# Patient Record
Sex: FEMALE | Race: WHITE | Employment: OTHER | URBAN - METROPOLITAN AREA
[De-identification: names, ages, dates, MRNs, and addresses within clinical notes are randomized per-mention and may not be internally consistent; named-entity substitution may affect disease eponyms.]

---

## 2018-08-10 ENCOUNTER — OFFICE VISIT (OUTPATIENT)
Dept: INTERNAL MEDICINE CLINIC | Age: 73
End: 2018-08-10

## 2018-08-10 VITALS
DIASTOLIC BLOOD PRESSURE: 62 MMHG | TEMPERATURE: 98.7 F | SYSTOLIC BLOOD PRESSURE: 144 MMHG | HEIGHT: 68 IN | BODY MASS INDEX: 23.49 KG/M2 | WEIGHT: 155 LBS

## 2018-08-10 DIAGNOSIS — N30.90 CYSTITIS: Primary | ICD-10-CM

## 2018-08-10 DIAGNOSIS — R39.15 URGENCY OF URINATION: ICD-10-CM

## 2018-08-10 DIAGNOSIS — R30.0 DYSURIA: ICD-10-CM

## 2018-08-10 LAB
BILIRUBIN, POC: ABNORMAL
BLOOD URINE, POC: + 3
CLARITY, POC: CLEAR
COLOR, POC: ABNORMAL
GLUCOSE URINE, POC: ABNORMAL
KETONES, POC: ABNORMAL
LEUKOCYTE EST, POC: + 2
NITRITE, POC: ABNORMAL
PH, POC: 6
PROTEIN, POC: ABNORMAL
SPECIFIC GRAVITY, POC: 1010
UROBILINOGEN, POC: ABNORMAL

## 2018-08-10 PROCEDURE — 99202 OFFICE O/P NEW SF 15 MIN: CPT | Performed by: NURSE PRACTITIONER

## 2018-08-10 PROCEDURE — 1123F ACP DISCUSS/DSCN MKR DOCD: CPT | Performed by: NURSE PRACTITIONER

## 2018-08-10 PROCEDURE — G8400 PT W/DXA NO RESULTS DOC: HCPCS | Performed by: NURSE PRACTITIONER

## 2018-08-10 PROCEDURE — 4040F PNEUMOC VAC/ADMIN/RCVD: CPT | Performed by: NURSE PRACTITIONER

## 2018-08-10 PROCEDURE — 1101F PT FALLS ASSESS-DOCD LE1/YR: CPT | Performed by: NURSE PRACTITIONER

## 2018-08-10 PROCEDURE — 1090F PRES/ABSN URINE INCON ASSESS: CPT | Performed by: NURSE PRACTITIONER

## 2018-08-10 PROCEDURE — 81002 URINALYSIS NONAUTO W/O SCOPE: CPT | Performed by: NURSE PRACTITIONER

## 2018-08-10 PROCEDURE — G0444 DEPRESSION SCREEN ANNUAL: HCPCS | Performed by: NURSE PRACTITIONER

## 2018-08-10 PROCEDURE — G8420 CALC BMI NORM PARAMETERS: HCPCS | Performed by: NURSE PRACTITIONER

## 2018-08-10 PROCEDURE — 3017F COLORECTAL CA SCREEN DOC REV: CPT | Performed by: NURSE PRACTITIONER

## 2018-08-10 PROCEDURE — G8427 DOCREV CUR MEDS BY ELIG CLIN: HCPCS | Performed by: NURSE PRACTITIONER

## 2018-08-10 PROCEDURE — 1036F TOBACCO NON-USER: CPT | Performed by: NURSE PRACTITIONER

## 2018-08-10 RX ORDER — CIPROFLOXACIN 250 MG/1
250 TABLET, FILM COATED ORAL 2 TIMES DAILY
Qty: 6 TABLET | Refills: 0 | Status: SHIPPED | OUTPATIENT
Start: 2018-08-10 | End: 2018-08-13

## 2018-08-10 RX ORDER — PHENAZOPYRIDINE HYDROCHLORIDE 200 MG/1
200 TABLET, FILM COATED ORAL 3 TIMES DAILY PRN
Qty: 9 TABLET | Refills: 0 | Status: SHIPPED | OUTPATIENT
Start: 2018-08-10 | End: 2018-08-13

## 2018-08-10 ASSESSMENT — PATIENT HEALTH QUESTIONNAIRE - PHQ9
8. MOVING OR SPEAKING SO SLOWLY THAT OTHER PEOPLE COULD HAVE NOTICED. OR THE OPPOSITE, BEING SO FIGETY OR RESTLESS THAT YOU HAVE BEEN MOVING AROUND A LOT MORE THAN USUAL: 0
SUM OF ALL RESPONSES TO PHQ9 QUESTIONS 1 & 2: 6
7. TROUBLE CONCENTRATING ON THINGS, SUCH AS READING THE NEWSPAPER OR WATCHING TELEVISION: 0
6. FEELING BAD ABOUT YOURSELF - OR THAT YOU ARE A FAILURE OR HAVE LET YOURSELF OR YOUR FAMILY DOWN: 0
2. FEELING DOWN, DEPRESSED OR HOPELESS: 3
SUM OF ALL RESPONSES TO PHQ QUESTIONS 1-9: 15
5. POOR APPETITE OR OVEREATING: 3
9. THOUGHTS THAT YOU WOULD BE BETTER OFF DEAD, OR OF HURTING YOURSELF: 0
SUM OF ALL RESPONSES TO PHQ QUESTIONS 1-9: 15
3. TROUBLE FALLING OR STAYING ASLEEP: 3
4. FEELING TIRED OR HAVING LITTLE ENERGY: 3
1. LITTLE INTEREST OR PLEASURE IN DOING THINGS: 3
10. IF YOU CHECKED OFF ANY PROBLEMS, HOW DIFFICULT HAVE THESE PROBLEMS MADE IT FOR YOU TO DO YOUR WORK, TAKE CARE OF THINGS AT HOME, OR GET ALONG WITH OTHER PEOPLE: 0

## 2018-08-10 NOTE — PROGRESS NOTES
SUBJECTIVE:  Georgette Brower a 72 y. o.female    Chief Complaint   Patient presents with    Urinary Tract Infection       Has Been Treated With Bactrim,Made Her Sick       Patient here for UTI. She had PCP that retired and she is scheduled to establish care with PCP in a few weeks. She started to have UTI symptoms and could not wait until appointment but [facility]. She is having burning when she urinates, urgency, and frequency. She tried to increase fluids and drink cranberry juice but she has not gotten any better. She has had some chills. No fever. No blood in urine that she has realized. No past medical history on file. No past surgical history on file. Family History   Problem Relation Age of Onset    High Cholesterol Mother     Diabetes Father        Social History     Social History    Marital status:      Spouse name: N/A    Number of children: N/A    Years of education: N/A     Occupational History    Not on file. Social History Main Topics    Smoking status: Never Smoker    Smokeless tobacco: Never Used    Alcohol use No    Drug use: No    Sexual activity: Not on file     Other Topics Concern    Not on file     Social History Narrative    No narrative on file       Review of Systems   Constitutional: Negative for chills and fever. Respiratory: Negative for cough, chest tightness and shortness of breath. Cardiovascular: Negative for chest pain. Gastrointestinal: Negative for abdominal pain, constipation, diarrhea, nausea and vomiting. Endocrine: Negative. Genitourinary: Positive for dysuria, frequency and urgency. Negative for difficulty urinating. Musculoskeletal: Negative. Skin: Negative. Allergic/Immunologic: Negative. Neurological: Negative for dizziness, syncope and light-headedness. Hematological: Negative. Psychiatric/Behavioral: Negative.         OBJECTIVE:  BP (!) 144/62   Temp 98.7 °F (37.1 °C) (Oral)   Ht 5' 8\" (1.727 m)   Wt 155 lb (70.3 kg)   BMI 23.57 kg/m²     Physical Exam   Constitutional: She is oriented to person, place, and time. She appears well-developed and well-nourished. HENT:   Head: Normocephalic and atraumatic. Mouth/Throat: Oropharynx is clear and moist.   Eyes: Pupils are equal, round, and reactive to light. Conjunctivae are normal. No scleral icterus. Neck: Normal range of motion. Neck supple. Cardiovascular: Normal rate, regular rhythm and normal heart sounds. Pulmonary/Chest: Effort normal and breath sounds normal. No respiratory distress. Abdominal: Soft. Normal appearance and bowel sounds are normal. There is no hepatosplenomegaly. There is no CVA tenderness. Musculoskeletal: Normal range of motion. Neurological: She is alert and oriented to person, place, and time. Skin: Skin is warm, dry and intact. Psychiatric: She has a normal mood and affect. Her speech is normal and behavior is normal. Judgment and thought content normal.   Vitals reviewed. ASSESSMENT/PLAN:    1. Cystitis  If no improvement/or worsening of condition, notify office for further follow up. Drink plenty of fluids. (Limit caffeine, spicy foods, and alcohol). Take medication as prescribed. You make drink cranberry juice. Take probiotics as directed. May take Tylenol for fever. Make sure to wipe from front to back. Practice good hand hygiene. Empty bladder as soon as you have the urge to do so. - POCT Urinalysis no Micro  - ciprofloxacin (CIPRO) 250 MG tablet; Take 1 tablet by mouth 2 times daily for 3 days  Dispense: 6 tablet; Refill: 0  - URINE CULTURE    2. Urgency of urination  See above plan. 3. Dysuria  See above plan. Take medication as prescribed. - phenazopyridine (PYRIDIUM) 200 MG tablet; Take 1 tablet by mouth 3 times daily as needed for Pain  Dispense: 9 tablet; Refill: 0      Return if symptoms worsen or fail to improve.

## 2018-08-12 LAB — URINE CULTURE, ROUTINE: NORMAL

## 2018-08-12 ASSESSMENT — ENCOUNTER SYMPTOMS
VOMITING: 0
COUGH: 0
DIARRHEA: 0
CHEST TIGHTNESS: 0
SHORTNESS OF BREATH: 0
CONSTIPATION: 0
NAUSEA: 0
ALLERGIC/IMMUNOLOGIC NEGATIVE: 1
ABDOMINAL PAIN: 0

## 2018-08-16 ENCOUNTER — APPOINTMENT (OUTPATIENT)
Dept: CT IMAGING | Age: 73
DRG: 699 | End: 2018-08-16
Payer: MEDICARE

## 2018-08-16 ENCOUNTER — HOSPITAL ENCOUNTER (INPATIENT)
Age: 73
LOS: 2 days | Discharge: HOME | DRG: 699 | End: 2018-08-18
Attending: EMERGENCY MEDICINE | Admitting: INTERNAL MEDICINE
Payer: MEDICARE

## 2018-08-16 DIAGNOSIS — K57.20 PERFORATION OF SIGMOID COLON DUE TO DIVERTICULITIS: Primary | ICD-10-CM

## 2018-08-16 DIAGNOSIS — N32.1: ICD-10-CM

## 2018-08-16 DIAGNOSIS — N34.2 INFECTIVE URETHRITIS: ICD-10-CM

## 2018-08-16 PROBLEM — K57.92 ACUTE DIVERTICULITIS: Status: ACTIVE | Noted: 2018-08-16

## 2018-08-16 PROBLEM — N30.01 ACUTE CYSTITIS WITH HEMATURIA: Status: ACTIVE | Noted: 2018-08-16

## 2018-08-16 LAB
A/G RATIO: 1.2 (ref 1.1–2.2)
ALBUMIN SERPL-MCNC: 3.9 G/DL (ref 3.4–5)
ALP BLD-CCNC: 123 U/L (ref 40–129)
ALT SERPL-CCNC: 21 U/L (ref 10–40)
ANION GAP SERPL CALCULATED.3IONS-SCNC: 14 MMOL/L (ref 3–16)
AST SERPL-CCNC: 22 U/L (ref 15–37)
BACTERIA: ABNORMAL /HPF
BILIRUB SERPL-MCNC: 0.3 MG/DL (ref 0–1)
BILIRUBIN URINE: NEGATIVE
BLOOD, URINE: ABNORMAL
BUN BLDV-MCNC: 9 MG/DL (ref 7–20)
CALCIUM SERPL-MCNC: 9.2 MG/DL (ref 8.3–10.6)
CHLORIDE BLD-SCNC: 104 MMOL/L (ref 99–110)
CLARITY: CLEAR
CO2: 23 MMOL/L (ref 21–32)
COLOR: YELLOW
CREAT SERPL-MCNC: <0.5 MG/DL (ref 0.6–1.2)
EPITHELIAL CELLS, UA: ABNORMAL /HPF
GFR AFRICAN AMERICAN: >60
GFR NON-AFRICAN AMERICAN: >60
GLOBULIN: 3.3 G/DL
GLUCOSE BLD-MCNC: 106 MG/DL (ref 70–99)
GLUCOSE URINE: NEGATIVE MG/DL
HCT VFR BLD CALC: 34.8 % (ref 36–48)
HEMOGLOBIN: 11.6 G/DL (ref 12–16)
KETONES, URINE: NEGATIVE MG/DL
LEUKOCYTE ESTERASE, URINE: ABNORMAL
LIPASE: 23 U/L (ref 13–60)
MCH RBC QN AUTO: 27.6 PG (ref 26–34)
MCHC RBC AUTO-ENTMCNC: 33.2 G/DL (ref 31–36)
MCV RBC AUTO: 83 FL (ref 80–100)
MICROSCOPIC EXAMINATION: YES
NITRITE, URINE: NEGATIVE
PDW BLD-RTO: 14 % (ref 12.4–15.4)
PH UA: 6.5
PLATELET # BLD: 326 K/UL (ref 135–450)
PMV BLD AUTO: 6.7 FL (ref 5–10.5)
POTASSIUM SERPL-SCNC: 4 MMOL/L (ref 3.5–5.1)
PROTEIN UA: NEGATIVE MG/DL
RBC # BLD: 4.19 M/UL (ref 4–5.2)
RBC UA: ABNORMAL /HPF (ref 0–2)
SODIUM BLD-SCNC: 141 MMOL/L (ref 136–145)
SPECIFIC GRAVITY UA: <=1.005
TOTAL PROTEIN: 7.2 G/DL (ref 6.4–8.2)
URINE TYPE: ABNORMAL
UROBILINOGEN, URINE: 0.2 E.U./DL
WBC # BLD: 6.6 K/UL (ref 4–11)
WBC UA: ABNORMAL /HPF (ref 0–5)

## 2018-08-16 PROCEDURE — 99284 EMERGENCY DEPT VISIT MOD MDM: CPT

## 2018-08-16 PROCEDURE — 83690 ASSAY OF LIPASE: CPT

## 2018-08-16 PROCEDURE — 96360 HYDRATION IV INFUSION INIT: CPT

## 2018-08-16 PROCEDURE — 87086 URINE CULTURE/COLONY COUNT: CPT

## 2018-08-16 PROCEDURE — 2580000003 HC RX 258: Performed by: INTERNAL MEDICINE

## 2018-08-16 PROCEDURE — 74176 CT ABD & PELVIS W/O CONTRAST: CPT

## 2018-08-16 PROCEDURE — 6360000002 HC RX W HCPCS: Performed by: INTERNAL MEDICINE

## 2018-08-16 PROCEDURE — 80053 COMPREHEN METABOLIC PANEL: CPT

## 2018-08-16 PROCEDURE — 2580000003 HC RX 258: Performed by: NURSE PRACTITIONER

## 2018-08-16 PROCEDURE — 81001 URINALYSIS AUTO W/SCOPE: CPT

## 2018-08-16 PROCEDURE — 1200000000 HC SEMI PRIVATE

## 2018-08-16 PROCEDURE — 85027 COMPLETE CBC AUTOMATED: CPT

## 2018-08-16 RX ORDER — POTASSIUM CHLORIDE 20 MEQ/1
40 TABLET, EXTENDED RELEASE ORAL PRN
Status: DISCONTINUED | OUTPATIENT
Start: 2018-08-16 | End: 2018-08-18 | Stop reason: HOSPADM

## 2018-08-16 RX ORDER — SODIUM CHLORIDE 9 MG/ML
INJECTION, SOLUTION INTRAVENOUS CONTINUOUS
Status: DISCONTINUED | OUTPATIENT
Start: 2018-08-16 | End: 2018-08-18 | Stop reason: HOSPADM

## 2018-08-16 RX ORDER — MORPHINE SULFATE 2 MG/ML
2 INJECTION, SOLUTION INTRAMUSCULAR; INTRAVENOUS
Status: DISCONTINUED | OUTPATIENT
Start: 2018-08-16 | End: 2018-08-18 | Stop reason: HOSPADM

## 2018-08-16 RX ORDER — POTASSIUM CHLORIDE 7.45 MG/ML
10 INJECTION INTRAVENOUS PRN
Status: DISCONTINUED | OUTPATIENT
Start: 2018-08-16 | End: 2018-08-18 | Stop reason: HOSPADM

## 2018-08-16 RX ORDER — SODIUM CHLORIDE 0.9 % (FLUSH) 0.9 %
10 SYRINGE (ML) INJECTION PRN
Status: DISCONTINUED | OUTPATIENT
Start: 2018-08-16 | End: 2018-08-18 | Stop reason: HOSPADM

## 2018-08-16 RX ORDER — SODIUM CHLORIDE 0.9 % (FLUSH) 0.9 %
10 SYRINGE (ML) INJECTION EVERY 12 HOURS SCHEDULED
Status: DISCONTINUED | OUTPATIENT
Start: 2018-08-16 | End: 2018-08-18 | Stop reason: HOSPADM

## 2018-08-16 RX ORDER — POTASSIUM CHLORIDE 20MEQ/15ML
40 LIQUID (ML) ORAL PRN
Status: DISCONTINUED | OUTPATIENT
Start: 2018-08-16 | End: 2018-08-18 | Stop reason: HOSPADM

## 2018-08-16 RX ORDER — MORPHINE SULFATE 4 MG/ML
4 INJECTION, SOLUTION INTRAMUSCULAR; INTRAVENOUS
Status: DISCONTINUED | OUTPATIENT
Start: 2018-08-16 | End: 2018-08-18 | Stop reason: HOSPADM

## 2018-08-16 RX ORDER — 0.9 % SODIUM CHLORIDE 0.9 %
1000 INTRAVENOUS SOLUTION INTRAVENOUS ONCE
Status: COMPLETED | OUTPATIENT
Start: 2018-08-16 | End: 2018-08-16

## 2018-08-16 RX ORDER — ACETAMINOPHEN 325 MG/1
650 TABLET ORAL EVERY 4 HOURS PRN
Status: DISCONTINUED | OUTPATIENT
Start: 2018-08-16 | End: 2018-08-18 | Stop reason: HOSPADM

## 2018-08-16 RX ORDER — CYANOCOBALAMIN (VITAMIN B-12) 1000 MCG
1000 TABLET, EXTENDED RELEASE ORAL DAILY
COMMUNITY
End: 2021-11-15 | Stop reason: ALTCHOICE

## 2018-08-16 RX ORDER — ONDANSETRON 2 MG/ML
4 INJECTION INTRAMUSCULAR; INTRAVENOUS EVERY 6 HOURS PRN
Status: DISCONTINUED | OUTPATIENT
Start: 2018-08-16 | End: 2018-08-18 | Stop reason: HOSPADM

## 2018-08-16 RX ADMIN — PIPERACILLIN SODIUM AND TAZOBACTAM SODIUM 3.38 G: 3; .375 INJECTION, POWDER, LYOPHILIZED, FOR SOLUTION INTRAVENOUS at 21:21

## 2018-08-16 RX ADMIN — MORPHINE SULFATE 2 MG: 2 INJECTION, SOLUTION INTRAMUSCULAR; INTRAVENOUS at 21:25

## 2018-08-16 RX ADMIN — SODIUM CHLORIDE: 9 INJECTION, SOLUTION INTRAVENOUS at 21:21

## 2018-08-16 RX ADMIN — SODIUM CHLORIDE 1000 ML: 9 INJECTION, SOLUTION INTRAVENOUS at 18:38

## 2018-08-16 RX ADMIN — SODIUM CHLORIDE, PRESERVATIVE FREE 10 ML: 5 INJECTION INTRAVENOUS at 21:21

## 2018-08-16 ASSESSMENT — PAIN SCALES - GENERAL
PAINLEVEL_OUTOF10: 8
PAINLEVEL_OUTOF10: 3
PAINLEVEL_OUTOF10: 6

## 2018-08-16 ASSESSMENT — PAIN DESCRIPTION - ORIENTATION: ORIENTATION: LOWER

## 2018-08-16 ASSESSMENT — PAIN DESCRIPTION - PAIN TYPE: TYPE: ACUTE PAIN

## 2018-08-16 ASSESSMENT — PAIN DESCRIPTION - PROGRESSION: CLINICAL_PROGRESSION: NOT CHANGED

## 2018-08-16 ASSESSMENT — PAIN DESCRIPTION - FREQUENCY: FREQUENCY: INTERMITTENT

## 2018-08-16 ASSESSMENT — PAIN DESCRIPTION - DESCRIPTORS: DESCRIPTORS: CRAMPING

## 2018-08-16 ASSESSMENT — PAIN DESCRIPTION - LOCATION: LOCATION: ABDOMEN

## 2018-08-16 ASSESSMENT — PAIN DESCRIPTION - ONSET: ONSET: ON-GOING

## 2018-08-16 NOTE — H&P
Hospital Medicine History & Physical      PCP: No primary care provider on file. Date of Admission: 8/16/2018    Date of Service: Pt seen/examined on 8/16/2018 and Admitted to Inpatient with expected LOS greater than two midnights due to medical therapy. Chief Complaint:  Burning sensation during urination, lower abdominal pain      History Of Present Illness:   72 y.o. female who presented to [facility] with above complaints  This is a otherwise healthy 80-year-old  woman who presented to the ED with complaints of dysuria, pain in the lower abdomen that has been going on on and off for the past few weeks. She reports she was diagnosed with a UTI by her primary care provider a few weeks ago and was prescribed Bactrim. When her symptoms did not resolve she was then given ciprofloxacin and Pyridium. After completion of this, her PCP obtained a urinalysis which was apparently negative but her symptoms persisted so she decided to come to the ED because today she began to have blood in the urine and continuing lower abdominal pain. She denies any subjective fevers chills or rigors, denies any nausea vomiting or diarrhea. Past Medical History:        Diagnosis Date    Kidney stone        Past Surgical History:    History reviewed. No pertinent surgical history. Medications Prior to Admission:      Prior to Admission medications    Medication Sig Start Date End Date Taking? Authorizing Provider   Cholecalciferol (VITAMIN D3) 5000 units TABS Take by mouth   Yes Historical Provider, MD   diclofenac (VOLTAREN) 75 MG EC tablet Take 1 tablet by mouth 2 times daily. 11/20/14   Isabel Randhawa MD       Allergies:  Bactrim [sulfamethoxazole-trimethoprim]    Social History:    The patient currently lives at home  TOBACCO:   reports that she has never smoked. She has never used smokeless tobacco.  ETOH:   reports that she does not drink alcohol.       Family History:   Positive as BLOODU MODERATE 08/16/2018    SPECGRAV <=1.005 08/16/2018    GLUCOSEU Negative 08/16/2018       Radiology:   I have reviewed the CT abdomen pelvis personally    CT ABDOMEN PELVIS WO CONTRAST Additional Contrast? None   Preliminary Result   Findings compatible with acute sigmoid diverticulitis with a tiny amount of   extraluminal gas adjacent to the sigmoid colon. Significant wall thickening involving the bladder with gas in the urinary   bladder, which raises concern for colovesical fistula. Asymmetric atrophy of the left kidney. Findings were discussed with Dr. Davey [facility] at 7:05 p.m. on 8/16/2018. ASSESSMENT:PLAN:  Acute diverticulitis with microperforation and possible colo-vesical fistula  - IV zosyn  - Gen surgery consulted  - NPO for possible further testing/surgical intervention  - IV fluids, analgesics  - May require CT abdomen pelvis with oral/rectal contrast tomorrow to look for colovesical fistula, defer to surgery  - consult urology      Acute cystitis with hematuria  - on IV zosyn, follow up urine c/s and tailor antiBx accordingly      DVT Prophylaxis: SCD  Diet: Diet NPO Effective Now  Code Status: Full code  PT/OT Eval Status: not consulted    Dispo - IP stay       Medhat Cummins MD    Thank you No primary care provider on file. for the opportunity to be involved in this patient's care. If you have any questions or concerns please feel free to contact me at [phone].

## 2018-08-16 NOTE — ED PROVIDER NOTES
[facility] Emergency Department    CHIEF COMPLAINT  Dysuria (Hematuria and frequency today. Pain in lower abd. Sts dx with UTI a few wks ago, given bactrim x1wk, sts she was no better, was given cipro and pyridium. Sts she feels neg but repeat urine was negative.)      HISTORY OF PRESENT ILLNESS  Dean Valente is a 72 y.o. female who presents to the ED complaining of lower abdominal pressure with urinary urgency, frequency, dysuria and hematuria. Patient reports approximately a few weeks ago she was diagnosed with a urinary tract infection and placed on Bactrim however she did not get any better and was placed on Cipro and Pyridium afterwards. Patient reports seeing her primary care physician upon completion of antibiotic therapy and had negative urinalysis continued to have symptoms. Patient reports 2 days ago she began with hematuria and worsening of \"UTI like symptoms. \" Patient denies any dizziness or lightheadedness. No chest pain or shortness of breath. No nausea, vomiting, or diarrhea. Patient reports decreased appetite. No numbness or tingling in extremities. No unilateral weakness. No other complaints, modifying factors or associated symptoms. Nursing notes reviewed. Past Medical History:   Diagnosis Date    Kidney stone      History reviewed. No pertinent surgical history. Family History   Problem Relation Age of Onset    High Cholesterol Mother     Diabetes Father      Social History     Social History    Marital status:      Spouse name: N/A    Number of children: N/A    Years of education: N/A     Occupational History    Not on file.      Social History Main Topics    Smoking status: Never Smoker    Smokeless tobacco: Never Used    Alcohol use No    Drug use: No    Sexual activity: Not on file     Other Topics Concern    Not on file     Social History Narrative    No narrative on file     Current Facility-Administered Medications   Medication Dose Result Value Ref Range    Color, UA Yellow Straw/Yellow    Clarity, UA Clear Clear    Glucose, Ur Negative Negative mg/dL    Bilirubin Urine Negative Negative    Ketones, Urine Negative Negative mg/dL    Specific Gravity, UA <=1.005 1.005 - 1.030    Blood, Urine MODERATE (A) Negative    pH, UA 6.5 5.0 - 8.0    Protein, UA Negative Negative mg/dL    Urobilinogen, Urine 0.2 <2.0 E.U./dL    Nitrite, Urine Negative Negative    Leukocyte Esterase, Urine LARGE (A) Negative    Microscopic Examination YES     Urine Type Not Specified    CBC   Result Value Ref Range    WBC 6.6 4.0 - 11.0 K/uL    RBC 4.19 4.00 - 5.20 M/uL    Hemoglobin 11.6 (L) 12.0 - 16.0 g/dL    Hematocrit 34.8 (L) 36.0 - 48.0 %    MCV 83.0 80.0 - 100.0 fL    MCH 27.6 26.0 - 34.0 pg    MCHC 33.2 31.0 - 36.0 g/dL    RDW 14.0 12.4 - 15.4 %    Platelets 542 270 - 321 K/uL    MPV 6.7 5.0 - 10.5 fL   Comprehensive Metabolic Panel   Result Value Ref Range    Sodium 141 136 - 145 mmol/L    Potassium 4.0 3.5 - 5.1 mmol/L    Chloride 104 99 - 110 mmol/L    CO2 23 21 - 32 mmol/L    Anion Gap 14 3 - 16    Glucose 106 (H) 70 - 99 mg/dL    BUN 9 7 - 20 mg/dL    CREATININE <0.5 (L) 0.6 - 1.2 mg/dL    GFR Non-African American >60 >60    GFR African American >60 >60    Calcium 9.2 8.3 - 10.6 mg/dL    Total Protein 7.2 6.4 - 8.2 g/dL    Alb 3.9 3.4 - 5.0 g/dL    Albumin/Globulin Ratio 1.2 1.1 - 2.2    Total Bilirubin 0.3 0.0 - 1.0 mg/dL    Alkaline Phosphatase 123 40 - 129 U/L    ALT 21 10 - 40 U/L    AST 22 15 - 37 U/L    Globulin 3.3 g/dL   Lipase   Result Value Ref Range    Lipase 23.0 13.0 - 60.0 U/L   Microscopic Urinalysis   Result Value Ref Range    WBC, UA 20-50 (A) 0 - 5 /HPF    RBC, UA 0-2 0 - 2 /HPF    Epi Cells 0-2 /HPF    Bacteria, UA Rare (A) /HPF       I spoke with Dr. Hernando Dhaliwal and Dr. Srinivas Oreilly. We thoroughly discussed the history, physical exam, laboratory and imaging studies, as well as, emergency department course.  Based upon that discussion, we've decided to admit Mirza Caruso for further observation and evaluation of Julia He's abdominal pain. As I have deemed necessary from their history, physical and studies, I have considered and evaluated Mirza Caruso for the following diagnoses:     FINAL IMPRESSION  1. Perforation of sigmoid colon due to diverticulitis    2. Infective urethritis    3. Fistula, intestinovesical        Vitals:  Blood pressure (!) 143/89, pulse 87, temperature 98.1 °F (36.7 °C), temperature source Oral, resp. rate 16, height 5' 8\" (1.727 m), weight 155 lb (70.3 kg), SpO2 97 %. DISPOSITION  Patient was admitted in stable condition.           Sonia Harden, SOUTH - JANIYA  08/16/18 [id number]

## 2018-08-17 VITALS
SYSTOLIC BLOOD PRESSURE: 122 MMHG | WEIGHT: 155 LBS | HEART RATE: 77 BPM | DIASTOLIC BLOOD PRESSURE: 73 MMHG | TEMPERATURE: 97.9 F | OXYGEN SATURATION: 94 % | BODY MASS INDEX: 23.49 KG/M2 | RESPIRATION RATE: 16 BRPM | HEIGHT: 68 IN

## 2018-08-17 LAB
ANION GAP SERPL CALCULATED.3IONS-SCNC: 11 MMOL/L (ref 3–16)
BASOPHILS ABSOLUTE: 0 K/UL (ref 0–0.2)
BASOPHILS RELATIVE PERCENT: 0.5 %
BUN BLDV-MCNC: 8 MG/DL (ref 7–20)
CALCIUM SERPL-MCNC: 8.5 MG/DL (ref 8.3–10.6)
CHLORIDE BLD-SCNC: 108 MMOL/L (ref 99–110)
CO2: 24 MMOL/L (ref 21–32)
CREAT SERPL-MCNC: <0.5 MG/DL (ref 0.6–1.2)
EOSINOPHILS ABSOLUTE: 0.1 K/UL (ref 0–0.6)
EOSINOPHILS RELATIVE PERCENT: 2.2 %
GFR AFRICAN AMERICAN: >60
GFR NON-AFRICAN AMERICAN: >60
GLUCOSE BLD-MCNC: 91 MG/DL (ref 70–99)
HCT VFR BLD CALC: 32.4 % (ref 36–48)
HEMOGLOBIN: 10.9 G/DL (ref 12–16)
LYMPHOCYTES ABSOLUTE: 2.3 K/UL (ref 1–5.1)
LYMPHOCYTES RELATIVE PERCENT: 39 %
MCH RBC QN AUTO: 28.2 PG (ref 26–34)
MCHC RBC AUTO-ENTMCNC: 33.5 G/DL (ref 31–36)
MCV RBC AUTO: 84 FL (ref 80–100)
MONOCYTES ABSOLUTE: 0.5 K/UL (ref 0–1.3)
MONOCYTES RELATIVE PERCENT: 9.1 %
NEUTROPHILS ABSOLUTE: 2.8 K/UL (ref 1.7–7.7)
NEUTROPHILS RELATIVE PERCENT: 49.2 %
PDW BLD-RTO: 14.1 % (ref 12.4–15.4)
PLATELET # BLD: 305 K/UL (ref 135–450)
PMV BLD AUTO: 6.6 FL (ref 5–10.5)
POTASSIUM REFLEX MAGNESIUM: 3.6 MMOL/L (ref 3.5–5.1)
RBC # BLD: 3.86 M/UL (ref 4–5.2)
SODIUM BLD-SCNC: 143 MMOL/L (ref 136–145)
WBC # BLD: 5.8 K/UL (ref 4–11)

## 2018-08-17 PROCEDURE — 36415 COLL VENOUS BLD VENIPUNCTURE: CPT

## 2018-08-17 PROCEDURE — 6360000002 HC RX W HCPCS: Performed by: NURSE PRACTITIONER

## 2018-08-17 PROCEDURE — 1200000000 HC SEMI PRIVATE

## 2018-08-17 PROCEDURE — 80048 BASIC METABOLIC PNL TOTAL CA: CPT

## 2018-08-17 PROCEDURE — 6360000002 HC RX W HCPCS: Performed by: INTERNAL MEDICINE

## 2018-08-17 PROCEDURE — 2580000003 HC RX 258: Performed by: INTERNAL MEDICINE

## 2018-08-17 PROCEDURE — 85025 COMPLETE CBC W/AUTO DIFF WBC: CPT

## 2018-08-17 PROCEDURE — 99222 1ST HOSP IP/OBS MODERATE 55: CPT | Performed by: SURGERY

## 2018-08-17 RX ADMIN — MORPHINE SULFATE 4 MG: 4 INJECTION INTRAVENOUS at 20:56

## 2018-08-17 RX ADMIN — SODIUM CHLORIDE: 9 INJECTION, SOLUTION INTRAVENOUS at 04:56

## 2018-08-17 RX ADMIN — SODIUM CHLORIDE: 9 INJECTION, SOLUTION INTRAVENOUS at 12:55

## 2018-08-17 RX ADMIN — PIPERACILLIN SODIUM AND TAZOBACTAM SODIUM 3.38 G: 3; .375 INJECTION, POWDER, LYOPHILIZED, FOR SOLUTION INTRAVENOUS at 14:42

## 2018-08-17 RX ADMIN — MORPHINE SULFATE 2 MG: 2 INJECTION, SOLUTION INTRAMUSCULAR; INTRAVENOUS at 04:56

## 2018-08-17 RX ADMIN — PIPERACILLIN SODIUM AND TAZOBACTAM SODIUM 3.38 G: 3; .375 INJECTION, POWDER, LYOPHILIZED, FOR SOLUTION INTRAVENOUS at 05:00

## 2018-08-17 RX ADMIN — SODIUM CHLORIDE: 9 INJECTION, SOLUTION INTRAVENOUS at 20:56

## 2018-08-17 RX ADMIN — SODIUM CHLORIDE, PRESERVATIVE FREE 10 ML: 5 INJECTION INTRAVENOUS at 08:17

## 2018-08-17 RX ADMIN — SODIUM CHLORIDE, PRESERVATIVE FREE 10 ML: 5 INJECTION INTRAVENOUS at 22:06

## 2018-08-17 RX ADMIN — MORPHINE SULFATE 4 MG: 4 INJECTION INTRAVENOUS at 11:10

## 2018-08-17 RX ADMIN — MORPHINE SULFATE 2 MG: 2 INJECTION, SOLUTION INTRAMUSCULAR; INTRAVENOUS at 08:16

## 2018-08-17 RX ADMIN — MORPHINE SULFATE 2 MG: 2 INJECTION, SOLUTION INTRAMUSCULAR; INTRAVENOUS at 00:19

## 2018-08-17 RX ADMIN — PIPERACILLIN SODIUM AND TAZOBACTAM SODIUM 3.38 G: 3; .375 INJECTION, POWDER, LYOPHILIZED, FOR SOLUTION INTRAVENOUS at 22:04

## 2018-08-17 RX ADMIN — ENOXAPARIN SODIUM 40 MG: 40 INJECTION SUBCUTANEOUS at 22:04

## 2018-08-17 ASSESSMENT — PAIN SCALES - GENERAL
PAINLEVEL_OUTOF10: 4
PAINLEVEL_OUTOF10: 3
PAINLEVEL_OUTOF10: 6
PAINLEVEL_OUTOF10: 5
PAINLEVEL_OUTOF10: 4
PAINLEVEL_OUTOF10: 8
PAINLEVEL_OUTOF10: 6
PAINLEVEL_OUTOF10: 10

## 2018-08-17 ASSESSMENT — PAIN DESCRIPTION - PAIN TYPE: TYPE: ACUTE PAIN

## 2018-08-17 ASSESSMENT — PAIN DESCRIPTION - LOCATION: LOCATION: ABDOMEN

## 2018-08-17 NOTE — CONSULTS
Department of General Surgery Consult    PATIENT NAME: Julio Ornelas   YOB: 1945    ADMISSION DATE: 8/16/2018  5:42 PM      TODAY'S DATE: 8/17/2018    Reason for Consult:  Diverticulitis, colovesical fistula    Chief Complaint: abd pain    Requesting Physician:  Marian Allen    HISTORY OF PRESENT ILLNESS:              The patient is a 72 y.o. female who presents with lower abdominal pain. Has been treated recently for UTI but symptoms worsened. Some chills. Mild nausea. Some dysuria and hematuria. No pneumaturia. No prior colonoscopy. .    Past Medical History:        Diagnosis Date    Kidney stone        Past Surgical History:    History reviewed. No pertinent surgical history. Current Medications:   Current Facility-Administered Medications: enoxaparin (LOVENOX) injection 40 mg, 40 mg, Subcutaneous, Daily  piperacillin-tazobactam (ZOSYN) 3.375 g in dextrose 5 % 50 mL IVPB extended infusion (mini-bag), 3.375 g, Intravenous, Q8H  sodium chloride flush 0.9 % injection 10 mL, 10 mL, Intravenous, 2 times per day  sodium chloride flush 0.9 % injection 10 mL, 10 mL, Intravenous, PRN  magnesium hydroxide (MILK OF MAGNESIA) 400 MG/5ML suspension 30 mL, 30 mL, Oral, Daily PRN  ondansetron (ZOFRAN) injection 4 mg, 4 mg, Intravenous, Q6H PRN  0.9 % sodium chloride infusion, , Intravenous, Continuous  potassium chloride (KLOR-CON M) extended release tablet 40 mEq, 40 mEq, Oral, PRN **OR** potassium chloride 20 MEQ/15ML (10%) oral solution 40 mEq, 40 mEq, Oral, PRN **OR** potassium chloride 10 mEq/100 mL IVPB (Peripheral Line), 10 mEq, Intravenous, PRN  acetaminophen (TYLENOL) tablet 650 mg, 650 mg, Oral, Q4H PRN  morphine injection 2 mg, 2 mg, Intravenous, Q2H PRN **OR** morphine (PF) injection 4 mg, 4 mg, Intravenous, Q2H PRN  Prior to Admission medications    Medication Sig Start Date End Date Taking?  Authorizing Provider   Cholecalciferol (VITAMIN D3) 5000 units TABS Take by mouth   Yes Historical Provider, MD   Cyanocobalamin (VITAMIN B-12) 1000 MCG extended release tablet Take 1,000 mcg by mouth daily   Yes Historical Provider, MD   diclofenac (VOLTAREN) 75 MG EC tablet Take 1 tablet by mouth 2 times daily. 11/20/14   Josselin Howard MD        Allergies:  Bactrim [sulfamethoxazole-trimethoprim]    Social History:   TOBACCO:  no  ETOH:  no    Family History:    Family History   Problem Relation Age of Onset    High Cholesterol Mother     Diabetes Father        REVIEW OF SYSTEMS:  CONSTITUTIONAL:  positive for  chills  HEENT:  negative  RESPIRATORY:  negative  CARDIOVASCULAR:  negative  GASTROINTESTINAL:  negative except for nausea and abdominal pain  GENITOURINARY:  negative  HEMATOLOGIC/LYMPHATIC:  negative  NEUROLOGICAL:  Negative  * All other ROS reviewed and negative. PHYSICAL EXAM:  VITALS:  /66   Pulse 81   Temp 98.3 °F (36.8 °C) (Oral)   Resp 16   Ht 5' 8\" (1.727 m)   Wt 155 lb (70.3 kg)   SpO2 95%   BMI 23.57 kg/m²   24HR INTAKE/OUTPUT:    I/O last 3 completed shifts: In: 3444 [I.V.:2259]  Out: 700 [Urine:700]  No intake/output data recorded.     CONSTITUTIONAL:  alert, no apparent distress and normal weight  EYES:  PERRL, sclera clear  ENT:  Normocepalic,atraumatic, without obvious abnormality  NECK:  supple, symmetrical, trachea midline  LUNGS: Resp effort easy and unlabored, clear to auscultation  CARDIOVASCULAR:  NO JVD, regular rate and rhythm and no murmur noted  ABDOMEN:  no scars, normal bowel sounds, soft, non-distended, tenderness noted in the left lower quadrant, voluntary guarding absent, no masses palpated   MUSCULOSKELETAL: No clubbing or cyanosis, 0+ pitting edema lower extremities  NEUROLOGIC:  Mental Status Exam:  Level of Alertness:   awake  PSYCHIATRIC:   person, place, time  SKIN:  no bruising or bleeding    DATA:    CBC:   Recent Labs      08/16/18   1820  08/17/18   0526   WBC  6.6  5.8   HGB  11.6*  10.9*   HCT  34.8*  32.4*   PLT  326  305     BMP:    Recent Labs      08/16/18   1820  08/17/18   0526   NA  141  143   K  4.0  3.6   CL  104  108   CO2  23  24   BUN  9  8   CREATININE  <0.5*  <0.5*   GLUCOSE  106*  91     Hepatic:   Recent Labs      08/16/18 1820   AST  22   ALT  21   BILITOT  0.3   ALKPHOS  123     Mag:    No results for input(s): MG in the last 72 hours. Phos:   No results for input(s): PHOS in the last 72 hours. INR: No results for input(s): INR in the last 72 hours. Radiology Review: Images personally reviewed by me. CT -   Findings compatible with acute sigmoid diverticulitis with a tiny amount of   extraluminal gas adjacent to the sigmoid colon.       Significant wall thickening involving the bladder with gas in the urinary   bladder, which raises concern for colovesical fistula.       Asymmetric atrophy of the left kidney.       Findings were discussed with Dr. Rolan Wang at 7:05 p.m. on 8/16/2018. IMPRESSION/RECOMMENDATIONS:    71 yo with diverticulitis, colovesical fistula  1. Symptoms mild and no leukocytosis. 2. Advance diet as tolerated  3. Continue IV abx  4. Outpatient cystoscopy and colonoscopy once infection resolved. Following this would plan for sigmoid colectomy.   5.  Possible discharge tomorrow on abx    [address] [facility]  [id number]

## 2018-08-17 NOTE — ED NOTES
Report given to floor Rn. All questions answered. No concerns at this time. The patient was transported via wheelchair to room. Family aware of transfer. All belongings sent with patient. Patient stable during transfer.       Mary Ellen Hernandez RN  08/16/18 5602

## 2018-08-17 NOTE — PLAN OF CARE
Problem: Pain:  Goal: Control of acute pain  Control of acute pain   Outcome: Ongoing  Pt complaining of abd pain rating 6/10; PRN Morphine given per order. Denies further needs at this time. Will continue to reassess and monitor.

## 2018-08-17 NOTE — CONSULTS
Urology Consult Note      Reason for Consult:  CV fistula     History:   Pt is a 71 yo WF c/o dysuria and lower abdominal pain. CT scan showed acute diverticultis with a tiny amount of extraluminal gas. There was bladder wall thickening and gas within the urinary bladder. Asymmetric atrophy of the left kidney also noted. UA suggestive of infection. WBC WNL, pt afebrile. She denies pneumaturia/fecaluria. She does not have a manning catheter. Meds: see med rec  Family History, Social History, Review of Systems:  Reviewed and agreed to as per chart    Exam:    Vitals:  /75   Pulse 77   Temp 98.2 °F (36.8 °C) (Oral)   Resp 15   Ht 5' 8\" (1.727 m)   Wt 155 lb (70.3 kg)   SpO2 97%   BMI 23.57 kg/m²   Temp  Av.1 °F (36.7 °C)  Min: 97.6 °F (36.4 °C)  Max: 98.5 °F (36.9 °C)    Intake/Output Summary (Last 24 hours) at 18 1009  Last data filed at 18 0457   Gross per 24 hour   Intake              999 ml   Output              500 ml   Net              499 ml        Physical:   Well developed, well nourished in no acute distress   Mood indicates no abnormalities. Pt doesnt appear depressed   Orientated to time and place   Neck is supple, trachea is midline   Respiratory effort is normal   Cardiovascular show no extremity swelling   Abdomen no masses or hernias are palpated, there is no tenderness. Liver and Spleen appear normal.   Skin show no abnormal lesions   Lymph nodes are not palpated in the inguinal, neck, or axillary area.       Labs:  WBC:    Lab Results   Component Value Date    WBC 5.8 2018     Hemoglobin/Hematocrit:  Lab Results   Component Value Date    HGB 10.9 2018    HCT 32.4 2018     BMP:  Lab Results   Component Value Date     2018    K 3.6 2018     2018    CO2 24 2018    BUN 8 2018    LABALBU 3.9 2018    CREATININE <0.5 2018    CALCIUM 8.5 2018    GFRAA >60 2018    GFRAA >60

## 2018-08-18 LAB
ANION GAP SERPL CALCULATED.3IONS-SCNC: 11 MMOL/L (ref 3–16)
BASOPHILS ABSOLUTE: 0 K/UL (ref 0–0.2)
BASOPHILS RELATIVE PERCENT: 0.4 %
BUN BLDV-MCNC: 8 MG/DL (ref 7–20)
CALCIUM SERPL-MCNC: 8.5 MG/DL (ref 8.3–10.6)
CHLORIDE BLD-SCNC: 108 MMOL/L (ref 99–110)
CO2: 24 MMOL/L (ref 21–32)
CREAT SERPL-MCNC: <0.5 MG/DL (ref 0.6–1.2)
EOSINOPHILS ABSOLUTE: 0.2 K/UL (ref 0–0.6)
EOSINOPHILS RELATIVE PERCENT: 4.7 %
GFR AFRICAN AMERICAN: >60
GFR NON-AFRICAN AMERICAN: >60
GLUCOSE BLD-MCNC: 86 MG/DL (ref 70–99)
HCT VFR BLD CALC: 30.9 % (ref 36–48)
HEMOGLOBIN: 10.2 G/DL (ref 12–16)
LYMPHOCYTES ABSOLUTE: 2.3 K/UL (ref 1–5.1)
LYMPHOCYTES RELATIVE PERCENT: 44.1 %
MCH RBC QN AUTO: 28.1 PG (ref 26–34)
MCHC RBC AUTO-ENTMCNC: 33.1 G/DL (ref 31–36)
MCV RBC AUTO: 85 FL (ref 80–100)
MONOCYTES ABSOLUTE: 0.5 K/UL (ref 0–1.3)
MONOCYTES RELATIVE PERCENT: 9.8 %
NEUTROPHILS ABSOLUTE: 2.2 K/UL (ref 1.7–7.7)
NEUTROPHILS RELATIVE PERCENT: 41 %
PDW BLD-RTO: 14 % (ref 12.4–15.4)
PLATELET # BLD: 267 K/UL (ref 135–450)
PMV BLD AUTO: 7 FL (ref 5–10.5)
POTASSIUM SERPL-SCNC: 3.7 MMOL/L (ref 3.5–5.1)
RBC # BLD: 3.63 M/UL (ref 4–5.2)
SODIUM BLD-SCNC: 143 MMOL/L (ref 136–145)
URINE CULTURE, ROUTINE: NORMAL
WBC # BLD: 5.3 K/UL (ref 4–11)

## 2018-08-18 PROCEDURE — 94761 N-INVAS EAR/PLS OXIMETRY MLT: CPT

## 2018-08-18 PROCEDURE — 6360000002 HC RX W HCPCS: Performed by: INTERNAL MEDICINE

## 2018-08-18 PROCEDURE — 36415 COLL VENOUS BLD VENIPUNCTURE: CPT

## 2018-08-18 PROCEDURE — 85025 COMPLETE CBC W/AUTO DIFF WBC: CPT

## 2018-08-18 PROCEDURE — 2580000003 HC RX 258: Performed by: INTERNAL MEDICINE

## 2018-08-18 PROCEDURE — 80048 BASIC METABOLIC PNL TOTAL CA: CPT

## 2018-08-18 RX ORDER — METRONIDAZOLE 500 MG/1
500 TABLET ORAL 3 TIMES DAILY
Qty: 30 TABLET | Refills: 0 | Status: SHIPPED | OUTPATIENT
Start: 2018-08-18 | End: 2018-08-28

## 2018-08-18 RX ORDER — CIPROFLOXACIN 500 MG/1
500 TABLET, FILM COATED ORAL 2 TIMES DAILY
Qty: 20 TABLET | Refills: 0 | Status: SHIPPED | OUTPATIENT
Start: 2018-08-18 | End: 2018-08-28

## 2018-08-18 RX ADMIN — PIPERACILLIN SODIUM AND TAZOBACTAM SODIUM 3.38 G: 3; .375 INJECTION, POWDER, LYOPHILIZED, FOR SOLUTION INTRAVENOUS at 06:14

## 2018-08-18 NOTE — PLAN OF CARE
Problem: Pain:  Goal: Pain level will decrease  Pain level will decrease   Outcome: Ongoing  Problem: Pain:  Goal: Pain level will decrease  Pt instructed on pain scale.  Will notify RN of any changed in pain level and will provide appropriate interventions

## 2018-08-18 NOTE — PROGRESS NOTES
Assessment completed and charted. Pt resting in bed. VSS. Pain controlled with medication. Call light within reach. Bed locked and in lowest position. Bedside table within reach. Pt denies needs at this time. No s/s of acute distress. Will continue to monitor.
Pt admitted to room C332 from ED via wheelchair at 2100. Pt alert and oriented, VSS, room air upon arrival. Pt ambulates independently. Oriented pt to room, call light and surroundings. Admission assessment completed and charted. Four eyed skin assessment completed with López Gunter RN; no skin issues noted. Pt complaining of abd pain rating 6/10; PRN Morphine given per order. Pt denies further needs. Bed locked and in lowest position, call light within reach. Will continue to monitor.
Pt discharged via private vehicle in stable condition. All discharge and follow up instructions given. Pt states understanding.
reactive to light. Conjunctivae/corneas clear. Neck: Supple, with full range of motion. No jugular venous distention. Trachea midline. Respiratory:  Normal respiratory effort. Clear to auscultation, bilaterally without Rales/Wheezes/Rhonchi. Cardiovascular: Regular rate and rhythm with normal S1/S2 without murmurs, rubs or gallops. Abdomen: Soft, non-tender, non-distended with normal bowel sounds. Musculoskeletal: No clubbing, cyanosis or edema bilaterally. Full range of motion without deformity. Skin: Skin color, texture, turgor normal.  No rashes or lesions. Neurologic:  Neurovascularly intact without any focal sensory/motor deficits. Cranial nerves: II-XII intact, grossly non-focal.  Psychiatric: Alert and oriented, thought content appropriate, normal insight  Capillary Refill: Brisk,< 3 seconds   Peripheral Pulses: +2 palpable, equal bilaterally       Labs:   Recent Labs      08/16/18 1820 08/17/18   0526   WBC  6.6  5.8   HGB  11.6*  10.9*   HCT  34.8*  32.4*   PLT  326  305     Recent Labs      08/16/18 1820 08/17/18   0526   NA  141  143   K  4.0  3.6   CL  104  108   CO2  23  24   BUN  9  8   CREATININE  <0.5*  <0.5*   CALCIUM  9.2  8.5     Recent Labs      08/16/18 1820   AST  22   ALT  21   BILITOT  0.3   ALKPHOS  123     No results for input(s): INR in the last 72 hours. No results for input(s): Alfreda Sneddon in the last 72 hours. Urinalysis:    Lab Results   Component Value Date    NITRU Negative 08/16/2018    WBCUA 20-50 08/16/2018    BACTERIA Rare 08/16/2018    RBCUA 0-2 08/16/2018    BLOODU MODERATE 08/16/2018    SPECGRAV <=1.005 08/16/2018    GLUCOSEU Negative 08/16/2018       Radiology:  CT ABDOMEN PELVIS WO CONTRAST Additional Contrast? None   Final Result   Findings compatible with acute sigmoid diverticulitis with a tiny amount of   extraluminal gas adjacent to the sigmoid colon.       Significant wall thickening involving the bladder with gas in the urinary   bladder,

## 2018-08-19 NOTE — DISCHARGE SUMMARY
Ht 5' 8\" (1.727 m)   Wt 155 lb (70.3 kg)   SpO2 94%   BMI 23.57 kg/m²       General appearance:  No apparent distress, appears stated age and cooperative. HEENT:  Normal cephalic, atraumatic without obvious deformity. Pupils equal, round, and reactive to light. Extra ocular muscles intact. Conjunctivae/corneas clear. Neck: Supple, with full range of motion. No jugular venous distention. Trachea midline. Respiratory:  Normal respiratory effort. Clear to auscultation, bilaterally without Rales/Wheezes/Rhonchi. Cardiovascular:  Regular rate and rhythm with normal S1/S2 without murmurs, rubs or gallops. Abdomen: Soft, non-tender, non-distended with normal bowel sounds. Musculoskeletal:  No clubbing, cyanosis or edema bilaterally. Full range of motion without deformity. Skin: Skin color, texture, turgor normal.  No rashes or lesions. Neurologic:  Neurovascularly intact without any focal sensory/motor deficits. Cranial nerves: II-XII intact, grossly non-focal.  Psychiatric:  Alert and oriented, thought content appropriate, normal insight  Capillary Refill: Brisk,< 3 seconds   Peripheral Pulses: +2 palpable, equal bilaterally       Labs: For convenience and continuity at follow-up the following most recent labs are provided:      CBC:    Lab Results   Component Value Date    WBC 5.3 08/18/2018    HGB 10.2 08/18/2018    HCT 30.9 08/18/2018     08/18/2018       Renal:    Lab Results   Component Value Date     08/18/2018    K 3.7 08/18/2018    K 3.6 08/17/2018     08/18/2018    CO2 24 08/18/2018    BUN 8 08/18/2018    CREATININE <0.5 08/18/2018    CALCIUM 8.5 08/18/2018         Significant Diagnostic Studies    Radiology:   CT ABDOMEN PELVIS WO CONTRAST Additional Contrast? None   Final Result   Findings compatible with acute sigmoid diverticulitis with a tiny amount of   extraluminal gas adjacent to the sigmoid colon.       Significant wall thickening involving the bladder with gas in the

## 2018-09-13 ENCOUNTER — HOSPITAL ENCOUNTER (OUTPATIENT)
Age: 73
Discharge: HOME | End: 2018-09-13
Payer: MEDICARE

## 2018-09-13 ENCOUNTER — OFFICE VISIT (OUTPATIENT)
Dept: FAMILY MEDICINE CLINIC | Age: 73
End: 2018-09-13

## 2018-09-13 VITALS
DIASTOLIC BLOOD PRESSURE: 80 MMHG | HEART RATE: 77 BPM | BODY MASS INDEX: 22.5 KG/M2 | SYSTOLIC BLOOD PRESSURE: 130 MMHG | WEIGHT: 148 LBS | OXYGEN SATURATION: 97 %

## 2018-09-13 DIAGNOSIS — D64.9 NORMOCYTIC ANEMIA: ICD-10-CM

## 2018-09-13 DIAGNOSIS — Z00.00 HEALTH CARE MAINTENANCE: ICD-10-CM

## 2018-09-13 DIAGNOSIS — D64.9 NORMOCYTIC ANEMIA: Primary | ICD-10-CM

## 2018-09-13 PROBLEM — K57.92 ACUTE DIVERTICULITIS: Status: RESOLVED | Noted: 2018-08-16 | Resolved: 2018-09-13

## 2018-09-13 PROBLEM — N30.01 ACUTE CYSTITIS WITH HEMATURIA: Status: RESOLVED | Noted: 2018-08-16 | Resolved: 2018-09-13

## 2018-09-13 LAB
A/G RATIO: 1.2 (ref 1.1–2.2)
ALBUMIN SERPL-MCNC: 4.4 G/DL (ref 3.4–5)
ALP BLD-CCNC: 103 U/L (ref 40–129)
ALT SERPL-CCNC: 20 U/L (ref 10–40)
ANION GAP SERPL CALCULATED.3IONS-SCNC: 15 MMOL/L (ref 3–16)
AST SERPL-CCNC: 19 U/L (ref 15–37)
BILIRUB SERPL-MCNC: <0.2 MG/DL (ref 0–1)
BUN BLDV-MCNC: 17 MG/DL (ref 7–20)
CALCIUM SERPL-MCNC: 10 MG/DL (ref 8.3–10.6)
CHLORIDE BLD-SCNC: 101 MMOL/L (ref 99–110)
CHOLESTEROL, TOTAL: 262 MG/DL (ref 0–199)
CO2: 25 MMOL/L (ref 21–32)
CREAT SERPL-MCNC: 0.6 MG/DL (ref 0.6–1.2)
GFR AFRICAN AMERICAN: >60
GFR NON-AFRICAN AMERICAN: >60
GLOBULIN: 3.8 G/DL
GLUCOSE BLD-MCNC: 105 MG/DL (ref 70–99)
HCT VFR BLD CALC: 40.1 % (ref 36–48)
HDLC SERPL-MCNC: 57 MG/DL (ref 40–60)
HEMOGLOBIN: 13 G/DL (ref 12–16)
IMMATURE RETIC FRACT: 0.36 (ref 0.21–0.37)
LACTATE DEHYDROGENASE: 193 U/L (ref 100–190)
LDL CHOLESTEROL CALCULATED: 168 MG/DL
MCH RBC QN AUTO: 27.8 PG (ref 26–34)
MCHC RBC AUTO-ENTMCNC: 32.4 G/DL (ref 31–36)
MCV RBC AUTO: 85.6 FL (ref 80–100)
PDW BLD-RTO: 14.9 % (ref 12.4–15.4)
PLATELET # BLD: 245 K/UL (ref 135–450)
PMV BLD AUTO: 8.2 FL (ref 5–10.5)
POTASSIUM SERPL-SCNC: 3.8 MMOL/L (ref 3.5–5.1)
RBC # BLD: 4.68 M/UL (ref 4–5.2)
RETICULOCYTE ABSOLUTE COUNT: 0.03 M/UL (ref 0.02–0.1)
RETICULOCYTE COUNT PCT: 0.62 % (ref 0.5–2.18)
SODIUM BLD-SCNC: 141 MMOL/L (ref 136–145)
TOTAL PROTEIN: 8.2 G/DL (ref 6.4–8.2)
TRIGL SERPL-MCNC: 183 MG/DL (ref 0–150)
VLDLC SERPL CALC-MCNC: 37 MG/DL
WBC # BLD: 6.6 K/UL (ref 4–11)

## 2018-09-13 PROCEDURE — 80061 LIPID PANEL: CPT

## 2018-09-13 PROCEDURE — 85027 COMPLETE CBC AUTOMATED: CPT

## 2018-09-13 PROCEDURE — 83036 HEMOGLOBIN GLYCOSYLATED A1C: CPT

## 2018-09-13 PROCEDURE — 36415 COLL VENOUS BLD VENIPUNCTURE: CPT

## 2018-09-13 PROCEDURE — 83615 LACTATE (LD) (LDH) ENZYME: CPT

## 2018-09-13 PROCEDURE — 99202 OFFICE O/P NEW SF 15 MIN: CPT | Performed by: FAMILY MEDICINE

## 2018-09-13 PROCEDURE — 80053 COMPREHEN METABOLIC PANEL: CPT

## 2018-09-13 PROCEDURE — 85045 AUTOMATED RETICULOCYTE COUNT: CPT

## 2018-09-13 NOTE — PROGRESS NOTES
2018    Enedelia Manriquez (:  1945) is a 72 y.o. female, here for a preventive medicine evaluation. Patient Active Problem List   Diagnosis    Perforation of sigmoid colon due to diverticulitis    Normocytic anemia     Recently in hosp for microperforation of diverticulitis, doing better now, had outpatient cysto which she states was normal. Patient still needs outpatient colonoscopy    Pt hasn't been eating much since discharge, slowly starting to eat more    Prior to this she has been very healthy, not on any meds    Review of Systems    Prior to Visit Medications    Medication Sig Taking? Authorizing Provider   Cholecalciferol (VITAMIN D3) 5000 units TABS Take by mouth Yes Historical Provider, MD   Cyanocobalamin (VITAMIN B-12) 1000 MCG extended release tablet Take 1,000 mcg by mouth daily Yes Historical Provider, MD        Allergies   Allergen Reactions    Bactrim [Sulfamethoxazole-Trimethoprim]        Past Medical History:   Diagnosis Date    Kidney stone        No past surgical history on file. Social History     Social History    Marital status:      Spouse name: N/A    Number of children: N/A    Years of education: N/A     Occupational History    Not on file. Social History Main Topics    Smoking status: Never Smoker    Smokeless tobacco: Never Used    Alcohol use No    Drug use: No    Sexual activity: Not on file     Other Topics Concern    Not on file     Social History Narrative    No narrative on file        Family History   Problem Relation Age of Onset    High Cholesterol Mother     Diabetes Father        ADVANCE DIRECTIVE: N, Not Received    Vitals:    18 1252   BP: 130/80   Site: Left Upper Arm   Position: Sitting   Cuff Size: Medium Adult   Pulse: 77   SpO2: 97%   Weight: 148 lb (67.1 kg)     Estimated body mass index is 22.5 kg/m² as calculated from the following:    Height as of 18: 5' 8\" (1.727 m).     Weight as of this encounter: 148 lb maintenance  Screening blood work ordered as below  3rd of Oct schedule for colonoscopy, hasn't done colon cancer screening, not interested in mammograms or paps, never done them, declines DEXA  Will get flu shot after colonscopy  -     CBC; Future  -     HEMOGLOBIN A1C; Future  -     Lipid Panel    Normocytic anemia  Newly diagnosed, unclear if related to recent hospitalization, will order prelim labs and based on results refer to heme/onc  -     Blood Smear Review; Future  -     COMPREHENSIVE METABOLIC PANEL; Future  -     LACTATE DEHYDROGENASE; Future  -     RETICULOCYTES; Future      Recent hospitalization: Recently had cycstoscope which was normal          Return in about 3 months (around 12/13/2018). An electronic signature was used to authenticate this note.     --Christina Valiente MD on 9/13/2018 at 2:30 PM

## 2018-09-14 LAB
BLOOD SMEAR REVIEW: NORMAL
ESTIMATED AVERAGE GLUCOSE: 114 MG/DL
HBA1C MFR BLD: 5.6 %
HEMATOLOGY PATH CONSULT: NORMAL

## 2018-09-15 DIAGNOSIS — E78.00 PURE HYPERCHOLESTEROLEMIA: ICD-10-CM

## 2018-09-15 RX ORDER — ATORVASTATIN CALCIUM 40 MG/1
40 TABLET, FILM COATED ORAL DAILY
Qty: 30 TABLET | Refills: 3 | Status: SHIPPED | OUTPATIENT
Start: 2018-09-15 | End: 2021-04-27

## 2018-10-02 ENCOUNTER — TELEPHONE (OUTPATIENT)
Dept: FAMILY MEDICINE CLINIC | Age: 73
End: 2018-10-02

## 2019-03-07 ENCOUNTER — NURSE TRIAGE (OUTPATIENT)
Dept: OTHER | Facility: CLINIC | Age: 74
End: 2019-03-07

## 2021-04-26 ENCOUNTER — TELEPHONE (OUTPATIENT)
Dept: INTERNAL MEDICINE CLINIC | Age: 76
End: 2021-04-26

## 2021-04-26 NOTE — TELEPHONE ENCOUNTER
----- Message from Jonny Dimas MA sent at 4/26/2021  9:16 AM EDT -----  Subject: Appointment Request    Reason for Call: Urgent (Patient Request) No Script    QUESTIONS  Type of Appointment? Established Patient  Reason for appointment request? No appointments available during search  Additional Information for Provider? 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 Former patient of Gertrude Carey. Needs to   be seen tomorrow. Please call home phone number which is [phone].   ---------------------------------------------------------------------------  --------------  [address]  What is the best way for the office to contact you? OK to leave message on   voicemail  Preferred Call Back Phone Number? [phone]  ---------------------------------------------------------------------------  --------------  SCRIPT ANSWERS  Relationship to Patient? Self  Appointment reason? Symptomatic  Select script based on patient symptoms? Adult No Script   (Is the patient requesting to see the provider for a procedure?)? No  (Is the patient requesting to see the provider urgently  today or   tomorrow. )? Yes  Have you been diagnosed with, tested for, or told that you are suspected   of having COVID-19 (Coronavirus)? No  Have you had a fever or taken medication to treat a fever within the past   3 days? No  Have you had a cough, shortness of breath or flu-like symptoms within the   past 3 days? No  Do you currently have flu-like symptoms including fever or chills, cough,   shortness of breath, or difficulty breathing, or new loss of taste or   smell? No  (Service Expert  click yes below to proceed with [employer] As Usual   Scheduling)?  Yes

## 2021-04-27 ENCOUNTER — OFFICE VISIT (OUTPATIENT)
Dept: FAMILY MEDICINE CLINIC | Age: 76
End: 2021-04-27
Payer: MEDICARE

## 2021-04-27 VITALS
WEIGHT: 157 LBS | SYSTOLIC BLOOD PRESSURE: 164 MMHG | DIASTOLIC BLOOD PRESSURE: 96 MMHG | OXYGEN SATURATION: 96 % | TEMPERATURE: 97 F | HEIGHT: 68 IN | BODY MASS INDEX: 23.79 KG/M2 | HEART RATE: 107 BPM

## 2021-04-27 DIAGNOSIS — E78.00 PURE HYPERCHOLESTEROLEMIA: ICD-10-CM

## 2021-04-27 DIAGNOSIS — Z76.89 ENCOUNTER TO ESTABLISH CARE: Primary | ICD-10-CM

## 2021-04-27 DIAGNOSIS — D64.9 NORMOCYTIC ANEMIA: ICD-10-CM

## 2021-04-27 DIAGNOSIS — N63.25 UNSPECIFIED LUMP IN THE LEFT BREAST, OVERLAPPING QUADRANTS: ICD-10-CM

## 2021-04-27 DIAGNOSIS — M79.645 FINGER PAIN, LEFT: ICD-10-CM

## 2021-04-27 DIAGNOSIS — R73.9 HYPERGLYCEMIA: ICD-10-CM

## 2021-04-27 DIAGNOSIS — E78.00 HYPERCHOLESTEROLEMIA: ICD-10-CM

## 2021-04-27 DIAGNOSIS — N63.20 LUMP OF LEFT BREAST: ICD-10-CM

## 2021-04-27 PROCEDURE — G8420 CALC BMI NORM PARAMETERS: HCPCS | Performed by: REGISTERED NURSE

## 2021-04-27 PROCEDURE — 99214 OFFICE O/P EST MOD 30 MIN: CPT | Performed by: REGISTERED NURSE

## 2021-04-27 PROCEDURE — 1090F PRES/ABSN URINE INCON ASSESS: CPT | Performed by: REGISTERED NURSE

## 2021-04-27 PROCEDURE — 3017F COLORECTAL CA SCREEN DOC REV: CPT | Performed by: REGISTERED NURSE

## 2021-04-27 PROCEDURE — 1036F TOBACCO NON-USER: CPT | Performed by: REGISTERED NURSE

## 2021-04-27 PROCEDURE — 1123F ACP DISCUSS/DSCN MKR DOCD: CPT | Performed by: REGISTERED NURSE

## 2021-04-27 PROCEDURE — G8400 PT W/DXA NO RESULTS DOC: HCPCS | Performed by: REGISTERED NURSE

## 2021-04-27 PROCEDURE — 4040F PNEUMOC VAC/ADMIN/RCVD: CPT | Performed by: REGISTERED NURSE

## 2021-04-27 PROCEDURE — G8427 DOCREV CUR MEDS BY ELIG CLIN: HCPCS | Performed by: REGISTERED NURSE

## 2021-04-27 RX ORDER — ASPIRIN 81 MG/1
81 TABLET ORAL DAILY
COMMUNITY
End: 2021-11-09 | Stop reason: ALTCHOICE

## 2021-04-27 ASSESSMENT — ENCOUNTER SYMPTOMS
COLOR CHANGE: 0
ABDOMINAL PAIN: 0
SORE THROAT: 0
NAUSEA: 0
COUGH: 0
ALLERGIC/IMMUNOLOGIC NEGATIVE: 1
ROS SKIN COMMENTS: LUMP IN LEFT BREAST
SINUS PRESSURE: 0
BACK PAIN: 0
SHORTNESS OF BREATH: 0
CONSTIPATION: 0
DIARRHEA: 0
TROUBLE SWALLOWING: 0

## 2021-04-27 ASSESSMENT — PATIENT HEALTH QUESTIONNAIRE - PHQ9
SUM OF ALL RESPONSES TO PHQ QUESTIONS 1-9: 1
1. LITTLE INTEREST OR PLEASURE IN DOING THINGS: 0
SUM OF ALL RESPONSES TO PHQ QUESTIONS 1-9: 1

## 2021-04-27 NOTE — PROGRESS NOTES
Patient: Mat Marquez is a 78 y.o. female who presents today with the following Chief Complaint(s):  Chief Complaint   Patient presents with    Breast Mass     left    Hand Pain     neuropathy, left        Assessment/Plan:  1. Encounter to establish care  Return for BP recheck once mammogram and US of breast are completed. She will have blood work drawn once she is fasting. 2. Normocytic anemia    - CBC Auto Differential; Future  - Comprehensive Metabolic Panel; Future    3. Hyperglycemia    - Comprehensive Metabolic Panel; Future  - Hemoglobin A1C; Future    4. Hypercholesterolemia    - Lipid Panel; Future    5. Pure hypercholesterolemia    - Lipid Panel; Future    6. Lump of left breast    - [facility] DIGITAL DIAGNOSTIC W OR WO CAD BILATERAL; Future  - US BREAST LIMITED LEFT; Future    7. Finger pain, left  Symptoms are consistent with a tendonitis or carpal tunnel. Recommend wrist brace and diclofenac gel. Follow up with [facility] if this does not improve. - [facility]  - diclofenac sodium (VOLTAREN) 1 % GEL; Apply 2 g topically 2 times daily  Dispense: 100 g; Refill: 1    8. Unspecified lump in the left breast, overlapping quadrants   Make appointment for mammogram.  - [facility] DIGITAL DIAGNOSTIC W OR WO CAD BILATERAL; Future  - US BREAST LIMITED LEFT; Future      Return in about 3 months (around 7/27/2021), or if symptoms worsen or fail to improve. HPI:     She is here to establish care. She has a lump in her left breast and numbness/tingling in her left index finger that she would like to discuss. She does not have a history of hypertension. She says she feels her blood pressure is higher today because she is nervous about the lump she discovered in her breast.     She has a history of perforated bowel. Last colonoscopy was in 2018 and she was told to return for colonoscopy in 8-10 years.        Current Outpatient Medications   Medication Sig Dispense Refill    aspirin 81 MG EC tablet Take 81 mg by mouth daily      Multiple Vitamin (MULTI-VITAMIN PO) Take by mouth      diclofenac sodium (VOLTAREN) 1 % GEL Apply 2 g topically 2 times daily 100 g 1    Cholecalciferol (VITAMIN D3) 5000 units TABS Take by mouth      Cyanocobalamin (VITAMIN B-12) 1000 MCG extended release tablet Take 1,000 mcg by mouth daily       No current facility-administered medications for this visit. Review of Systems   Constitutional: Negative for fatigue and fever. HENT: Negative for congestion, ear pain, postnasal drip, sinus pressure, sore throat and trouble swallowing. Eyes: Negative for visual disturbance. Respiratory: Negative for cough and shortness of breath. Cardiovascular: Negative for chest pain and palpitations. Gastrointestinal: Negative for abdominal pain, constipation, diarrhea and nausea. Endocrine: Negative. Genitourinary: Negative for dysuria and flank pain. Musculoskeletal: Positive for arthralgias ( left index finger pain). Negative for back pain, joint swelling, myalgias and neck pain. Skin: Negative for color change, pallor, rash and wound. Lump in left breast   Allergic/Immunologic: Negative. Neurological: Negative for light-headedness and headaches. Hematological: Negative. Psychiatric/Behavioral: Negative for dysphoric mood and sleep disturbance. Vitals:    04/27/21 1502   BP: (!) 164/96   Pulse: 107   Temp: 97 °F (36.1 °C)   SpO2: 96%   Weight: 157 lb (71.2 kg)   Height: 5' 8\" (1.727 m)     Physical Exam  Vitals signs and nursing note reviewed. Constitutional:       General: She is not in acute distress. Appearance: Normal appearance. She is normal weight. HENT:      Head: Normocephalic and atraumatic. Right Ear: Tympanic membrane, ear canal and external ear normal. There is no impacted cerumen. Left Ear: Tympanic membrane, ear canal and external ear normal. There is no impacted cerumen.       Nose: Nose normal. No congestion or rhinorrhea. Mouth/Throat:      Mouth: Mucous membranes are moist.      Pharynx: Oropharynx is clear. No oropharyngeal exudate or posterior oropharyngeal erythema. Eyes:      General:         Right eye: No discharge. Left eye: No discharge. Extraocular Movements: Extraocular movements intact. Conjunctiva/sclera: Conjunctivae normal.      Pupils: Pupils are equal, round, and reactive to light. Neck:      Musculoskeletal: Normal range of motion and neck supple. No muscular tenderness. Cardiovascular:      Rate and Rhythm: Normal rate and regular rhythm. Pulses: Normal pulses. Heart sounds: Normal heart sounds. No murmur. No friction rub. No gallop. Pulmonary:      Effort: Pulmonary effort is normal.      Breath sounds: Normal breath sounds. No stridor. No wheezing, rhonchi or rales. Chest:      Breasts:         Right: Normal. No swelling, bleeding, mass, nipple discharge, skin change or tenderness. Left: Mass and tenderness present. No swelling, bleeding, nipple discharge or skin change. Comments: 3cm x 2cm tender nodule Left breast  Abdominal:      General: Abdomen is flat. There is no distension. Palpations: Abdomen is soft. There is no mass. Tenderness: There is no abdominal tenderness. There is no guarding. Hernia: No hernia is present. Musculoskeletal: Normal range of motion. General: Tenderness ( left index finger tender to palpation) present. No swelling, deformity or signs of injury. Right lower leg: No edema. Left lower leg: No edema. Lymphadenopathy:      Cervical: No cervical adenopathy. Skin:     General: Skin is warm and dry. Capillary Refill: Capillary refill takes less than 2 seconds. Coloration: Skin is not jaundiced or pale. Findings: No lesion or rash. Neurological:      General: No focal deficit present.       Mental Status: She is alert and oriented to person, place, and time. Mental status is at baseline. Psychiatric:         Mood and Affect: Mood normal.         Behavior: Behavior normal.         Thought Content: Thought content normal.         Judgment: Judgment normal.            Patient's past medical history, surgical history, family history, medications,  and allergies  were all reviewed and updated as appropriate today. Patient Active Problem List   Diagnosis    Perforation of sigmoid colon due to diverticulitis    Normocytic anemia    Pure hypercholesterolemia     Past Medical History:   Diagnosis Date    Kidney stone       History reviewed. No pertinent surgical history. Family History   Problem Relation Age of Onset    High Cholesterol Mother     Diabetes Father       Allergies   Allergen Reactions    Bactrim [Sulfamethoxazole-Trimethoprim]        This chart was generated using the [employer] dictation system. I created this record but it may contain dictation errors due to the limitation of the software.

## 2021-05-05 ENCOUNTER — TELEPHONE (OUTPATIENT)
Dept: WOMENS IMAGING | Age: 76
End: 2021-05-05

## 2021-05-05 ENCOUNTER — HOSPITAL ENCOUNTER (OUTPATIENT)
Dept: WOMENS IMAGING | Age: 76
Discharge: HOME | End: 2021-05-05
Payer: MEDICARE

## 2021-05-05 VITALS — WEIGHT: 155 LBS | HEIGHT: 68 IN | BODY MASS INDEX: 23.49 KG/M2

## 2021-05-05 DIAGNOSIS — N63.20 LUMP OF LEFT BREAST: ICD-10-CM

## 2021-05-05 DIAGNOSIS — N63.25 UNSPECIFIED LUMP IN THE LEFT BREAST, OVERLAPPING QUADRANTS: ICD-10-CM

## 2021-05-05 DIAGNOSIS — R92.8 ABNORMAL MAMMOGRAM: ICD-10-CM

## 2021-05-05 PROCEDURE — 76642 ULTRASOUND BREAST LIMITED: CPT

## 2021-05-05 PROCEDURE — G0279 TOMOSYNTHESIS, MAMMO: HCPCS

## 2021-05-05 NOTE — TELEPHONE ENCOUNTER
Called to review and schedule recommended breast biopsy. No answer and voice mail inbox full. Will call back tomorrow.   Gabbi Mondragon RN

## 2021-05-06 ENCOUNTER — TELEPHONE (OUTPATIENT)
Dept: WOMENS IMAGING | Age: 76
End: 2021-05-06

## 2021-05-06 NOTE — TELEPHONE ENCOUNTER
[address]   [address] [place] [address], [address] [place] [address]  [address]   Phone: [phone]         ULTRASOUND BIOPSY EDUCATION    NAME:  Manny Galan   YOB: 1945   MEDICAL RECORD NUMBER:  [id number]   TODAY'S DATE:  5/6/2021    Referring Physician: Dr. Colleen Mckeon    Procedure: U/S Core Bx    Bilateral and Lymph node, Bilateral breast, RLN, 3 sites. Date of biopsy: 5/7/21    Patient taking blood thinners: yes - ASA 81mg, holding day of. Medicine allergies: yes - Bactrim    Special Instructions: Directions to [facility] given to patient. Biopsy order form faxed to referring MD.          What is an Ultrasound Guided Breast Biopsy? Ultrasound guided breast biopsy is a test that uses ultrasound to find an area of your breast where a tissue sample will be taken. The sample is then looked at under a microscope to check for signs of breast cancer. Why is it done? An Ultrasound biopsy is usually done to check for cancer in a lump or cyst found during a mammogram or ultrasound. Preparing for the test?     * Take your medications as prescribed    You may eat and drink fluids before the test    Take a shower the evening or morning before the biopsy. What happens before the test?  Images are taken to find the exact site to be biopsied.   Your skin is washed with an alcohol prep.   You will be given an injection of medication to numb your breast.   What happens during the test?     Once your breast is numb, a small cut (incision) is made.   Using the imaging, the doctor will guide the needle into the biopsy area.   A sample of breast tissue is taken through the needle.   A small \"Clip\" or Marker is inserted into your breast to erlinda the biopsy site.   The needle is removed and pressure put on the needle site to stop any bleeding.   A bandage will be placed over the site.      A post mammogram picture will be taken to document the clip placement. How long does the test take? Approximately 60 minutes. Most of the time is spent finding the area for the biopsy. What are the risks?   Bleeding: You may have some bleeding which can cause bruising, swelling,    or a bleeding under your skin.   Infection:  Signs of infection are redness, swelling, heat, or increasing pain    at the biopsy Site.   Sample size not adequate: This would require repeating the biopsy. What happens after the test?    The nurse will review your post biopsy instructions which include:         Placing an ice pack in your bra.    Wearing a firm fitting bra.    You may use Tylenol (Acetaminiphen) for discomfort. Lab results take about 2-3 business days. The Nurse Navigator or your doctor will call you with the results. Ultrasound Breast Biopsy:     (Please arrive 15 minutes early)                                                 [x] Blood thinner history reviewed with patient    [x] Take all your other medications on your normal routine schedule. [x] You may eat and drink as normal before your biopsy. [x] You may drive yourself. [x] No heavy lifting or exercise for 48 hours after the biopsy. [x] Printed Pre Ultrasound Biopsy Instructions were provided, reviewed with the patient and all questions were answered. The [facility]'s Information:   Should you experience any significant changes in your health or have questions about your care, please contact:  Pascual Esposito or Priya Mitchell, Breast Navigator's at [facility]:  [phone]  Monday-Friday. If you need help with your care outside these hours and cannot wait until we are again available, contact your Physician or go to the hospital emergency room. [x] Patient/POA/Caregiver verbalized understanding of instructions.        Electronically signed by Pascual Esposito RN on 5/6/2021 at 9:21 AM

## 2021-05-07 ENCOUNTER — HOSPITAL ENCOUNTER (OUTPATIENT)
Dept: WOMENS IMAGING | Age: 76
Discharge: HOME | End: 2021-05-07
Payer: MEDICARE

## 2021-05-07 DIAGNOSIS — R92.8 ABNORMAL MAMMOGRAM: ICD-10-CM

## 2021-05-07 PROCEDURE — 88305 TISSUE EXAM BY PATHOLOGIST: CPT

## 2021-05-07 PROCEDURE — 77066 DX MAMMO INCL CAD BI: CPT

## 2021-05-07 PROCEDURE — 88360 TUMOR IMMUNOHISTOCHEM/MANUAL: CPT

## 2021-05-07 PROCEDURE — 88342 IMHCHEM/IMCYTCHM 1ST ANTB: CPT

## 2021-05-07 PROCEDURE — C1894 INTRO/SHEATH, NON-LASER: HCPCS

## 2021-05-07 PROCEDURE — A4648 IMPLANTABLE TISSUE MARKER: HCPCS

## 2021-05-07 PROCEDURE — 88341 IMHCHEM/IMCYTCHM EA ADD ANTB: CPT

## 2021-05-07 NOTE — PROGRESS NOTES
Your referring physician or the Nurse Navigator will call you with results. The [facility] Information:   Should you experience any significant changes in your health or have questions about your care, please contact:  Pascual Esposito or Priya Mitchell, Breast Navigators with [facility]  [phone]  Monday-Friday. If you need help with your care outside these hours and cannot wait until we are again available, contact your Physician or go to the hospital emergency room.         Electronically signed by Pascual Esposito RN on 5/7/2021 at 1:44 PM

## 2021-05-11 ENCOUNTER — TELEPHONE (OUTPATIENT)
Dept: WOMENS IMAGING | Age: 76
End: 2021-05-11

## 2021-05-11 PROBLEM — C50.911 MALIGNANT NEOPLASM OF RIGHT BREAST IN FEMALE, ESTROGEN RECEPTOR POSITIVE (HCC): Status: ACTIVE | Noted: 2021-05-11

## 2021-05-11 PROBLEM — C50.212 MALIGNANT NEOPLASM OF UPPER-INNER QUADRANT OF LEFT BREAST IN FEMALE, ESTROGEN RECEPTOR POSITIVE (HCC): Status: ACTIVE | Noted: 2021-05-11

## 2021-05-11 PROBLEM — Z17.0 MALIGNANT NEOPLASM OF RIGHT BREAST IN FEMALE, ESTROGEN RECEPTOR POSITIVE (HCC): Status: ACTIVE | Noted: 2021-05-11

## 2021-05-11 PROBLEM — Z17.0 MALIGNANT NEOPLASM OF UPPER-INNER QUADRANT OF LEFT BREAST IN FEMALE, ESTROGEN RECEPTOR POSITIVE (HCC): Status: ACTIVE | Noted: 2021-05-11

## 2021-05-11 NOTE — TELEPHONE ENCOUNTER
Pathology for breast biopsy complete, radiologist confirms concordance. Reviewed breast biopsy results with patient and answered all questions. The radiologist is recommending that the patient see a breast surgeon regarding biopsy results. Per patient request, referral made to [facility]. Path report faxed to SOUTH Canseco CNP. Breast Navigator will remain available for any questions. Pt verbalized understanding.       Moraima Cardoso RN

## 2021-05-12 ENCOUNTER — OFFICE VISIT (OUTPATIENT)
Dept: SURGERY | Age: 76
End: 2021-05-12
Payer: MEDICARE

## 2021-05-12 VITALS
SYSTOLIC BLOOD PRESSURE: 152 MMHG | WEIGHT: 156.6 LBS | BODY MASS INDEX: 23.73 KG/M2 | HEIGHT: 68 IN | RESPIRATION RATE: 16 BRPM | TEMPERATURE: 97.6 F | OXYGEN SATURATION: 96 % | HEART RATE: 106 BPM | DIASTOLIC BLOOD PRESSURE: 91 MMHG

## 2021-05-12 DIAGNOSIS — C50.212 MALIGNANT NEOPLASM OF UPPER-INNER QUADRANT OF LEFT BREAST IN FEMALE, ESTROGEN RECEPTOR POSITIVE (HCC): ICD-10-CM

## 2021-05-12 DIAGNOSIS — Z17.0 MALIGNANT NEOPLASM OF RIGHT BREAST IN FEMALE, ESTROGEN RECEPTOR POSITIVE, UNSPECIFIED SITE OF BREAST (HCC): Primary | ICD-10-CM

## 2021-05-12 DIAGNOSIS — Z17.0 MALIGNANT NEOPLASM OF UPPER-INNER QUADRANT OF LEFT BREAST IN FEMALE, ESTROGEN RECEPTOR POSITIVE (HCC): ICD-10-CM

## 2021-05-12 DIAGNOSIS — C50.911 MALIGNANT NEOPLASM OF RIGHT BREAST IN FEMALE, ESTROGEN RECEPTOR POSITIVE, UNSPECIFIED SITE OF BREAST (HCC): Primary | ICD-10-CM

## 2021-05-12 PROCEDURE — 1123F ACP DISCUSS/DSCN MKR DOCD: CPT | Performed by: SURGERY

## 2021-05-12 PROCEDURE — G8400 PT W/DXA NO RESULTS DOC: HCPCS | Performed by: SURGERY

## 2021-05-12 PROCEDURE — 4040F PNEUMOC VAC/ADMIN/RCVD: CPT | Performed by: SURGERY

## 2021-05-12 PROCEDURE — 3017F COLORECTAL CA SCREEN DOC REV: CPT | Performed by: SURGERY

## 2021-05-12 PROCEDURE — 99205 OFFICE O/P NEW HI 60 MIN: CPT | Performed by: SURGERY

## 2021-05-12 PROCEDURE — G8427 DOCREV CUR MEDS BY ELIG CLIN: HCPCS | Performed by: SURGERY

## 2021-05-12 PROCEDURE — 1090F PRES/ABSN URINE INCON ASSESS: CPT | Performed by: SURGERY

## 2021-05-12 PROCEDURE — 1036F TOBACCO NON-USER: CPT | Performed by: SURGERY

## 2021-05-12 PROCEDURE — G8420 CALC BMI NORM PARAMETERS: HCPCS | Performed by: SURGERY

## 2021-05-12 RX ORDER — LORAZEPAM 0.5 MG/1
0.5 TABLET ORAL ONCE
Qty: 1 TABLET | Refills: 0 | Status: SHIPPED | OUTPATIENT
Start: 2021-05-12 | End: 2021-05-12

## 2021-05-12 ASSESSMENT — ENCOUNTER SYMPTOMS
COUGH: 0
ABDOMINAL PAIN: 0
SHORTNESS OF BREATH: 0

## 2021-05-12 NOTE — LETTER
[facility] Breast [facility]  [address]  Phone: [phone]  Fax: [phone]    Endy Taylor DO        May 12, 2021     Anna Guidry, APRN - CNP  [address] [facility] [address]    Patient: Manny Galan  MR Number: <[id number]>  YOB: 1945  Date of Visit: 5/12/2021    Dear Ms. Tex Mercedes:    I saw Manny Galan today for the evaluation of bilateral breast cancer. Below are the relevant portions of my assessment and plan of care. If you have questions, please do not hesitate to call me. I look forward to following Amber Alcala along with you.     Sincerely,        Endy Taylor DO

## 2021-05-12 NOTE — PROGRESS NOTES
Review of Systems   Constitutional: Negative for unexpected weight change. Eyes: Negative for visual disturbance. Respiratory: Negative for cough and shortness of breath. Cardiovascular: Negative for chest pain and palpitations. Gastrointestinal: Negative for abdominal pain. Musculoskeletal: Positive for arthralgias (Chronic arthritis). Negative for myalgias. Neurological: Negative for headaches. Hematological: Negative for adenopathy. Does not bruise/bleed easily. Psychiatric/Behavioral: Negative for dysphoric mood. The patient is not nervous/anxious. Obstetric/Gynecologic History  Number of pregnancies: 7  Births: 5  Age at First Birth:  23  Age at Menstruation:  17  Still Menstruating? No, age of menopause late 45s  Hysterectomy? No    Age at first mammogram: 45s  Frequency of mammograms: Has not had others since her 45s  Bra/Cup size: 45 C    History of breastfeeding? No   History of OCP Use? Yes for about 2 years and is not currently taking  History of hormone use? Never.

## 2021-05-12 NOTE — PROGRESS NOTES
BREAST SURGICAL ONCOLOGY NEW PATIENT EVALUATION    05/12/21     Chief Complaint: Breast cancer    History of Present Illness  Galina Medina \"Creesip\" is a 78 y.o. female  referred by No ref. provider found for bilateral breast cancer. Ms. Oma Ruffin initially noticed a left breast mass a few months ago. Since she first noticed it she thinks that it seemed to get a little bit bigger and notes that she can now see it protruding a bit in that area. It was non-tender. She also notes new left nipple inversion. No nipple discharge. She subsequently saw her PCP on 4/27 and was sent for breast imaging. She underwent b/l diagnostic mammogram and b/l breast US. In the area of palpable mass, mammogram revealed a large ill-defined spiculated mass measuring up to 3.5 cm at 11-12:00 in the left breast. In the lower slightly lateral right breast, there is an ill-defined spiculated mass which may measure up to 2 cm (and calcifications extending anteriorly 2 cm). There is also a large, dense prominent node in the right axilla. US of the palpable area at 11:00 left breast revealed a mass measuring up to 3.5 cm. US of left axilla is unremarkable. US of lower outer right breast revealed an ill-defined mass measuring 1.8 cm. US of the right axilla demonstrates multiple enlarged LNs (pt did have COVID vaccine, 2nd dose right arm 5/4, however due to breast findings these are of greater concern). She subsequently underwent the following US-guided core needle biopsies on 5/7/21:  - Left breast mass at 11:00 - Invasive ductal carcinoma, grade 3, ER/ID+, HER2- - Butterfly biopsy marker placed  - Right breast mass at 8:00 - Invasive ductal carcinoma, grade 3, ER/ID+ and HER2+ and associated DCIS - Hydromark open coil marker placed  - Right axillary LN - Metastatic adenocarcinoma, ER/ID+, HER2+ - Butterfly biopsy marker placed    Post-biopsy mammogram revealed all clips in expected location, with no migration.      She notes left-sided symptoms above. No concerns in the right breast. She is asymptomatic today and denies any new systemic complaints including weight loss, bone pain, headaches and abd pain. She does have some arthritis which she states is chronic. She also has occasional pain in her knees since she had a fall in the past.  Takes Aleve when needed. I reviewed her medical records. She has anemia, hyperglycemia and HLD. She is on ASA 81 mg for prophylaxis. Hx of complicated diverticulitis treated conservatively. Recently had colonsocopy and cystoscopy - no fistula noted. No prior issues with anesthesia. Family history is significant for bladder cancer in her paternal grandmother. There is no family history of breast or ovarian cancer. PCP - Leigh Barcenas, CNP  No other specialists. REVIEW OF SYSTEMS  Constitutional: Negative for unexpected weight change. Eyes: Negative for visual disturbance. Respiratory: Negative for cough and shortness of breath. Cardiovascular: Negative for chest pain and palpitations. Gastrointestinal: Negative for abdominal pain. Musculoskeletal: Positive for arthralgias (Chronic arthritis). Negative for myalgias. Neurological: Negative for headaches. Hematological: Negative for adenopathy. Does not bruise/bleed easily. Psychiatric/Behavioral: Negative for dysphoric mood. The patient is not nervous/anxious. I personally reviewed and agree with the above ROS as documented by my nurse. Obstetric/Gynecologic History  Number of pregnancies: 7  Births: 5  Age at First Birth:  23  Age at Menstruation:  17  Still Menstruating? No, age of menopause late 45s  Hysterectomy? No    Age at first mammogram: 45s  Frequency of mammograms: Has not had others since her 45s  Bra/Cup size: 45 C    History of breastfeeding? No   History of OCP Use? Yes for about 2 years and is not currently taking  History of hormone use? Never.     Past Medical History      Diagnosis Date  Kidney stone         Past Surgical History      Procedure Laterality Date    US BREAST BIOPSY NEEDLE ADDITIONAL RIGHT Right 5/7/2021    US BREAST BIOPSY NEEDLE ADDITIONAL RIGHT 5/7/2021 Janny Nelson MD [address] BREAST NEEDLE BIOPSY LEFT Left 5/7/2021    US BREAST NEEDLE BIOPSY LEFT 5/7/2021 Janny Nelson MD [address] BREAST NEEDLE BIOPSY RIGHT Right 5/7/2021    US BREAST NEEDLE BIOPSY RIGHT 5/7/2021 Janny Nelson MD [facility] History  Patient  reports that she is a non-smoker but has been exposed to tobacco smoke. She has never used smokeless tobacco. She reports that she does not drink alcohol or use drugs. 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 No     Current Medications  Current Outpatient Medications   Medication Sig Dispense Refill    aspirin 81 MG EC tablet Take 81 mg by mouth daily      Multiple Vitamin (MULTI-VITAMIN PO) Take by mouth      diclofenac sodium (VOLTAREN) 1 % GEL Apply 2 g topically 2 times daily 100 g 1    Cholecalciferol (VITAMIN D3) 5000 units TABS Take by mouth      Cyanocobalamin (VITAMIN B-12) 1000 MCG extended release tablet Take 1,000 mcg by mouth daily       No current facility-administered medications for this visit. Allergies  Allergies   Allergen Reactions    Bactrim [Sulfamethoxazole-Trimethoprim]        PHYSICAL EXAMINATION:  VS: BP (!) 152/91 (Site: Left Upper Arm, Position: Sitting, Cuff Size: Medium Adult)   Pulse 106   Temp 97.6 °F (36.4 °C) (Infrared)   Resp 16   Ht 5' 8\" (1.727 m)   Wt 156 lb 9.6 oz (71 kg)   LMP  (LMP Unknown)   SpO2 96%   BMI 23.81 kg/m²     General: Well-developed, well-nourished, in no apparent distress.   Head: The head is normocephalic  Eyes:  Conjunctivae appear normal. Pupils are equal and reactive. Extraocular movements are intact. Neck: Supple with no adenopathy  Respiratory: Normal respiratory effort, chest expands symmetrically  Psychiatric: Alert and oriented x 3, appropriate affect and behavior   Musculoskeletal: Normal gait and range of motion in all 4 extremities. Skin: Warm and dry  Lymphatics: The supraclavicular, submental, cervical and axillary regions are free of significant lymphadenopathy. Steri-strips in place in right axilla. Right Breast: Examined with patient seated and supine. The skin, nipple, and areola appear normal, there is no skin dimpling with movement of the pectoralis, there is no nipple retraction, no obvious nipple discharge, the parenchyma is mildly nodular, Post-biopsy ecchymosis in the lower outer quadrant with steri-strips in place, there is a questionable small mass in the area however this could be related to post-biopsy changes  Left Breast: Examined with patient seated and supine. The skin and areola appear normal, the nipple is retracted, at 12:00 there is visible fullness with a mass beneath this area clinically measuring 5 cm tall by 3.5 cm wide    ----------------------------------------------    IMAGING: Imaging was performed here and read by our radiologists. I personally reviewed the breast imaging performed on 5/5/2021 and 5/7/2021. Mammogram revealed a large ill-defined spiculated mass at the area of palpable abnormality measuring up to 3.5 cm at 11-12:00 in the left breast. In the lower slightly lateral right breast, there is an ill-defined spiculated mass which may measure up to 2 cm (and calcifications extending anteriorly 2 cm). There is also a large, dense prominent node in the right axilla. BI-RADS 5. Breast density is described as heterogeneously dense. US of the palpable area at 11:00 left breast revealed a mass measuring up to 3.5 cm. US of left axilla is unremarkable.  US of lower outer right breast revealed an ill-defined mass measuring 1.8 cm. US of the right axilla demonstrates multiple enlarged LNs (pt did have COVID vaccine, 2nd dose right arm 5/4., however due to breast findings these are of greater concern). I also reviewed her right axillary US with radiology, Dr. Josep Keller, today. PATHOLOGY:  I personally reviewed the pathology report from her left and right breast biopsies from 5/7/21 with her today as well. These demonstrated:    - Left breast mass at 11:00 - Invasive ductal carcinoma, grade 3, ER+ (90%), DE+(50%), HER2-(1+) - Butterfly biopsy marker placed  - Right breast mass at 8:00 - Invasive ductal carcinoma, grade 3, ER+(>90%), DE+(50%) and HER2+(3+) and associated DCIS - Hydromark open coil marker placed  - Right axillary LN - Metastatic adenocarcinoma, ER+(>90%), DE+(60%), and HER2+ (3+) - Butterfly biopsy marker placed    ----------------------------------------------    IMPRESSION:   78 y.o. female with:  1. Invasive ductal carcinoma, left breast, 3.5 cm   2. Invasive ductal carcinoma, right breast, 1.8 cm, with positive LN    Clinical AJCC (8th Edition) Stage: Cancer Staging  Malignant neoplasm of right breast in female, estrogen receptor positive ([facility] [address]  Staging form: Breast, AJCC 8th Edition  - Clinical stage from 5/11/2021: Stage IB (cT1c, cN1(f), cM0, G3, ER+, DE+, HER2+) - Signed by Leatha Melvin DO on 5/11/2021    Malignant neoplasm of upper-inner quadrant of left breast in female, estrogen receptor positive ([address]  Staging form: Breast, AJCC 8th Edition  - Clinical stage from 5/11/2021: Stage IIA (cT2, cN0, cM0, G3, ER+, DE+, HER2-) - Signed by Leatha Melvin DO on 5/11/2021       PLAN:    I had a detailed discussion with Ms. Bibi Miles and her daughter Lex Dahl regarding her diagnosis of bilateral breast cancer. We discussed the treatment of breast cancer, which includes surgery as well as adjuvant therapies such as radiation and systemic therapy.  I discussed that her care will be coordinated between a multidisciplinary treatment care team.     Given that she has HER2+, node + disease on the right, I have referred her to medical oncology for consideration of neoadjuvant chemotherapy. We talked about how chemotherapy is administered via a port. We discussed port placement today, and risks of the procedure including bleeding, infection, damage to blood vessels, pneumothorax, and failed procedure. I can place her port on 5/25 or sooner if needed based on her discussion with medical oncology. We discussed the surgical management of breast cancer including breast conservation therapy, or lumpectomy followed by radiation, versus mastectomy. We talked about how there are certain indications for post-mastectomy radiation, such as positive lymph nodes. Currently, Mrs. Katherene Primrose is a candidate for breast conservation on the right, however she is most likely not a candidate for this on the left at this time given the size of the mass in relation to her breast.  She prefers to pursue breast conservation if possible. We also discussed axillary management; sentinel lymph node biopsy versus axillary lymph node dissection. We discussed that neoadjuvant chemotherapy will not only benefit in shrinking the tumors, but also treating the breast cancer in a systemic fashion. We discussed that patients who have an excellent clinical response with chemotherapy may become candidates for breast conservation as well as sentinel lymphadenectomy, potentially avoiding the risks and morbidity associated with total mastectomy and/or complete axillary lymph node dissection. I will send her for a baseline breast MRI to be done before she starts chemotherapy. I will then see her back when she is near completion of her chemotherapy to assess her clinical response. At that time, we will also repeat her mammogram and MRI to evaluate her response. Given her bilateral breast cancer she would qualify for genetic testing.   We talked about reasons why some women wish to have this done. She is not interested at this time and will let me know if she changes her mind in the future. She understands that should she have any further concerns or questions she may always follow up or return subsequently. IN SUMMARY:  - Referred to medical oncology for NACT  - Breast MRI pre chemo  - Will f/u after she sees med onc to get scheduled for port placement    Ismael Valero. Betty Wren DO  Breast Surgical Oncology    Tumor board addendum 5/14/2021: Her case was presented today at tumor board. All are in agreement with the plan for neoadjuvant chemotherapy. Medical oncology will discuss potentially doing staging scans. She will see Dr. Tanesha Rios on Tuesday. Ismael Valreo.  Betty Wren DO  Breast Surgical Oncology    [facility] Breast Surgery  Phone: [phone] (option 3)

## 2021-05-12 NOTE — PATIENT INSTRUCTIONS
I could place your port on 5/25 if this works for the oncologist. If they need it done sooner, please let me know. Please have MRI done before you start chemotherapy.

## 2021-05-13 ENCOUNTER — TELEPHONE (OUTPATIENT)
Dept: SURGERY | Age: 76
End: 2021-05-13

## 2021-05-18 ENCOUNTER — HOSPITAL ENCOUNTER (OUTPATIENT)
Dept: MRI IMAGING | Age: 76
Discharge: HOME | End: 2021-05-18
Payer: MEDICARE

## 2021-05-18 DIAGNOSIS — C50.212 MALIGNANT NEOPLASM OF UPPER-INNER QUADRANT OF LEFT BREAST IN FEMALE, ESTROGEN RECEPTOR POSITIVE (HCC): ICD-10-CM

## 2021-05-18 DIAGNOSIS — Z17.0 MALIGNANT NEOPLASM OF UPPER-INNER QUADRANT OF LEFT BREAST IN FEMALE, ESTROGEN RECEPTOR POSITIVE (HCC): ICD-10-CM

## 2021-05-18 LAB
GFR AFRICAN AMERICAN: >60
GFR NON-AFRICAN AMERICAN: >60
PERFORMED ON: NORMAL
POC CREATININE: 0.7 MG/DL (ref 0.6–1.2)
POC SAMPLE TYPE: NORMAL

## 2021-05-18 PROCEDURE — A9579 GAD-BASE MR CONTRAST NOS,1ML: HCPCS | Performed by: SURGERY

## 2021-05-18 PROCEDURE — 82565 ASSAY OF CREATININE: CPT

## 2021-05-18 PROCEDURE — 77049 MRI BREAST C-+ W/CAD BI: CPT

## 2021-05-18 PROCEDURE — 6360000004 HC RX CONTRAST MEDICATION: Performed by: SURGERY

## 2021-05-18 RX ADMIN — GADOTERIDOL 14 ML: 279.3 INJECTION, SOLUTION INTRAVENOUS at 14:52

## 2021-05-19 ENCOUNTER — PREP FOR PROCEDURE (OUTPATIENT)
Dept: SURGERY | Age: 76
End: 2021-05-19

## 2021-05-19 ENCOUNTER — TELEPHONE (OUTPATIENT)
Dept: SURGERY | Age: 76
End: 2021-05-19

## 2021-05-19 DIAGNOSIS — R92.8 ABNORMAL MRI, BREAST: Primary | ICD-10-CM

## 2021-05-19 RX ORDER — SODIUM CHLORIDE 0.9 % (FLUSH) 0.9 %
10 SYRINGE (ML) INJECTION EVERY 12 HOURS SCHEDULED
Status: CANCELLED | OUTPATIENT
Start: 2021-05-19

## 2021-05-19 RX ORDER — SODIUM CHLORIDE 0.9 % (FLUSH) 0.9 %
10 SYRINGE (ML) INJECTION PRN
Status: CANCELLED | OUTPATIENT
Start: 2021-05-19

## 2021-05-19 RX ORDER — SODIUM CHLORIDE 9 MG/ML
25 INJECTION, SOLUTION INTRAVENOUS PRN
Status: CANCELLED | OUTPATIENT
Start: 2021-05-19

## 2021-05-19 NOTE — TELEPHONE ENCOUNTER
TELEPHONE NOTE    I called Kallie Graves to review the results of her MRI. In the left breast, the known mass measures about 4 cm, and no suspicious left axillary nodes. In the right breast, in addition to the known mass at 8:00, there are multiple additional suspicious areas of NME. We talked about how this may make BCT more difficult. If she wishes to consider attempting this, then she would require 2 additional biopsies (MRI guided). At this time, she really wishes to avoid a mastectomy if she can, so we will proceed with bx x 2. I let her know that [place] Garrett will reach out to her to schedule these. I also discussed the case with Dr. Hung Dean today and he plans to start chemo on 6/1. We will plan for port placement on 5/25. Julisa Gorman.  Pavithra Acuña, DO  Breast Surgical Oncology    [facility] Breast Surgery  Phone: [phone] (option 3)

## 2021-05-20 ENCOUNTER — TELEPHONE (OUTPATIENT)
Dept: WOMENS IMAGING | Age: 76
End: 2021-05-20

## 2021-05-20 DIAGNOSIS — Z17.0 MALIGNANT NEOPLASM OF RIGHT BREAST IN FEMALE, ESTROGEN RECEPTOR POSITIVE, UNSPECIFIED SITE OF BREAST (HCC): ICD-10-CM

## 2021-05-20 DIAGNOSIS — C50.212 MALIGNANT NEOPLASM OF UPPER-INNER QUADRANT OF LEFT BREAST IN FEMALE, ESTROGEN RECEPTOR POSITIVE (HCC): Primary | ICD-10-CM

## 2021-05-20 DIAGNOSIS — Z17.0 MALIGNANT NEOPLASM OF UPPER-INNER QUADRANT OF LEFT BREAST IN FEMALE, ESTROGEN RECEPTOR POSITIVE (HCC): Primary | ICD-10-CM

## 2021-05-20 DIAGNOSIS — C50.911 MALIGNANT NEOPLASM OF RIGHT BREAST IN FEMALE, ESTROGEN RECEPTOR POSITIVE, UNSPECIFIED SITE OF BREAST (HCC): ICD-10-CM

## 2021-05-20 RX ORDER — LORAZEPAM 0.5 MG/1
0.5 TABLET ORAL ONCE
Qty: 1 TABLET | Refills: 0 | Status: SHIPPED | OUTPATIENT
Start: 2021-05-20 | End: 2021-05-20

## 2021-05-20 NOTE — TELEPHONE ENCOUNTER
[facility]  [address] [place] [address], [address] [place] [address]  [address]   Phone: [phone]          NAME:  Tony Diaz  YOB: 1945  MEDICAL RECORD NUMBER:  [id number]  TODAY'S DATE:  5/20/2021      Referring Physician: Dr. Karin Rea    Procedure: MRI guided breast biopsy     Right breast x 2    Date of biopsy: 5/28/21    Patient taking blood thinners: yes - ASA 81mg, holding 3 days prior    Medicine allergies: yes - Bactrim    Special Instructions: n/a    Reviewed pre and post biopsy instructions/information with patient. Pt verbalized understanding. Biopsy order form faxed to referring MD.      Nurse Navigator reviewed the education with the patient regarding an MRI biopsy:   *The technologist or a nurse may ask if you have allergies of any kind, such as an allergy to iodine or x-ray contrast material, drugs, food, or the environment, or if you have asthma. The contrast material most commonly used for an MRI exam contains a metal called gadolinium.  It is fine to eat and drink prior to the procedure and we prefer that you do so.  Bring a written list of the medications you are taking. Plan on being at the breast center for 2 -3 hours.  Wear a bra with good support (like a sports bra) and a two-piece comfortable outfit for the procedure.  You can bring someone with you but it is not required, since this is done with local anesthetic. You may drive yourself, or bring a family member or friend, whatever is comfortable and supportive.  If you have claustrophobia (fear of enclosed spaces) or anxiety, you may want to ask your physician for a prescription for a mild sedative prior to your scheduled examination. If this is done, please have someone drive you to the procedure. During your biopsy:   You will be lying on your stomach for this procedure just as when you had the MRI.     If a contrast material will be used in the MRI exam, the technologist will insert an intravenous (IV) catheter, into a vein in your hand or arm.  You will be placed into the magnet of the MRI unit and the radiologist and technologist will perform the examination while working at a computer outside of the room.  If a contrast material is used during the examination, it will be injected into the intravenous line (IV) after an initial series of scans. Additional series of images will be taken during or following the injection and at different points during the procedure.  Then the skin is cleaned and a local anesthetic (slight pin prick) is given to numb the specific area which takes effect very quickly. A small skin nick is made so that the biopsy needle can be inserted.  Once the biopsy needle is in the proper position, samples will be taken. A tiny tissue marker (titanium) is then placed inside your breast at the site of the biopsy for future reference.  Pressure is then held over the biopsy site to stop the bleeding. No sutures are necessary as steri-strips and a waterproof dressing are applied.  Most women report little or no pain and no scarring on the breast, though some bruising can occur. After your biopsy:    A mammogram may be done to make sure the tissue marker is in the right place.  You will receive a set of follow-up instructions from the nurse navigator.  The biopsy sample is sent to the Pathology department for analysis.  Results of the biopsy will come back in 2-3 business days. The results will be sent to your referring physician. Patient verbalized understanding.

## 2021-05-20 NOTE — PROGRESS NOTES
Rogers Duffel    Age 78 y.o.    female    1945    MRN [id number]    5/25/2021  Arrival Time_____________  OR Time____________[address]     Procedure(s):  LEFT VERSUS RIGHT PORT A CATH PLACEMENT                      General     Surgeon(s):  Rober Poser, DO      DAY ADMIT ___  SDS/OP ___  OUTPT IN BED ___         Phone [phone] (home)    PCP _____________________ Phone_________________ Epic ( ) Epic CE ( ) Appt ________    ADDITIONAL INFO __________________________________ Cardio/Consult _____________    NOTES _____________________________________________________________________    ____________________________________________________________________________    PAT APPT DATE:________ TIME: ________  FAXED QAD: _______  (__) H&P w/ hospitalist  ____________________________________________________________________________    COVID TEST: Date/Location______________        NURSING HISTORY COMPLETE: _______  (__) CBC       (__) W/ DIFF ___________  (__)  ECHO    __________  (__) Hgb A1C    ___________  (__) CHEST X RAY   __________  (__) LIPID PROFILE  ___________  (__) EKG   __________  (__) PT/PTT   ___________  (__) PFT's   __________  (__) BMP   ___________  (__) CAROTIDS  __________  (__) CMP   ___________  (__) VEIN MAPPING  __________  (__) U/A   ___________  (__) HISTORY & PHYSICAL __________  (__) URINE C & S  ___________  (__) CARDIAC CLEARANCE __________  (__) U/A W/ FLEX  ___________  (__) PULM.  CLEARANCE __________  (__) SERUM PREGNANCY ___________  (__) Check Epic DOS orders __________  (__) TYPE & SCREEN ________ repeat ( ) (__)  __________________ __________  (__) ALBUMIN   ___________  (__)  __________________ __________  (__) TRANSFERRIN  ___________  (__)  __________________ __________  (__) LIVER PROFILE  ___________  (__)  __________________ __________  (__) CARBOXY HGB  ___________  (__) URINE PREG DOS __________  (__) NICOTINE & MET.  ___________  (__) BLOOD SUGAR DOS __________  (__) PREALBUMIN  ___________    (__) MRSA NASAL SWAB ___________  (__) BLOOD THINNERS __________  (__) ACE/ ARBS: _____________________    (__) BETABLOCKERS ___________________

## 2021-05-25 ENCOUNTER — HOSPITAL ENCOUNTER (OUTPATIENT)
Age: 76
Setting detail: OUTPATIENT SURGERY
Discharge: HOME | End: 2021-05-25
Attending: SURGERY | Admitting: SURGERY
Payer: MEDICARE

## 2021-05-25 ENCOUNTER — ANESTHESIA (OUTPATIENT)
Dept: OPERATING ROOM | Age: 76
End: 2021-05-25
Payer: MEDICARE

## 2021-05-25 ENCOUNTER — ANESTHESIA EVENT (OUTPATIENT)
Dept: OPERATING ROOM | Age: 76
End: 2021-05-25
Payer: MEDICARE

## 2021-05-25 ENCOUNTER — APPOINTMENT (OUTPATIENT)
Dept: GENERAL RADIOLOGY | Age: 76
End: 2021-05-25
Attending: SURGERY
Payer: MEDICARE

## 2021-05-25 VITALS
DIASTOLIC BLOOD PRESSURE: 67 MMHG | OXYGEN SATURATION: 94 % | SYSTOLIC BLOOD PRESSURE: 121 MMHG | RESPIRATION RATE: 4 BRPM

## 2021-05-25 VITALS
OXYGEN SATURATION: 96 % | HEIGHT: 67 IN | HEART RATE: 91 BPM | WEIGHT: 152 LBS | TEMPERATURE: 97.1 F | SYSTOLIC BLOOD PRESSURE: 143 MMHG | BODY MASS INDEX: 23.86 KG/M2 | RESPIRATION RATE: 16 BRPM | DIASTOLIC BLOOD PRESSURE: 75 MMHG

## 2021-05-25 DIAGNOSIS — Z17.0 MALIGNANT NEOPLASM OF UPPER-INNER QUADRANT OF LEFT BREAST IN FEMALE, ESTROGEN RECEPTOR POSITIVE (HCC): Primary | ICD-10-CM

## 2021-05-25 DIAGNOSIS — C50.212 MALIGNANT NEOPLASM OF UPPER-INNER QUADRANT OF LEFT BREAST IN FEMALE, ESTROGEN RECEPTOR POSITIVE (HCC): Primary | ICD-10-CM

## 2021-05-25 PROCEDURE — 6360000002 HC RX W HCPCS: Performed by: SURGERY

## 2021-05-25 PROCEDURE — 2580000003 HC RX 258: Performed by: SURGERY

## 2021-05-25 PROCEDURE — 3700000000 HC ANESTHESIA ATTENDED CARE: Performed by: SURGERY

## 2021-05-25 PROCEDURE — 6370000000 HC RX 637 (ALT 250 FOR IP): Performed by: ANESTHESIOLOGY

## 2021-05-25 PROCEDURE — C1788 PORT, INDWELLING, IMP: HCPCS | Performed by: SURGERY

## 2021-05-25 PROCEDURE — 3600000004 HC SURGERY LEVEL 4 BASE: Performed by: SURGERY

## 2021-05-25 PROCEDURE — 7100000001 HC PACU RECOVERY - ADDTL 15 MIN: Performed by: SURGERY

## 2021-05-25 PROCEDURE — 3209999900 FLUORO FOR SURGICAL PROCEDURES

## 2021-05-25 PROCEDURE — 2500000003 HC RX 250 WO HCPCS: Performed by: SURGERY

## 2021-05-25 PROCEDURE — 7100000011 HC PHASE II RECOVERY - ADDTL 15 MIN: Performed by: SURGERY

## 2021-05-25 PROCEDURE — 71045 X-RAY EXAM CHEST 1 VIEW: CPT

## 2021-05-25 PROCEDURE — 6360000002 HC RX W HCPCS

## 2021-05-25 PROCEDURE — 3700000001 HC ADD 15 MINUTES (ANESTHESIA): Performed by: SURGERY

## 2021-05-25 PROCEDURE — 36561 INSERT TUNNELED CV CATH: CPT | Performed by: SURGERY

## 2021-05-25 PROCEDURE — 7100000010 HC PHASE II RECOVERY - FIRST 15 MIN: Performed by: SURGERY

## 2021-05-25 PROCEDURE — 2580000003 HC RX 258: Performed by: ANESTHESIOLOGY

## 2021-05-25 PROCEDURE — 7100000000 HC PACU RECOVERY - FIRST 15 MIN: Performed by: SURGERY

## 2021-05-25 PROCEDURE — 76937 US GUIDE VASCULAR ACCESS: CPT | Performed by: SURGERY

## 2021-05-25 PROCEDURE — 3600000014 HC SURGERY LEVEL 4 ADDTL 15MIN: Performed by: SURGERY

## 2021-05-25 PROCEDURE — 77001 FLUOROGUIDE FOR VEIN DEVICE: CPT

## 2021-05-25 PROCEDURE — 2500000003 HC RX 250 WO HCPCS

## 2021-05-25 PROCEDURE — 2709999900 HC NON-CHARGEABLE SUPPLY: Performed by: SURGERY

## 2021-05-25 PROCEDURE — 77001 FLUOROGUIDE FOR VEIN DEVICE: CPT | Performed by: SURGERY

## 2021-05-25 DEVICE — PORT INFUS SGL LUMN ATTCH POLYUR OPN END CATH 8FR POWERPRT: Type: IMPLANTABLE DEVICE | Site: CHEST | Status: FUNCTIONAL

## 2021-05-25 RX ORDER — FENTANYL CITRATE 50 UG/ML
INJECTION, SOLUTION INTRAMUSCULAR; INTRAVENOUS PRN
Status: DISCONTINUED | OUTPATIENT
Start: 2021-05-25 | End: 2021-05-25 | Stop reason: SDUPTHER

## 2021-05-25 RX ORDER — SODIUM CHLORIDE 9 MG/ML
25 INJECTION, SOLUTION INTRAVENOUS PRN
Status: DISCONTINUED | OUTPATIENT
Start: 2021-05-25 | End: 2021-05-25 | Stop reason: HOSPADM

## 2021-05-25 RX ORDER — ONDANSETRON 2 MG/ML
4 INJECTION INTRAMUSCULAR; INTRAVENOUS EVERY 10 MIN PRN
Status: DISCONTINUED | OUTPATIENT
Start: 2021-05-25 | End: 2021-05-25 | Stop reason: HOSPADM

## 2021-05-25 RX ORDER — SODIUM CHLORIDE 0.9 % (FLUSH) 0.9 %
5-40 SYRINGE (ML) INJECTION PRN
Status: DISCONTINUED | OUTPATIENT
Start: 2021-05-25 | End: 2021-05-25 | Stop reason: SDUPTHER

## 2021-05-25 RX ORDER — DEXAMETHASONE SODIUM PHOSPHATE 4 MG/ML
INJECTION, SOLUTION INTRA-ARTICULAR; INTRALESIONAL; INTRAMUSCULAR; INTRAVENOUS; SOFT TISSUE PRN
Status: DISCONTINUED | OUTPATIENT
Start: 2021-05-25 | End: 2021-05-25 | Stop reason: SDUPTHER

## 2021-05-25 RX ORDER — PHENYLEPHRINE HCL IN 0.9% NACL 1 MG/10 ML
SYRINGE (ML) INTRAVENOUS PRN
Status: DISCONTINUED | OUTPATIENT
Start: 2021-05-25 | End: 2021-05-25 | Stop reason: SDUPTHER

## 2021-05-25 RX ORDER — LABETALOL HYDROCHLORIDE 5 MG/ML
5 INJECTION, SOLUTION INTRAVENOUS EVERY 10 MIN PRN
Status: DISCONTINUED | OUTPATIENT
Start: 2021-05-25 | End: 2021-05-25 | Stop reason: HOSPADM

## 2021-05-25 RX ORDER — BUPIVACAINE HYDROCHLORIDE 5 MG/ML
INJECTION, SOLUTION EPIDURAL; INTRACAUDAL PRN
Status: DISCONTINUED | OUTPATIENT
Start: 2021-05-25 | End: 2021-05-25 | Stop reason: ALTCHOICE

## 2021-05-25 RX ORDER — LIDOCAINE HYDROCHLORIDE 20 MG/ML
INJECTION, SOLUTION EPIDURAL; INFILTRATION; INTRACAUDAL; PERINEURAL PRN
Status: DISCONTINUED | OUTPATIENT
Start: 2021-05-25 | End: 2021-05-25 | Stop reason: SDUPTHER

## 2021-05-25 RX ORDER — OXYCODONE HYDROCHLORIDE AND ACETAMINOPHEN 5; 325 MG/1; MG/1
2 TABLET ORAL PRN
Status: DISCONTINUED | OUTPATIENT
Start: 2021-05-25 | End: 2021-05-25 | Stop reason: HOSPADM

## 2021-05-25 RX ORDER — MAGNESIUM HYDROXIDE 1200 MG/15ML
LIQUID ORAL CONTINUOUS PRN
Status: COMPLETED | OUTPATIENT
Start: 2021-05-25 | End: 2021-05-25

## 2021-05-25 RX ORDER — PROPOFOL 10 MG/ML
INJECTION, EMULSION INTRAVENOUS PRN
Status: DISCONTINUED | OUTPATIENT
Start: 2021-05-25 | End: 2021-05-25 | Stop reason: SDUPTHER

## 2021-05-25 RX ORDER — MEPERIDINE HYDROCHLORIDE 50 MG/ML
12.5 INJECTION INTRAMUSCULAR; INTRAVENOUS; SUBCUTANEOUS EVERY 5 MIN PRN
Status: DISCONTINUED | OUTPATIENT
Start: 2021-05-25 | End: 2021-05-25 | Stop reason: HOSPADM

## 2021-05-25 RX ORDER — SODIUM CHLORIDE 0.9 % (FLUSH) 0.9 %
10 SYRINGE (ML) INJECTION PRN
Status: DISCONTINUED | OUTPATIENT
Start: 2021-05-25 | End: 2021-05-25 | Stop reason: HOSPADM

## 2021-05-25 RX ORDER — TRAMADOL HYDROCHLORIDE 50 MG/1
50 TABLET ORAL ONCE
Status: COMPLETED | OUTPATIENT
Start: 2021-05-25 | End: 2021-05-25

## 2021-05-25 RX ORDER — SODIUM CHLORIDE, SODIUM LACTATE, POTASSIUM CHLORIDE, CALCIUM CHLORIDE 600; 310; 30; 20 MG/100ML; MG/100ML; MG/100ML; MG/100ML
INJECTION, SOLUTION INTRAVENOUS CONTINUOUS
Status: DISCONTINUED | OUTPATIENT
Start: 2021-05-25 | End: 2021-05-25 | Stop reason: HOSPADM

## 2021-05-25 RX ORDER — LIDOCAINE HYDROCHLORIDE 10 MG/ML
0.3 INJECTION, SOLUTION EPIDURAL; INFILTRATION; INTRACAUDAL; PERINEURAL
Status: DISCONTINUED | OUTPATIENT
Start: 2021-05-25 | End: 2021-05-25 | Stop reason: HOSPADM

## 2021-05-25 RX ORDER — MIDAZOLAM HYDROCHLORIDE 1 MG/ML
INJECTION INTRAMUSCULAR; INTRAVENOUS PRN
Status: DISCONTINUED | OUTPATIENT
Start: 2021-05-25 | End: 2021-05-25 | Stop reason: SDUPTHER

## 2021-05-25 RX ORDER — SODIUM CHLORIDE 0.9 % (FLUSH) 0.9 %
10 SYRINGE (ML) INJECTION EVERY 12 HOURS SCHEDULED
Status: DISCONTINUED | OUTPATIENT
Start: 2021-05-25 | End: 2021-05-25 | Stop reason: HOSPADM

## 2021-05-25 RX ORDER — SODIUM CHLORIDE 0.9 % (FLUSH) 0.9 %
5-40 SYRINGE (ML) INJECTION EVERY 12 HOURS SCHEDULED
Status: DISCONTINUED | OUTPATIENT
Start: 2021-05-25 | End: 2021-05-25 | Stop reason: SDUPTHER

## 2021-05-25 RX ORDER — HYDRALAZINE HYDROCHLORIDE 20 MG/ML
5 INJECTION INTRAMUSCULAR; INTRAVENOUS EVERY 10 MIN PRN
Status: DISCONTINUED | OUTPATIENT
Start: 2021-05-25 | End: 2021-05-25 | Stop reason: HOSPADM

## 2021-05-25 RX ORDER — TRAMADOL HYDROCHLORIDE 50 MG/1
50 TABLET ORAL EVERY 6 HOURS PRN
Qty: 12 TABLET | Refills: 0 | Status: SHIPPED | OUTPATIENT
Start: 2021-05-25 | End: 2021-05-28

## 2021-05-25 RX ORDER — ONDANSETRON 2 MG/ML
INJECTION INTRAMUSCULAR; INTRAVENOUS PRN
Status: DISCONTINUED | OUTPATIENT
Start: 2021-05-25 | End: 2021-05-25 | Stop reason: SDUPTHER

## 2021-05-25 RX ORDER — OXYCODONE HYDROCHLORIDE AND ACETAMINOPHEN 5; 325 MG/1; MG/1
1 TABLET ORAL PRN
Status: DISCONTINUED | OUTPATIENT
Start: 2021-05-25 | End: 2021-05-25 | Stop reason: HOSPADM

## 2021-05-25 RX ADMIN — Medication 100 MCG: at 08:28

## 2021-05-25 RX ADMIN — MIDAZOLAM HYDROCHLORIDE 2 MG: 2 INJECTION, SOLUTION INTRAMUSCULAR; INTRAVENOUS at 07:52

## 2021-05-25 RX ADMIN — TRAMADOL HYDROCHLORIDE 50 MG: 50 TABLET, FILM COATED ORAL at 10:19

## 2021-05-25 RX ADMIN — SODIUM CHLORIDE, SODIUM LACTATE, POTASSIUM CHLORIDE, AND CALCIUM CHLORIDE: .6; .31; .03; .02 INJECTION, SOLUTION INTRAVENOUS at 07:29

## 2021-05-25 RX ADMIN — SODIUM CHLORIDE, SODIUM LACTATE, POTASSIUM CHLORIDE, AND CALCIUM CHLORIDE: .6; .31; .03; .02 INJECTION, SOLUTION INTRAVENOUS at 08:50

## 2021-05-25 RX ADMIN — PROPOFOL 150 MG: 10 INJECTION, EMULSION INTRAVENOUS at 07:57

## 2021-05-25 RX ADMIN — FENTANYL CITRATE 25 MCG: 50 INJECTION INTRAMUSCULAR; INTRAVENOUS at 08:03

## 2021-05-25 RX ADMIN — DEXAMETHASONE SODIUM PHOSPHATE 8 MG: 4 INJECTION, SOLUTION INTRAMUSCULAR; INTRAVENOUS at 08:06

## 2021-05-25 RX ADMIN — ONDANSETRON 4 MG: 2 INJECTION INTRAMUSCULAR; INTRAVENOUS at 08:06

## 2021-05-25 RX ADMIN — CEFAZOLIN SODIUM 2000 MG: 10 INJECTION, POWDER, FOR SOLUTION INTRAVENOUS at 08:00

## 2021-05-25 RX ADMIN — FENTANYL CITRATE 50 MCG: 50 INJECTION INTRAMUSCULAR; INTRAVENOUS at 07:57

## 2021-05-25 RX ADMIN — LIDOCAINE HYDROCHLORIDE 60 MG: 20 INJECTION, SOLUTION EPIDURAL; INFILTRATION; INTRACAUDAL; PERINEURAL at 07:57

## 2021-05-25 ASSESSMENT — PULMONARY FUNCTION TESTS
PIF_VALUE: 10
PIF_VALUE: 2
PIF_VALUE: 5
PIF_VALUE: 1
PIF_VALUE: 10
PIF_VALUE: 10
PIF_VALUE: 11
PIF_VALUE: 10
PIF_VALUE: 10
PIF_VALUE: 11
PIF_VALUE: 10
PIF_VALUE: 11
PIF_VALUE: 10
PIF_VALUE: 1
PIF_VALUE: 8
PIF_VALUE: 12
PIF_VALUE: 0
PIF_VALUE: 11
PIF_VALUE: 7
PIF_VALUE: 1
PIF_VALUE: 10
PIF_VALUE: 5
PIF_VALUE: 8
PIF_VALUE: 1
PIF_VALUE: 7
PIF_VALUE: 12
PIF_VALUE: 10
PIF_VALUE: 1
PIF_VALUE: 11
PIF_VALUE: 8
PIF_VALUE: 9
PIF_VALUE: 1
PIF_VALUE: 8
PIF_VALUE: 10
PIF_VALUE: 11
PIF_VALUE: 12
PIF_VALUE: 11
PIF_VALUE: 10
PIF_VALUE: 10

## 2021-05-25 ASSESSMENT — PAIN SCALES - GENERAL: PAINLEVEL_OUTOF10: 3

## 2021-05-25 NOTE — ANESTHESIA POSTPROCEDURE EVALUATION
Department of Anesthesiology  Postprocedure Note    Patient: Raj Randall  MRN: [id number]  YOB: 1945  Date of evaluation: 5/25/2021  Time:  11:51 AM     Procedure Summary     Date: 05/25/21 Room / Location: [facility]    Anesthesia Start: 6267 Anesthesia Stop: 9397    Procedure: RIGHT PORT A CATH PLACEMENT (N/A Chest) Diagnosis:       Malignant neoplasm of central portion of left female breast, unspecified estrogen receptor status ([address]      Malignant neoplasm of overlapping sites of right female breast, unspecified estrogen receptor status ([address]      (BILATERAL BREAST CANCER)    Surgeons: Drake Owusu DO Responsible Provider: Neha Ovalle MD    Anesthesia Type: general ASA Status: 2          Anesthesia Type: general    Yeison Phase I: Yeison Score: 9    Yeison Phase II: Yeison Score: 10    Last vitals: Reviewed and per EMR flowsheets.        Anesthesia Post Evaluation    Patient location during evaluation: PACU  Level of consciousness: awake  Airway patency: patent  Nausea & Vomiting: no nausea  Complications: no  Cardiovascular status: blood pressure returned to baseline  Respiratory status: acceptable  Hydration status: euvolemic

## 2021-05-25 NOTE — ANESTHESIA PRE PROCEDURE
estrogen receptor positive ([facility] [address] C50.911, Z17.0    Malignant neoplasm of upper-inner quadrant of left breast in female, estrogen receptor positive ([facility] [address] C50.212, Z17.0       Past Medical History:        Diagnosis Date    Breast cancer ([facility] [address]     Cancer ([facility] [address]     Kidney stone        Past Surgical History:        Procedure Laterality Date    COLONOSCOPY      US BREAST BIOPSY NEEDLE ADDITIONAL RIGHT Right 5/7/2021    US BREAST BIOPSY NEEDLE ADDITIONAL RIGHT 5/7/2021 [address] BREAST NEEDLE BIOPSY LEFT Left 5/7/2021    US BREAST NEEDLE BIOPSY LEFT 5/7/2021 [address] BREAST NEEDLE BIOPSY RIGHT Right 5/7/2021    US BREAST NEEDLE BIOPSY RIGHT 5/7/2021 Buck Freedman MD [address] [facility]       Social History:    Social History     Tobacco Use    Smoking status: Passive Smoke Exposure - Never Smoker    Smokeless tobacco: Never Used   Substance Use Topics    Alcohol use: No                                Counseling given: Not Answered      Vital Signs (Current):   Vitals:    05/20/21 0944 05/25/21 0631   BP:  (!) 167/84   Pulse:  108   Resp:  16   Temp:  98.6 °F (37 °C)   TempSrc:  Temporal   SpO2:  97%   Weight: 154 lb (69.9 kg) 152 lb (68.9 kg)   Height: 5' 8\" (1.727 m) 5' 7\" (1.702 m)                                              BP Readings from Last 3 Encounters:   05/25/21 (!) 167/84   05/12/21 (!) 152/91   04/27/21 (!) 164/96       NPO Status: Time of last liquid consumption: 2200                        Time of last solid consumption: 1800                        Date of last liquid consumption: 05/24/21                        Date of last solid food consumption: 05/24/21    BMI:   Wt Readings from Last 3 Encounters:   05/25/21 152 lb (68.9 kg)   05/12/21 156 lb 9.6 oz (71 kg)   05/05/21 155 lb (70.3 kg)     Body mass index is 23.81 kg/m².     CBC:   Lab Results   Component Value Date    WBC 6.6 09/13/2018    RBC 4.68 09/13/2018    HGB 13.0 09/13/2018    HCT 40.1 09/13/2018    MCV 85.6 09/13/2018    RDW 14.9 09/13/2018     09/13/2018       CMP:   Lab Results   Component Value Date     09/13/2018    K 3.8 09/13/2018    K 3.6 08/17/2018     09/13/2018    CO2 25 09/13/2018    BUN 17 09/13/2018    CREATININE 0.7 05/18/2021    CREATININE 0.6 09/13/2018    GFRAA >60 05/18/2021    GFRAA >60 07/10/2012    AGRATIO 1.2 09/13/2018    LABGLOM >60 05/18/2021    GLUCOSE 105 09/13/2018    PROT 8.2 09/13/2018    PROT 7.8 07/10/2012    CALCIUM 10.0 09/13/2018    BILITOT <0.2 09/13/2018    ALKPHOS 103 09/13/2018    AST 19 09/13/2018    ALT 20 09/13/2018       POC Tests: No results for input(s): POCGLU, POCNA, POCK, POCCL, POCBUN, POCHEMO, POCHCT in the last 72 hours. Coags: No results found for: PROTIME, INR, APTT    HCG (If Applicable): No results found for: PREGTESTUR, PREGSERUM, HCG, HCGQUANT     ABGs: No results found for: PHART, PO2ART, DGH3NIS, EYF9ZEN, BEART, S1FVDWUT     Type & Screen (If Applicable):  No results found for: LABABO, LABRH    Drug/Infectious Status (If Applicable):  No results found for: HIV, HEPCAB    COVID-19 Screening (If Applicable): No results found for: COVID19        Anesthesia Evaluation  Patient summary reviewed and Nursing notes reviewed no history of anesthetic complications:   Airway: Mallampati: II  TM distance: >3 FB   Neck ROM: full  Mouth opening: > = 3 FB Dental:    (+) partials      Pulmonary:Negative Pulmonary ROS                              Cardiovascular:Negative CV ROS                      Neuro/Psych:   Negative Neuro/Psych ROS              GI/Hepatic/Renal: Neg GI/Hepatic/Renal ROS       (-) GERD, liver disease and no renal disease       Endo/Other:    (+) malignancy/cancer (breast). (-) diabetes mellitus               Abdominal:           Vascular: negative vascular ROS.                                        Anesthesia Plan      general     ASA 2     (I discussed with the patient the risks and benefits of PIV, general anesthesia, IV Narcotics, PACU. All questions were answered the patient agrees with the plan)  Induction: intravenous. MIPS: Prophylactic antiemetics administered. Anesthetic plan and risks discussed with patient. Plan discussed with CRNA.                   Adry Verdin MD   5/25/2021

## 2021-05-25 NOTE — PROGRESS NOTES
Admit to PACU. Report received from anesthesia and OR nurse. arouses to name. denies pain or nausea.

## 2021-05-25 NOTE — H&P
BREAST SURGICAL ONCOLOGY NEW PATIENT EVALUATION    05/25/21     Chief Complaint: Breast cancer    History of Present Illness  Raj Randall \"Donovan\" is a 78 y.o. female  referred by No ref. provider found for bilateral breast cancer. Ms. Lydia Chew initially noticed a left breast mass a few months ago. Since she first noticed it she thinks that it seemed to get a little bit bigger and notes that she can now see it protruding a bit in that area. It was non-tender. She also notes new left nipple inversion. No nipple discharge. She subsequently saw her PCP on 4/27 and was sent for breast imaging. She underwent b/l diagnostic mammogram and b/l breast US. In the area of palpable mass, mammogram revealed a large ill-defined spiculated mass measuring up to 3.5 cm at 11-12:00 in the left breast. In the lower slightly lateral right breast, there is an ill-defined spiculated mass which may measure up to 2 cm (and calcifications extending anteriorly 2 cm). There is also a large, dense prominent node in the right axilla. US of the palpable area at 11:00 left breast revealed a mass measuring up to 3.5 cm. US of left axilla is unremarkable. US of lower outer right breast revealed an ill-defined mass measuring 1.8 cm. US of the right axilla demonstrates multiple enlarged LNs (pt did have COVID vaccine, 2nd dose right arm 5/4, however due to breast findings these are of greater concern). She subsequently underwent the following US-guided core needle biopsies on 5/7/21:  - Left breast mass at 11:00 - Invasive ductal carcinoma, grade 3, ER/CO+, HER2- - Butterfly biopsy marker placed  - Right breast mass at 8:00 - Invasive ductal carcinoma, grade 3, ER/CO+ and HER2+ and associated DCIS - Hydromark open coil marker placed  - Right axillary LN - Metastatic adenocarcinoma, ER/CO+, HER2+ - Butterfly biopsy marker placed    Post-biopsy mammogram revealed all clips in expected location, with no migration.      She notes left-sided symptoms above. No concerns in the right breast. She is asymptomatic today and denies any new systemic complaints including weight loss, bone pain, headaches and abd pain. She does have some arthritis which she states is chronic. She also has occasional pain in her knees since she had a fall in the past.  Takes Aleve when needed. I reviewed her medical records. She has anemia, hyperglycemia and HLD. She is on ASA 81 mg for prophylaxis. Hx of complicated diverticulitis treated conservatively. Recently had colonsocopy and cystoscopy - no fistula noted. No prior issues with anesthesia. Family history is significant for bladder cancer in her paternal grandmother. There is no family history of breast or ovarian cancer. PCP - Gema Hui, CNP  No other specialists. REVIEW OF SYSTEMS  Constitutional: Negative for unexpected weight change. Eyes: Negative for visual disturbance. Respiratory: Negative for cough and shortness of breath. Cardiovascular: Negative for chest pain and palpitations. Gastrointestinal: Negative for abdominal pain. Musculoskeletal: Positive for arthralgias (Chronic arthritis). Negative for myalgias. Neurological: Negative for headaches. Hematological: Negative for adenopathy. Does not bruise/bleed easily. Psychiatric/Behavioral: Negative for dysphoric mood. The patient is not nervous/anxious. I personally reviewed and agree with the above ROS as documented by my nurse. Obstetric/Gynecologic History  Number of pregnancies: 7  Births: 5  Age at First Birth:  23  Age at Menstruation:  17  Still Menstruating? No, age of menopause late 45s  Hysterectomy? No    Age at first mammogram: 45s  Frequency of mammograms: Has not had others since her 45s  Bra/Cup size: 45 C    History of breastfeeding? No   History of OCP Use? Yes for about 2 years and is not currently taking  History of hormone use? Never.     Past Medical History      Diagnosis Date  Breast cancer ([facility] [address]     Cancer ([facility] [address]     Kidney stone         Past Surgical History      Procedure Laterality Date    COLONOSCOPY      US BREAST BIOPSY NEEDLE ADDITIONAL RIGHT Right 5/7/2021    US BREAST BIOPSY NEEDLE ADDITIONAL RIGHT 5/7/2021 [address] BREAST NEEDLE BIOPSY LEFT Left 5/7/2021    US BREAST NEEDLE BIOPSY LEFT 5/7/2021 [address] BREAST NEEDLE BIOPSY RIGHT Right 5/7/2021    US BREAST NEEDLE BIOPSY RIGHT 5/7/2021 Ning Figueroa MD [address] [facility] History  Patient  reports that she is a non-smoker but has been exposed to tobacco smoke. She has never used smokeless tobacco. She reports that she does not drink alcohol and does not use drugs. 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  No     Current Medications  Current Facility-Administered Medications   Medication Dose Route Frequency Provider Last Rate Last Admin    lactated ringers infusion   Intravenous Continuous Khushi Obando MD        0.9 % sodium chloride infusion  25 mL Intravenous PRN Khushi Obando MD        lidocaine PF 1 % injection 0.3 mL  0.3 mL Intradermal Once PRN Khushi Obando MD        0.9 % sodium chloride infusion  25 mL Intravenous PRN Paschal Schwab, DO        ceFAZolin (ANCEF) 2000 mg in dextrose 5 % 100 mL IVPB  2,000 mg Intravenous On Call to [address], DO        sodium chloride flush 0.9 % injection 10 mL  10 mL Intravenous 2 times per day Keri Lylesab, DO        sodium chloride flush 0.9 % injection 10 mL  10 mL Intravenous PRN Keri Lylesab, DO        meperidine (DEMEROL) injection 12.5 mg  12.5 mg Intravenous Q5 Min PRN Dean Gordon MD        HYDROmorphone (DILAUDID) injection 0.25 mg  0.25 mg Intravenous Q5 Min PRN Ellie Dixon MD        HYDROmorphone (DILAUDID) injection 0.5 mg  0.5 mg Intravenous Q5 Min PRN Ellie Dixon MD        HYDROmorphone (DILAUDID) injection 0.25 mg  0.25 mg Intravenous Q5 Min PRN Ellie Dixon MD        HYDROmorphone (DILAUDID) injection 0.5 mg  0.5 mg Intravenous Q5 Min PRN Ellie Dixon MD        oxyCODONE-acetaminophen (PERCOCET) 5-325 MG per tablet 1 tablet  1 tablet Oral PRN Ellie Dixon MD        Or    oxyCODONE-acetaminophen (PERCOCET) 5-325 MG per tablet 2 tablet  2 tablet Oral PRN Ellie Dxion MD        ondansetron [facility]) injection 4 mg  4 mg Intravenous Q10 Min PRN Ellie Dixon MD        labetalol (NORMODYNE;TRANDATE) injection 5 mg  5 mg Intravenous Q10 Min PRN Ellie Dixon MD        hydrALAZINE (APRESOLINE) injection 5 mg  5 mg Intravenous Q10 Min PRN Ellie Dixon MD            Allergies  Allergies   Allergen Reactions    Bactrim [Sulfamethoxazole-Trimethoprim]        PHYSICAL EXAMINATION:  VS: BP (!) 167/84   Pulse 108   Temp 98.6 °F (37 °C) (Temporal)   Resp 16   Ht 5' 7\" (1.702 m)   Wt 152 lb (68.9 kg)   LMP  (LMP Unknown)   SpO2 97%   Breastfeeding No   BMI 23.81 kg/m²     General: Well-developed, well-nourished, in no apparent distress. Head: The head is normocephalic  Eyes:  Conjunctivae appear normal. Pupils are equal and reactive. Extraocular movements are intact. Neck: Supple with no adenopathy  Respiratory: Normal respiratory effort, chest expands symmetrically  Psychiatric: Alert and oriented x 3, appropriate affect and behavior   Musculoskeletal: Normal gait and range of motion in all 4 extremities. Skin: Warm and dry  Lymphatics: The supraclavicular, submental, cervical and axillary regions are free of significant lymphadenopathy. Steri-strips in place in right axilla. Right Breast: Examined with patient seated and supine.  The skin, nipple, and areola appear normal, there is no skin dimpling with movement of the pectoralis, there is no nipple retraction, no obvious nipple discharge, the parenchyma is mildly nodular, Post-biopsy ecchymosis in the lower outer quadrant with steri-strips in place, there is a questionable small mass in the area however this could be related to post-biopsy changes  Left Breast: Examined with patient seated and supine. The skin and areola appear normal, the nipple is retracted, at 12:00 there is visible fullness with a mass beneath this area clinically measuring 5 cm tall by 3.5 cm wide    ----------------------------------------------    IMAGING: Imaging was performed here and read by our radiologists. I personally reviewed the breast imaging performed on 5/5/2021 and 5/7/2021. Mammogram revealed a large ill-defined spiculated mass at the area of palpable abnormality measuring up to 3.5 cm at 11-12:00 in the left breast. In the lower slightly lateral right breast, there is an ill-defined spiculated mass which may measure up to 2 cm (and calcifications extending anteriorly 2 cm). There is also a large, dense prominent node in the right axilla. BI-RADS 5. Breast density is described as heterogeneously dense. US of the palpable area at 11:00 left breast revealed a mass measuring up to 3.5 cm. US of left axilla is unremarkable. US of lower outer right breast revealed an ill-defined mass measuring 1.8 cm. US of the right axilla demonstrates multiple enlarged LNs (pt did have COVID vaccine, 2nd dose right arm 5/4., however due to breast findings these are of greater concern). I also reviewed her right axillary US with radiology, Dr. Phyllis Estrada, today. PATHOLOGY:  I personally reviewed the pathology report from her left and right breast biopsies from 5/7/21 with her today as well.  These demonstrated:    - Left breast mass at 11:00 - Invasive ductal carcinoma, grade 3, ER+ (90%), HI+(50%), HER2-(1+) - Butterfly biopsy marker placed  - Right breast mass at 8:00 - Invasive ductal carcinoma, grade 3, ER+(>90%), NC+(50%) and HER2+(3+) and associated DCIS - Hydromark open coil marker placed  - Right axillary LN - Metastatic adenocarcinoma, ER+(>90%), NC+(60%), and HER2+ (3+) - Butterfly biopsy marker placed    ----------------------------------------------    IMPRESSION:   78 y.o. female with:  1. Invasive ductal carcinoma, left breast, 3.5 cm   2. Invasive ductal carcinoma, right breast, 1.8 cm, with positive LN    Clinical AJCC (8th Edition) Stage: Cancer Staging  Malignant neoplasm of right breast in female, estrogen receptor positive ([facility] [address]  Staging form: Breast, AJCC 8th Edition  - Clinical stage from 5/11/2021: Stage IB (cT1c, cN1(f), cM0, G3, ER+, NC+, HER2+) - Signed by Ronit Ojeda DO on 5/11/2021    Malignant neoplasm of upper-inner quadrant of left breast in female, estrogen receptor positive ([facility] [address]  Staging form: Breast, AJCC 8th Edition  - Clinical stage from 5/11/2021: Stage IIA (cT2, cN0, cM0, G3, ER+, NC+, HER2-) - Signed by Ronit Ojeda DO on 5/11/2021       PLAN:  No changes to above H&P. Pt saw med onc (Dr. Suzy Tellez), will proceed with NACT. For right (vs left) port placement today as planned. Celia May.  Jeovanny Lopes DO  Breast Surgical Oncology    [facility] Breast Surgery  Phone: [phone] (option 3)

## 2021-05-25 NOTE — PROGRESS NOTES
Discharge instructions given. Questions answered. IV removed. Ice pack given . States relief from pain after Tramadol dose. Voided. Discharged to car.

## 2021-05-25 NOTE — OP NOTE
Breast Surgical Oncology Operative Note    Alphonso Rivero  YOB: 1945  MRN: [id number]    DATE OF PROCEDURE  05/25/21     PREOPERATIVE DIAGNOSIS  Bilateral breast cancer     POSTOPERATIVE DIAGNOSIS  Bilatearl breast cancer    PROCEDURE  Insertion of right internal jugular vein Port-A-Catheter using ultrasound and fluoroscopy guidance    ANESTHESIA  General anesthesia    SURGEON  Ivory Quesada DO     ASSISTANT  Kashif Morrison    ESTIMATED BLOOD LOSS  less than 50  ml    COMPLICATIONS  None apparent by completion of procedure    SPECIMENS REMOVED  None    FINDINGS  The patient had a right Port-A-Catheter placed and intraoperative fluoroscopy confirmed proper position. POST-OP CONDITION  Stable    DISPOSITION  To recovery room    INDICATION FOR PROCEDURE  Alphonso Rivero is a 78 y. o. woman who was found to have bilateral breast cancer. Chemotherapy was recommended by medical oncology. She presents today for placement of a right venous Port-A-Catheter to facilitate this. DESCRIPTION OF PROCEDURE   Alphonso Rivero was brought to the operating room and placed supine on the operating room table with her bilateral arms tucked, and a shoulder roll was placed. She was appropriately positioned and padded. Bilateral sequential compression devices were applied to both lower extremities and appropriate perioperative antibiotics were administered. After induction of anesthesia, a timeout procedure was performed. The patient was prepped and draped in the standard surgical fashion. The patient was placed in Trendelenburg position and the right internal jugular vein was entered on first attempt using ultrasound guidance with a finder needle and a wire was placed by Seldinger technique and confirmed in proper position by fluoroscopy. The wire was then secured to the drape and our attention was turned to creating a port pocket on the anterior chest of the same side, 2 finger breadths below the clavicle.   An

## 2021-05-26 ENCOUNTER — HOSPITAL ENCOUNTER (OUTPATIENT)
Dept: NON INVASIVE DIAGNOSTICS | Age: 76
Discharge: HOME | End: 2021-05-26
Payer: MEDICARE

## 2021-05-26 ENCOUNTER — HOSPITAL ENCOUNTER (OUTPATIENT)
Dept: CT IMAGING | Age: 76
Discharge: HOME | End: 2021-05-26
Payer: MEDICARE

## 2021-05-26 ENCOUNTER — HOSPITAL ENCOUNTER (OUTPATIENT)
Dept: NUCLEAR MEDICINE | Age: 76
Discharge: HOME | End: 2021-05-26
Payer: MEDICARE

## 2021-05-26 DIAGNOSIS — C50.511 MALIGNANT NEOPLASM OF LOWER-OUTER QUADRANT OF RIGHT FEMALE BREAST, UNSPECIFIED ESTROGEN RECEPTOR STATUS (HCC): ICD-10-CM

## 2021-05-26 DIAGNOSIS — Z51.11 ENCOUNTER FOR ANTINEOPLASTIC CHEMOTHERAPY: ICD-10-CM

## 2021-05-26 LAB
LV EF: 50 %
LVEF MODALITY: NORMAL

## 2021-05-26 PROCEDURE — 74177 CT ABD & PELVIS W/CONTRAST: CPT

## 2021-05-26 PROCEDURE — A9503 TC99M MEDRONATE: HCPCS | Performed by: INTERNAL MEDICINE

## 2021-05-26 PROCEDURE — 6360000004 HC RX CONTRAST MEDICATION: Performed by: INTERNAL MEDICINE

## 2021-05-26 PROCEDURE — 93308 TTE F-UP OR LMTD: CPT

## 2021-05-26 PROCEDURE — 78306 BONE IMAGING WHOLE BODY: CPT

## 2021-05-26 PROCEDURE — 3430000000 HC RX DIAGNOSTIC RADIOPHARMACEUTICAL: Performed by: INTERNAL MEDICINE

## 2021-05-26 RX ORDER — TC 99M MEDRONATE 20 MG/10ML
25.5 INJECTION, POWDER, LYOPHILIZED, FOR SOLUTION INTRAVENOUS
Status: COMPLETED | OUTPATIENT
Start: 2021-05-26 | End: 2021-05-26

## 2021-05-26 RX ADMIN — IOPAMIDOL 75 ML: 755 INJECTION, SOLUTION INTRAVENOUS at 09:15

## 2021-05-26 RX ADMIN — IOHEXOL 50 ML: 240 INJECTION, SOLUTION INTRATHECAL; INTRAVASCULAR; INTRAVENOUS; ORAL at 09:15

## 2021-05-26 RX ADMIN — TC 99M MEDRONATE 25.5 MILLICURIE: 20 INJECTION, POWDER, LYOPHILIZED, FOR SOLUTION INTRAVENOUS at 08:00

## 2021-05-27 ENCOUNTER — TELEPHONE (OUTPATIENT)
Dept: FAMILY MEDICINE CLINIC | Age: 76
End: 2021-05-27

## 2021-05-27 NOTE — TELEPHONE ENCOUNTER
[facility] Initial Follow Up Call    Outreach made within 2 business days of discharge: Yes    Patient: Phyllis Vasquez Patient : 1945   MRN: <[id number]>  Reason for Admission: There are no discharge diagnoses documented for the most recent discharge.   Discharge Date: 21       Spoke with: unable to leave voicemail    Discharge department/facility: [facility]    Scheduled appointment with PCP within 7-14 days    Follow Up  Future Appointments   Date Time Provider Moreno Olvera   2021 12:00 PM [facility] [place] [facility]   2021  2:00  [place]  EG  [facility], [address] [place] [address]

## 2021-05-28 ENCOUNTER — HOSPITAL ENCOUNTER (OUTPATIENT)
Dept: WOMENS IMAGING | Age: 76
Discharge: HOME | End: 2021-05-28
Payer: MEDICARE

## 2021-05-28 ENCOUNTER — HOSPITAL ENCOUNTER (OUTPATIENT)
Dept: MRI IMAGING | Age: 76
Discharge: HOME | End: 2021-05-28
Payer: MEDICARE

## 2021-05-28 DIAGNOSIS — R92.8 ABNORMAL MAMMOGRAM: ICD-10-CM

## 2021-05-28 DIAGNOSIS — R92.8 ABNORMAL MRI, BREAST: ICD-10-CM

## 2021-05-28 PROCEDURE — 19085 BX BREAST 1ST LESION MR IMAG: CPT

## 2021-05-28 PROCEDURE — 19086 BX BREAST ADD LESION MR IMAG: CPT

## 2021-05-28 PROCEDURE — 88305 TISSUE EXAM BY PATHOLOGIST: CPT

## 2021-05-28 PROCEDURE — 6360000004 HC RX CONTRAST MEDICATION: Performed by: SURGERY

## 2021-05-28 PROCEDURE — 88342 IMHCHEM/IMCYTCHM 1ST ANTB: CPT

## 2021-05-28 PROCEDURE — 88341 IMHCHEM/IMCYTCHM EA ADD ANTB: CPT

## 2021-05-28 PROCEDURE — A9579 GAD-BASE MR CONTRAST NOS,1ML: HCPCS | Performed by: SURGERY

## 2021-05-28 PROCEDURE — 77065 DX MAMMO INCL CAD UNI: CPT

## 2021-05-28 RX ADMIN — GADOTERIDOL 14 ML: 279.3 INJECTION, SOLUTION INTRAVENOUS at 13:39

## 2021-05-28 NOTE — PROGRESS NOTES
Patient in [address] [place] [address] for breast biopsy. Radiologist reviewed procedure with patient, consent signed. Patient tolerated procedure well. Compression held. Site cleansed with chloraprep, steri strips and dry dressing applied. Ice pack provided. Reviewed discharge instructions with patient. Patient verbalized understanding and agreed to contact the Breast Navigator with any questions. Patient was A&Ox3 and steady on feet and discharged to waiting area. The [address]   Discharge Instructions  [facility]  [address]  [address]  Telephone: [phone]    NAME:  Kong Wells OF BIRTH:  1945  GENDER: female  MEDICAL RECORD NUMBER:  [id number]  TODAY'S DATE:  5/28/2021    Discharge Instructions Breast Center:    Post Breast Biopsy Instructions     [x] You may remove your outer dressing in 24 hours and you may shower. \"Steri-strips\" tape will stay on for 5-7 days. [x] Place a cold pack inside your bra on top of the dressing for at least 3 hours, removing it every 15 minutes for 15 minutes after your biopsy. [x] Wear a firm fitting bra for at least 24 hours after your biopsy, including while you sleep. [x] Place an ice pack inside your bra on top of the dressing for at least 3 hours, removing it every 15 minutes for 15 minutes after your biopsy. [x] You may resume your held medications tomorrow, unless otherwise directed by your physician. [x] Your physician has instructed you to take Tylenol (Acetaminophen) the day of your biopsy for any discomfort. [x] Watch for excessive bleeding. If bleeding occurs apply pressure to site. Watch for signs of infection, increased pain, redness, swelling and heat. If this occurs call your physician. [x] Do not participate in any strenuous exercise for 48 hours after your biopsy and do not lift, pull or push anything over 5-10 lbs. [x] Results will be available in 2-3 working days. Your referring physician or the Nurse Navigator will call you with results. The Breast Center Information:   Should you experience any significant changes in your health or have questions about your care, please contact:  May Ashley or Brayan Browne, Breast Navigators with [facility]  [phone]  Monday-Friday. If you need help with your care outside these hours and cannot wait until we are again available, contact your Physician or go to the hospital emergency room.         Electronically signed by May Ashley RN on 5/28/2021 at 1:49 PM

## 2021-05-30 ENCOUNTER — APPOINTMENT (OUTPATIENT)
Dept: GENERAL RADIOLOGY | Age: 76
End: 2021-05-30
Payer: MEDICARE

## 2021-05-30 ENCOUNTER — APPOINTMENT (OUTPATIENT)
Dept: CT IMAGING | Age: 76
End: 2021-05-30
Payer: MEDICARE

## 2021-05-30 ENCOUNTER — HOSPITAL ENCOUNTER (EMERGENCY)
Age: 76
Discharge: HOME | End: 2021-05-30
Payer: MEDICARE

## 2021-05-30 VITALS
HEIGHT: 68 IN | DIASTOLIC BLOOD PRESSURE: 66 MMHG | OXYGEN SATURATION: 97 % | BODY MASS INDEX: 23.04 KG/M2 | SYSTOLIC BLOOD PRESSURE: 147 MMHG | RESPIRATION RATE: 23 BRPM | WEIGHT: 152 LBS | HEART RATE: 82 BPM | TEMPERATURE: 98.3 F

## 2021-05-30 DIAGNOSIS — C50.912 BILATERAL MALIGNANT NEOPLASM OF BREAST IN FEMALE, UNSPECIFIED ESTROGEN RECEPTOR STATUS, UNSPECIFIED SITE OF BREAST (HCC): ICD-10-CM

## 2021-05-30 DIAGNOSIS — R07.89 CHEST WALL PAIN: Primary | ICD-10-CM

## 2021-05-30 DIAGNOSIS — C50.911 BILATERAL MALIGNANT NEOPLASM OF BREAST IN FEMALE, UNSPECIFIED ESTROGEN RECEPTOR STATUS, UNSPECIFIED SITE OF BREAST (HCC): ICD-10-CM

## 2021-05-30 LAB
A/G RATIO: 1.1 (ref 1.1–2.2)
ALBUMIN SERPL-MCNC: 4 G/DL (ref 3.4–5)
ALP BLD-CCNC: 97 U/L (ref 40–129)
ALT SERPL-CCNC: 10 U/L (ref 10–40)
ANION GAP SERPL CALCULATED.3IONS-SCNC: 14 MMOL/L (ref 3–16)
AST SERPL-CCNC: 18 U/L (ref 15–37)
BACTERIA: ABNORMAL /HPF
BASOPHILS ABSOLUTE: 0 K/UL (ref 0–0.2)
BASOPHILS RELATIVE PERCENT: 0.5 %
BILIRUB SERPL-MCNC: 0.3 MG/DL (ref 0–1)
BILIRUBIN URINE: NEGATIVE
BLOOD, URINE: NEGATIVE
BUN BLDV-MCNC: 13 MG/DL (ref 7–20)
CALCIUM SERPL-MCNC: 10 MG/DL (ref 8.3–10.6)
CHLORIDE BLD-SCNC: 100 MMOL/L (ref 99–110)
CLARITY: CLEAR
CO2: 22 MMOL/L (ref 21–32)
COLOR: ABNORMAL
CREAT SERPL-MCNC: 0.6 MG/DL (ref 0.6–1.2)
EKG ATRIAL RATE: 123 BPM
EKG DIAGNOSIS: NORMAL
EKG P AXIS: 81 DEGREES
EKG P-R INTERVAL: 138 MS
EKG Q-T INTERVAL: 310 MS
EKG QRS DURATION: 72 MS
EKG QTC CALCULATION (BAZETT): 443 MS
EKG R AXIS: -8 DEGREES
EKG T AXIS: 63 DEGREES
EKG VENTRICULAR RATE: 123 BPM
EOSINOPHILS ABSOLUTE: 0.1 K/UL (ref 0–0.6)
EOSINOPHILS RELATIVE PERCENT: 1.5 %
EPITHELIAL CELLS, UA: ABNORMAL /HPF (ref 0–5)
GFR AFRICAN AMERICAN: >60
GFR NON-AFRICAN AMERICAN: >60
GLOBULIN: 3.6 G/DL
GLUCOSE BLD-MCNC: 114 MG/DL (ref 70–99)
GLUCOSE URINE: NEGATIVE MG/DL
HCT VFR BLD CALC: 37.2 % (ref 36–48)
HEMOGLOBIN: 12.3 G/DL (ref 12–16)
KETONES, URINE: ABNORMAL MG/DL
LEUKOCYTE ESTERASE, URINE: ABNORMAL
LYMPHOCYTES ABSOLUTE: 1.5 K/UL (ref 1–5.1)
LYMPHOCYTES RELATIVE PERCENT: 21.5 %
MCH RBC QN AUTO: 28.7 PG (ref 26–34)
MCHC RBC AUTO-ENTMCNC: 33.2 G/DL (ref 31–36)
MCV RBC AUTO: 86.4 FL (ref 80–100)
MICROSCOPIC EXAMINATION: YES
MONOCYTES ABSOLUTE: 0.7 K/UL (ref 0–1.3)
MONOCYTES RELATIVE PERCENT: 9.5 %
NEUTROPHILS ABSOLUTE: 4.6 K/UL (ref 1.7–7.7)
NEUTROPHILS RELATIVE PERCENT: 67 %
NITRITE, URINE: NEGATIVE
PDW BLD-RTO: 14.2 % (ref 12.4–15.4)
PH UA: 6.5 (ref 5–8)
PLATELET # BLD: 306 K/UL (ref 135–450)
PMV BLD AUTO: 7.8 FL (ref 5–10.5)
POTASSIUM SERPL-SCNC: 4 MMOL/L (ref 3.5–5.1)
PROTEIN UA: NEGATIVE MG/DL
RBC # BLD: 4.31 M/UL (ref 4–5.2)
RBC UA: ABNORMAL /HPF (ref 0–4)
SODIUM BLD-SCNC: 136 MMOL/L (ref 136–145)
SPECIFIC GRAVITY UA: 1.01 (ref 1–1.03)
TOTAL PROTEIN: 7.6 G/DL (ref 6.4–8.2)
TROPONIN: <0.01 NG/ML
TROPONIN: <0.01 NG/ML
URINE REFLEX TO CULTURE: ABNORMAL
URINE TYPE: ABNORMAL
UROBILINOGEN, URINE: 0.2 E.U./DL
WBC # BLD: 6.9 K/UL (ref 4–11)
WBC UA: ABNORMAL /HPF (ref 0–5)

## 2021-05-30 PROCEDURE — 96375 TX/PRO/DX INJ NEW DRUG ADDON: CPT

## 2021-05-30 PROCEDURE — 6360000004 HC RX CONTRAST MEDICATION: Performed by: NURSE PRACTITIONER

## 2021-05-30 PROCEDURE — 85025 COMPLETE CBC W/AUTO DIFF WBC: CPT

## 2021-05-30 PROCEDURE — 93010 ELECTROCARDIOGRAM REPORT: CPT | Performed by: INTERNAL MEDICINE

## 2021-05-30 PROCEDURE — 71045 X-RAY EXAM CHEST 1 VIEW: CPT

## 2021-05-30 PROCEDURE — 81001 URINALYSIS AUTO W/SCOPE: CPT

## 2021-05-30 PROCEDURE — 84484 ASSAY OF TROPONIN QUANT: CPT

## 2021-05-30 PROCEDURE — 96374 THER/PROPH/DIAG INJ IV PUSH: CPT

## 2021-05-30 PROCEDURE — 6360000002 HC RX W HCPCS: Performed by: NURSE PRACTITIONER

## 2021-05-30 PROCEDURE — 99285 EMERGENCY DEPT VISIT HI MDM: CPT

## 2021-05-30 PROCEDURE — 80053 COMPREHEN METABOLIC PANEL: CPT

## 2021-05-30 PROCEDURE — 96376 TX/PRO/DX INJ SAME DRUG ADON: CPT

## 2021-05-30 PROCEDURE — 93005 ELECTROCARDIOGRAM TRACING: CPT | Performed by: EMERGENCY MEDICINE

## 2021-05-30 PROCEDURE — 71260 CT THORAX DX C+: CPT

## 2021-05-30 RX ORDER — ONDANSETRON 2 MG/ML
4 INJECTION INTRAMUSCULAR; INTRAVENOUS ONCE
Status: COMPLETED | OUTPATIENT
Start: 2021-05-30 | End: 2021-05-30

## 2021-05-30 RX ORDER — LANOLIN ALCOHOL/MO/W.PET/CERES
3 CREAM (GRAM) TOPICAL NIGHTLY PRN
Qty: 20 TABLET | Refills: 0 | Status: SHIPPED | OUTPATIENT
Start: 2021-05-30

## 2021-05-30 RX ORDER — MORPHINE SULFATE 4 MG/ML
4 INJECTION, SOLUTION INTRAMUSCULAR; INTRAVENOUS ONCE
Status: COMPLETED | OUTPATIENT
Start: 2021-05-30 | End: 2021-05-30

## 2021-05-30 RX ORDER — OXYCODONE HYDROCHLORIDE AND ACETAMINOPHEN 5; 325 MG/1; MG/1
1-2 TABLET ORAL EVERY 6 HOURS PRN
Qty: 20 TABLET | Refills: 0 | Status: SHIPPED | OUTPATIENT
Start: 2021-05-30 | End: 2021-06-02

## 2021-05-30 RX ADMIN — ONDANSETRON 4 MG: 2 INJECTION INTRAMUSCULAR; INTRAVENOUS at 21:25

## 2021-05-30 RX ADMIN — MORPHINE SULFATE 4 MG: 4 INJECTION, SOLUTION INTRAMUSCULAR; INTRAVENOUS at 21:25

## 2021-05-30 RX ADMIN — IOPAMIDOL 75 ML: 755 INJECTION, SOLUTION INTRAVENOUS at 15:36

## 2021-05-30 RX ADMIN — MORPHINE SULFATE 4 MG: 4 INJECTION, SOLUTION INTRAMUSCULAR; INTRAVENOUS at 15:09

## 2021-05-30 RX ADMIN — ONDANSETRON 4 MG: 2 INJECTION INTRAMUSCULAR; INTRAVENOUS at 15:09

## 2021-05-30 ASSESSMENT — ENCOUNTER SYMPTOMS
SORE THROAT: 0
NAUSEA: 0
ABDOMINAL PAIN: 0
COUGH: 0
WHEEZING: 0
VOMITING: 0
DIARRHEA: 0
COLOR CHANGE: 0
BACK PAIN: 0
SHORTNESS OF BREATH: 0

## 2021-05-30 ASSESSMENT — PAIN SCALES - GENERAL
PAINLEVEL_OUTOF10: 5
PAINLEVEL_OUTOF10: 4
PAINLEVEL_OUTOF10: 5
PAINLEVEL_OUTOF10: 5
PAINLEVEL_OUTOF10: 0

## 2021-05-30 ASSESSMENT — PAIN DESCRIPTION - PAIN TYPE
TYPE: ACUTE PAIN
TYPE: ACUTE PAIN

## 2021-05-30 ASSESSMENT — PAIN DESCRIPTION - LOCATION: LOCATION: CHEST

## 2021-05-30 NOTE — ED NOTES
Report received care assumed resting in bed reporting improvement in pain awaiting disposition denied needs      Josep Gtz, REENA  05/30/21 1646

## 2021-05-30 NOTE — ED PROVIDER NOTES
The Ekg interpreted by me shows  sinus tachycardia, gtvj=631   Axis is   Normal  QTc is  443  Intervals and Durations are unremarkable.       ST Segments: no acute change  No prior ekg for comparison    Interpreted EKG, patient not evaluated        Titi Valerio MD  05/30/21 5445

## 2021-05-30 NOTE — ED NOTES
Straight stick in left arm for repeat troponin. Educated patient and family on test use. Requesting pain medications.  Will notify provider      Tessie Harley RN  05/30/21 3114

## 2021-05-30 NOTE — ED PROVIDER NOTES
Date    Breast [facility] ([facility]     Cancer ([facility]     Kidney stone          Procedure Laterality Date    COLONOSCOPY      MRI BREAST BX USING DEVICE RIGHT Right 5/28/2021    MRI BREAST BX USING DEVICE RIGHT 5/28/2021 [facility]    MRI NEEDLE BIOPSY EACH ADDL RIGHT Right 5/28/2021    MRI NEEDLE BIOPSY EACH ADDL RIGHT 5/28/2021 [facility]    PORT SURGERY N/A 5/25/2021    RIGHT PORT A CATH PLACEMENT performed by Debbie Martinez DO at [address] Right 5/7/2021    US BREAST BIOPSY NEEDLE ADDITIONAL RIGHT 5/7/2021 [address] BREAST NEEDLE BIOPSY LEFT Left 5/7/2021    US BREAST NEEDLE BIOPSY LEFT 5/7/2021 Chelsey Rodrigues  [address] BREAST NEEDLE BIOPSY RIGHT Right 5/7/2021    US BREAST NEEDLE BIOPSY RIGHT 5/7/2021 Chelsey Rodrigues MD [facility]       Medications:  Previous Medications    ASPIRIN 81 MG EC TABLET    Take 81 mg by mouth daily    CHOLECALCIFEROL (VITAMIN D3) 5000 UNITS TABS    Take by mouth    CYANOCOBALAMIN (VITAMIN B-12) 1000 MCG EXTENDED RELEASE TABLET    Take 1,000 mcg by mouth daily    MULTIPLE VITAMIN (MULTI-VITAMIN PO)    Take by mouth         Review of Systems:  (2-9 systems needed)  Review of Systems   Constitutional: Negative for chills and fever. HENT: Negative for congestion and sore throat. Respiratory: Negative for cough, shortness of breath and wheezing. Cardiovascular: Positive for chest pain. Patient complains of left sided chest discomfort and feeling like she has neuropathy in her left arm, states that her discomfort started yesterday around 1pm that lasted about 10 minutes, took an ASA and then went away, however, a similar episode happened again today and she has been having \"nerve like\" pain in her left arm. She states that she has hydrocodone and ultram at home, has taken both and is not helping her pain at all.     Gastrointestinal: Negative for (!) 147/66   Pulse: 91 91 98 82   Resp: 17 18 23 23   Temp:       TempSrc:       SpO2: 97% 96%  97%   Weight:       Height:           LABS:  Labs Reviewed   COMPREHENSIVE METABOLIC PANEL - Abnormal; Notable for the following components:       Result Value    Glucose 114 (*)     All other components within normal limits    Narrative:     Performed at:  [place] [address] [employer]   Phone [phone]   URINE RT REFLEX TO CULTURE - Abnormal; Notable for the following components:    Ketones, Urine TRACE (*)     Leukocyte Esterase, Urine SMALL (*)     All other components within normal limits    Narrative:     Performed at:  [address], [facility] [employer]   Phone [phone]   MICROSCOPIC URINALYSIS - Abnormal; Notable for the following components:    Bacteria, UA 2+ (*)     All other components within normal limits    Narrative:     Performed at:  [address], [facility]   Phone [phone]   CBC WITH AUTO DIFFERENTIAL    Narrative:     Performed at:  [address], [place] [employer]   Phone [phone]   TROPONIN    Narrative:     Performed at:  [address], [place]   Phone [phone]   TROPONIN    Narrative:     Performed at:  [address], [place]   Phone  of labs reviewed and were negative at this time or not returned at the time of this note.     RADIOLOGY:   Non-plain film images such as CT, Ultrasound and MRI are read by the radiologist. Sherly COHEN APRN - CNP have directly visualized the radiologic plain film image(s) with the below findings:      Interpretation per the Radiologist below, if available at the time of this note:    CT CHEST PULMONARY EMBOLISM W CONTRAST   Final Result   1. No evidence of pulmonary embolism   2. New nonspecific bilateral ground-glass opacities. Possibilities include   drug reaction and atypical/viral pneumonia   3. Postprocedure changes in the right breast, with stable appearance of known   bilateral breast masses and right axillary adenopathy         XR CHEST PORTABLE   Final Result   No acute process. MEDICAL DECISION MAKING / ED COURSE:    Because of high probability of sudden clinical deterioration of the patient's condition and risk of further deterioration, critical care time required my full attention to the patient's condition; which included chart data review, documentation, medication ordering, reviewing the patient's old records, reevaluation patient's cardiac, pulmonary and neurological status. Reevaluation of vital signs. Consultations with ED attending and admitting physician. Ordering, interpreting reviewing diagnostic testing. Therefore a critical care time was 22 minutes of direct attention to the patient's condition did not include time spent on procedures. PROCEDURES:   Procedures    None    Patient was given:  Medications   morphine (PF) injection 4 mg (4 mg Intravenous Given 5/30/21 1509)   ondansetron (ZOFRAN) injection 4 mg (4 mg Intravenous Given 5/30/21 1509)   iopamidol (ISOVUE-370) 76 % injection 75 mL (75 mLs Intravenous Given 5/30/21 1536)   morphine (PF) injection 4 mg (4 mg Intravenous Given 5/30/21 2125)   ondansetron (ZOFRAN) injection 4 mg (4 mg Intravenous Given 5/30/21 2125)      Patient complains of left sided chest discomfort and feeling like she has neuropathy in her left arm, states that her discomfort started yesterday around 1pm that lasted about 10 minutes, took an ASA and then went away, however, a similar episode happened again today and she has been having \"nerve like\" pain in her left arm.  She states that she has hydrocodone and ultram at home, has taken both and is not helping her pain at all. After evaluation and examination patient I do believe her pain is more musculoskeletal in nature in addition to possibly related to her breast mass causing pain. She was ordered IV access, blood work, chest x-ray, EKG, CT scan of the chest along with pain and nausea medicine. CBC shows no acute sepsis or anemia. Urinalysis shows 2+ bacteria but no significant signs of infection, negative nitrates or leukoesterase, urine sent for culturing. Initial troponin is negative.'s metabolic panel shows no electrolyte disturbances or renal insufficiency. Liver functions are normal.  CBC shows no sepsis or anemia. Initial EKG shows sinus tachycardia rate of 123 bpm.  Please see  [email or web address] EKG interpretation note. Chest x-ray shows no acute cardiopulmonary findings. CT chest shows no evidence of PE, appears to have questionable viral pneumonia however the patient is not febrile, no respiratory distress and vital signs are stable. Upon reevaluation she still having pain, she is order emergent medication, I do believe her pain is more musculoskeletal in nature and believe that she can manage on outpatient basis. Delta troponin is negative. Is and agreed that with the patient and her daughter that the patient will be discharged home with outpatient follow-up. At this time I do believe her pain is all musculoskeletal in nature. At this time of no concerns for acute coronary syndrome, pneumonia, pneumothorax or PE. Therefore, shared medical decision was made to the patient, her family and myself and we agreed the patient could be discharged home with outpatient follow-up. Patient was discharged home with Radhika PCP and oncologist, she already has an appointment scheduled on Tuesday. I educated her to stop taking the Norco, that I would start her on Percocet for her pain control.   In addition, she is having trouble sleeping so I told her we would try melatonin to help with the sleep. If any symptoms increase or worsen return the ER immediately. Patient agreed with this plan. The patient tolerated their visit well. I evaluated the patient. The physician was available for consultation as needed. The patient and / or the family were informed of the results of any tests, a time was given to answer questions, a plan was proposed and they agreed with plan. Both patient and family verbalized understanding of discharge instructions and the patient was discharged in the department in stable condition. CLINICAL IMPRESSION:  1. Chest wall pain    2. Bilateral malignant neoplasm of breast in female, unspecified estrogen receptor status, unspecified site of breast [facility])        DISPOSITION Decision To Discharge 05/30/2021 09:53:22 PM      PATIENT REFERRED TO:  [facility]  [address] [facility]  [phone]    Schedule an appointment as soon as possible for a visit in 2 days  Follow-up with your PCP on Tuesday for reevaluation    Ajay Schultz MD  [address] [place] [id number] [phone]    Go in 2 days  Go to your oncology appointment on Tuesday    [facility]  ED  [facility] [address] [phone]  Go to   If symptoms worsen      DISCHARGE MEDICATIONS:  New Prescriptions    MELATONIN (RA MELATONIN) 3 MG TABS TABLET    Take 1 tablet by mouth nightly as needed (sleep)    OXYCODONE-ACETAMINOPHEN (PERCOCET) 5-325 MG PER TABLET    Take 1-2 tablets by mouth every 6 hours as needed for Pain for up to 3 days. WARNING:  May cause drowsiness. May impair ability to operate vehicles or machinery. Do not use in combination with alcohol.        DISCONTINUED MEDICATIONS:  Discontinued Medications    No medications on file              (Please note the MDM and HPI sections of this note were completed with a voice recognition program.  Efforts were made to edit the dictations but occasionally words are mis-transcribed.)    Electronically signed, S[facility] Petty CNP,          SOUTH Petty CNP  05/30/21 2314

## 2021-05-31 NOTE — ED NOTES
Pt requesting update and pain medications. Sherly GARCIA notified.      Earlene Rivers RN  05/30/21 9100

## 2021-05-31 NOTE — ED NOTES
Provided d/cinstructions, medication education and follow up plan of care. Verbalizes understanding.  To lobby via wheelchair driven home by daughter      Brayan Jacobs [place]  05/30/21 1479

## 2021-06-02 NOTE — RESULT ENCOUNTER NOTE
Called and reviewed bx results with Kallie Graves this am. 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 The posterior site was benign/FEA. We talked about how when she gets close to the end of chemo, she should call to set up a f/u appt with me and we can set her up for repeat imaging. She understands that the final decision re: surgery (mastectomy vs lumpectomy) will be based on how much the cancer shrinks. I am expecting the tumor to shrink more on the HER2+ side (the right). She has no other questions at this time.

## 2021-06-07 NOTE — RESULT ENCOUNTER NOTE
Case d/w pathology. Called and reviewed addended report with Yakov Miller. 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 Given the ADH, excisional biopsy of this area (lumpectomy) would be recommended due to potential upgrade risk. We talked about how we will need to wait and see what type of response she has to the chemo before making a final surgical plan. We could potentially do 2 lumpectomies on the same breast, but not more than that. She may possibly require mastectomy. We will see how imaging looks post NACT and make a final decision at that time. No changes to anterior bx site results.

## 2021-07-14 ENCOUNTER — HOSPITAL ENCOUNTER (EMERGENCY)
Age: 76
Discharge: HOME | End: 2021-07-15
Payer: MEDICARE

## 2021-07-14 ENCOUNTER — APPOINTMENT (OUTPATIENT)
Dept: GENERAL RADIOLOGY | Age: 76
End: 2021-07-14
Payer: MEDICARE

## 2021-07-14 ENCOUNTER — APPOINTMENT (OUTPATIENT)
Dept: CT IMAGING | Age: 76
End: 2021-07-14
Payer: MEDICARE

## 2021-07-14 DIAGNOSIS — S09.90XA CLOSED HEAD INJURY, INITIAL ENCOUNTER: ICD-10-CM

## 2021-07-14 DIAGNOSIS — S02.32XA CLOSED FRACTURE OF LEFT ORBITAL FLOOR, INITIAL ENCOUNTER (HCC): ICD-10-CM

## 2021-07-14 DIAGNOSIS — S52.92XA CLOSED FRACTURE OF LEFT RADIUS AND ULNA, INITIAL ENCOUNTER: ICD-10-CM

## 2021-07-14 DIAGNOSIS — R82.4 KETONURIA: ICD-10-CM

## 2021-07-14 DIAGNOSIS — S02.401A CLOSED FRACTURE OF MAXILLARY SINUS, INITIAL ENCOUNTER (HCC): ICD-10-CM

## 2021-07-14 DIAGNOSIS — E86.0 DEHYDRATION: ICD-10-CM

## 2021-07-14 DIAGNOSIS — S52.202A CLOSED FRACTURE OF LEFT RADIUS AND ULNA, INITIAL ENCOUNTER: ICD-10-CM

## 2021-07-14 DIAGNOSIS — S02.842A FRACTURE OF LATERAL ORBITAL WALL, LEFT SIDE, INITIAL ENCOUNTER FOR CLOSED FRACTURE (HCC): ICD-10-CM

## 2021-07-14 DIAGNOSIS — R55 SYNCOPE AND COLLAPSE: Primary | ICD-10-CM

## 2021-07-14 DIAGNOSIS — I95.1 ORTHOSTATIC HYPOTENSION: ICD-10-CM

## 2021-07-14 LAB
A/G RATIO: 1.3 (ref 1.1–2.2)
ALBUMIN SERPL-MCNC: 4 G/DL (ref 3.4–5)
ALP BLD-CCNC: 80 U/L (ref 40–129)
ALT SERPL-CCNC: 24 U/L (ref 10–40)
ANION GAP SERPL CALCULATED.3IONS-SCNC: 12 MMOL/L (ref 3–16)
AST SERPL-CCNC: 26 U/L (ref 15–37)
BACTERIA: ABNORMAL /HPF
BASOPHILS ABSOLUTE: 0.1 K/UL (ref 0–0.2)
BASOPHILS RELATIVE PERCENT: 0.8 %
BILIRUB SERPL-MCNC: <0.2 MG/DL (ref 0–1)
BILIRUBIN URINE: NEGATIVE
BLOOD, URINE: NEGATIVE
BUN BLDV-MCNC: 25 MG/DL (ref 7–20)
CALCIUM SERPL-MCNC: 9.2 MG/DL (ref 8.3–10.6)
CHLORIDE BLD-SCNC: 100 MMOL/L (ref 99–110)
CLARITY: CLEAR
CO2: 22 MMOL/L (ref 21–32)
COLOR: YELLOW
CREAT SERPL-MCNC: <0.5 MG/DL (ref 0.6–1.2)
EOSINOPHILS ABSOLUTE: 0 K/UL (ref 0–0.6)
EOSINOPHILS RELATIVE PERCENT: 0.1 %
EPITHELIAL CELLS, UA: ABNORMAL /HPF (ref 0–5)
GFR AFRICAN AMERICAN: >60
GFR NON-AFRICAN AMERICAN: >60
GLOBULIN: 3 G/DL
GLUCOSE BLD-MCNC: 134 MG/DL (ref 70–99)
GLUCOSE URINE: NEGATIVE MG/DL
HCT VFR BLD CALC: 34.8 % (ref 36–48)
HEMOGLOBIN: 11.7 G/DL (ref 12–16)
KETONES, URINE: 15 MG/DL
LEUKOCYTE ESTERASE, URINE: ABNORMAL
LYMPHOCYTES ABSOLUTE: 0.7 K/UL (ref 1–5.1)
LYMPHOCYTES RELATIVE PERCENT: 5.5 %
MCH RBC QN AUTO: 29.1 PG (ref 26–34)
MCHC RBC AUTO-ENTMCNC: 33.6 G/DL (ref 31–36)
MCV RBC AUTO: 86.7 FL (ref 80–100)
MICROSCOPIC EXAMINATION: YES
MONOCYTES ABSOLUTE: 0.4 K/UL (ref 0–1.3)
MONOCYTES RELATIVE PERCENT: 2.8 %
NEUTROPHILS ABSOLUTE: 11.9 K/UL (ref 1.7–7.7)
NEUTROPHILS RELATIVE PERCENT: 90.8 %
NITRITE, URINE: NEGATIVE
PDW BLD-RTO: 15.5 % (ref 12.4–15.4)
PH UA: 5.5 (ref 5–8)
PLATELET # BLD: 385 K/UL (ref 135–450)
PMV BLD AUTO: 7.3 FL (ref 5–10.5)
POTASSIUM SERPL-SCNC: 4.1 MMOL/L (ref 3.5–5.1)
PROTEIN UA: NEGATIVE MG/DL
RBC # BLD: 4.02 M/UL (ref 4–5.2)
RBC UA: ABNORMAL /HPF (ref 0–4)
SODIUM BLD-SCNC: 134 MMOL/L (ref 136–145)
SPECIFIC GRAVITY UA: 1.02 (ref 1–1.03)
TOTAL PROTEIN: 7 G/DL (ref 6.4–8.2)
TROPONIN: <0.01 NG/ML
URINE TYPE: ABNORMAL
UROBILINOGEN, URINE: 0.2 E.U./DL
WBC # BLD: 13.1 K/UL (ref 4–11)
WBC UA: ABNORMAL /HPF (ref 0–5)

## 2021-07-14 PROCEDURE — 2580000003 HC RX 258: Performed by: NURSE PRACTITIONER

## 2021-07-14 PROCEDURE — 70450 CT HEAD/BRAIN W/O DYE: CPT

## 2021-07-14 PROCEDURE — 96361 HYDRATE IV INFUSION ADD-ON: CPT

## 2021-07-14 PROCEDURE — 80053 COMPREHEN METABOLIC PANEL: CPT

## 2021-07-14 PROCEDURE — 73110 X-RAY EXAM OF WRIST: CPT

## 2021-07-14 PROCEDURE — 84484 ASSAY OF TROPONIN QUANT: CPT

## 2021-07-14 PROCEDURE — 6370000000 HC RX 637 (ALT 250 FOR IP): Performed by: NURSE PRACTITIONER

## 2021-07-14 PROCEDURE — 96360 HYDRATION IV INFUSION INIT: CPT

## 2021-07-14 PROCEDURE — 6370000000 HC RX 637 (ALT 250 FOR IP)

## 2021-07-14 PROCEDURE — 70486 CT MAXILLOFACIAL W/O DYE: CPT

## 2021-07-14 PROCEDURE — 99285 EMERGENCY DEPT VISIT HI MDM: CPT

## 2021-07-14 PROCEDURE — 81001 URINALYSIS AUTO W/SCOPE: CPT

## 2021-07-14 PROCEDURE — 71045 X-RAY EXAM CHEST 1 VIEW: CPT

## 2021-07-14 PROCEDURE — 85025 COMPLETE CBC W/AUTO DIFF WBC: CPT

## 2021-07-14 PROCEDURE — 93005 ELECTROCARDIOGRAM TRACING: CPT | Performed by: NURSE PRACTITIONER

## 2021-07-14 PROCEDURE — 29125 APPL SHORT ARM SPLINT STATIC: CPT

## 2021-07-14 RX ORDER — 0.9 % SODIUM CHLORIDE 0.9 %
1000 INTRAVENOUS SOLUTION INTRAVENOUS ONCE
Status: COMPLETED | OUTPATIENT
Start: 2021-07-14 | End: 2021-07-14

## 2021-07-14 RX ORDER — DEXTROSE AND SODIUM CHLORIDE 5; .9 G/100ML; G/100ML
1000 INJECTION, SOLUTION INTRAVENOUS ONCE
Status: COMPLETED | OUTPATIENT
Start: 2021-07-14 | End: 2021-07-14

## 2021-07-14 RX ORDER — AMOXICILLIN AND CLAVULANATE POTASSIUM 875; 125 MG/1; MG/1
1 TABLET, FILM COATED ORAL 2 TIMES DAILY
Qty: 20 TABLET | Refills: 0 | Status: SHIPPED | OUTPATIENT
Start: 2021-07-14 | End: 2021-07-24

## 2021-07-14 RX ORDER — AMOXICILLIN AND CLAVULANATE POTASSIUM 875; 125 MG/1; MG/1
1 TABLET, FILM COATED ORAL ONCE
Status: COMPLETED | OUTPATIENT
Start: 2021-07-15 | End: 2021-07-15

## 2021-07-14 RX ORDER — OXYCODONE HYDROCHLORIDE AND ACETAMINOPHEN 5; 325 MG/1; MG/1
TABLET ORAL
Status: COMPLETED
Start: 2021-07-14 | End: 2021-07-14

## 2021-07-14 RX ORDER — OXYCODONE HYDROCHLORIDE AND ACETAMINOPHEN 5; 325 MG/1; MG/1
1 TABLET ORAL ONCE
Status: COMPLETED | OUTPATIENT
Start: 2021-07-14 | End: 2021-07-14

## 2021-07-14 RX ORDER — ONDANSETRON 2 MG/ML
4 INJECTION INTRAMUSCULAR; INTRAVENOUS EVERY 30 MIN PRN
Status: DISCONTINUED | OUTPATIENT
Start: 2021-07-14 | End: 2021-07-15 | Stop reason: HOSPADM

## 2021-07-14 RX ADMIN — OXYCODONE HYDROCHLORIDE AND ACETAMINOPHEN 1 TABLET: 5; 325 TABLET ORAL at 23:53

## 2021-07-14 RX ADMIN — SODIUM CHLORIDE 1000 ML: 9 INJECTION, SOLUTION INTRAVENOUS at 19:35

## 2021-07-14 RX ADMIN — DEXTROSE AND SODIUM CHLORIDE 1000 ML: 5; 900 INJECTION, SOLUTION INTRAVENOUS at 21:29

## 2021-07-14 RX ADMIN — OXYCODONE HYDROCHLORIDE AND ACETAMINOPHEN 1 TABLET: 5; 325 TABLET ORAL at 19:37

## 2021-07-14 ASSESSMENT — PAIN SCALES - GENERAL
PAINLEVEL_OUTOF10: 7
PAINLEVEL_OUTOF10: 9
PAINLEVEL_OUTOF10: 7
PAINLEVEL_OUTOF10: 7

## 2021-07-14 NOTE — ED PROVIDER NOTES
Evaluated by Advanced Practice Provider    [facility]  ED    CHIEF COMPLAINT    Loss of Consciousness (pt to ED via EMS from home with c/o syncope at home. last thing pt remembers is sitting on the couch waiting for her son to come pick her up. pt is currently on 3rd round of chemo for breast cancer. pt arrives alert and oriented x4. pt with pain to left wrist and head.)    HISTORY OF PRESENT ILLNESS  Troy Myers is a 78 y.o. female who presents to the ED complaining of LOC, HA and wrist pain. Feeling faint and cramping in her stomach today. She was waiting on her son and next thing she knows the ambulance was there. She currently has breast cancer, she is on chemo and just had it this past week. This was her 3rd treatment. States it has been kicking her butt. No surgery for the breast cancer. Her oncologist is Dr. Maya Chandler. She does report that her head hurts and her left wrist hurts from the fall. She has had no episodes of vomiting since the fall. Denies visual disturbances or changes. Denies pain any where else. 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 She is right hand dominant. She is retired. Wrist hurts with supination and pronation. The patient is currently rating their pain as 9/10 and describes it as an aching type of pain. Treatments tried prior to arrival in the ED: none. The patient denies other complaints, modifying factors or associated symptoms. The patient arrived to the ED via EMS transport.     PAST MEDICAL HISTORY    Past Medical History:   Diagnosis Date    Breast cancer ([address]     Cancer ([address]     Kidney stone        SURGICAL HISTORY    Past Surgical History:   Procedure Laterality Date    COLONOSCOPY      MRI BREAST BX USING DEVICE RIGHT Right 5/28/2021    MRI BREAST BX USING DEVICE RIGHT 5/28/2021 [facility] MRI    MRI NEEDLE BIOPSY EACH ADDL RIGHT Right 5/28/2021    MRI NEEDLE BIOPSY EACH ADDL RIGHT 5/28/2021 [facility] MRI    PORT SURGERY N/A 5/25/2021    RIGHT PORT A CATH PLACEMENT performed by Malcolm Blake DO at [address] Right 5/7/2021    US BREAST BIOPSY NEEDLE ADDITIONAL RIGHT 5/7/2021 Braden Pierson MD [facility] BREAST NEEDLE BIOPSY LEFT Left 5/7/2021    US BREAST NEEDLE BIOPSY LEFT 5/7/2021 Braden Pierson MD [facility] BREAST NEEDLE BIOPSY RIGHT Right 5/7/2021    US BREAST NEEDLE BIOPSY RIGHT 5/7/2021 Braden Pierson MD [facility]       CURRENT MEDICATIONS    Current Outpatient Rx   Medication Sig Dispense Refill    amoxicillin-clavulanate (AUGMENTIN) 875-125 MG per tablet Take 1 tablet by mouth 2 times daily for 10 days 20 tablet 0    melatonin (RA MELATONIN) 3 MG TABS tablet Take 1 tablet by mouth nightly as needed (sleep) 20 tablet 0    aspirin 81 MG EC tablet Take 81 mg by mouth daily      Multiple Vitamin (MULTI-VITAMIN PO) Take by mouth      Cholecalciferol (VITAMIN D3) 5000 units TABS Take by mouth      Cyanocobalamin (VITAMIN B-12) 1000 MCG extended release tablet Take 1,000 mcg by mouth daily         ALLERGIES    Allergies   Allergen Reactions    Bactrim [Sulfamethoxazole-Trimethoprim]        FAMILY HISTORY    Family History   Problem Relation Age of Onset    High Cholesterol Mother     Diabetes Father     Cancer Paternal Grandmother 76        Bladder       SOCIAL HISTORY    Social History     Socioeconomic History    Marital status:       Spouse name: None    Number of children: None    Years of education: None    Highest education level: None   Occupational History    None   Tobacco Use    Smoking status: Passive Smoke Exposure - Never Smoker    Smokeless tobacco: Never Used   Vaping Use    Vaping Use: Never used   Substance and Sexual Activity    Alcohol use: No    Drug use: No    Sexual activity: None   Other Topics Concern    None   Social History Narrative    None     Social Determinants of Health     Financial Resource Strain:     Difficulty of Paying Living Expenses:    Food Insecurity:     Worried About Running Out of Food in the Last Year:     [address] in the Last Year:    Transportation Needs:     Lack of Transportation (Medical):  Lack of Transportation (Non-Medical):    Physical Activity:     Days of Exercise per Week:     Minutes of Exercise per Session:    Stress:     Feeling of Stress :    Social Connections:     Frequency of Communication with Friends and Family:     Frequency of Social Gatherings with Friends and Family:     Attends Anabaptism Services:     Active Member of Clubs or Organizations:     Attends Club or Organization Meetings:     Marital Status:    Intimate Partner Violence:     Fear of Current or Ex-Partner:     Emotionally Abused:     Physically Abused:     Sexually Abused:      REVIEW OF SYSTEMS    10 systems reviewed, pertinent positives per HPI otherwise noted to be negative    PHYSICAL EXAM  Vitals:    07/14/21 2342   BP: 132/65   Pulse: 112   Resp: 16   Temp:    SpO2: 100%     GENERAL: Patient is thin and chronically ill appearing,  no acute distress. No apparent discomfort. Non toxic appearing. HEENT:  Normocephalic. Atraumatic. There are no bony depressions, instability, step-off, or crepitus to palpation of the left side of the face but there is tenderness to palpation. There is no raccoon's eyes or battles sign observed. PERRL. EOMI. Conjunctiva appear normal.  External ears are normal.  Bilateral TM are without erythema and are not bulging. There is no evidence of rupture or hemotympanum. There is no facial asymmetry. Tongue is midline, uvula is midline. Posterior oropharynx is without edema, erythema, or exudate. NECK: Supple with normal ROM. Trachea midline. There is no tenderness to palpation of the cervical midline spine. LUNGS:  Normal work of breathing.   Speaking comfortably in full sentences. Breath sounds clear throughout all lung fields. CADIOVASCULAR:  Regular rate and rhythm. S1/S2 normal, no murmurs, rubs, or gallops on exam. Peripheral pulses are symmetric and strong. GI: Nondistended. Soft, non-tender to palpation, bowel sounds active in all 4 quadrants, no hepatosplenomegaly. EXTREMITIES: EXTREMITIES: Wrist exam: left. Swelling and tenderness to distal left forearm and over the dorsal surface of the wrist. Reduced range of motion of the left wrist due to pain, pain with supination and pronation. Scaphoid (snuffbox) tenderness negative. There is mild bruising observed. Radial and ulnar pulses normal, palpable, 2+ and capillary refill is brisk to distal tips of all fingers to left hand. Sensation intact to light touch to all digits of left hand. Patient can extend all digits of left hand. Patient can abduct 2nd and 3rd digit of right hand against resistance. Thumb palmar abduction is intact. To all other extremities: Normal ROM, no edema, no tenderness, or deformity. Distal pulses palpable. No gross deformities or trauma noted. Moving all extremities equally and appropriately. SKIN: Warm/dry. Skin is intact. No rashes/lesions noted. PSYCHIATRIC: Patient is alert and oriented with normal affect  NEUROLOGIC: GCS is 15. Cranial nerves II-XII grossly intact. Moves all extremities with equal strength. No gross sensory deficits. Answers questions/follows commands appropriately.     Procedure  None    LABS  Results for orders placed or performed during the hospital encounter of 07/14/21   CBC Auto Differential   Result Value Ref Range    WBC 13.1 (H) 4.0 - 11.0 K/uL    RBC 4.02 4.00 - 5.20 M/uL    Hemoglobin 11.7 (L) 12.0 - 16.0 g/dL    Hematocrit 34.8 (L) 36.0 - 48.0 %    MCV 86.7 80.0 - 100.0 fL    MCH 29.1 26.0 - 34.0 pg    MCHC 33.6 31.0 - 36.0 g/dL    RDW 15.5 (H) 12.4 - 15.4 %    Platelets 197 317 - 014 K/uL    MPV 7.3 5.0 - 10.5 fL    Neutrophils % 90.8 % Lymphocytes % 5.5 %    Monocytes % 2.8 %    Eosinophils % 0.1 %    Basophils % 0.8 %    Neutrophils Absolute 11.9 (H) 1.7 - 7.7 K/uL    Lymphocytes Absolute 0.7 (L) 1.0 - 5.1 K/uL    Monocytes Absolute 0.4 0.0 - 1.3 K/uL    Eosinophils Absolute 0.0 0.0 - 0.6 K/uL    Basophils Absolute 0.1 0.0 - 0.2 K/uL   Comprehensive Metabolic Panel   Result Value Ref Range    Sodium 134 (L) 136 - 145 mmol/L    Potassium 4.1 3.5 - 5.1 mmol/L    Chloride 100 99 - 110 mmol/L    CO2 22 21 - 32 mmol/L    Anion Gap 12 3 - 16    Glucose 134 (H) 70 - 99 mg/dL    BUN 25 (H) 7 - 20 mg/dL    CREATININE <0.5 (L) 0.6 - 1.2 mg/dL    GFR Non-African American >60 >60    GFR African American >60 >60    Calcium 9.2 8.3 - 10.6 mg/dL    Total Protein 7.0 6.4 - 8.2 g/dL    Albumin 4.0 3.4 - 5.0 g/dL    Albumin/Globulin Ratio 1.3 1.1 - 2.2    Total Bilirubin <0.2 0.0 - 1.0 mg/dL    Alkaline Phosphatase 80 40 - 129 U/L    ALT 24 10 - 40 U/L    AST 26 15 - 37 U/L    Globulin 3.0 g/dL   Troponin   Result Value Ref Range    Troponin <0.01 <0.01 ng/mL   Urinalysis, reflex to microscopic   Result Value Ref Range    Color, UA Yellow Straw/Yellow    Clarity, UA Clear Clear    Glucose, Ur Negative Negative mg/dL    Bilirubin Urine Negative Negative    Ketones, Urine 15 (A) Negative mg/dL    Specific Gravity, UA 1.025 1.005 - 1.030    Blood, Urine Negative Negative    pH, UA 5.5 5.0 - 8.0    Protein, UA Negative Negative mg/dL    Urobilinogen, Urine 0.2 <2.0 E.U./dL    Nitrite, Urine Negative Negative    Leukocyte Esterase, Urine TRACE (A) Negative    Microscopic Examination YES     Urine Type NotGiven    Microscopic Urinalysis   Result Value Ref Range    WBC, UA 6-9 (A) 0 - 5 /HPF    RBC, UA 0-2 0 - 4 /HPF    Epithelial Cells, UA 0-1 0 - 5 /HPF    Bacteria, UA 1+ (A) None Seen /HPF   EKG 12 Lead   Result Value Ref Range    Ventricular Rate 105 BPM    Atrial Rate 105 BPM    P-R Interval 146 ms    QRS Duration 68 ms    Q-T Interval 360 ms    QTc Calculation (Bazett) 475 ms    P Axis 78 degrees    R Axis 19 degrees    T Axis 40 degrees    Diagnosis       Sinus tachycardia with Premature atrial complexesPossible Left atrial enlargementSeptal infarct , age undeterminedAbnormal ECGWhen compared with ECG of 30-MAY-2021 14:28,Premature atrial complexes are now PresentSeptal infarct is now Present       RADIOLOGY    XR WRIST LEFT (MIN 3 VIEWS)    Result Date: 7/14/2021  EXAMINATION: 3 XRAY VIEWS OF THE LEFT WRIST 7/14/2021 5:25 pm COMPARISON: None. HISTORY: ORDERING SYSTEM PROVIDED HISTORY: syncope TECHNOLOGIST PROVIDED HISTORY: Reason for exam:->syncope Reason for Exam: loc Acuity: Acute Type of Exam: Initial FINDINGS: There is a minimally displaced fracture of the distal radial metaphysis. There is a possible fracture involving the distal radial styloid. Possible nondisplaced fracture of the distal ulnar metaphysisa only visualized on the lateral view. The bones are diffusely osteopenic. Carpal alignment is maintained. Prominent chondrocalcinosis at the level of the TFC. Moderate degenerative change in the triscaphe joint. No focal soft tissue abnormality. Minimally displaced fracture of the distal radial metaphysis. Probable nondisplaced fracture of the distal ulnar metaphysis apparent on the lateral view. CT Head WO Contrast    Result Date: 7/14/2021  EXAMINATION: CT OF THE HEAD WITHOUT CONTRAST  7/14/2021 6:05 pm TECHNIQUE: CT of the head was performed without the administration of intravenous contrast. Dose modulation, iterative reconstruction, and/or weight based adjustment of the mA/kV was utilized to reduce the radiation dose to as low as reasonably achievable. COMPARISON: None. HISTORY: ORDERING SYSTEM PROVIDED HISTORY: fall and hit head, syncope TECHNOLOGIST PROVIDED HISTORY: Reason for exam:->fall and hit head, syncope Has a \"code stroke\" or \"stroke alert\" been called? ->No Decision Support Exception - unselect if not a suspected or confirmed emergency medical condition->Emergency Medical Condition ([place]) Reason for Exam: Passed out and fell; hit head Acuity: Acute Type of Exam: Initial Relevant Medical/Surgical History: hx of breast cancer FINDINGS: BRAIN/VENTRICLES: There is no acute intracranial hemorrhage, mass effect or midline shift. No abnormal extra-axial fluid collection. The gray-white differentiation is maintained without evidence of an acute infarct. There is no evidence of hydrocephalus. Mild prominence of the ventricular and cisternal spaces, appropriate for patient age. Decreased attenuation in the periventricular and subcortical white matter most consistent with small vessel ischemic change scattered intracranial atherosclerotic vascular calcification. ORBITS: The visualized portion of the orbits demonstrate no acute abnormality. SINUSES: Short air-fluid level in the left maxillary sinus. The remainder of the paranasal sinuses and mastoid air cells are clear. SOFT TISSUES/SKULL:  No acute abnormality of the visualized skull or soft tissues. No acute intracranial abnormality. Chronic small-vessel white matter ischemic changes. Short air-fluid level in the left maxillary sinus. Correlate for possible traumatic or inflammatory etiology. CT FACIAL BONES WO CONTRAST    Result Date: 7/14/2021  EXAMINATION: CT OF THE FACE WITHOUT CONTRAST  7/14/2021 8:28 pm TECHNIQUE: CT of the face was performed without the administration of intravenous contrast. Multiplanar reformatted images are provided for review. Dose modulation, iterative reconstruction, and/or weight based adjustment of the mA/kV was utilized to reduce the radiation dose to as low as reasonably achievable.  COMPARISON: None HISTORY: ORDERING SYSTEM PROVIDED HISTORY: air fluid level on CT head, fall TECHNOLOGIST PROVIDED HISTORY: Reason for exam:->air fluid level on CT head, fall Decision Support Exception - unselect if not a suspected or confirmed emergency medical condition->Emergency Medical Condition (MA) Reason for Exam: evaluate air/fluid levels seem on CT scan,fall Acuity: Acute Type of Exam: Initial FINDINGS: The examination is motion degraded. FACIAL BONES:  There are acute nondisplaced fractures of the anterior and posterior walls of the left maxillary sinus and left maxillary sinus roof/orbital floor. There is also a minimally displaced fracture of the left lateral orbital wall at the zygomaticosphenoid suture. No other facial fracture is identified. ORBITS:  There is no proptosis or orbital hematoma. SINUSES/MASTOIDS: There is left maxillary hemosinus. SOFT TISSUES:  Small amount of left facial soft tissue gas related to the maxillary sinus fracture. Acute fractures of the left lateral orbit, inferior orbit, anterior and posterior maxillary sinus. XR CHEST PORTABLE    Result Date: 7/14/2021  EXAMINATION: ONE XRAY VIEW OF THE CHEST 7/14/2021 5:25 pm COMPARISON: May 30, 2021 HISTORY: ORDERING SYSTEM PROVIDED HISTORY: syncope TECHNOLOGIST PROVIDED HISTORY: Reason for exam:->syncope Reason for Exam: loc Acuity: Acute Type of Exam: Initial FINDINGS: No evidence of consolidation, edema or other acute pulmonary process. No evidence of acute process of the cardiac or mediastinal structures. No evidence of pneumothorax or pleural effusion. No evidence of acute cardiopulmonary disease. ED COURSE/MDM  Patient seen and evaluated. Old records reviewed. Diagnostic testing reviewed and results discussed. I have evaluated this patient. My supervising physician was available for consultation. Carolynn Burnette presented to the ED today with above noted complaints. Physical exam reveals tenderness to palpation of the left temporal region as well as some swelling and bruising and tenderness to the left wrist.  Left upper extremity is distally neurovascularly intact. Patient does have difficulty with supination and pronation due to pain.   The patient is neurologically intact: GCS is 15, CN II-XII intact, no focal or lateralizing neurologic deficits on exam.     The patient is without signs of basilar skull fracture. Based on [address] [place] [address] CT criteria a CT scan of the head was performed and showed no acute findings. Chronic small vessel white matter ischemic changes. Short air-fluid level in the left maxillary sinus. Correlate for possible traumatic or inflammatory etiology. Blood work does show leukocytosis is WBC elevated 13.1. Absolute neutrophils elevated at 11.9, absolute lymphocytes low at 0.7. No further differential shift. Stable anemia. No significant electrolyte abnormality. No evidence of acute kidney injury or transaminitis. Troponin is negative. Chest x-ray is without acute findings. Left wrist x-ray shows a minimally displaced fracture of the distal radial metaphysis. Probable nondisplaced fracture of the distal ulnar metaphysis apparent on the lateral view. Urinalysis does show 15 ketones present. No nitrites but upon microscopic there are 6-9 white blood cells and 1+ bacteria. Initial orthostatic blood pressures were obtained and blood pressure was 123/67 with a heart rate of 112 while lying, sitting she was 134/64 with a heart rate of 115 and standing she was 64/51 with a heart rate of 139. Patient was given a total of 2 L of IV fluids and recheck showed her to be 147/76 with a heart rate of 118 while lying and 128/72 with a heart rate of 123. CT of the facial bones was obtained and shows acute fractures of the left lateral orbit, inferior orbit and anterior and posterior maxillary sinus. These are described as nondisplaced fractures of the anterior and posterior walls of the left maxillary sinus and left maxillary sinus roof/orbital floor. And a minimally displaced fracture of the left lateral orbital wall. I will put the patient on Augmentin due to the sinus fractures.     Patient did have a persistent tachycardia however when I reviewed prior vital signs and EKGs patient has had tachycardia for some time. I do not feel that this is new and that she can be discharged despite the tachycardia. While in ED patient received   Medications   ondansetron (ZOFRAN) injection 4 mg (has no administration in time range)   amoxicillin-clavulanate (AUGMENTIN) 875-125 MG per tablet 1 tablet (has no administration in time range)   0.9 % sodium chloride bolus (0 mLs Intravenous Stopped 7/14/21 2052)   oxyCODONE-acetaminophen (PERCOCET) 5-325 MG per tablet 1 tablet (1 tablet Oral Given 7/14/21 1937)   dextrose 5 % and 0.9 % sodium chloride infusion (0 mLs Intravenous Stopped 7/14/21 2252)   oxyCODONE-acetaminophen (PERCOCET) 5-325 MG per tablet 1 tablet (1 tablet Oral Given 7/14/21 2353)     Patient preferred to go home in I also prefer that she goes home if at all possible as she is currently getting chemotherapy. Patient states that she feels comfortable being discharged at this point. She does have family at home to help her. I feel that her syncopal event was related to the orthostasis and dehydration that was evident with her orthostatic blood pressures and ketonuria. Patient was given strict ED return precautions and she verbalized her understanding with this plan. At this point I do not feel the patient requires further work up and it is reasonable to discharge the patient. A discussion was had with the patient regarding diagnosis, diagnostic testing results, treatment/ plan of care, and follow up. All questions were answered. Patient will follow up as directed for further evaluation/treatment. The patient was given strict return precautions as we discussed symptoms that would necessitate return to the ED. Patient will return to ED for new/worsening symptoms. The patient verbalized their understanding and agreement with the above plan. Please refer to AVS for further details of the discharge instructions.     Patient was sent [address] [place] [address]  Go to   If symptoms worsen      DISPOSITION  Patient was discharged to home in good condition. Comment: Please note this report has been produced using speech recognition software and may contain errors related to that system including errors in grammar, punctuation, and spelling, as well as words and phrases that may be inappropriate. If there are any questions or concerns please feel free to contact the dictating provider for clarification.        SOUTH Moore - CNP  07/15/21 1933

## 2021-07-14 NOTE — ED NOTES
Bed: 01  Expected date:   Expected time:   Means of arrival: [place]  Comments:  [facility]  07/14/21 1701

## 2021-07-15 VITALS
RESPIRATION RATE: 22 BRPM | BODY MASS INDEX: 21.98 KG/M2 | SYSTOLIC BLOOD PRESSURE: 130 MMHG | OXYGEN SATURATION: 100 % | WEIGHT: 145 LBS | HEIGHT: 68 IN | DIASTOLIC BLOOD PRESSURE: 74 MMHG | HEART RATE: 108 BPM | TEMPERATURE: 97.5 F

## 2021-07-15 LAB
EKG ATRIAL RATE: 105 BPM
EKG DIAGNOSIS: NORMAL
EKG P AXIS: 78 DEGREES
EKG P-R INTERVAL: 146 MS
EKG Q-T INTERVAL: 360 MS
EKG QRS DURATION: 68 MS
EKG QTC CALCULATION (BAZETT): 475 MS
EKG R AXIS: 19 DEGREES
EKG T AXIS: 40 DEGREES
EKG VENTRICULAR RATE: 105 BPM

## 2021-07-15 PROCEDURE — 6370000000 HC RX 637 (ALT 250 FOR IP): Performed by: NURSE PRACTITIONER

## 2021-07-15 PROCEDURE — 93010 ELECTROCARDIOGRAM REPORT: CPT | Performed by: INTERNAL MEDICINE

## 2021-07-15 RX ADMIN — AMOXICILLIN AND CLAVULANATE POTASSIUM 1 TABLET: 875; 125 TABLET, FILM COATED ORAL at 00:13

## 2021-07-23 ENCOUNTER — OFFICE VISIT (OUTPATIENT)
Dept: ORTHOPEDIC SURGERY | Age: 76
End: 2021-07-23
Payer: MEDICARE

## 2021-07-23 VITALS — WEIGHT: 140 LBS | BODY MASS INDEX: 21.22 KG/M2 | HEIGHT: 68 IN

## 2021-07-23 DIAGNOSIS — S62.102A CLOSED FRACTURE OF LEFT WRIST, INITIAL ENCOUNTER: Primary | ICD-10-CM

## 2021-07-23 PROCEDURE — G8427 DOCREV CUR MEDS BY ELIG CLIN: HCPCS | Performed by: ORTHOPAEDIC SURGERY

## 2021-07-23 PROCEDURE — G8420 CALC BMI NORM PARAMETERS: HCPCS | Performed by: ORTHOPAEDIC SURGERY

## 2021-07-23 PROCEDURE — L3908 WHO COCK-UP NONMOLDE PRE OTS: HCPCS | Performed by: ORTHOPAEDIC SURGERY

## 2021-07-23 PROCEDURE — 1090F PRES/ABSN URINE INCON ASSESS: CPT | Performed by: ORTHOPAEDIC SURGERY

## 2021-07-23 PROCEDURE — 1036F TOBACCO NON-USER: CPT | Performed by: ORTHOPAEDIC SURGERY

## 2021-07-23 PROCEDURE — 4040F PNEUMOC VAC/ADMIN/RCVD: CPT | Performed by: ORTHOPAEDIC SURGERY

## 2021-07-23 PROCEDURE — G8400 PT W/DXA NO RESULTS DOC: HCPCS | Performed by: ORTHOPAEDIC SURGERY

## 2021-07-23 PROCEDURE — 3017F COLORECTAL CA SCREEN DOC REV: CPT | Performed by: ORTHOPAEDIC SURGERY

## 2021-07-23 PROCEDURE — 1123F ACP DISCUSS/DSCN MKR DOCD: CPT | Performed by: ORTHOPAEDIC SURGERY

## 2021-07-23 PROCEDURE — 99202 OFFICE O/P NEW SF 15 MIN: CPT | Performed by: ORTHOPAEDIC SURGERY

## 2021-07-23 ASSESSMENT — ENCOUNTER SYMPTOMS: COLOR CHANGE: 1

## 2021-07-23 NOTE — PROGRESS NOTES
ORTHOPAEDIC SURGERY INITIAL EVALUATION NOTE  Chief Complaint   Patient presents with    New Patient     LEFT WRIST      HISTORY OF PRESENT ILLNESS:  62-year-old right-hand-dominant female presents for evaluation of left wrist injury. She sustained a syncopal event on 7/14/2021. She has finished undergoing chemotherapy. She believes this is the reason for her syncope. She was seen at [facility] emergency department. Work-up demonstrated a minimally displaced fracture of the distal radius. She was placed into a volar resting splint and provided with orthopedic follow-up. She continues to have minor pain mostly about the dorsal and radial wrist.  She has noted some bruising. Her pain overall is improving. She is using over-the-counter medications for pain. Past Medical History:   Diagnosis Date    [facility] [address]     Cancer ([facility] [address]     Kidney stone        Current Outpatient Medications   Medication Sig Dispense Refill    amoxicillin-clavulanate (AUGMENTIN) 875-125 MG per tablet Take 1 tablet by mouth 2 times daily for 10 days 20 tablet 0    melatonin (RA MELATONIN) 3 MG TABS tablet Take 1 tablet by mouth nightly as needed (sleep) 20 tablet 0    aspirin 81 MG EC tablet Take 81 mg by mouth daily      Multiple Vitamin (MULTI-VITAMIN PO) Take by mouth      Cholecalciferol (VITAMIN D3) 5000 units TABS Take by mouth      Cyanocobalamin (VITAMIN B-12) 1000 MCG extended release tablet Take 1,000 mcg by mouth daily       No current facility-administered medications for this visit.         Past Surgical History:   Procedure Laterality Date    COLONOSCOPY      MRI BREAST BX USING DEVICE RIGHT Right 5/28/2021    MRI BREAST BX USING DEVICE RIGHT 5/28/2021 [facility]    MRI NEEDLE BIOPSY EACH ADDL RIGHT Right 5/28/2021    MRI NEEDLE BIOPSY EACH ADDL RIGHT [facility]    PORT SURGERY N/A 5/25/2021    RIGHT PORT A CATH PLACEMENT performed by Sunni Marcum DO at [facility] NEEDLE ADDITIONAL RIGHT Right 5/7/2021    US BREAST BIOPSY NEEDLE ADDITIONAL RIGHT 5/7/2021 [address] BREAST NEEDLE BIOPSY LEFT Left 5/7/2021    US BREAST NEEDLE BIOPSY LEFT 5/7/2021 [address] BREAST NEEDLE BIOPSY RIGHT Right 5/7/2021    US BREAST NEEDLE BIOPSY RIGHT 5/7/2021 [facility]       Allergies   Allergen Reactions    Bactrim [Sulfamethoxazole-Trimethoprim]        Family History   Problem Relation Age of Onset    High Cholesterol Mother     Diabetes Father     Cancer Paternal Grandmother 76        Bladder       Social History     Socioeconomic History    Marital status:      Spouse name: Not on file    Number of children: Not on file    Years of education: Not on file    Highest education level: Not on file   Occupational History    Not on file   Tobacco Use    Smoking status: Passive Smoke Exposure - Never Smoker    Smokeless tobacco: Never Used   Vaping Use    Vaping Use: Never used   Substance and Sexual Activity    Alcohol use: No    Drug use: No    Sexual activity: Not on file   Other Topics Concern    Not on file   Social History Narrative    Not on file     Social Determinants of Health     Financial Resource Strain:     Difficulty of Paying Living Expenses:    Food Insecurity:     Worried About [address] in the Last Year:     [address] in the Last Year:    Transportation Needs:     Lack of Transportation (Medical):      Lack of Transportation (Non-Medical):    Physical Activity:     Days of Exercise per Week:     Minutes of Exercise per Session:    Stress:     Feeling of Stress :    Social Connections:     Frequency of Communication with Friends and Family:     Frequency of Social Gatherings with Friends and Family:     Attends [employer]ars[employer] Services:     Active Member of Clubs or Organizations:     Attends Club or Organization Meetings: alignment is appropriate. No significant loss of radial height. No evidence of interval healing. ASSESSEMENT AND PLAN    78 y.o. female with the following orthopaedic surgery problems:    1. Left distal radius/distal ulna fracture, minimal displacement. Recommend nonoperative treatment. The alignment has proven to be stable in the volar resting splint. I offered her cast immobilization versus a removable wrist brace. She will proceed with brace immobilization removing it only for hygiene purposes. See her back in 3 weeks for repeat evaluation with x-rays at that time. She will use ice and elevation to limit swelling.     Duglas Ashley MD

## 2021-08-02 ENCOUNTER — TELEPHONE (OUTPATIENT)
Dept: SURGERY | Age: 76
End: 2021-08-02

## 2021-08-03 NOTE — TELEPHONE ENCOUNTER
Writer attempted to call this patient and her daughter to relay message as noted by Dr. Sabra London. Unable to reach patient or her daughter, left voicemail with request for return call to this office.

## 2021-08-03 NOTE — TELEPHONE ENCOUNTER
Yes you are correct Kimberley Ortiz. Please ask her to call when she is almost done with chemo (about 2-3 weeks left) so that we can get her scheduled to see me for follow up, and for breast MRI. She will need breast MRI a few days before she sees me (ideally, MRI can be ASAP once chemo is done). The day she sees me she will need b/l diagnostic mammo (and possibly US as well).

## 2021-08-06 NOTE — TELEPHONE ENCOUNTER
Writer attempted to call patient again in regard to previous note. Unable to reach patient. Left voicemail with detailed message regarding when to make next appointment.

## 2021-08-16 ENCOUNTER — HOSPITAL ENCOUNTER (INPATIENT)
Age: 76
LOS: 1 days | Discharge: HOME | DRG: 808 | End: 2021-08-18
Attending: EMERGENCY MEDICINE | Admitting: HOSPITALIST
Payer: MEDICARE

## 2021-08-16 ENCOUNTER — APPOINTMENT (OUTPATIENT)
Dept: CT IMAGING | Age: 76
DRG: 808 | End: 2021-08-16
Payer: MEDICARE

## 2021-08-16 ENCOUNTER — APPOINTMENT (OUTPATIENT)
Dept: GENERAL RADIOLOGY | Age: 76
DRG: 808 | End: 2021-08-16
Payer: MEDICARE

## 2021-08-16 DIAGNOSIS — E86.0 DEHYDRATION: ICD-10-CM

## 2021-08-16 DIAGNOSIS — R00.0 SINUS TACHYCARDIA: ICD-10-CM

## 2021-08-16 DIAGNOSIS — T45.1X5A CHEMOTHERAPY-INDUCED NEUTROPENIA (HCC): ICD-10-CM

## 2021-08-16 DIAGNOSIS — S22.080D COMPRESSION FRACTURE OF T12 VERTEBRA WITH ROUTINE HEALING, SUBSEQUENT ENCOUNTER: ICD-10-CM

## 2021-08-16 DIAGNOSIS — R77.8 ELEVATED TROPONIN: Primary | ICD-10-CM

## 2021-08-16 DIAGNOSIS — D70.1 CHEMOTHERAPY-INDUCED NEUTROPENIA (HCC): ICD-10-CM

## 2021-08-16 DIAGNOSIS — R06.09 DOE (DYSPNEA ON EXERTION): ICD-10-CM

## 2021-08-16 PROBLEM — R53.83 CHEMOTHERAPY-INDUCED FATIGUE: Status: ACTIVE | Noted: 2021-08-16

## 2021-08-16 PROBLEM — D70.2 NEUTROPENIA, DRUG-INDUCED (HCC): Status: ACTIVE | Noted: 2021-08-16

## 2021-08-16 PROBLEM — R53.83 FATIGUE: Status: ACTIVE | Noted: 2021-08-16

## 2021-08-16 LAB
A/G RATIO: 1.3 (ref 1.1–2.2)
ALBUMIN SERPL-MCNC: 4.3 G/DL (ref 3.4–5)
ALP BLD-CCNC: 92 U/L (ref 40–129)
ALT SERPL-CCNC: 28 U/L (ref 10–40)
ANION GAP SERPL CALCULATED.3IONS-SCNC: 16 MMOL/L (ref 3–16)
AST SERPL-CCNC: 24 U/L (ref 15–37)
BASOPHILS ABSOLUTE: 0 K/UL (ref 0–0.2)
BASOPHILS RELATIVE PERCENT: 1.4 %
BILIRUB SERPL-MCNC: <0.2 MG/DL (ref 0–1)
BILIRUBIN URINE: NEGATIVE
BLOOD, URINE: NEGATIVE
BUN BLDV-MCNC: 11 MG/DL (ref 7–20)
CALCIUM SERPL-MCNC: 9.9 MG/DL (ref 8.3–10.6)
CHLORIDE BLD-SCNC: 101 MMOL/L (ref 99–110)
CLARITY: CLEAR
CO2: 21 MMOL/L (ref 21–32)
COLOR: YELLOW
CREAT SERPL-MCNC: 0.6 MG/DL (ref 0.6–1.2)
EKG ATRIAL RATE: 108 BPM
EKG DIAGNOSIS: NORMAL
EKG P AXIS: 74 DEGREES
EKG P-R INTERVAL: 164 MS
EKG Q-T INTERVAL: 356 MS
EKG QRS DURATION: 70 MS
EKG QTC CALCULATION (BAZETT): 477 MS
EKG R AXIS: -14 DEGREES
EKG T AXIS: 59 DEGREES
EKG VENTRICULAR RATE: 108 BPM
EOSINOPHILS ABSOLUTE: 0 K/UL (ref 0–0.6)
EOSINOPHILS RELATIVE PERCENT: 1.4 %
FOLATE: 14.47 NG/ML (ref 4.78–24.2)
GFR AFRICAN AMERICAN: >60
GFR NON-AFRICAN AMERICAN: >60
GLOBULIN: 3.2 G/DL
GLUCOSE BLD-MCNC: 133 MG/DL (ref 70–99)
GLUCOSE URINE: NEGATIVE MG/DL
HCT VFR BLD CALC: 34.4 % (ref 36–48)
HEMATOLOGY PATH CONSULT: YES
HEMOGLOBIN: 11.9 G/DL (ref 12–16)
KETONES, URINE: NEGATIVE MG/DL
LACTIC ACID, SEPSIS: 1.4 MMOL/L (ref 0.4–1.9)
LACTIC ACID: 0.8 MMOL/L (ref 0.4–2)
LEUKOCYTE ESTERASE, URINE: NEGATIVE
LIPASE: 26 U/L (ref 13–60)
LYMPHOCYTES ABSOLUTE: 0.4 K/UL (ref 1–5.1)
LYMPHOCYTES RELATIVE PERCENT: 45.8 %
MAGNESIUM: 2 MG/DL (ref 1.8–2.4)
MCH RBC QN AUTO: 30.5 PG (ref 26–34)
MCHC RBC AUTO-ENTMCNC: 34.6 G/DL (ref 31–36)
MCV RBC AUTO: 88.2 FL (ref 80–100)
MICROSCOPIC EXAMINATION: NORMAL
MONOCYTES ABSOLUTE: 0.4 K/UL (ref 0–1.3)
MONOCYTES RELATIVE PERCENT: 41.2 %
NEUTROPHILS ABSOLUTE: 0.1 K/UL (ref 1.7–7.7)
NEUTROPHILS RELATIVE PERCENT: 10.2 %
NITRITE, URINE: NEGATIVE
PDW BLD-RTO: 17.3 % (ref 12.4–15.4)
PH UA: 7 (ref 5–8)
PHOSPHORUS: 3.3 MG/DL (ref 2.5–4.9)
PLATELET # BLD: 230 K/UL (ref 135–450)
PMV BLD AUTO: 7.1 FL (ref 5–10.5)
POTASSIUM SERPL-SCNC: 3.7 MMOL/L (ref 3.5–5.1)
PROCALCITONIN: 0.05 NG/ML (ref 0–0.15)
PROTEIN UA: NEGATIVE MG/DL
RBC # BLD: 3.9 M/UL (ref 4–5.2)
SLIDE REVIEW: ABNORMAL
SODIUM BLD-SCNC: 138 MMOL/L (ref 136–145)
SPECIFIC GRAVITY UA: <=1.005 (ref 1–1.03)
T4 FREE: 1.7 NG/DL (ref 0.9–1.8)
TOTAL PROTEIN: 7.5 G/DL (ref 6.4–8.2)
TROPONIN: 0.04 NG/ML
TROPONIN: 0.05 NG/ML
TSH REFLEX: 1.35 UIU/ML (ref 0.27–4.2)
URINE TYPE: NORMAL
UROBILINOGEN, URINE: 0.2 E.U./DL
VITAMIN B-12: >2000 PG/ML (ref 211–911)
VITAMIN D 25-HYDROXY: 42.4 NG/ML
WBC # BLD: 0.9 K/UL (ref 4–11)

## 2021-08-16 PROCEDURE — 71260 CT THORAX DX C+: CPT

## 2021-08-16 PROCEDURE — 80053 COMPREHEN METABOLIC PANEL: CPT

## 2021-08-16 PROCEDURE — G0378 HOSPITAL OBSERVATION PER HR: HCPCS

## 2021-08-16 PROCEDURE — 6360000002 HC RX W HCPCS: Performed by: EMERGENCY MEDICINE

## 2021-08-16 PROCEDURE — 87186 SC STD MICRODIL/AGAR DIL: CPT

## 2021-08-16 PROCEDURE — 87086 URINE CULTURE/COLONY COUNT: CPT

## 2021-08-16 PROCEDURE — 93010 ELECTROCARDIOGRAM REPORT: CPT | Performed by: INTERNAL MEDICINE

## 2021-08-16 PROCEDURE — 99285 EMERGENCY DEPT VISIT HI MDM: CPT

## 2021-08-16 PROCEDURE — 84484 ASSAY OF TROPONIN QUANT: CPT

## 2021-08-16 PROCEDURE — 84439 ASSAY OF FREE THYROXINE: CPT

## 2021-08-16 PROCEDURE — 6370000000 HC RX 637 (ALT 250 FOR IP): Performed by: HOSPITALIST

## 2021-08-16 PROCEDURE — 96361 HYDRATE IV INFUSION ADD-ON: CPT

## 2021-08-16 PROCEDURE — 2580000003 HC RX 258: Performed by: EMERGENCY MEDICINE

## 2021-08-16 PROCEDURE — 87077 CULTURE AEROBIC IDENTIFY: CPT

## 2021-08-16 PROCEDURE — 87040 BLOOD CULTURE FOR BACTERIA: CPT

## 2021-08-16 PROCEDURE — 82306 VITAMIN D 25 HYDROXY: CPT

## 2021-08-16 PROCEDURE — 83735 ASSAY OF MAGNESIUM: CPT

## 2021-08-16 PROCEDURE — 6360000004 HC RX CONTRAST MEDICATION: Performed by: EMERGENCY MEDICINE

## 2021-08-16 PROCEDURE — 81003 URINALYSIS AUTO W/O SCOPE: CPT

## 2021-08-16 PROCEDURE — 84443 ASSAY THYROID STIM HORMONE: CPT

## 2021-08-16 PROCEDURE — 84145 PROCALCITONIN (PCT): CPT

## 2021-08-16 PROCEDURE — 71046 X-RAY EXAM CHEST 2 VIEWS: CPT

## 2021-08-16 PROCEDURE — 84100 ASSAY OF PHOSPHORUS: CPT

## 2021-08-16 PROCEDURE — 2580000003 HC RX 258: Performed by: HOSPITALIST

## 2021-08-16 PROCEDURE — 93005 ELECTROCARDIOGRAM TRACING: CPT | Performed by: EMERGENCY MEDICINE

## 2021-08-16 PROCEDURE — 96374 THER/PROPH/DIAG INJ IV PUSH: CPT

## 2021-08-16 PROCEDURE — 36415 COLL VENOUS BLD VENIPUNCTURE: CPT

## 2021-08-16 PROCEDURE — 83605 ASSAY OF LACTIC ACID: CPT

## 2021-08-16 PROCEDURE — 85025 COMPLETE CBC W/AUTO DIFF WBC: CPT

## 2021-08-16 PROCEDURE — 83690 ASSAY OF LIPASE: CPT

## 2021-08-16 PROCEDURE — 82607 VITAMIN B-12: CPT

## 2021-08-16 PROCEDURE — 82746 ASSAY OF FOLIC ACID SERUM: CPT

## 2021-08-16 RX ORDER — ACETAMINOPHEN 650 MG/1
650 SUPPOSITORY RECTAL EVERY 6 HOURS PRN
Status: DISCONTINUED | OUTPATIENT
Start: 2021-08-16 | End: 2021-08-18 | Stop reason: HOSPADM

## 2021-08-16 RX ORDER — SODIUM CHLORIDE, SODIUM LACTATE, POTASSIUM CHLORIDE, AND CALCIUM CHLORIDE .6; .31; .03; .02 G/100ML; G/100ML; G/100ML; G/100ML
1000 INJECTION, SOLUTION INTRAVENOUS ONCE
Status: DISCONTINUED | OUTPATIENT
Start: 2021-08-16 | End: 2021-08-18 | Stop reason: HOSPADM

## 2021-08-16 RX ORDER — SODIUM CHLORIDE, SODIUM LACTATE, POTASSIUM CHLORIDE, AND CALCIUM CHLORIDE .6; .31; .03; .02 G/100ML; G/100ML; G/100ML; G/100ML
1000 INJECTION, SOLUTION INTRAVENOUS ONCE
Status: COMPLETED | OUTPATIENT
Start: 2021-08-16 | End: 2021-08-16

## 2021-08-16 RX ORDER — ASPIRIN 81 MG/1
81 TABLET ORAL DAILY
Status: DISCONTINUED | OUTPATIENT
Start: 2021-08-16 | End: 2021-08-18 | Stop reason: HOSPADM

## 2021-08-16 RX ORDER — SODIUM CHLORIDE 0.9 % (FLUSH) 0.9 %
10 SYRINGE (ML) INJECTION EVERY 12 HOURS SCHEDULED
Status: DISCONTINUED | OUTPATIENT
Start: 2021-08-16 | End: 2021-08-18 | Stop reason: HOSPADM

## 2021-08-16 RX ORDER — SENNA PLUS 8.6 MG/1
1 TABLET ORAL DAILY PRN
Status: DISCONTINUED | OUTPATIENT
Start: 2021-08-16 | End: 2021-08-18 | Stop reason: HOSPADM

## 2021-08-16 RX ORDER — SODIUM CHLORIDE 9 MG/ML
25 INJECTION, SOLUTION INTRAVENOUS PRN
Status: DISCONTINUED | OUTPATIENT
Start: 2021-08-16 | End: 2021-08-18 | Stop reason: HOSPADM

## 2021-08-16 RX ORDER — PROMETHAZINE HYDROCHLORIDE 25 MG/1
12.5 TABLET ORAL EVERY 6 HOURS PRN
Status: DISCONTINUED | OUTPATIENT
Start: 2021-08-16 | End: 2021-08-18 | Stop reason: HOSPADM

## 2021-08-16 RX ORDER — ONDANSETRON 2 MG/ML
4 INJECTION INTRAMUSCULAR; INTRAVENOUS ONCE
Status: COMPLETED | OUTPATIENT
Start: 2021-08-16 | End: 2021-08-16

## 2021-08-16 RX ORDER — ONDANSETRON 2 MG/ML
4 INJECTION INTRAMUSCULAR; INTRAVENOUS EVERY 6 HOURS PRN
Status: DISCONTINUED | OUTPATIENT
Start: 2021-08-16 | End: 2021-08-18 | Stop reason: HOSPADM

## 2021-08-16 RX ORDER — POTASSIUM CHLORIDE 7.45 MG/ML
10 INJECTION INTRAVENOUS PRN
Status: DISCONTINUED | OUTPATIENT
Start: 2021-08-16 | End: 2021-08-18 | Stop reason: HOSPADM

## 2021-08-16 RX ORDER — ACETAMINOPHEN 325 MG/1
650 TABLET ORAL EVERY 6 HOURS PRN
Status: DISCONTINUED | OUTPATIENT
Start: 2021-08-16 | End: 2021-08-18 | Stop reason: HOSPADM

## 2021-08-16 RX ORDER — M-VIT,TX,IRON,MINS/CALC/FOLIC 27MG-0.4MG
1 TABLET ORAL
Status: DISCONTINUED | OUTPATIENT
Start: 2021-08-17 | End: 2021-08-18 | Stop reason: HOSPADM

## 2021-08-16 RX ORDER — SODIUM CHLORIDE 0.9 % (FLUSH) 0.9 %
10 SYRINGE (ML) INJECTION PRN
Status: DISCONTINUED | OUTPATIENT
Start: 2021-08-16 | End: 2021-08-18 | Stop reason: HOSPADM

## 2021-08-16 RX ORDER — MAGNESIUM SULFATE IN WATER 40 MG/ML
2000 INJECTION, SOLUTION INTRAVENOUS PRN
Status: DISCONTINUED | OUTPATIENT
Start: 2021-08-16 | End: 2021-08-18 | Stop reason: HOSPADM

## 2021-08-16 RX ORDER — POTASSIUM CHLORIDE 20 MEQ/1
40 TABLET, EXTENDED RELEASE ORAL PRN
Status: DISCONTINUED | OUTPATIENT
Start: 2021-08-16 | End: 2021-08-18 | Stop reason: HOSPADM

## 2021-08-16 RX ORDER — CHOLECALCIFEROL (VITAMIN D3) 125 MCG
1000 CAPSULE ORAL DAILY
Status: DISCONTINUED | OUTPATIENT
Start: 2021-08-17 | End: 2021-08-18 | Stop reason: HOSPADM

## 2021-08-16 RX ORDER — LANOLIN ALCOHOL/MO/W.PET/CERES
3 CREAM (GRAM) TOPICAL NIGHTLY PRN
Status: DISCONTINUED | OUTPATIENT
Start: 2021-08-16 | End: 2021-08-18 | Stop reason: HOSPADM

## 2021-08-16 RX ORDER — SODIUM CHLORIDE 9 MG/ML
INJECTION, SOLUTION INTRAVENOUS CONTINUOUS
Status: DISCONTINUED | OUTPATIENT
Start: 2021-08-16 | End: 2021-08-17

## 2021-08-16 RX ADMIN — SODIUM CHLORIDE: 9 INJECTION, SOLUTION INTRAVENOUS at 23:35

## 2021-08-16 RX ADMIN — ONDANSETRON 4 MG: 2 INJECTION INTRAMUSCULAR; INTRAVENOUS at 17:04

## 2021-08-16 RX ADMIN — SODIUM CHLORIDE, POTASSIUM CHLORIDE, SODIUM LACTATE AND CALCIUM CHLORIDE 1000 ML: 600; 310; 30; 20 INJECTION, SOLUTION INTRAVENOUS at 16:52

## 2021-08-16 RX ADMIN — SODIUM CHLORIDE, PRESERVATIVE FREE 10 ML: 5 INJECTION INTRAVENOUS at 23:37

## 2021-08-16 RX ADMIN — IOPAMIDOL 75 ML: 755 INJECTION, SOLUTION INTRAVENOUS at 15:44

## 2021-08-16 RX ADMIN — SODIUM CHLORIDE, SODIUM LACTATE, POTASSIUM CHLORIDE, AND CALCIUM CHLORIDE 1000 ML: .6; .31; .03; .02 INJECTION, SOLUTION INTRAVENOUS at 13:51

## 2021-08-16 RX ADMIN — ASPIRIN 81 MG: 81 TABLET, COATED ORAL at 23:35

## 2021-08-16 ASSESSMENT — ENCOUNTER SYMPTOMS
BACK PAIN: 0
ABDOMINAL PAIN: 0
VOMITING: 0
NAUSEA: 1
SHORTNESS OF BREATH: 1
DIARRHEA: 0
EYE REDNESS: 0
COUGH: 0
EYE PAIN: 0
CHEST TIGHTNESS: 0
PHOTOPHOBIA: 0
SORE THROAT: 0

## 2021-08-16 NOTE — ED PROVIDER NOTES
EMERGENCY DEPARTMENT ENCOUNTER        Pt Name: Alia Nebsitt  MRN: [id number]  [place] 1945  Date of evaluation: 8/16/2021  Provider: Kwame Boss MD  [facility]       Chief Complaint   Patient presents with    Fatigue     Pt reports currently being treated for Breast CA with Chemo, pt reports has been feeling fatigued and weak, concerned for dehydration. Pt has Echo scheduled for tachycardia tomorrow. HISTORY OFPRESENT ILLNESS   (Location/Symptom, Timing/Onset, Context/Setting, Quality, Duration, Modifying Factors,Severity)  Note limiting factors. Alia Nesbitt is a 78 y.o. female presenting today due to concern for increased fatigue along with generalized weakness over the last couple of weeks. She does report having a decreased appetite and is trying to drink her Ensure but is having difficulty with loss of appetite. She denies any vomiting or diarrhea. She denies any current pain including no headache, chest pain, abdominal pain. She is not nauseated. She had a syncopal event 1 month ago but denies any since. She is currently receiving chemotherapy for breast cancer. She states that she has been to the office at [facility] a couple times for IV fluids but feels like she is still very dehydrated. No unilateral numbness or weakness. No visual changes. She did get her Covid vaccine. She does feel short of breath with exertion but denies any shortness of breath currently. She denies any leg swelling or history of blood clots. Due to worsening fatigue, she came to the ED for further evaluation. She is supposed to get an echocardiogram tomorrow in regards to her tachycardia. REVIEW OF SYSTEMS    (2-9 systems for level 4, 10 or more for level 5)     Review of Systems   Constitutional: Positive for activity change, appetite change and fatigue. Negative for chills, diaphoresis and fever.    HENT: Negative for congestion and sore throat. Eyes: Negative for photophobia, pain, redness and visual disturbance. Respiratory: Positive for shortness of breath (with exertion only). Negative for cough and chest tightness. Cardiovascular: Negative for chest pain. Gastrointestinal: Positive for nausea (currently controlled with medications at home). Negative for abdominal pain, diarrhea and vomiting. Genitourinary: Negative for difficulty urinating, dysuria and flank pain. Musculoskeletal: Positive for gait problem (due to dyspnea on exertion). Negative for back pain and neck pain. Allergic/Immunologic: Positive for immunocompromised state. Neurological: Positive for weakness (generalized) and light-headedness. Negative for dizziness, syncope and headaches. Psychiatric/Behavioral: The patient is nervous/anxious. Positives and Pertinent negatives as per HPI.       PASTMEDICAL HISTORY     Past Medical History:   Diagnosis Date    [facility] [address] [facility]     Cancer [facility] [address] [facility]     Kidney stone          SURGICAL HISTORY       Past Surgical History:   Procedure Laterality Date    COLONOSCOPY      MRI BREAST BX USING DEVICE RIGHT Right 5/28/2021    MRI BREAST BX USING DEVICE RIGHT 5/28/2021 [address] [facility]    MRI NEEDLE BIOPSY EACH ADDL RIGHT Right 5/28/2021    MRI NEEDLE BIOPSY EACH ADDL RIGHT 5/28/2021 [facility]    PORT SURGERY N/A 5/25/2021    RIGHT PORT A CATH PLACEMENT performed by Alfreda Sage DO at [address] Right 5/7/2021    US BREAST BIOPSY NEEDLE ADDITIONAL RIGHT 5/7/2021 [address] BREAST NEEDLE BIOPSY LEFT Left 5/7/2021    US BREAST NEEDLE BIOPSY LEFT 5/7/2021 Kathy Rodriguez MD [address] [facility]    US BREAST NEEDLE BIOPSY RIGHT Right 5/7/2021    US BREAST NEEDLE BIOPSY RIGHT 5/7/2021 MD [address] [facility]       Current Discharge Medication List      CONTINUE these medications which have NOT CHANGED    Details   melatonin (RA MELATONIN) 3 MG TABS tablet Take 1 tablet by mouth nightly as needed (sleep)  Qty: 20 tablet, Refills: 0      aspirin 81 MG EC tablet Take 81 mg by mouth daily      Multiple Vitamin (MULTI-VITAMIN PO) Take by mouth      Cholecalciferol (VITAMIN D3) 5000 units TABS Take by mouth      Cyanocobalamin (VITAMIN B-12) 1000 MCG extended release tablet Take 1,000 mcg by mouth daily             ALLERGIES     Bactrim [sulfamethoxazole-trimethoprim]    FAMILY HISTORY       Family History   Problem Relation Age of Onset    High Cholesterol Mother     Diabetes Father     Cancer Paternal Grandmother 76        Bladder          SOCIAL HISTORY       Social History     Socioeconomic History    Marital status:      Spouse name: None    Number of children: None    Years of education: None    Highest education level: None   Occupational History    None   Tobacco Use    Smoking status: Passive Smoke Exposure - Never Smoker    Smokeless tobacco: Never Used   Vaping Use    Vaping Use: Never used   Substance and Sexual Activity    Alcohol use: No    Drug use: No    Sexual activity: Not Currently     Partners: Male   Other Topics Concern    None   Social History Narrative    None     Social Determinants of Health     Financial Resource Strain:     Difficulty of Paying Living Expenses:    Food Insecurity:     Worried About Running Out of Food in the Last Year:     Ran Out of Food in the Last Year:    Transportation Needs:     Lack of Transportation (Medical):      Lack of Transportation (Non-Medical):    Physical Activity:     Days of Exercise per Week:     Minutes of Exercise per Session:    Stress:     Feeling of Stress :    Social Connections:     Frequency of Communication with Friends and Family:     Frequency of Social Gatherings with Friends and Family:     Attends Rastafari Services:     Active Member of Clubs or Organizations:     Attends Club or Organization Meetings:     Marital Status:    Intimate Partner Violence:     Fear of Current or Ex-Partner:     Emotionally Abused:     Physically Abused:     Sexually Abused:        SCREENINGS    [place] Coma Scale  Eye Opening: Spontaneous  Best Verbal Response: Oriented  Best Motor Response: Obeys commands  Timothy Coma Scale Score: 15           PHYSICAL EXAM    (up to 7 for level 4, 8 or more for level 5)     ED Triage Vitals [08/16/21 1307]   BP Temp Temp Source Pulse Resp SpO2 Height Weight   137/79 97.4 °F (36.3 °C) Oral 128 18 98 % 5' 7\" (1.702 m) 139 lb (63 kg)       Physical Exam  Vitals and nursing note reviewed. Constitutional:       General: She is awake. She is not in acute distress. Appearance: She is well-developed, well-groomed and normal weight. She is ill-appearing (chronically ill-appearing). She is not toxic-appearing or diaphoretic. Interventions: She is not intubated. HENT:      Head: Normocephalic and atraumatic. Right Ear: External ear normal.      Left Ear: External ear normal.      Nose: Nose normal.      Mouth/Throat:      Mouth: Mucous membranes are dry. Eyes:      General:         Right eye: No discharge. Left eye: No discharge. Neck:      Trachea: Trachea and phonation normal. No tracheal deviation. Cardiovascular:      Rate and Rhythm: Regular rhythm. Tachycardia present. Pulses: Normal pulses. Radial pulses are 2+ on the right side and 2+ on the left side. Pulmonary:      Effort: Pulmonary effort is normal. No tachypnea, bradypnea, accessory muscle usage, prolonged expiration, respiratory distress or retractions. She is not intubated. Breath sounds: Normal breath sounds and air entry. No stridor, decreased air movement or transmitted upper airway sounds. No decreased breath sounds, wheezing, rhonchi or rales. Chest:      Chest wall: No tenderness. Abdominal:      General: Abdomen is flat.  Bowel sounds are normal. There is no distension. Palpations: Abdomen is soft. Abdomen is not rigid. Tenderness: There is no abdominal tenderness. There is no guarding or rebound. Negative signs include Braun's sign and McBurney's sign. Musculoskeletal:         General: No swelling, tenderness, deformity or signs of injury. Normal range of motion. Cervical back: Full passive range of motion without pain, normal range of motion and neck supple. No edema, erythema, signs of trauma, rigidity, torticollis, tenderness, bony tenderness or crepitus. No pain with movement, spinous process tenderness or muscular tenderness. Normal range of motion. Thoracic back: No tenderness or bony tenderness. Lumbar back: No tenderness or bony tenderness. Right lower leg: No edema. Left lower leg: No edema. Skin:     General: Skin is warm and dry. Coloration: Skin is pale. Skin is not jaundiced. Findings: No bruising, erythema, lesion or rash. Neurological:      General: No focal deficit present. Mental Status: She is alert and oriented to person, place, and time. Mental status is at baseline. GCS: GCS eye subscore is 4. GCS verbal subscore is 5. GCS motor subscore is 6. Cranial Nerves: No dysarthria. Sensory: Sensation is intact. No sensory deficit. Motor: Motor function is intact. No weakness, tremor, atrophy, abnormal muscle tone or seizure activity. Psychiatric:         Attention and Perception: Attention normal.         Mood and Affect: Affect normal. Mood is anxious. Speech: Speech normal. Speech is not slurred. Behavior: Behavior normal. Behavior is cooperative.              DIAGNOSTIC RESULTS   :    Labs Reviewed   CBC WITH AUTO DIFFERENTIAL - Abnormal; Notable for the following components:       Result Value    WBC 0.9 (*)     RBC 3.90 (*)     Hemoglobin 11.9 (*)     Hematocrit 34.4 (*)     RDW 17.3 (*)     Neutrophils Absolute 0.1 (*)     Lymphocytes Absolute 0.4 (*) All other components within normal limits    Narrative:     Nataly Wells tel. [phone],  Hematology results called to and read back by REENA Nuñez, 08/16/2021  13:53, by Wei Sanchez  Performed at:  [address],  [place], [address]   Phone [phone]   COMPREHENSIVE METABOLIC PANEL - Abnormal; Notable for the following components:    Glucose 133 (*)     All other components within normal limits    Narrative:     Performed at:  [address],  [place], [address] [employer]   Phone [phone]   TROPONIN - Abnormal; Notable for the following components:    Troponin 0.04 (*)     All other components within normal limits    Narrative:     Performed at:  [address],  [place], [address]   Phone [phone]   VITAMIN B12 & FOLATE - Abnormal; Notable for the following components:    Vitamin B-12 >2000 (*)     All other components within normal limits    Narrative:     Performed at:  [address] [address]   Phone [phone]   TROPONIN - Abnormal; Notable for the following components:    Troponin 0.05 (*)     All other components within normal limits    Narrative:     Performed at:  [address],  [place], [address]   Phone [phone]   CULTURE, BLOOD 1   CULTURE, BLOOD 2   CULTURE, URINE   LACTATE, SEPSIS    Narrative:     Performed at:  [address],  [place], [address] [employer]   Phone [phone]   URINALYSIS    Narrative:     Performed at:  [address],  [place], [address] [employer]   Phone [phone]   MAGNESIUM    Narrative:     Performed at:  [address],  [place], [address] [employer]   Phone [phone]   PHOSPHORUS Narrative:     Performed at:  [address],  [place], [address]   Phone [phone]   [facility] WITH REFLEX    Narrative:     Binta Farfan tel. [phone],  Hematology results called to and read back by Cassidy Wang RN, 08/16/2021  14:51, by Liz Bowles  Performed at:  [facility]  [address] [place], [place] [address]   Phone [phone]   LIPASE    Narrative:     Performed at:  [address],  [place], [address]   Phone [phone]   LACTIC ACID, PLASMA    Narrative:     Performed at:  [address],  [place], [address]   Phone [phone]   VITAMIN D 25 HYDROXY    Narrative:     Performed at:  [facility]  [address] [facility], [place] [address]   Phone [phone]   PROCALCITONIN    Narrative:     Binta Farfan tel. [phone],  Hematology results called to and read back by Cassidy Wang RN, 08/16/2021  14:51, by Liz Bowles  Performed at:  [address],  [place], [address]   Phone [phone]   T4, FREE    Narrative:     Binta Farfan tel. [phone],  Hematology results called to and read back by Cassidy Wang RN, 08/16/2021  14:51, by Liz Bowles  Performed at:  [facility]  [address] [place], [place] [address]   Phone [phone]   BLOOD GAS, VENOUS   TSH WITHOUT REFLEX   BLOOD OCCULT STOOL SCREEN #1   CBC WITH AUTO DIFFERENTIAL   COMPREHENSIVE METABOLIC PANEL W/ REFLEX TO MG FOR LOW K       All other labs were within normal range or not returned asof this dictation. EKG:  All EKG's are interpreted by the Emergency Department Physician who either signs or Co-signs this chart in the absence of a cardiologist.    The Ekg interpreted by me shows  sinus tachycardia, xmdj=[id number]   Axis is   Normal  QTc is borderline prolonged QT  Intervals and Durations are unremarkable. ST Segments: no acute change and nonspecific changes  No significant change from prior EKG dated - 7/14/21  No STEMI           RADIOLOGY:   Non-plain film images such as CT, Ultrasound and MRI are read by the radiologist. Hemanthne Rafi images are visualized and preliminarily interpreted by the  ED Provider with the belowfindings:        Interpretation per the Radiologist below, if available at the time of this note:    CT CHEST PULMONARY EMBOLISM W CONTRAST   Final Result   1. No evidence of pulmonary embolism   2. No acute cardiopulmonary process   3. T12 superior endplate compression fracture new since 05/2021         XR CHEST (2 VW)   Final Result   No acute process. VL DUP CAROTID BILATERAL    (Results Pending)         PROCEDURES   Unless otherwise noted below, none     Procedures    CRITICAL CARE TIME   Time: 35 minutes  Includes repeat examinations, speaking with consultants, lab  interpretation, charting, treating with severe dehydration/sinus tachycardia needing IV fluids  Excludes separate billable procedures. Patient at risk for serious decompensation if not treated for this life-threatening condition. CONSULTS: Spoke with her oncologist Dr. Kaley Bhatti and he did agree with my recommendation for admission, especially with concern for elevated troponin and he will see her in the hospital.  He is aware of neutropenia but had no other recommendations for this at this point. Since paged hospitalist for admission and spoke with Dr. Carlos Pérez briefly about patient.     IP CONSULT TO ONCOLOGY  IP CONSULT TO ONCOLOGY  IP CONSULT TO HOSPITALIST    EMERGENCY DEPARTMENT COURSE and DIFFERENTIAL DIAGNOSIS/MDM:   Vitals:    Vitals:    08/16/21 1931 08/16/21 2111 08/16/21 2232 08/16/21 2345   BP: 129/69 127/64 (!) 143/87    Pulse: 105 103 107    Resp: 18 20 20    Temp:   97.7 °F (36.5 °C)    TempSrc:   Oral    SpO2: 96% 97% 98% Weight:    138 lb 1.6 oz (62.6 kg)   Height:    5' 7\" (1.702 m)       Patient was given the following medications:  Medications   lactated ringers bolus (1,000 mLs Intravenous Bolus from Bag 8/16/21 1351)   ondansetron (ZOFRAN) injection 4 mg (has no administration in time range)   aspirin EC tablet 81 mg (81 mg Oral Given 8/16/21 2335)   vitamin B-12 (CYANOCOBALAMIN) tablet 1,000 mcg (has no administration in time range)   melatonin tablet 3 mg (has no administration in time range)   therapeutic multivitamin-minerals 1 tablet (has no administration in time range)   0.9 % sodium chloride infusion ( Intravenous New Bag 8/16/21 6065)   sodium chloride flush 0.9 % injection 10 mL (10 mLs Intravenous Given 8/16/21 2337)   sodium chloride flush 0.9 % injection 10 mL (has no administration in time range)   0.9 % sodium chloride infusion (has no administration in time range)   potassium chloride (KLOR-CON M) extended release tablet 40 mEq (has no administration in time range)     Or   potassium bicarb-citric acid (EFFER-K) effervescent tablet 40 mEq (has no administration in time range)     Or   potassium chloride 10 mEq/100 mL IVPB (Peripheral Line) (has no administration in time range)   magnesium sulfate 2000 mg in 50 mL IVPB premix (has no administration in time range)   enoxaparin (LOVENOX) injection 40 mg (has no administration in time range)   promethazine (PHENERGAN) tablet 12.5 mg (has no administration in time range)     Or   ondansetron (ZOFRAN) injection 4 mg (has no administration in time range)   senna (SENOKOT) tablet 8.6 mg (has no administration in time range)   acetaminophen (TYLENOL) tablet 650 mg (has no administration in time range)     Or   acetaminophen (TYLENOL) suppository 650 mg (has no administration in time range)   perflutren lipid microspheres (DEFINITY) injection 1.65 mg (has no administration in time range)   iopamidol (ISOVUE-370) 76 % injection 75 mL (75 mLs Intravenous Given 8/16/21 4279)   lactated ringers bolus (0 mLs Intravenous Stopped 8/16/21 1848)   ondansetron (ZOFRAN) injection 4 mg (4 mg Intravenous Given 8/16/21 1704)     Patient was evaluated due to being on chemotherapy and feeling more and more fatigued and weak over the last couple of weeks. She feels very dehydrated at this time and feels like she needs IV fluids. Her initial pulse was 128 and I do feel that IV fluids will be appropriate for her. She denies any pain anywhere but with tachycardia along with history of breast cancer, she will need a CT pulmonary study to rule out any sign of pulmonary embolism. She denies any chest pain and story not suggestive of acute coronary syndrome. Troponin was mildly elevated and this may be due to demand ischemia. No sign of STEMI on EKG. Repeat troponin did slightly go up from 0.04 to 0.05. She will need further evaluation in the hospital with possible cardiology consultation. She is stable for the floor with telemetry and feels comfortable with this plan. She had no abdominal pain or tenderness and at this time I have low suspicion for acute abdominal pathology and therefore CT scan of the abdomen was not obtained at this time. In regards to her neutropenia, she denies any concern with fever and at this point she will need to be on neutropenic precautions but no need for antibiotics at this time per her oncologist.  I did inform the patient of her new T12 compression fracture and she denies any current back pain. The patient tolerated their visit well. The patient and / or the family were informed of the results of any tests, a time was given to answer questions. FINAL IMPRESSION      1. Elevated troponin    2. Chemotherapy-induced neutropenia (HCC)    3. Dehydration    4. Sinus tachycardia    5. VEE (dyspnea on exertion)    6.  Compression fracture of T12 vertebra with routine healing, subsequent encounter          DISPOSITION/PLAN   DISPOSITION  - decision to admit      PATIENT REFERRED TO:  No follow-up provider specified.     DISCHARGEMEDICATIONS:  Current Discharge Medication List          DISCONTINUED MEDICATIONS:  Current Discharge Medication List                 (Please note that portions of this note were completed with a voicerecognition program.  Efforts were made to edit the dictations but occasionally words are mis-transcribed.)    Florencia Johnson MD (electronically signed)            Florencia Johnson MD  08/17/21 [address], [place]  08/17/21 0280

## 2021-08-16 NOTE — ED NOTES
PS oncology @ [phone]   RE: neutropenic, dehydration  Dr. Gael Barreto @ [address] [place] [address]  08/16/21 [id number]

## 2021-08-16 NOTE — H&P
HOSPITALISTS HISTORY AND PHYSICAL    8/16/2021 2:56 PM    Patient Information:  Yomaira Esposito is a 78 y.o. female [id number]  PCP:  SOUTH Felipe CNP (Tel: [phone] )    Chief complaint:    Chief Complaint   Patient presents with    Fatigue     Pt reports currently being treated for Breast CA with Chemo, pt reports has been feeling fatigued and weak, concerned for dehydration. Pt has Echo scheduled for tachycardia tomorrow. History of Present Illness:  Eva Riggs is a 78 y.o. female who presented to the ED to be evaluated for increased fatigue and generalized weakness ongoing for the past 2 weeks PTA. The patient was diagnosed with bilateral invasive ductal breast CA in May 2021, and she is currently undergoing chemotherapy, having recently started Taxol. Patient endorses anorexia, but denies fever, chills, N/V/D, and chest pain. Approximately 1 month earlier, patient experienced a syncopal episode which resulted in traumatic wrist fracture; she is scheduled for cardiac echo later this week. Patient has received both Covid vaccinations, but has not yet received booster injection. Upon arrival to the ED, vital signs were obtained revealing the patient is afebrile but tachycardic at a rate of 128. EKG was obtained revealing sinus tachycardia without evidence of bundle branch block or ischemic changes. Labs were obtained and notable for WBC 0.9, with absolute neutrophils reduced to 0.1. CXR obtained revealing no acute disease. Patient received 1 L bolus of LR in ED, with subsequent improvement of pulse rate to 96. Chest CT with PE protocol was obtained in the setting of malignancy/tachycardia/dyspnea, and noted to be negative for embolic disease.   Patient will be admitted under neutropenic isolation protocol for further treatment of her neutropenia, dehydration, and generalized fatigue. History obtained from patient and review of Epic chart    REVIEW OF SYSTEMS:   Constitutional: Negative for fever,chills, and generalized weakness  ENT: Negative for headache, rhinorrhea, and sore throat. Respiratory: positive for shortness of breath, wheezing, and cough  Cardiovascular: Negative for chest pain, palpitations, peripheral edema, orthopnea or PND  Gastrointestinal: Negative for N/V/D and abdominal pain; no hematemesis, hematochezia, or melena; positive anorexia  Genitourinary: Negative for dysuria, frequency, retention; no incontinence  Hematologic/Lymphatic: Negative for bleeding tendency/excessive bruising  Musculoskeletal: Positive for myalgias and arthalgias; able to ambulate without difficulty  Neurologic: Negative for LOC, seizure activity, paresthesias, dysarthria, vertigo, and gait disturbance  Skin: Negative for itching,rash, decubitus  Psychiatric: Positive for depression,anxiety  Endocrine: Negative for polyuria/polydipsia/polyphagia; no heat/cold intolerance    Past Medical History:   has a past medical history of Breast cancer ([facility] [address] Cancer ([facility] [address] and Kidney stone. Past Surgical History:   has a past surgical history that includes US BREAST BIOPSY W LOC DEVICE 1ST LESION RIGHT (Right, 5/7/2021); US BREAST BIOPSY W LOC DEVICE EACH ADDL LESION RIGHT (Right, 5/7/2021); US BREAST BIOPSY W LOC DEVICE 1ST LESION LEFT (Left, 5/7/2021); Colonoscopy; Port Surgery (N/A, 5/25/2021); MRI BIOPSY BREAST W LOC DEVICE RIGHT 1ST LESION (Right, 5/28/2021); and MRI BIOPSY BREAST W LOC DEVICE RIGHT EACH ADDL (Right, 5/28/2021). Medications:  No current facility-administered medications on file prior to encounter.      Current Outpatient Medications on File Prior to Encounter   Medication Sig Dispense Refill    melatonin (RA MELATONIN) 3 MG TABS tablet Take 1 tablet by mouth nightly as needed (sleep) 20 tablet 0    aspirin 81 MG EC tablet Take 81 mg by mouth daily      Multiple Vitamin (MULTI-VITAMIN PO) Take by mouth      Cholecalciferol (VITAMIN D3) 5000 units TABS Take by mouth      Cyanocobalamin (VITAMIN B-12) 1000 MCG extended release tablet Take 1,000 mcg by mouth daily         Allergies: Allergies   Allergen Reactions    Bactrim [Sulfamethoxazole-Trimethoprim]         Social History:   reports that she is a non-smoker but has been exposed to tobacco smoke. She has never used smokeless tobacco. She reports that she does not drink alcohol and does not use drugs. Family History:  family history includes Cancer (age of onset: 76) in her paternal grandmother; Diabetes in her father; High Cholesterol in her mother.      Physical Exam:  BP (!) 150/89   Pulse 100   Temp 97.4 °F (36.3 °C) (Oral)   Resp 16   Ht 5' 7\" (1.702 m)   Wt 139 lb (63 kg)   LMP  (LMP Unknown)   SpO2 95%   BMI 21.77 kg/m²     General appearance: Chronically ill-appearing female with alopecia areata  Eyes: Sclera clear without conjunctival injection; PERRLA; EOMI  ENT: Mucous membranes dry without thrush; normal dentition  Neck: Supple without meningismus; no goiter; no carotid bruit bilaterally  Cardiovascular: Tachycardia without ectopy; normal S1-S2 with no murmurs; no peripheral edema; no JVD  Respiratory: No tachypnea; CTAB with adequate air exchange, no wheeze, rhonchi or rales; I:E intact  Gastrointestinal: Abdomen soft, non-tender, not distended; bowel sounds normal; no masses/organomegaly appreciated  Musculoskeletal: FROM spine and extremities x4; no gross deformity  Neurology: A&O x3; cranial nerves 2-12 grossly intact; motor 5/5  BUE/BLE; no seizure activity; finger-to-nose/heel-to-shin intact; no pronator drift  Psychiatry: Well-groomed with good eye contact; anxious affect; no visual/auditory hallucination  Skin: Warm, dry, normal turgor, no rash  PV: 2/4 radial and dorsalis pedis bilaterally; brisk capillary refill    Labs:  CBC:   Lab Results   Component Value Date    WBC 0.9 08/16/2021    RBC 3.90 08/16/2021    HGB 11.9 08/16/2021    HCT 34.4 08/16/2021    MCV 88.2 08/16/2021    MCH 30.5 08/16/2021    MCHC 34.6 08/16/2021    RDW 17.3 08/16/2021     08/16/2021    MPV 7.1 08/16/2021     BMP:    Lab Results   Component Value Date     08/16/2021    K 3.7 08/16/2021    K 3.6 08/17/2018     08/16/2021    CO2 21 08/16/2021    BUN 11 08/16/2021    CREATININE 0.6 08/16/2021    CALCIUM 9.9 08/16/2021    GFRAA >60 08/16/2021    GFRAA >60 07/10/2012    LABGLOM >60 08/16/2021    GLUCOSE 133 08/16/2021     XR CHEST (2 VW)   Final Result   No acute process. CT CHEST PULMONARY EMBOLISM W CONTRAST    (Results Pending)         EKG:    Ventricular Rate 108 BPM QTc Calculation (Bazett) 477 ms   Atrial Rate 108 BPM P Severance 74 degrees   P-R Interval 164 ms R Axis -14 degrees   QRS Duration 70 ms T Axis 59 degrees   Q-T Interval 356 ms Diagnosis Sinus tachycardiaOtherwise normal        I visualized CXR images and EKG strips personally and agree with documented interpretation    Discussed case  with ED provider    Problem List  Active Problems:    Chemotherapy-induced neutropenia (HCC)    Fatigue    Malignant neoplasm of right breast in female, estrogen receptor positive (HCC)    Malignant neoplasm of upper-inner quadrant of left breast in female, estrogen receptor positive (HCC)    Tachycardia  Resolved Problems:    * No resolved hospital problems.  *        Assessment/Plan:     B breast CA with chemotherapy-induced neutropenia  Patient admitted for observation under neutropenic isolation precautions  Blood and urine cultures collected; lactate, procalcitonin pending   Serial temperature measurements to ensure patient remains afebrile; no evidence of infection currently therefore antibiotics not initiated  Consultation placed to [facility] for further recommendations/ G-CSF injection    Recent syncope  Admit to telemetry floor for continuous monitoring and serial neuro checks Q4H  Fall and seizure

## 2021-08-17 ENCOUNTER — APPOINTMENT (OUTPATIENT)
Dept: VASCULAR LAB | Age: 76
DRG: 808 | End: 2021-08-17
Payer: MEDICARE

## 2021-08-17 ENCOUNTER — APPOINTMENT (OUTPATIENT)
Dept: CT IMAGING | Age: 76
DRG: 808 | End: 2021-08-17
Payer: MEDICARE

## 2021-08-17 PROBLEM — D70.9 NEUTROPENIA (HCC): Status: ACTIVE | Noted: 2021-08-17

## 2021-08-17 PROBLEM — R82.71 BACTERIURIA: Status: ACTIVE | Noted: 2021-08-17

## 2021-08-17 PROBLEM — R62.7 FAILURE TO THRIVE IN ADULT: Status: ACTIVE | Noted: 2021-08-17

## 2021-08-17 PROBLEM — E43 SEVERE MALNUTRITION (HCC): Chronic | Status: ACTIVE | Noted: 2021-08-17

## 2021-08-17 PROBLEM — R29.6 RECURRENT FALLS: Status: ACTIVE | Noted: 2021-08-17

## 2021-08-17 LAB
A/G RATIO: 1.3 (ref 1.1–2.2)
ALBUMIN SERPL-MCNC: 3.7 G/DL (ref 3.4–5)
ALP BLD-CCNC: 81 U/L (ref 40–129)
ALT SERPL-CCNC: 24 U/L (ref 10–40)
ANION GAP SERPL CALCULATED.3IONS-SCNC: 12 MMOL/L (ref 3–16)
AST SERPL-CCNC: 21 U/L (ref 15–37)
BASOPHILS ABSOLUTE: 0 K/UL (ref 0–0.2)
BASOPHILS RELATIVE PERCENT: 0.8 %
BILIRUB SERPL-MCNC: <0.2 MG/DL (ref 0–1)
BUN BLDV-MCNC: 7 MG/DL (ref 7–20)
CALCIUM SERPL-MCNC: 9.1 MG/DL (ref 8.3–10.6)
CHLORIDE BLD-SCNC: 106 MMOL/L (ref 99–110)
CO2: 23 MMOL/L (ref 21–32)
CREAT SERPL-MCNC: 0.6 MG/DL (ref 0.6–1.2)
EOSINOPHILS ABSOLUTE: 0 K/UL (ref 0–0.6)
EOSINOPHILS RELATIVE PERCENT: 1.7 %
GFR AFRICAN AMERICAN: >60
GFR NON-AFRICAN AMERICAN: >60
GLOBULIN: 2.8 G/DL
GLUCOSE BLD-MCNC: 110 MG/DL (ref 70–99)
HCT VFR BLD CALC: 32.3 % (ref 36–48)
HEMATOLOGY PATH CONSULT: NO
HEMATOLOGY PATH CONSULT: NORMAL
HEMOGLOBIN: 10.9 G/DL (ref 12–16)
LV EF: 55 %
LVEF MODALITY: NORMAL
LYMPHOCYTES ABSOLUTE: 0.4 K/UL (ref 1–5.1)
LYMPHOCYTES RELATIVE PERCENT: 35.1 %
MCH RBC QN AUTO: 30.6 PG (ref 26–34)
MCHC RBC AUTO-ENTMCNC: 33.8 G/DL (ref 31–36)
MCV RBC AUTO: 90.5 FL (ref 80–100)
MONOCYTES ABSOLUTE: 0.5 K/UL (ref 0–1.3)
MONOCYTES RELATIVE PERCENT: 48.6 %
NEUTROPHILS ABSOLUTE: 0.1 K/UL (ref 1.7–7.7)
NEUTROPHILS RELATIVE PERCENT: 13.8 %
PDW BLD-RTO: 17 % (ref 12.4–15.4)
PLATELET # BLD: 258 K/UL (ref 135–450)
PMV BLD AUTO: 6.8 FL (ref 5–10.5)
POTASSIUM REFLEX MAGNESIUM: 4 MMOL/L (ref 3.5–5.1)
RBC # BLD: 3.56 M/UL (ref 4–5.2)
SODIUM BLD-SCNC: 141 MMOL/L (ref 136–145)
TOTAL PROTEIN: 6.5 G/DL (ref 6.4–8.2)
WBC # BLD: 1.1 K/UL (ref 4–11)

## 2021-08-17 PROCEDURE — 6360000004 HC RX CONTRAST MEDICATION: Performed by: INTERNAL MEDICINE

## 2021-08-17 PROCEDURE — 96372 THER/PROPH/DIAG INJ SC/IM: CPT

## 2021-08-17 PROCEDURE — 6370000000 HC RX 637 (ALT 250 FOR IP): Performed by: NURSE PRACTITIONER

## 2021-08-17 PROCEDURE — 80053 COMPREHEN METABOLIC PANEL: CPT

## 2021-08-17 PROCEDURE — 6370000000 HC RX 637 (ALT 250 FOR IP): Performed by: HOSPITALIST

## 2021-08-17 PROCEDURE — 2580000003 HC RX 258: Performed by: HOSPITALIST

## 2021-08-17 PROCEDURE — 6360000002 HC RX W HCPCS: Performed by: HOSPITALIST

## 2021-08-17 PROCEDURE — 93880 EXTRACRANIAL BILAT STUDY: CPT

## 2021-08-17 PROCEDURE — 6370000000 HC RX 637 (ALT 250 FOR IP): Performed by: STUDENT IN AN ORGANIZED HEALTH CARE EDUCATION/TRAINING PROGRAM

## 2021-08-17 PROCEDURE — 36415 COLL VENOUS BLD VENIPUNCTURE: CPT

## 2021-08-17 PROCEDURE — 70470 CT HEAD/BRAIN W/O & W/DYE: CPT

## 2021-08-17 PROCEDURE — 93306 TTE W/DOPPLER COMPLETE: CPT

## 2021-08-17 PROCEDURE — G0378 HOSPITAL OBSERVATION PER HR: HCPCS

## 2021-08-17 PROCEDURE — 1200000000 HC SEMI PRIVATE

## 2021-08-17 PROCEDURE — 85025 COMPLETE CBC W/AUTO DIFF WBC: CPT

## 2021-08-17 PROCEDURE — 6360000002 HC RX W HCPCS: Performed by: NURSE PRACTITIONER

## 2021-08-17 RX ORDER — AMOXICILLIN 250 MG/1
500 CAPSULE ORAL EVERY 8 HOURS SCHEDULED
Status: DISCONTINUED | OUTPATIENT
Start: 2021-08-17 | End: 2021-08-18

## 2021-08-17 RX ORDER — LORAZEPAM 0.5 MG/1
0.5 TABLET ORAL DAILY PRN
Status: DISCONTINUED | OUTPATIENT
Start: 2021-08-17 | End: 2021-08-18 | Stop reason: HOSPADM

## 2021-08-17 RX ORDER — TRAMADOL HYDROCHLORIDE 50 MG/1
50 TABLET ORAL EVERY 6 HOURS PRN
Status: DISCONTINUED | OUTPATIENT
Start: 2021-08-17 | End: 2021-08-18 | Stop reason: HOSPADM

## 2021-08-17 RX ORDER — LORAZEPAM 0.5 MG/1
0.5 TABLET ORAL NIGHTLY
Status: DISCONTINUED | OUTPATIENT
Start: 2021-08-17 | End: 2021-08-18 | Stop reason: HOSPADM

## 2021-08-17 RX ADMIN — SODIUM CHLORIDE, PRESERVATIVE FREE 10 ML: 5 INJECTION INTRAVENOUS at 21:41

## 2021-08-17 RX ADMIN — TRAMADOL HYDROCHLORIDE 50 MG: 50 TABLET, FILM COATED ORAL at 21:39

## 2021-08-17 RX ADMIN — IOPAMIDOL 75 ML: 755 INJECTION, SOLUTION INTRAVENOUS at 11:31

## 2021-08-17 RX ADMIN — TRAMADOL HYDROCHLORIDE 50 MG: 50 TABLET, FILM COATED ORAL at 03:05

## 2021-08-17 RX ADMIN — TRAMADOL HYDROCHLORIDE 50 MG: 50 TABLET, FILM COATED ORAL at 13:16

## 2021-08-17 RX ADMIN — Medication 3 MG: at 02:26

## 2021-08-17 RX ADMIN — ENOXAPARIN SODIUM 40 MG: 40 INJECTION SUBCUTANEOUS at 08:03

## 2021-08-17 RX ADMIN — LORAZEPAM 0.5 MG: 0.5 TABLET ORAL at 14:12

## 2021-08-17 RX ADMIN — CYANOCOBALAMIN TAB 500 MCG 1000 MCG: 500 TAB at 08:03

## 2021-08-17 RX ADMIN — NYSTATIN 5 ML: 100000 SUSPENSION ORAL at 14:13

## 2021-08-17 RX ADMIN — TBO-FILGRASTIM 300 MCG: 300 INJECTION, SOLUTION SUBCUTANEOUS at 13:09

## 2021-08-17 RX ADMIN — Medication 1 TABLET: at 08:03

## 2021-08-17 RX ADMIN — LORAZEPAM 0.5 MG: 0.5 TABLET ORAL at 21:39

## 2021-08-17 RX ADMIN — SENNOSIDES 8.6 MG: 8.6 TABLET, FILM COATED ORAL at 14:13

## 2021-08-17 RX ADMIN — ASPIRIN 81 MG: 81 TABLET, COATED ORAL at 08:03

## 2021-08-17 ASSESSMENT — PAIN SCALES - GENERAL
PAINLEVEL_OUTOF10: 6
PAINLEVEL_OUTOF10: 3
PAINLEVEL_OUTOF10: 0
PAINLEVEL_OUTOF10: 6
PAINLEVEL_OUTOF10: 5
PAINLEVEL_OUTOF10: 5
PAINLEVEL_OUTOF10: 2

## 2021-08-17 ASSESSMENT — PAIN DESCRIPTION - LOCATION
LOCATION: GENERALIZED
LOCATION: GENERALIZED

## 2021-08-17 NOTE — PROGRESS NOTES
clear.  Neck: Supple, with full range of motion. No jugular venous distention. Trachea midline. Respiratory:  Normal respiratory effort. Clear to auscultation, bilaterally without Rales/Wheezes/Rhonchi. Cardiovascular: Regular rate and rhythm with normal S1/S2 without murmurs, rubs or gallops. Abdomen: Soft, non-tender, non-distended with normal bowel sounds. Musculoskeletal: No clubbing, cyanosis or edema bilaterally. Full range of motion without deformity. Skin: Skin color, texture, turgor normal.  No rashes or lesions. Neurologic:  Neurovascularly intact without any focal sensory/motor deficits. Cranial nerves: II-XII intact, grossly non-focal.  Psychiatric: Alert and oriented, thought content appropriate, normal insight  Capillary Refill: Brisk,3 seconds, normal   Peripheral Pulses: +2 palpable, equal bilaterally       Labs:   Recent Labs     08/16/21 1317 08/17/21  0656   WBC 0.9* 1.1*   HGB 11.9* 10.9*   HCT 34.4* 32.3*    258     Recent Labs     08/16/21  1317 08/17/21  0656    141   K 3.7 4.0    106   CO2 21 23   BUN 11 7   CREATININE 0.6 0.6   CALCIUM 9.9 9.1   PHOS 3.3  --      Recent Labs     08/16/21  1317 08/17/21  0656   AST 24 21   ALT 28 24   BILITOT <0.2 <0.2   ALKPHOS 92 81     No results for input(s): INR in the last 72 hours. Recent Labs     08/16/21  1317 08/16/21  1645   TROPONINI 0.04* 0.05*       Urinalysis:      Lab Results   Component Value Date    NITRU Negative 08/16/2021    WBCUA 6-9 07/14/2021    BACTERIA 1+ 07/14/2021    RBCUA 0-2 07/14/2021    BLOODU Negative 08/16/2021    SPECGRAV <=1.005 08/16/2021    GLUCOSEU Negative 08/16/2021       Radiology:  VL DUP CAROTID BILATERAL         CT HEAD W WO CONTRAST   Final Result   No acute intracranial abnormality identified. CT CHEST PULMONARY EMBOLISM W CONTRAST   Final Result   1. No evidence of pulmonary embolism   2. No acute cardiopulmonary process   3.  T12 superior endplate compression fracture new since 05/2021         XR CHEST (2 VW)   Final Result   No acute process. Assessment/Plan:    Active Hospital Problems    Diagnosis     Severe malnutrition ([address] [E43]     Neutropenia (HCC) [D70.9]     Chemotherapy-induced neutropenia (HCC) [D70.1, T45.1X5A]     Fatigue [R53.83]     Tachycardia [R00.0]     Neutropenia, drug-induced ([address] [D70.2]     Malignant neoplasm of upper-inner quadrant of left breast in female, estrogen receptor positive ([address] [C50.212, Z17.0]     Malignant neoplasm of right breast in female, estrogen receptor positive ([address] [C50.911, Z17.0]      Failure to thrive recurrent falls generalized fatigue- encourage PO intake. Maybe more acute in setting of recent chemotherapy and neutropenia however may benefit from meal stimulant. Will d/w pt. - f/u TSH, josseline D, b12/folate, a1c  - f/u CT head, echo    Neutropenia  - neutropenic precautions ordered  - bld cultures collected, f/u urine cx  - d/w [facility] starting G-CSF injection daily until counts recover  - ordered nystatin for minor mucositis noted on exam    DVT Prophylaxis: enoxaparin  Diet: ADULT DIET;  Regular  Adult Oral Nutrition Supplement; Standard High Calorie/High Protein Oral Supplement  Code Status: Full Code    PT/OT Eval Status: needs PT/OT    Dispo: 3 days or so; await until [address] [place] [address] over 1     Adrienne Ivan MD

## 2021-08-17 NOTE — PROGRESS NOTES
For patient safety, an AVASYS camera has been placed in patient's room. AVASYS monitoring needed for increased fall risk. Writer placed call to monitor observer to verify camera number 3 and review patient name, reason for monitoring, and contact information for primary RN. Patient notified of use of remote monitoring upon implementation of camera and verbalized understanding.

## 2021-08-17 NOTE — PROGRESS NOTES
4 Eyes Skin Assessment     NAME:  Josh Deluna  YOB: 1945  MEDICAL RECORD NUMBER:  [id number]    The patient is being assess for  Admission    I agree that 2 RN's have performed a thorough Head to Toe Skin Assessment on the patient. ALL assessment sites listed below have been assessed. Areas assessed by both nurses:    Head, Face, Ears, Shoulders, Back, Chest, Arms, Elbows, Hands, Sacrum. Buttock, Coccyx, Ischium and Legs. Feet and Heels        Does the Patient have a Wound?  No noted wound(s)       Nacho Prevention initiated:  NA   Wound Care Orders initiated:  NA    Pressure Injury (Stage 3,4, Unstageable, DTI, NWPT, and Complex wounds) if present place consult order under [de-identified] NA    New and Established Ostomies if present place consult order under : NA      Nurse 1 eSignature: Electronically signed by Maximus Way RN on 8/16/21 at 11:59 PM EDT    **SHARE this note so that the co-signing nurse is able to place an eSignature**    Nurse 2 eSignature: Electronically signed by Tyra Carias RN on 8/17/21 at 12:00 AM EDT

## 2021-08-17 NOTE — ED NOTES
Bedside report given to REENA Pike at this time. VS as noted. Questions/concerns addressed at this time. Pt transported to floor via wheelchair with RN at this time.       Abdelrahman Rico RN  08/16/21 6995

## 2021-08-17 NOTE — CONSULTS
Medications:     Current Facility-Administered Medications   Medication Dose Route Frequency Provider Last Rate Last Admin    traMADol (ULTRAM) tablet 50 mg  50 mg Oral Q6H PRN SOUTH Rizo CNP   50 mg at 08/17/21 0305    Tbo-Filgrastim (GRANIX) injection 300 mcg  300 mcg Subcutaneous Daily SOUTH Cabral CNP        magic (miracle) mouthwash with nystatin  5 mL Swish & Swallow TID SOUTH Cabral CNP        LORazepam (ATIVAN) tablet 0.5 mg  0.5 mg Oral Nightly SOUTH Cabral CNP        lactated ringers bolus  1,000 mL Intravenous Once Dayami Christian MD        [facility]etron [facility] injection 4 mg  4 mg Intravenous Q6H PRN Dayami Christian MD        aspirin EC tablet 81 mg  81 mg Oral Daily Dayami Christian MD   81 mg at 08/17/21 5625    vitamin B-12 (CYANOCOBALAMIN) tablet 1,000 mcg  1,000 mcg Oral Daily Dayami Christian MD   1,000 mcg at 08/17/21 0803    melatonin tablet 3 mg  3 mg Oral Nightly PRN Dayami Christian MD   3 mg at 08/17/21 5146    therapeutic multivitamin-minerals 1 tablet  1 tablet Oral Daily with breakfast Dayami Christian MD   1 tablet at 08/17/21 0803    sodium chloride flush 0.9 % injection 10 mL  10 mL Intravenous 2 times per day Dayami Christian MD   10 mL at 08/16/21 2337    sodium chloride flush 0.9 % injection 10 mL  10 mL Intravenous PRN Dayami Christian MD        0.9 % sodium chloride infusion  25 mL Intravenous PRN Dayami Christian MD        potassium chloride (KLOR-CON M) extended release tablet 40 mEq  40 mEq Oral PRN Dayami Christian MD        Or    potassium bicarb-citric acid (EFFER-K) effervescent tablet 40 mEq  40 mEq Oral PRN Dayami Christian MD        Or   Floydene Ada potassium chloride 10 mEq/100 mL IVPB (Peripheral Line)  10 mEq Intravenous PRN Dayami Christian MD        magnesium sulfate 2000 mg in 50 mL IVPB premix  2,000 mg Intravenous PRN Dayami Christian MD        enoxaparin (LOVENOX) injection 40 mg  40 mg Subcutaneous Daily Nirali Masters MD   40 mg at 08/17/21 0803    promethazine (PHENERGAN) tablet 12.5 mg  12.5 mg Oral Q6H PRN Nirali Masters MD        Or    ondansetron [facility]) injection 4 mg  4 mg Intravenous Q6H PRN Nirali Masters MD        senna (SENOKOT) tablet 8.6 mg  1 tablet Oral Daily PRN Nirali Masters MD        acetaminophen (TYLENOL) tablet 650 mg  650 mg Oral Q6H PRN Nirali Masters MD        Or   Ardyth Moritz acetaminophen (TYLENOL) suppository 650 mg  650 mg Rectal Q6H PRN Nirali Masters MD        perflutren lipid microspheres (DEFINITY) injection 1.65 mg  1.5 mL Intravenous ONCE PRN Nirali Masters MD           Allergies: Allergies   Allergen Reactions    Bactrim [Sulfamethoxazole-Trimethoprim]        Social History:     Social History     Socioeconomic History    Marital status:      Spouse name: Not on file    Number of children: Not on file    Years of education: Not on file    Highest education level: Not on file   Occupational History    Not on file   Tobacco Use    Smoking status: Passive Smoke Exposure - Never Smoker    Smokeless tobacco: Never Used   Vaping Use    Vaping Use: Never used   Substance and Sexual Activity    Alcohol use: No    Drug use: No    Sexual activity: Not Currently     Partners: Male   Other Topics Concern    Not on file   Social History Narrative    Not on file     Social Determinants of Health     Financial Resource Strain:     Difficulty of Paying Living Expenses:    Food Insecurity:     Worried About Running Out of Food in the Last Year:     [address] in the Last Year:    Transportation Needs:     Lack of Transportation (Medical):      Lack of Transportation (Non-Medical):    Physical Activity:     Days of Exercise per Week:     Minutes of Exercise per Session:    Stress:     Feeling of Stress :    Social Connections:     Frequency of Communication with Friends and Family:     Frequency of Social Gatherings with Friends and Family:     Attends [employer] Services:     Active Member of Clubs or Organizations:     Attends Club or Organization Meetings:     Marital Status:    Intimate Partner Violence:     Fear of Current or Ex-Partner:     Emotionally Abused:     Physically Abused:     Sexually Abused:      Social History     Substance and Sexual Activity   Drug Use No     Social History     Substance and Sexual Activity   Alcohol Use No     Social History     Substance and Sexual Activity   Sexual Activity Not Currently    Partners: Male     Social History     Tobacco Use   Smoking Status Passive Smoke Exposure - Never Smoker   Smokeless Tobacco Never Used       Family History:     Family History   Problem Relation Age of Onset    High Cholesterol Mother     Diabetes Father     Cancer Paternal Grandmother 76        Bladder       Review of Systems:     Constitutional: falls and lack of appetite , syncope   Eyes: No visual changes or diplopia. No scleral icterus. ENT: No Headaches, no hearing loss, no  vertigo. No mouth sores or sore throat. Cardiovascular: No chest pain, no dyspnea on exertion, no palpitations, no  loss of consciousness. Respiratory: No cough, no  wheezing, no dyspnea, no sputum production. No hemoptysis. .    Gastrointestinal: No abdominal pain, no appetite loss, no blood in stools. No change in bowel habits. Genitourinary: No dysuria, trouble voiding, or hematuria. Musculoskeletal:  Generalized weakness. No joint complaints. Integumentary: No rash or pruritis. Neurological: No headache, diplopia. No change in gait, balance, or coordination. No paresthesias. Endocrine: No temperature intolerance. No excessive thirst, fluid intake, or urination. Hematologic/Lymphatic: No abnormal bruising or ecchymoses, no blood clots or swollen lymph nodes. Allergic/Immunologic: No nasal congestion or hives.      Physical Exam:     /73   Pulse 99   Temp 97.6 °F (36.4 °C) (Oral)   Resp 16   Ht 5' 7\" (1.702 m)   Wt 138 lb 1.6 oz (62.6 kg)   LMP  (LMP Unknown)   SpO2 96%   BMI 21.63 kg/m²     CONSTITUTIONAL: awake, alert, cooperative, no apparent distress. EYES:  Pupils equal, round and reactive to light, sclera non-icteric, conjunctiva normal  ENT:  Normocephalic, without obvious abnormality, atraumatic, sinuses nontender on palpation, external ears without lesions, oral pharynx with moist mucus membranes, no mucositis. NECK:  Supple, symmetrical, trachea midline, no adenopathy, thyroid symmetric, not enlarged and no tenderness, skin normal  HEMATOLOGIC/LYMPHATICS:  no cervical lymphadenopathy, no supraclavicular lymphadenopathy, no axillary lymphadenopathy and no inguinal lymphadenopathy  BACK:  Symmetric, no curvature, spinous processes are non-tender on palpation, paraspinous muscles are non-tender on palpation, no costal vertebral tenderness  LUNGS:  Clear to auscultation bilaterally, no crackles or wheezing  CARDIOVASCULAR:  Regular rate and rhythm, normal S1 and S2, no S3 or S4, and no murmur noted  ABDOMEN:  Normal bowel sounds, soft, non-distended, non-tender, no masses palpated, no hepatosplenomegally  MUSCULOSKELETAL:  There is no redness, warmth, or swelling of the joints  NEUROLOGIC:   No focal findings. SKIN:  Scattered bruising and ecchymoses. EXT: without clubbing, cyanosis or edema. Data:     CBC:  Recent Labs     08/17/21  0656 08/16/21  1317   WBC 1.1* 0.9*   HGB 10.9* 11.9*   HCT 32.3* 34.4*   MCV 90.5 88.2    230       BMP:  Recent Labs     08/17/21  0656 08/16/21  1317    138   K 4.0 3.7   CO2 23 21   BUN 7 11   CREATININE 0.6 0.6   MG  --  2.00       HEPATIC:  Recent Labs     08/17/21  0656 08/16/21  1317   AST 21 24   ALT 24 28   ALKPHOS 81 92   PROT 6.5 7.5   BILITOT <0.2 <0.2       TUMOR MARKERS:  No results for input(s): PSA, CEA, , [id number],  in the last 720 hours.       MAGNESIUM:    Lab Results   Component Value Date    MG 2.00 08/16/2021       PT/INR:    No results found for: PTINR    No results found for: INR    PTT:    No results found for: APTT    U/A:    Lab Results   Component Value Date    NITRITE Neg 08/10/2018    COLORU Yellow 2021    PHUR 7.0 2021    WBCUA 6-9 2021    RBCUA 0-2 2021    BACTERIA 1+ 2021    CLARITYU Clear 2021    SPECGRAV <=1.005 2021    LEUKOCYTESUR Negative 2021    UROBILINOGEN 0.2 2021    BILIRUBINUR Negative 2021    BILIRUBINUR Neg 08/10/2018    BLOODU Negative 2021    GLUCOSEU Negative 2021       Imagin. No evidence of pulmonary embolism   2. No acute cardiopulmonary process   3. T12 superior endplate compression fracture new since 2021             Impression:     See below     Plan:     Breast CA  - s/p cycle 4 Omar and Cytoxan on   - due to start weekly Taxol with Herceptin and Perjeta on ; may need to delay for low counts; patient aware     Falls/Syncope   - ECHO pending   - Does not want MRI brain ; agreeable to CT cranial imaging   - Carotid dopplers pending   - likely due to poor PO intake and dehydration     Neutropenia  - Denies s/sx of infection and no fevers   - Granix daily until [address] over 1   - Hold on ATB unless she develops low grade fevers or cx demonstrate active infection     Mouth sores  - MMW with Nystatin started     T12 compression fracture  - new since May 2021  - Ultram from pain   - Bone scan 21 no signs of mets; may need to repeat     Dispo: 3 days or so; await until [address] over 1     Dr. Maame Auguste to see in the AM.             Thank you very much for allowing me to participate in the care of this patient.     Moo Ram, JANIYA   Medical Oncology/Hematology    [facility] - [address],  [place] [address]  Phone: [phone]  Fax: [phone]

## 2021-08-17 NOTE — PLAN OF CARE
Problem: Nutrition  Goal: Optimal nutrition therapy  Outcome: Ongoing  Note: Nutrition Problem #1: Inadequate oral intake  Intervention: Food and/or Nutrient Delivery: Continue Current Diet, Start Oral Nutrition Supplement  Nutritional Goals: Pt will have po intakes 50% or greater this admission

## 2021-08-17 NOTE — PROGRESS NOTES
Comprehensive Nutrition Assessment    Type and Reason for Visit:  Initial, Positive Nutrition Screen    Nutrition Recommendations/Plan:   1. Continue general diet  2. Add Ensure ONS  3. Encourage po intakes  4. Monitor po intakes, nutrition adequacy, weights, pertinent labs, BMs    Nutrition Assessment:  Positive nutrition screen for weight loss and decreased appetite. Pt admitted for increased fatigue and generalized weakness. PMHx of breast cancer on chemo. Pt reports that her appetite has been decreased over the past few months since starting chemo. Pt endorses weight loss of ~14 lb during this time (3 months per EMR). Pt states that she has had some taste changes. Pt reports that she drinks Ensure at home and would like to receive it here. Pt had no questions. Encouraged po intakes. Will monitor. Malnutrition Assessment:  Malnutrition Status:  Severe malnutrition    Context:  Chronic Illness     Findings of the 6 clinical characteristics of malnutrition:  Energy Intake:  7 - 75% or less estimated energy requirements for 1 month or longer  Weight Loss:  7 - Greater than 7.5% over 3 months     Body Fat Loss:  1 - Mild body fat loss Orbital   Muscle Mass Loss:   (moderate loss) Temples (temporalis), Clavicles (pectoralis & deltoids)  Fluid Accumulation:  Unable to assess     Strength:  Not Performed    Estimated Daily Nutrient Needs:  Energy (kcal):  2054-2803; Weight Used for Energy Requirements:  Current     Protein (g):  75-88; Weight Used for Protein Requirements:  Current        Fluid (ml/day): 1 ml/kcal      Nutrition Related Findings:  Taste changes from chemo. BM x1 on 8/17. Wounds:  None       Current Nutrition Therapies:    ADULT DIET;  Regular    Anthropometric Measures:  · Height: 5' 7\" (170.2 cm)  · Current Body Weight: 138 lb 1.6 oz (62.6 kg)   · Ideal Body Weight: 135 lbs  · BMI: 21.6  · BMI Categories: Underweight (BMI less than 22) age over 72       Nutrition Diagnosis: · Inadequate oral intake related to inadequate protein-energy intake as evidenced by poor intake prior to admission      Nutrition Interventions:   Food and/or Nutrient Delivery:  Continue Current Diet, Start Oral Nutrition Supplement  Nutrition Education/Counseling:  No recommendation at this time   Coordination of Nutrition Care:  Continue to monitor while inpatient    Goals:  Pt will have po intakes 50% or greater this admission       Nutrition Monitoring and Evaluation:   Behavioral-Environmental Outcomes:  None Identified   Food/Nutrient Intake Outcomes:  Food and Nutrient Intake, Supplement Intake  Physical Signs/Symptoms Outcomes:  Weight     Discharge Planning:    Continue current diet, Continue Oral Nutrition Supplement     Electronically signed by Reece Driscoll RD, LD on 8/17/21 at 2:50 PM EDT    Contact: [phone]

## 2021-08-17 NOTE — PLAN OF CARE
LAST [facility] OFFICE NOTE FROM 21       Patient Name: Barnard Reason  Patient : 1945  Patient MRN: [id number]    Primary Oncologist: Martha Tamayo  Referring Physician: Kwame Chun DO (Surgical Oncology), Sandip Dorsey CNP (Family Practice)    Date of Service: 2021      Chief Complaint  Breast cancer, female ( Stage Date: 2021, Stage IB (Primary, Right breast lower-outer quadrant, T1c, N1, M0, G3, ER Status: Positive, MA Status: Positive, HER-2/cory Status: Positive)-Clinical  Date of Dx:2021 Menopausal Status: Postmenopausal; )      Problem List  Breast cancer, female ( Stage Date: 2021, Stage IB (Primary, Right breast lower-outer quadrant, T1c, N1, M0, G3, ER Status: Positive, MA Status: Positive, HER-2/cory Status: Positive)-Clinical  Date of Dx:2021 Menopausal Status: Postmenopausal; )  Anxiety  Breast cancer, female ( Stage Date: 2021, Stage IIA (Primary, Left breast upper-inner quadrant, T2, N0, M0, G3, ER Status: Positive, MA Status: Positive, HER-2/cory Status: Negative)-Clinical  Date of Dx:2021 )  Monitoring status  Nausea and vomiting (disorder)  Pain due to neoplastic disease (finding)    HPI  The patient is a 61-year-old female who I am seeing for the first time today at the request of Dr. Destin Sylvester. I am seeing her because of the recent finding of bilateral breast cancer. Relevant history is outlined below:    Bilateral breast cancer:  1. Presented with a left-sided palpable breast mass. 2. Bilateral digital diagnostic mammogram with targeted ultrasound, 2021, showed:  A. Right breast - 2 cm spiculated mass in the lower outer breast.  Multiple enlarged lymph nodes with prominent, thickened cortices were noted. B. Left breast - 3.5 cm spiculated mass in the upper inner breast.  Left axilla was unremarkable. 3. Core needle biopsy, 2021, showed:  A. Right breast, 8 o'clock position - Grade 3 IDC, ER/MA pos, HER2 pos by IHC.   B. Right axillary LN - Metastatic adenocarcinoma, ER/MI pos, HER2 pos by IHC. C. Left breast 11 o'clock position - Grade 3 IDC, ER/MI pos, HER2 neg by IHC. 4. Seen for initial surgical consultation by Dr. Nani Rivas, 2021. Referred for neoadjuvant chemo. Genetic testin. Declined genetic testing at the time of initial consultation with Dr. Esperanza Jeffrey on 2021. BMD:  1. No DEXA available. Currently, she arrives with her daughter. They have many questions. She is having breast pain after her biopsy. Mainly on the left. Previous Therapies  As above. Current Therapy  Doxorubicin + Cyclophosphamide (AC) Q21D fb Paclitaxel D1,8,15 + Pertuzumab + Trastuzumab IV BIOSIMILAR Q21D Cycle Length: 21 Number Cycles: 22 Start: [place] on 2021 Assoc Dx: Breast cancer, female LOT: Neoadjuvant Stage: IB 2021 C6 D15  Doxorubicin IV,  mg, Dose modified  Cyclophosphamide IV,  mg, Dose modified  Prochlorperazine Oral, 10 mg q6h PRN  Ondansetron Oral, 8 mg q8h PRN  Pertuzumab IV, 420 mg  Pertuzumab IV, 840 mg  Kanjinti (Trastuzumab-anns IV), 420 mg (6 mg/kg)  Kanjinti (Trastuzumab-anns IV), 570 mg (8 mg/kg)  Paclitaxel IV, 150 mg (80 mg/m2)  OHC Hydration Cycle Length: 1 Number Cycles: 2 Start: C1D1 on 2021 Assoc Dx: Breast cancer, female LOT: Toxicity Management 2021 C1 D1  Sodium Chloride IV 0.9 %, .9 % 1000 mL, Dose modified      Interval History    The patient is a 62-year-old female who is here today for follow-up and treatment of bilateral breast cancer. She started neoadjuvant AC on 2021. She is here today for neoadjuvant AC #4     Since we have seen the patient last she was seen in the ED on 21 after a syncopal episode at home. L wrist X-ray showed minimally displaced fracture of the distal radius. CT facial bones showed acute fractures of the left lateral orbit, inferior orbit, anterior and posterior maxillary sinus.      She reports feeling well today other than pain in her wrist and fatigue. She has had a diminished appetite and limited fluid intake and is requesting IVF later this week. Review of Systems  Constitutional:  No weight loss, No fever, No chills, No night sweats. Energy level good.   Eyes:  No impairment or change in vision  ENT / Mouth:  No pain, abnormal ulceration, bleeding, nasal drip or change in voice or hearing  Cardiovascular:  No chest pain, palpitations, new edema, or calf discomfort  Respiratory:  No pain, hemoptysis, change to breathing  Breast:  No pain, discharge, change in appearance or texture  Gastrointestinal:  No pain, cramping, jaundice, change to eating and bowel habits  Urinary:  No pain, bleeding or change in continence  Musculoskeletal:  No redness, pain, edema or weakness  Skin:  No pruritus, rash, change to nodules or lesions  Neurologic:  No discomfort, change in mental status, speech, sensory or motor activity  Psychiatric:  No change in concentration or change to affect or mood  Endocrine:  No hot flashes, increased thirst, or change to urine production  Hematologic: No petechiae, ecchymosis or bleeding  Lymphatic:  No lymphadenopathy or lymphedema  Allergy / Immunologic:  No eczema, hives, frequent or recurrent infections      Vital Signs  Blood pressure: 142/84, Pulse: 126, Temperature: 97.8 F, Respirations: 16, O2 sat: , Pain Scale: 0, Height: 67 in, Weight: 138.8 lb, BSA: 1.73, BMI: 21.74 kg/m2    Physical Exam    CONSTITUTIONAL: awake, alert, cooperative, no apparent distress   EYES: pupils equal, round and reactive to light, sclera clear and conjunctiva normal  ENT: Normocephalic, without obvious abnormality, atraumatic  NECK: supple, symmetrical, no jugular venous distension and no carotid bruits   HEMATOLOGIC/LYMPHATIC: no cervical, supraclavicular or axillary lymphadenopathy   LUNGS: no increased work of breathing and clear to auscultation   CARDIOVASCULAR: regular rate and rhythm, normal S1 and S2, no murmur noted  ABDOMEN: normal bowel sounds x 4, soft, non-distended, non-tender, no masses palpated, no hepatosplenomegaly   MUSCULOSKELETAL: full range of motion noted, tone is normal  NEUROLOGIC: awake, alert, oriented to name, place and time. Motor skills grossly intact. SKIN: Normal skin color, texture, turgor and no jaundice.  appears intact   EXTREMITIES: Without cyanosis, clubbing, edema or asymmetry                Labs  CBC  Lab Results 08/02/2021 07/14/2021 07/12/2021 06/21/2021 06/07/2021 06/01/2021    CBC                 WBC x 10^3/uL 7.0   5.0 6.3 5.9 9.2      RBC x 10^6/uL 4.33   4.38 4.22 4.07 4.58      HGB g/dL 12.7   12.8 12.3 11.8 13.1      HCT % 38.6   38.5 45.4 (H) 35.8 40.2      MCV fL 89.1   87.9 107.6 (H) 88.0 87.8      MCH pg 29.3   29.2 29.1 29.0 28.6      MCHC g/dL 32.9   33.2 27.1 (L) 33.0 32.6      RDW-CV, % 17.2 (H)   15.1 (H) 13.3 13.3 14.0      PLT x 10^3/uL 333.0   304.0 515.0 (H) 203.0 210.0      Lucia % 76.0 (H)   65.2 69.2 86.8 (H) 75.1 (H)      LY % 8.1 (L)   16.9 (L) 15.0 (L) 8.5 (L) 13.7 (L)      MO % 14.8 (H)   16.5 (H) 15.0 (H) 0.8 (L) 9.7      EO % 0.4 (L)   0.6 (L) 0.2 (L) 3.7 1.3      Lucia # (ANC) x 10^3/uL 5.34   3.25 4.38 5.12 6.87 (H)      BA % 0.7   0.8 0.6 0.2 0.2      MO # x 10^3/uL 1.04 (H)   0.82 0.95 (H) 0.05 (L) 0.89 (H)      EO # x 10^3/uL 0.03 (L)   0.03 (L) 0.01 (L) 0.22 0.12      BA # x 10^3/uL 0.05   0.04 0.04 0.01 0.02      LY # x 10^3/uL 0.57 (L)   0.84 (L) 0.95 (L) 0.50 (L) 1.25  CMP  Lab Results 08/02/2021 07/14/2021 07/12/2021 06/21/2021 06/07/2021 06/01/2021    Chemistries                 Glucose mg/dL 103   113 (H) 118 (H) 142 (H) 104      BUN mg/dL 16   16 20 20 15      Creatinine mg/dL 0.57   0.57 0.57 0.61 0.57      BUN/Creatinine ratio 28.1 (H)   28.1 (H) 35.1 (H) 32.8 (H) 26.3 (H)      Sodium mmol/L 140   140 139 137 140      Potassium mmol/L 4.0   4.2 4.3 3.9 4.0      Chloride mmol/L 106   108 (H) 105 103 106      CO2 mmol/L 26.2   26.8 24.1 26.9 25.0      Anion gap, mmol/L 7.8   5.2 9.9 7.1 9.0      Calcium mg/dL 9.9   9.7 9.4 9.4 9.5      Albumin g/dL 3.8   3.7 3.6 3.5 3.8      Total protein g/dL 6.7   6.9 6.7 6.7 6.8      A/G ratio 1.3   1.2 1.2 1.1 1.3      Bilirubin, total mg/dL 0.2 (L)   0.2 (L) 0.2 (L) 0.3 0.3      Alkaline phosphatase U/L 96   91 130 (H) 96 98      AST/SGOT U/L 27   24 19 18 20      ALT/SGPT U/L 33   22 18 17 13      GFR non-African American, estimated mL/min/1.73m2 >60.0   >60.0 >60.0 >60.0 >60.0      GFR African American, estimated mL/min/1.73m2 >60.0   >60.0 >60.0 >60.0 >60.0                        Imaging  [facility] ANAHI DIGITAL DIAGNOSTIC BILATERAL (5/05/2021): Impression  Bilateral solid suspicious masses of the 11 o'clock left breast an 8 o'clock  right breast.  Right axillary lymphadenopathy. Recommendation: Ultrasound-guided core biopsies of both breast masses as well  as right axilla. Findings discussed with patient by myself. Our nurse navigator will follow-up with the patient for biopsy planning  purposes. BIRADS:  BIRADS - CATEGORY 5     Highly Suggestive for Malignancy. Biopsy should be considered at this time. OVERALL ASSESSMENT - HIGHLY SUGGESTIVE OF MALIGNANCY. OCM - Patient Care Management    Pain, if applicable:   Date of Service: 08/02/2021  Pain Scale (0-10): 0  Pain Treatment Plan: Non-opioid analgesics or techniques  Comment:      Performance Status: ECOG 1 Symptoms, but ambulatory. Restricted in physically strenuous activity, but ambulatory and able to carry out work of a light or sedentary nature (e.g., light housework, office work). (Date: 08/02/2021)     Depression Status: Was screened; Outcome positive: No; Screening Date: 06/07/2021; Screening Tool: Patient Health Questionnaire (PHQ9)    Psycho-social PHQ-9 Follow-up Plan (if applicable):     Research    Would you like this patient screened for clinical trial eligibility?  If yes, please enter the order for \"Research: Screen for Eligibility\"    Assessment & Plan    1. Right-sided grade 3 IDC, ER/IN pos, HER2 pos - This patient presented with a left-sided palpable breast mass and underwent bilateral digital diagnostic mammogram with targeted ultrasound on 5/05/2021, which showed:  A. Right breast - 2 cm spiculated mass in the lower outer breast.  Multiple enlarged lymph nodes with prominent, thickened cortices were noted. A subsequent core needle biopsy off the right breast at the 8 o'clock position on 5/07/2021 showed a grade 3 IDC, ER/IN pos, HER2 pos by IHC. A biopsy of a right axillary lymph node was positive as well. Imaging with a CT off the chest/abdomen/pelvis and a bone scan each performed on 5/26/2021 showed no evidence of metastatic disease. The overall plan is for neoadjuvant AC x 4 followed by weekly Taxol/Herceptin/Perjeta. Then surgery. Whether she will receive RT depends on her choice of surgery and pathologic findings. Will require an endocrine therapy with AI. She start neoadjuvant AC on 6/01/2021. She is here today for neoadjuvant AC #4. Has acceptable counts. Will proceed. 2. Left-sided grade 3 IDC, ER/IN pos, HER2 neg - This patient presented with a left-sided palpable breast mass and underwent bilateral digital diagnostic mammogram with targeted ultrasound on 5/05/2021, which showed:  A. Left breast - 3.5 cm spiculated mass in the upper inner breast.  Left axilla was unremarkable. A subsequent core needle biopsy of the left breast 11 o'clock position on 5/07/2021 showed a grade 3 IDC, ER/IN pos, HER2 neg by IHC. Imaging with a CT off the chest/abdomen/pelvis and a bone scan each performed on 5/26/2021 showed no evidence of metastatic disease. 3. Last echocardiogram - Echocardiogram, 5/26/2021, showed LVEF 50%. 4. Genetic testing - Declined genetic testing at the time of initial consultation with Dr. Amirah Arana on 5/12/2021.    5. BMD - No DEXA available.     6. Cancer-associated pain - Pain contract initially signed on 5/18/2021. Controlled Substance Plan initially completed on 5/18/2021. Takes Hydrocodone/APAP 5/325 mg tabs PRN. Last prescribed on 6/21/2021.    7. Nausea- Takes Alvarado and Compazine as needed. We reviewed how to take these medications. 8. Syncope- This happened once with cycle #1. She did hit her forehead. She refused imaging. This happened again following cycle #3. She was seen in the ED and found to have left wrist and facial fractures. 9. Plan -Give AC cycle #4. RTO in 3 weeks for MD cycle 5, day 1 Taxol/Herceptin/Perjeta. Will get echo prior to next appointment. Time Based Itemization    A total of  minutes was spent on today's patient encounter. If applicable, non-patient-facing activities:     (   )   Preparing to see the patient and reviewing records     (   )   Individual interpretation of results      (   )   Discussion or coordination of care with other health care professionals     (   )   Ordering of unique tests, medications, or procedures     (   )   Documentation within the EHR   . Recent imaging and labs were reviewed and discussed with the patient. I have recommended that the patient follow CDC guidelines for prevention of COVID-19 infection. Tj Moffett CNP  [facility]  Phone: [phone]  Fax: [phone]  Online: [email or web address]. [employer]     Note Recipients:

## 2021-08-18 VITALS
BODY MASS INDEX: 21.2 KG/M2 | WEIGHT: 135.1 LBS | OXYGEN SATURATION: 96 % | SYSTOLIC BLOOD PRESSURE: 104 MMHG | TEMPERATURE: 98.4 F | HEART RATE: 93 BPM | RESPIRATION RATE: 18 BRPM | DIASTOLIC BLOOD PRESSURE: 65 MMHG | HEIGHT: 67 IN

## 2021-08-18 LAB
ANION GAP SERPL CALCULATED.3IONS-SCNC: 12 MMOL/L (ref 3–16)
ATYPICAL LYMPHOCYTE RELATIVE PERCENT: 1 % (ref 0–6)
BANDED NEUTROPHILS RELATIVE PERCENT: 40 % (ref 0–7)
BASOPHILS ABSOLUTE: 0 K/UL (ref 0–0.2)
BASOPHILS RELATIVE PERCENT: 0 %
BUN BLDV-MCNC: 10 MG/DL (ref 7–20)
CALCIUM SERPL-MCNC: 9.2 MG/DL (ref 8.3–10.6)
CHLORIDE BLD-SCNC: 106 MMOL/L (ref 99–110)
CO2: 24 MMOL/L (ref 21–32)
CREAT SERPL-MCNC: <0.5 MG/DL (ref 0.6–1.2)
EOSINOPHILS ABSOLUTE: 0 K/UL (ref 0–0.6)
EOSINOPHILS RELATIVE PERCENT: 0 %
GFR AFRICAN AMERICAN: >60
GFR NON-AFRICAN AMERICAN: >60
GLUCOSE BLD-MCNC: 87 MG/DL (ref 70–99)
HCT VFR BLD CALC: 31.9 % (ref 36–48)
HEMATOLOGY PATH CONSULT: NO
HEMOGLOBIN: 10.8 G/DL (ref 12–16)
LYMPHOCYTES ABSOLUTE: 0.8 K/UL (ref 1–5.1)
LYMPHOCYTES RELATIVE PERCENT: 12 %
MACROCYTES: ABNORMAL
MCH RBC QN AUTO: 29.9 PG (ref 26–34)
MCHC RBC AUTO-ENTMCNC: 33.9 G/DL (ref 31–36)
MCV RBC AUTO: 88.3 FL (ref 80–100)
METAMYELOCYTES RELATIVE PERCENT: 1 %
MICROCYTES: ABNORMAL
MONOCYTES ABSOLUTE: 1.5 K/UL (ref 0–1.3)
MONOCYTES RELATIVE PERCENT: 23 %
NEUTROPHILS ABSOLUTE: 4.1 K/UL (ref 1.7–7.7)
NEUTROPHILS RELATIVE PERCENT: 23 %
ORGANISM: ABNORMAL
PDW BLD-RTO: 17.7 % (ref 12.4–15.4)
PLATELET # BLD: 301 K/UL (ref 135–450)
PLATELET SLIDE REVIEW: ADEQUATE
PMV BLD AUTO: 6.8 FL (ref 5–10.5)
POTASSIUM REFLEX MAGNESIUM: 4.2 MMOL/L (ref 3.5–5.1)
RBC # BLD: 3.61 M/UL (ref 4–5.2)
SLIDE REVIEW: ABNORMAL
SODIUM BLD-SCNC: 142 MMOL/L (ref 136–145)
TOXIC GRANULATION: PRESENT
URINE CULTURE, ROUTINE: ABNORMAL
URINE CULTURE, ROUTINE: ABNORMAL
WBC # BLD: 6.4 K/UL (ref 4–11)

## 2021-08-18 PROCEDURE — 6360000002 HC RX W HCPCS: Performed by: NURSE PRACTITIONER

## 2021-08-18 PROCEDURE — 80048 BASIC METABOLIC PNL TOTAL CA: CPT

## 2021-08-18 PROCEDURE — 2580000003 HC RX 258: Performed by: HOSPITALIST

## 2021-08-18 PROCEDURE — 6370000000 HC RX 637 (ALT 250 FOR IP): Performed by: HOSPITALIST

## 2021-08-18 PROCEDURE — 85025 COMPLETE CBC W/AUTO DIFF WBC: CPT

## 2021-08-18 PROCEDURE — 36415 COLL VENOUS BLD VENIPUNCTURE: CPT

## 2021-08-18 PROCEDURE — 6360000002 HC RX W HCPCS: Performed by: HOSPITALIST

## 2021-08-18 RX ADMIN — CYANOCOBALAMIN TAB 500 MCG 1000 MCG: 500 TAB at 09:20

## 2021-08-18 RX ADMIN — ASPIRIN 81 MG: 81 TABLET, COATED ORAL at 09:20

## 2021-08-18 RX ADMIN — ENOXAPARIN SODIUM 40 MG: 40 INJECTION SUBCUTANEOUS at 09:20

## 2021-08-18 RX ADMIN — SODIUM CHLORIDE, PRESERVATIVE FREE 10 ML: 5 INJECTION INTRAVENOUS at 09:21

## 2021-08-18 RX ADMIN — TBO-FILGRASTIM 300 MCG: 300 INJECTION, SOLUTION SUBCUTANEOUS at 09:20

## 2021-08-18 ASSESSMENT — PAIN SCALES - GENERAL
PAINLEVEL_OUTOF10: 0

## 2021-08-18 NOTE — ONCOLOGY
ONCOLOGY HEMATOLOGY CARE PROGRESS NOTE      SUBJECTIVE:     Afebrile and on room air. A/O x 3. ROS:   The remaining 10 point review of symptoms is unremarkable. OBJECTIVE        Physical    VITALS:  /76   Pulse 99   Temp 98.5 °F (36.9 °C) (Oral)   Resp 16   Ht 5' 7\" (1.702 m)   Wt 135 lb 1.6 oz (61.3 kg)   LMP  (LMP Unknown)   SpO2 95%   BMI 21.16 kg/m²   TEMPERATURE:  Current - Temp: 98.5 °F (36.9 °C); Max - Temp  Av.1 °F (36.7 °C)  Min: 97.6 °F (36.4 °C)  Max: 98.5 °F (36.9 °C)  PULSE OXIMETRY RANGE: SpO2  Av.6 %  Min: 95 %  Max: 98 %  24HR INTAKE/OUTPUT:    Intake/Output Summary (Last 24 hours) at 2021 0647  Last data filed at 2021 0423  Gross per 24 hour   Intake 315 ml   Output 1650 ml   Net -1335 ml       CONSTITUTIONAL:  awake, alert, cooperative, no apparent distress, HEENT oral pharynx , no scleral icterus  HEMATOLOGIC/LYMPHATICS:  no cervical lymphadenopathy, no supraclavicular lymphadenopathy, no axillary lymphadenopathy and no inguinal lymphadenopathy  LUNGS:  No increased work of breathing, good air exchange, clear to auscultation bilaterally, no crackles or wheezing  CARDIOVASCULAR:  , regular rate and rhythm, normal S1 and S2, no S3 or S4, and no murmur noted  ABDOMEN:  No scars, normal bowel sounds, soft, non-distended, non-tender, no masses palpated, no hepatosplenomegally  MUSCULOSKELETAL:  There is no redness, warmth, or swelling of the joints. EXTREMETIES: No clubbing cynosis or edema  NEUROLOGIC:  Awake, alert, oriented to name, place and time. Cranial nerves II-XII are grossly intact. Motor is 5 out of 5 bilaterally.    SKIN:  no bruising or bleeding      Data      Recent Labs     21  1317 21  0656   WBC 0.9* 1.1*   HGB 11.9* 10.9*   HCT 34.4* 32.3*    258   MCV 88.2 90.5        Recent Labs     21  1317 21  0656    141   K 3.7 4.0    106   CO2 21 23   PHOS 3.3  --    BUN 11 7   CREATININE 0.6 0.6     Recent Labs     08/16/21  1317 08/17/21  0656   AST 24 21   ALT 28 24   BILITOT <0.2 <0.2   ALKPHOS 92 81       Magnesium:    Lab Results   Component Value Date    MG 2.00 08/16/2021         Problem List  Patient Active Problem List   Diagnosis    Perforation of sigmoid colon due to diverticulitis    Normocytic anemia    Pure hypercholesterolemia    Malignant neoplasm of right breast in female, estrogen receptor positive ([facility] [place] [address]    Malignant neoplasm of upper-inner quadrant of left breast in female, estrogen receptor positive ([facility] [address]    Chemotherapy-induced neutropenia (HCC)    Fatigue    Tachycardia    Neutropenia, drug-induced (HCC)    Severe malnutrition (HCC)    Neutropenia (HCC)    Failure to thrive in adult    Recurrent falls    Bacteriuria       ASSESSMENT AND PLAN    Bilateral breast CA undergoing neoadjuvant chemo  - s/p cycle 4 Omar and Cytoxan on 8/02/2021.  - due to start weekly Taxol with Herceptin and Perjeta on 8/23; may need to delay for low counts; patient aware.     Falls/Syncope   - EKG, 8/16/2021, showed sinus tach at 108 BPM.  - Echocardiogram, 8/17/2021, showed LVEF 55%. - Does not want an MRI brain.  - CT head with and without contrast, 8/17/2021, was negative. - Carotid dopplers, 8/17/2021, was neg.  - Likely due to poor PO intake and dehydration.     Neutropenia  - Denies s/sx of infection and no fevers. - Started Granix on 8/17. Continue Granix daily until [address] over 1 K/mcL. - Hold on ATB unless she develops low grade fevers or cx demonstrate active infection.     Mouth sores  - MMW with Nystatin started.      T12 compression fracture  - New since May 2021.  - Ultram for pain . - Bone scan 5/26/21 no signs of mets. - Due to recent fall. Deformity of T12 seems minor on CT. ONCOLOGIC DISPOSITION:  Once ANC recovers.     Arturo Cabezas MD  Please contact through [facility]

## 2021-08-18 NOTE — PROGRESS NOTES
Physician Progress Note      Jayne Strauss  CSN #:                  [id number]  :                       1945  ADMIT DATE:       2021 1:16 PM  [facility] DATE:        2021 1:31 PM  RESPONDING  PROVIDER #:        Rosemarie Reeys          QUERY TEXT:    Pt admitted with weakness and neutropenia. Noted documentation of severe   malnutrition per dietician progress note . If possible, please document   in progress notes and discharge summary:    The medical record reflects the following:  Risk Factors: Breast cancer, chemo, neutropenia  Clinical Indicators: Per dietician progress note  \"Malnutrition Status:    Severe malnutrition\". \"Weight Loss:  7 - Greater than 7.5% over 3 months, . Pt   endorses weight loss of   14 lb during this time (3 months per EMR). Pt states that she has had some   taste changes. Muscle Mass Loss:   (moderate loss) Temples (temporalis),   Clavicles (pectoralis & deltoids)\" BMI 21.16  Treatment: Dietician consult, supplements, daily weights, I&O's, serial labs,   supportive care    Thank you,  [email or web address]  Options provided:  -- Severe malnutrition confirmed present on admission  -- Severe malnutrition ruled out  -- Other - I will add my own diagnosis  -- Disagree - Not applicable / Not valid  -- Disagree - Clinically unable to determine / Unknown  -- Refer to Clinical Documentation Reviewer    PROVIDER RESPONSE TEXT:    The diagnosis of severe malnutrition was confirmed as present on admission.     Query created by: Derwin Fabry on 2021 11:45 AM      Electronically signed by:  Rosemarie Reyes 2021 6:42 PM

## 2021-08-18 NOTE — DISCHARGE SUMMARY
Hospital Medicine Discharge Summary    Patient ID: Demarcus Demarco      Patient's PCP: Ellamae Holter, APRN - CNP    Admit Date: 8/16/2021     Discharge Date:   08/18/2021 at 48    Admitting Physician: Scott Ziegler MD     Discharge Physician: Irwin Cabello MD     Discharge Diagnoses: Active Hospital Problems    Diagnosis     Severe malnutrition (HCC) [E43]     Neutropenia (HCC) [D70.9]     Failure to thrive in adult [R62.7]     Recurrent falls [R29.6]     Bacteriuria [R82.71]     Chemotherapy-induced neutropenia (HCC) [D70.1, T45.1X5A]     Fatigue [R53.83]     Tachycardia [R00.0]     Neutropenia, drug-induced ([address] [D70.2]     Malignant neoplasm of upper-inner quadrant of left breast in female, estrogen receptor positive ([address] [C50.212, Z17.0]     Malignant neoplasm of right breast in female, estrogen receptor positive ([address] [C50.911, Z17.0]        The patient was seen and examined on day of discharge and this discharge summary is in conjunction with any daily progress note from day of discharge. Hospital Course:     78 yoF with bilateral invasicve ductal breast Ca currently on chemotherapy presenting with failure to thrive in setting of fatigue and anorexia for days to weeks found to be dehydrated and neutropenic. Vs have normalized with IVF. Oncology consulted for work up and mgmt of neutropenia while on chemotherapy. Received Tbo-Filgastrim x 2 doses with significant improvement in  [address] (4.1). Remained without signes of infection or neuro deficits this admission. Asymptomatic Bacteruria- E. Faecalis on urine culture. Remained free of sxs before and during admission. Return precautions provided.  (Did not treat as pt is not pregnant, undergoing a urologic procedure or a renal transplant pt)      Physical Exam Performed:     /65   Pulse 93   Temp 98.4 °F (36.9 °C) (Oral)   Resp 18   Ht 5' 7\" (1.702 m)   Wt 135 lb 1.6 oz (61.3 kg)   LMP  (LMP Unknown)   SpO2 96% BMI 21.16 kg/m²       General appearance:  No apparent distress, appears stated age and cooperative. HEENT:  Normal cephalic, atraumatic without obvious deformity. Pupils equal, round, and reactive to light. Extra ocular muscles intact. Conjunctivae/corneas clear. Neck: Supple, with full range of motion. No jugular venous distention. Trachea midline. Respiratory:  Normal respiratory effort. Clear to auscultation, bilaterally without Rales/Wheezes/Rhonchi. Cardiovascular:  Regular rate and rhythm with normal S1/S2 without murmurs, rubs or gallops. Abdomen: Soft, non-tender, non-distended with normal bowel sounds. Musculoskeletal:  No clubbing, cyanosis or edema bilaterally. Full range of motion without deformity. Skin: Skin color, texture, turgor normal.  No rashes or lesions. Neurologic:  Neurovascularly intact without any focal sensory/motor deficits. Cranial nerves: II-XII intact, grossly non-focal.  Psychiatric:  Alert and oriented, thought content appropriate, normal insight  Capillary Refill: Brisk,< 3 seconds   Peripheral Pulses: +2 palpable, equal bilaterally       Labs: For convenience and continuity at follow-up the following most recent labs are provided:      CBC:    Lab Results   Component Value Date    WBC 6.4 08/18/2021    HGB 10.8 08/18/2021    HCT 31.9 08/18/2021     08/18/2021       Renal:    Lab Results   Component Value Date     08/18/2021    K 4.2 08/18/2021     08/18/2021    CO2 24 08/18/2021    BUN 10 08/18/2021    CREATININE <0.5 08/18/2021    CALCIUM 9.2 08/18/2021    PHOS 3.3 08/16/2021         Significant Diagnostic Studies    Radiology:   VL DUP CAROTID BILATERAL         CT HEAD W WO CONTRAST   Final Result   No acute intracranial abnormality identified. CT CHEST PULMONARY EMBOLISM W CONTRAST   Final Result   1. No evidence of pulmonary embolism   2. No acute cardiopulmonary process   3.  T12 superior endplate compression fracture new since 05/2021

## 2021-08-18 NOTE — PROGRESS NOTES
Discharge orders placed by MD, verified by RN prior to discharge.  denies any discharge needs for the patient. Discharge paperwork complete, reviewed, and signed with the patient. A copy of all paperwork provided. All questions and concerns resolved at the time of review. Patient verbalized understanding of paperwork. No new prescriptions. RN removed the tele box, informed [facility] of discharge, and removed IV prior to discharge. Patient discharged to home with spouse, transported out of the hospital via wheelchair.

## 2021-08-20 LAB
BLOOD CULTURE, ROUTINE: NORMAL
CULTURE, BLOOD 2: NORMAL

## 2021-10-22 ENCOUNTER — APPOINTMENT (OUTPATIENT)
Dept: CT IMAGING | Age: 76
DRG: 175 | End: 2021-10-22
Payer: MEDICARE

## 2021-10-22 ENCOUNTER — HOSPITAL ENCOUNTER (INPATIENT)
Age: 76
LOS: 3 days | Discharge: HOME | DRG: 175 | End: 2021-10-25
Attending: EMERGENCY MEDICINE | Admitting: INTERNAL MEDICINE
Payer: MEDICARE

## 2021-10-22 ENCOUNTER — APPOINTMENT (OUTPATIENT)
Dept: GENERAL RADIOLOGY | Age: 76
DRG: 175 | End: 2021-10-22
Payer: MEDICARE

## 2021-10-22 DIAGNOSIS — I26.94 MULTIPLE SUBSEGMENTAL PULMONARY EMBOLI WITHOUT ACUTE COR PULMONALE (HCC): Primary | ICD-10-CM

## 2021-10-22 PROBLEM — I26.99 ACUTE PULMONARY EMBOLISM WITHOUT ACUTE COR PULMONALE (HCC): Status: ACTIVE | Noted: 2021-10-22

## 2021-10-22 LAB
A/G RATIO: 1 (ref 1.1–2.2)
ALBUMIN SERPL-MCNC: 3.5 G/DL (ref 3.4–5)
ALP BLD-CCNC: 107 U/L (ref 40–129)
ALT SERPL-CCNC: 17 U/L (ref 10–40)
ANION GAP SERPL CALCULATED.3IONS-SCNC: 14 MMOL/L (ref 3–16)
ANTI-XA UNFRAC HEPARIN: 0.76 IU/ML (ref 0.3–0.7)
ANTI-XA UNFRAC HEPARIN: <0.04 IU/ML (ref 0.3–0.7)
APTT: 32.3 SEC (ref 26.2–38.6)
APTT: 32.4 SEC (ref 26.2–38.6)
AST SERPL-CCNC: 18 U/L (ref 15–37)
BASE EXCESS VENOUS: 0.8 MMOL/L (ref -3–3)
BASOPHILS ABSOLUTE: 0 K/UL (ref 0–0.2)
BASOPHILS RELATIVE PERCENT: 0.6 %
BILIRUB SERPL-MCNC: 0.4 MG/DL (ref 0–1)
BUN BLDV-MCNC: 12 MG/DL (ref 7–20)
CALCIUM SERPL-MCNC: 9.1 MG/DL (ref 8.3–10.6)
CARBOXYHEMOGLOBIN: 1.2 % (ref 0–1.5)
CHLORIDE BLD-SCNC: 95 MMOL/L (ref 99–110)
CO2: 22 MMOL/L (ref 21–32)
CREAT SERPL-MCNC: <0.5 MG/DL (ref 0.6–1.2)
EOSINOPHILS ABSOLUTE: 0.1 K/UL (ref 0–0.6)
EOSINOPHILS RELATIVE PERCENT: 2 %
GFR AFRICAN AMERICAN: >60
GFR NON-AFRICAN AMERICAN: >60
GLOBULIN: 3.5 G/DL
GLUCOSE BLD-MCNC: 120 MG/DL (ref 70–99)
HCO3 VENOUS: 22.9 MMOL/L (ref 23–29)
HCT VFR BLD CALC: 32.2 % (ref 36–48)
HEMOGLOBIN: 10.9 G/DL (ref 12–16)
INR BLD: 1.21 (ref 0.88–1.12)
LACTIC ACID: 1.2 MMOL/L (ref 0.4–2)
LYMPHOCYTES ABSOLUTE: 0.5 K/UL (ref 1–5.1)
LYMPHOCYTES RELATIVE PERCENT: 9.8 %
MAGNESIUM: 1.9 MG/DL (ref 1.8–2.4)
MCH RBC QN AUTO: 31.5 PG (ref 26–34)
MCHC RBC AUTO-ENTMCNC: 33.7 G/DL (ref 31–36)
MCV RBC AUTO: 93.5 FL (ref 80–100)
METHEMOGLOBIN VENOUS: 0.7 %
MONOCYTES ABSOLUTE: 0.3 K/UL (ref 0–1.3)
MONOCYTES RELATIVE PERCENT: 5 %
NEUTROPHILS ABSOLUTE: 4.4 K/UL (ref 1.7–7.7)
NEUTROPHILS RELATIVE PERCENT: 82.6 %
O2 SAT, VEN: 87 %
O2 THERAPY: ABNORMAL
PCO2, VEN: 29 MMHG (ref 40–50)
PDW BLD-RTO: 17.1 % (ref 12.4–15.4)
PH VENOUS: 7.52 (ref 7.35–7.45)
PLATELET # BLD: 411 K/UL (ref 135–450)
PMV BLD AUTO: 7.7 FL (ref 5–10.5)
PO2, VEN: 48.6 MMHG (ref 25–40)
POTASSIUM REFLEX MAGNESIUM: 3.5 MMOL/L (ref 3.5–5.1)
PRO-BNP: 867 PG/ML (ref 0–449)
PROTHROMBIN TIME: 13.8 SEC (ref 9.9–12.7)
RBC # BLD: 3.45 M/UL (ref 4–5.2)
SARS-COV-2, NAAT: NOT DETECTED
SODIUM BLD-SCNC: 131 MMOL/L (ref 136–145)
TCO2 CALC VENOUS: 24 MMOL/L
TOTAL PROTEIN: 7 G/DL (ref 6.4–8.2)
TROPONIN: <0.01 NG/ML
WBC # BLD: 5.3 K/UL (ref 4–11)

## 2021-10-22 PROCEDURE — 85730 THROMBOPLASTIN TIME PARTIAL: CPT

## 2021-10-22 PROCEDURE — 2580000003 HC RX 258: Performed by: EMERGENCY MEDICINE

## 2021-10-22 PROCEDURE — 84484 ASSAY OF TROPONIN QUANT: CPT

## 2021-10-22 PROCEDURE — 2060000000 HC ICU INTERMEDIATE R&B

## 2021-10-22 PROCEDURE — 2580000003 HC RX 258: Performed by: INTERNAL MEDICINE

## 2021-10-22 PROCEDURE — 80053 COMPREHEN METABOLIC PANEL: CPT

## 2021-10-22 PROCEDURE — 83605 ASSAY OF LACTIC ACID: CPT

## 2021-10-22 PROCEDURE — 83880 ASSAY OF NATRIURETIC PEPTIDE: CPT

## 2021-10-22 PROCEDURE — 6370000000 HC RX 637 (ALT 250 FOR IP): Performed by: INTERNAL MEDICINE

## 2021-10-22 PROCEDURE — 71045 X-RAY EXAM CHEST 1 VIEW: CPT

## 2021-10-22 PROCEDURE — 85520 HEPARIN ASSAY: CPT

## 2021-10-22 PROCEDURE — 6360000004 HC RX CONTRAST MEDICATION: Performed by: EMERGENCY MEDICINE

## 2021-10-22 PROCEDURE — 93005 ELECTROCARDIOGRAM TRACING: CPT | Performed by: EMERGENCY MEDICINE

## 2021-10-22 PROCEDURE — 82803 BLOOD GASES ANY COMBINATION: CPT

## 2021-10-22 PROCEDURE — 87635 SARS-COV-2 COVID-19 AMP PRB: CPT

## 2021-10-22 PROCEDURE — 99283 EMERGENCY DEPT VISIT LOW MDM: CPT

## 2021-10-22 PROCEDURE — 71260 CT THORAX DX C+: CPT

## 2021-10-22 PROCEDURE — 85610 PROTHROMBIN TIME: CPT

## 2021-10-22 PROCEDURE — 83735 ASSAY OF MAGNESIUM: CPT

## 2021-10-22 PROCEDURE — 85025 COMPLETE CBC W/AUTO DIFF WBC: CPT

## 2021-10-22 PROCEDURE — 6360000002 HC RX W HCPCS: Performed by: EMERGENCY MEDICINE

## 2021-10-22 RX ORDER — SODIUM CHLORIDE 9 MG/ML
25 INJECTION, SOLUTION INTRAVENOUS PRN
Status: DISCONTINUED | OUTPATIENT
Start: 2021-10-22 | End: 2021-10-25 | Stop reason: HOSPADM

## 2021-10-22 RX ORDER — HEPARIN SODIUM 1000 [USP'U]/ML
2300 INJECTION, SOLUTION INTRAVENOUS; SUBCUTANEOUS PRN
Status: DISCONTINUED | OUTPATIENT
Start: 2021-10-22 | End: 2021-10-25 | Stop reason: HOSPADM

## 2021-10-22 RX ORDER — CHOLECALCIFEROL (VITAMIN D3) 125 MCG
1000 CAPSULE ORAL DAILY
Status: DISCONTINUED | OUTPATIENT
Start: 2021-10-23 | End: 2021-10-25 | Stop reason: HOSPADM

## 2021-10-22 RX ORDER — ACETAMINOPHEN 325 MG/1
650 TABLET ORAL EVERY 6 HOURS PRN
Status: DISCONTINUED | OUTPATIENT
Start: 2021-10-22 | End: 2021-10-25 | Stop reason: HOSPADM

## 2021-10-22 RX ORDER — ONDANSETRON HYDROCHLORIDE 8 MG/1
1 TABLET, FILM COATED ORAL
COMMUNITY
Start: 2021-06-21

## 2021-10-22 RX ORDER — ASPIRIN 81 MG/1
81 TABLET ORAL DAILY
Status: DISCONTINUED | OUTPATIENT
Start: 2021-10-23 | End: 2021-10-25 | Stop reason: HOSPADM

## 2021-10-22 RX ORDER — DOCUSATE SODIUM 100 MG/1
100 CAPSULE, LIQUID FILLED ORAL EVERY 8 HOURS PRN
COMMUNITY
End: 2021-11-15 | Stop reason: ALTCHOICE

## 2021-10-22 RX ORDER — LANOLIN ALCOHOL/MO/W.PET/CERES
3 CREAM (GRAM) TOPICAL NIGHTLY PRN
Status: DISCONTINUED | OUTPATIENT
Start: 2021-10-22 | End: 2021-10-25 | Stop reason: HOSPADM

## 2021-10-22 RX ORDER — HEPARIN SODIUM 1000 [USP'U]/ML
4600 INJECTION, SOLUTION INTRAVENOUS; SUBCUTANEOUS ONCE
Status: COMPLETED | OUTPATIENT
Start: 2021-10-22 | End: 2021-10-22

## 2021-10-22 RX ORDER — ONDANSETRON 4 MG/1
4 TABLET, ORALLY DISINTEGRATING ORAL EVERY 8 HOURS PRN
Status: DISCONTINUED | OUTPATIENT
Start: 2021-10-22 | End: 2021-10-25 | Stop reason: HOSPADM

## 2021-10-22 RX ORDER — SODIUM CHLORIDE 0.9 % (FLUSH) 0.9 %
5-40 SYRINGE (ML) INJECTION EVERY 12 HOURS SCHEDULED
Status: DISCONTINUED | OUTPATIENT
Start: 2021-10-22 | End: 2021-10-25 | Stop reason: HOSPADM

## 2021-10-22 RX ORDER — HEPARIN SODIUM 1000 [USP'U]/ML
4600 INJECTION, SOLUTION INTRAVENOUS; SUBCUTANEOUS PRN
Status: DISCONTINUED | OUTPATIENT
Start: 2021-10-22 | End: 2021-10-25 | Stop reason: HOSPADM

## 2021-10-22 RX ORDER — SODIUM CHLORIDE 0.9 % (FLUSH) 0.9 %
5-40 SYRINGE (ML) INJECTION PRN
Status: DISCONTINUED | OUTPATIENT
Start: 2021-10-22 | End: 2021-10-25 | Stop reason: HOSPADM

## 2021-10-22 RX ORDER — DEXTROSE AND SODIUM CHLORIDE 5; .9 G/100ML; G/100ML
1000 INJECTION, SOLUTION INTRAVENOUS ONCE
Status: COMPLETED | OUTPATIENT
Start: 2021-10-22 | End: 2021-10-22

## 2021-10-22 RX ORDER — LOPERAMIDE HYDROCHLORIDE 2 MG/1
2 CAPSULE ORAL 4 TIMES DAILY PRN
COMMUNITY
End: 2021-11-09

## 2021-10-22 RX ORDER — ONDANSETRON 4 MG/1
4 TABLET, FILM COATED ORAL EVERY 8 HOURS PRN
Status: ON HOLD | COMMUNITY
End: 2021-10-22 | Stop reason: SDUPTHER

## 2021-10-22 RX ORDER — POLYETHYLENE GLYCOL 3350 17 G/17G
17 POWDER, FOR SOLUTION ORAL DAILY PRN
Status: DISCONTINUED | OUTPATIENT
Start: 2021-10-22 | End: 2021-10-25 | Stop reason: HOSPADM

## 2021-10-22 RX ORDER — ACETAMINOPHEN 650 MG/1
650 SUPPOSITORY RECTAL EVERY 6 HOURS PRN
Status: DISCONTINUED | OUTPATIENT
Start: 2021-10-22 | End: 2021-10-25 | Stop reason: HOSPADM

## 2021-10-22 RX ORDER — ONDANSETRON 2 MG/ML
4 INJECTION INTRAMUSCULAR; INTRAVENOUS EVERY 6 HOURS PRN
Status: DISCONTINUED | OUTPATIENT
Start: 2021-10-22 | End: 2021-10-25 | Stop reason: HOSPADM

## 2021-10-22 RX ORDER — LOPERAMIDE HYDROCHLORIDE 2 MG/1
1 CAPSULE ORAL
COMMUNITY
Start: 2021-09-07 | End: 2021-11-09

## 2021-10-22 RX ORDER — HEPARIN SODIUM 10000 [USP'U]/100ML
970 INJECTION, SOLUTION INTRAVENOUS CONTINUOUS
Status: DISCONTINUED | OUTPATIENT
Start: 2021-10-22 | End: 2021-10-24

## 2021-10-22 RX ADMIN — IOPAMIDOL 75 ML: 755 INJECTION, SOLUTION INTRAVENOUS at 15:05

## 2021-10-22 RX ADMIN — DEXTROSE AND SODIUM CHLORIDE 1000 ML: 5; 900 INJECTION, SOLUTION INTRAVENOUS at 15:25

## 2021-10-22 RX ADMIN — Medication 3 MG: at 20:34

## 2021-10-22 RX ADMIN — HEPARIN SODIUM 4600 UNITS: 1000 INJECTION INTRAVENOUS; SUBCUTANEOUS at 16:42

## 2021-10-22 RX ADMIN — SODIUM CHLORIDE, PRESERVATIVE FREE 10 ML: 5 INJECTION INTRAVENOUS at 23:41

## 2021-10-22 RX ADMIN — HEPARIN SODIUM AND DEXTROSE 1030 UNITS/HR: 10000; 5 INJECTION INTRAVENOUS at 16:41

## 2021-10-22 ASSESSMENT — PAIN SCALES - GENERAL
PAINLEVEL_OUTOF10: 0
PAINLEVEL_OUTOF10: 0
PAINLEVEL_OUTOF10: 7

## 2021-10-22 NOTE — ED NOTES
Patient resting comfortably in bed. Call light in reach. Patient denies further needs at this time.         Eulogio Adamson RN  10/22/21 [id number]

## 2021-10-22 NOTE — H&P
Hospital Medicine History & Physical      PCP: Rafaela Rees, APRN - CNP    Date of Admission: 10/22/2021    Date of Service: Pt seen/examined on 10/22/21 and Admitted to Inpatient with expected LOS greater than two midnights due to medical therapy. Chief Complaint:  Shortness of Breath       History Of Present Illness:   78 y.o. female who presented to [facility] with above complaints  Patient with PMH of breast CA on chemotherapy presents to the ED with complaints of shortness of breath. Patient reports she has been short of breath since Monday, gradually getting worse. It is present at rest and on exertion. Patient had last round of chemo treatment on Monday. Patient reports some chest tightness which is particularly worse on deep inspiration. Also complains of chronic mild back pain. Denies any subjective fevers or chills. Mild nonproductive cough with some hemoptysis. She does endorse to having nosebleeds since she started chemo. denies any asymmetric leg swelling or cramping in the calves.     Past Medical History:          Diagnosis Date    Breast cancer ([address]     [facility]     Kidney stone        Past Surgical History:          Procedure Laterality Date    COLONOSCOPY      MRI BREAST BX USING DEVICE RIGHT Right 5/28/2021    MRI BREAST BX USING DEVICE RIGHT [place]20[place] [facility]    MRI NEEDLE BIOPSY EACH ADDL RIGHT Right 5/28/2021    MRI NEEDLE BIOPSY [facility]ACH ADDL RIGHT [facility]    PORT SURGERY N/A 5/25/2021    RIGHT PORT A CATH PLACEMENT performed by Nick Willis DO at [address] Right 5/7/2021    US BREAST BIOPSY NEEDLE ADDITIONAL RIGHT 5/7/2021 Stephanie Lawrence  [place] BREAST NEEDLE BIOPSY LEFT Left 5/7/2021    US BREAST NEEDLE BIOPSY LEFT 5/7/2021 Stephanie Lawrence MD [address] [facility] EG [facility]    US BREAST NEEDLE BIOPSY RIGHT Right 5/7/2021    US BREAST NEEDLE BIOPSY RIGHT 5/7/2021 Blossom Nolan Doug Brooks MD [facility]       Medications Prior to Admission:      Prior to Admission medications    Medication Sig Start Date End Date Taking? Authorizing Provider   melatonin (RA MELATONIN) 3 MG TABS tablet Take 1 tablet by mouth nightly as needed (sleep) 5/30/21   Sherly Drake, APRN - CNP   aspirin 81 MG EC tablet Take 81 mg by mouth daily    Historical Provider, MD   Multiple Vitamin (MULTI-VITAMIN PO) Take by mouth    Historical Provider, MD   Cholecalciferol (VITAMIN D3) 5000 units TABS Take by mouth    Historical Provider, MD   Cyanocobalamin (VITAMIN B-12) 1000 MCG extended release tablet Take 1,000 mcg by mouth daily    Historical Provider, MD       Allergies:  Bactrim [sulfamethoxazole-trimethoprim]    Social History:      The patient currently lives at home    TOBACCO:   reports that she is a non-smoker but has been exposed to tobacco smoke. She has never used smokeless tobacco.  ETOH:   reports no history of alcohol use. E-Cigarettes/Vaping Use     Questions Responses    E-Cigarette/Vaping Use Never User    Start Date     Passive Exposure     Quit Date     Counseling Given     Comments             Family History:     Positive as follows:        Problem Relation Age of Onset    High Cholesterol Mother     Diabetes Father     Cancer Paternal Grandmother 76        Bladder       REVIEW OF SYSTEMS COMPLETED:   Pertinent positives as noted in the HPI. All other systems reviewed and negative. PHYSICAL EXAM PERFORMED:    /83   Pulse 112   Temp 97.7 °F (36.5 °C) (Oral)   Resp 20   Ht 5' 7.5\" (1.715 m)   Wt 126 lb (57.2 kg)   LMP  (LMP Unknown)   SpO2 97%   BMI 19.44 kg/m²     General appearance:  No apparent distress, appears stated age and cooperative. HEENT:  Normal cephalic, atraumatic without obvious deformity. Pupils equal, round, and reactive to light. Extra ocular muscles intact. Conjunctivae/corneas clear. Neck: Supple, with full range of motion.  No jugular venous distention. Trachea midline. Respiratory: Tachypneic, increased respiratory effort. Clear to auscultation, bilaterally without Rales/Wheezes/Rhonchi. Cardiovascular: Tachycardic, regular rhythm with normal S1/S2 without murmurs, rubs or gallops. Abdomen: Soft, non-tender, non-distended with normal bowel sounds. Musculoskeletal:  No clubbing, cyanosis or edema bilaterally. Full range of motion without deformity. Skin: Skin color, texture, turgor normal.  No rashes or lesions. Neurologic:  Neurovascularly intact without any focal sensory/motor deficits. Cranial nerves: II-XII intact, grossly non-focal.  Psychiatric:  Alert and oriented, thought content appropriate, normal insight  Capillary Refill: Brisk,3 seconds, normal  Peripheral Pulses: +2 palpable, equal bilaterally       Labs:     Recent Labs     10/22/21  1349   WBC 5.3   HGB 10.9*   HCT 32.2*        Recent Labs     10/22/21  1349   *   K 3.5   CL 95*   CO2 22   BUN 12   CREATININE <0.5*   CALCIUM 9.1     Recent Labs     10/22/21  1349   AST 18   ALT 17   BILITOT 0.4   ALKPHOS 107     Recent Labs     10/22/21  1349   INR 1.21*     Recent Labs     10/22/21  1349   TROPONINI <0.01       Urinalysis:      Lab Results   Component Value Date    NITRU Negative 08/16/2021    WBCUA 6-9 07/14/2021    BACTERIA 1+ 07/14/2021    RBCUA 0-2 07/14/2021    BLOODU Negative 08/16/2021    SPECGRAV <=1.005 08/16/2021    GLUCOSEU Negative 08/16/2021       Radiology:     I personally reviewed the CXR and CT chest pulmonary and also reviewed the radiologist reports    EKG:  I have reviewed the EKG with the following interpretation: Sinus tachycardia    CT CHEST PULMONARY EMBOLISM W CONTRAST   Final Result   1. multiple acute bilateral pulmonary emboli. 2. Nonspecific multifocal bilateral ground-glass opacification.  In the   setting of pulmonary emboli, the differential includes pulmonary infarct with   or without superimposed atypical infectious versus inflammatory pneumonitis. 3. Mild CHF. Findings were discussed with LAURA Lauren at 3:28 pm on 10/22/2021. XR CHEST PORTABLE   Final Result   Hazy opacities noted in the bilateral upper lungs likely reflecting airspace   disease, however malignant process cannot be excluded. Further evaluation   with chest CT is recommended. ASSESSMENT:PLAN:    Acute pulmonary embolism without acute cor pulmonale   -provoked due to underlying malignancy  -CT chest report reviewed and documented as above  -No hypoxemia, currently saturating 97% on room air  -IV heparin bolus and gtt. started in the ED, continue  -Consult heme-onc to guide with anticoagulant therapy of choice  -2D echo to assess for RV strain/dilation  -Venous Doppler of bilateral lower extremities to rule out DVT  -No compelling evidence so far for the need of catheter thrombolysis/aspiration thrombectomy, await 2D echo. Troponin normal.  No evidence of hypotension or shock. No hypoxemia. Malignant neoplasm of right breast in female, estrogen receptor positive (  -On chemotherapy  -Heme-onc consulted    DVT Prophylaxis: On heparin drip  Diet: No diet orders on file  Code Status: Full code  PT/OT Eval Status: Not consulted    Dispo -IP stay, admit to PCU       Josue Lind MD    Thank you [facility] for the opportunity to be involved in this patient's care. If you have any questions or concerns please feel free to contact me at [phone].

## 2021-10-22 NOTE — CONSULTS
10/22/21 @ 18:35 sent [employer] msg to Dr. Christoph Laughlin for Oncology consult.  Maria M Encarnacion

## 2021-10-22 NOTE — ED NOTES
RN taking pt to room 442. Bedside to be given upon arrival.  All personal belongings to be transported with pt to floor.      Jeramie Sullivan RN  10/22/21 [id number]

## 2021-10-22 NOTE — ED PROVIDER NOTES
CHIEF COMPLAINT  Shortness of Breath (pt to ED with c/o SOB since Monday. pt with hx of breast cancer x 5-6 months. pt on last rounds of chemo. had chemo treatment on Monday. told to come in by oncologist at [facility]. pt reports mild back pain. pt reports it is hard to take a deep breath)      HISTORY OF PRESENT ILLNESS  Damaris Caballero is a 78 y.o. female with a history of BRCA on chemotherapy who presents to the ED complaining of shortness of breath. Patient states ever since her last chemotherapy dose on Monday she has been increasingly short of breath. Has an intermittent mild nonproductive cough and slight pleurisy but denies rafaela chest pain. States her heart rate has also been elevated although she believes this has been ongoing for multiple weeks, possibly months. Denies pedal edema or history of DVT/PE. Patient states she was sent in by her doctor for evaluation of possible pulmonary embolism. She denies diaphoresis, syncope, fever, chills, emesis, diarrhea, dysuria. No other complaints, modifying factors or associated symptoms. I have reviewed the following from the nursing documentation.     Past Medical History:   Diagnosis Date    [facility]     Cancer [facility]     Kidney stone      Past Surgical History:   Procedure Laterality Date    COLONOSCOPY      MRI BREAST BX USING DEVICE RIGHT Right 5/28/2021    MRI BREAST BX USING DEVICE RIGHT 5/28/2021 [facility]    MRI NEEDLE BIOPSY EACH ADDL RIGHT Right 5/28/2021    MRI NEEDLE BIOPSY EACH ADDL RIGHT 5/28/2021 [facility]    PORT SURGERY N/A 5/25/2021    RIGHT PORT A CATH PLACEMENT performed by Dari Vanessa DO at [address] Right 5/7/2021    US BREAST BIOPSY NEEDLE ADDITIONAL RIGHT 5/7/2021 [address] BREAST NEEDLE BIOPSY LEFT Left 5/7/2021    US BREAST NEEDLE BIOPSY LEFT 5/7/2021 Pernell Blevins MD [facility]    US BREAST NEEDLE BIOPSY RIGHT Right 5/7/2021     BREAST NEEDLE BIOPSY RIGHT 5/7/2021 Felisha Graves MD [facility] [facility]     Family History   Problem Relation Age of Onset    High Cholesterol Mother     Diabetes Father     Cancer Paternal Grandmother 76        Bladder     Social History     Socioeconomic History    Marital status:      Spouse name: Not on file    Number of children: Not on file    Years of education: Not on file    Highest education level: Not on file   Occupational History    Not on file   Tobacco Use    Smoking status: Passive Smoke Exposure - Never Smoker    Smokeless tobacco: Never Used   Vaping Use    Vaping Use: Never used   Substance and Sexual Activity    Alcohol use: No    Drug use: No    Sexual activity: Not Currently     Partners: Male   Other Topics Concern    Not on file   Social History Narrative    Not on file     Social Determinants of Health     Financial Resource Strain:     Difficulty of Paying Living Expenses:    Food Insecurity:     Worried About Running Out of Food in the Last Year:     [address] in the Last Year:    Transportation Needs:     Lack of Transportation (Medical):      Lack of Transportation (Non-Medical):    Physical Activity:     Days of Exercise per Week:     Minutes of Exercise per Session:    Stress:     Feeling of Stress :    Social Connections:     Frequency of Communication with Friends and Family:     Frequency of Social Gatherings with Friends and Family:     Attends [employer] Services:     Active Member of Clubs or Organizations:     Attends Club or Organization Meetings:     Marital Status:    Intimate Partner Violence:     Fear of Current or Ex-Partner:     Emotionally Abused:     Physically Abused:     Sexually Abused:      Current Facility-Administered Medications   Medication Dose Route Frequency Provider Last Rate Last Admin    heparin (porcine) injection 4,600 Units  4,600 Units IntraVENous Once Mayra Gtz MD        heparin (porcine) injection 4,600 Units  4,600 Units IntraVENous PRN Alex Irwin MD        heparin (porcine) injection 2,300 Units  2,300 Units IntraVENous PRN Alex Irwin MD        heparin 25,000 units in dextrose 5% 250 mL (premix) infusion  1,030 Units/hr IntraVENous Continuous Alex Irwin MD         Current Outpatient Medications   Medication Sig Dispense Refill    melatonin (RA MELATONIN) 3 MG TABS tablet Take 1 tablet by mouth nightly as needed (sleep) 20 tablet 0    aspirin 81 MG EC tablet Take 81 mg by mouth daily      Multiple Vitamin (MULTI-VITAMIN PO) Take by mouth      Cholecalciferol (VITAMIN D3) 5000 units TABS Take by mouth      Cyanocobalamin (VITAMIN B-12) 1000 MCG extended release tablet Take 1,000 mcg by mouth daily       Allergies   Allergen Reactions    Bactrim [Sulfamethoxazole-Trimethoprim]        REVIEW OF SYSTEMS  10 systems reviewed, pertinent positives per HPI otherwise noted to be negative. PHYSICAL EXAM  /83   Pulse 112   Temp 97.7 °F (36.5 °C) (Oral)   Resp 20   Ht 5' 7.5\" (1.715 m)   Wt 126 lb (57.2 kg)   LMP  (LMP Unknown)   SpO2 97%   BMI 19.44 kg/m²   GENERAL APPEARANCE: Awake and alert. Cooperative. Appears chronically ill, cachectic, pale but not acutely toxic. HEAD: Normocephalic. Atraumatic. EYES: PERRL. EOM's grossly intact. ENT: Mucous membranes are moist.   NECK: Supple, trachea midline. HEART: RR rate 110. Normal S1, S2. No murmurs, rubs or gallops. LUNGS: Respirations unlabored. CTAB. Moderate air exchange. No wheezes, rales, or rhonchi. Speaking comfortably in full sentences. ABDOMEN: Soft. Non-distended. Non-tender. No guarding or rebound. Normal Bowel sounds. EXTREMITIES: No peripheral edema. MAEE. No acute deformities. SKIN: Warm and dry. No acute rashes. NEUROLOGICAL: Alert and oriented X 3. CN II-XII intact. No gross facial drooping. Strength 5/5, sensation intact. No pronator drift.  Normal coordination. PSYCHIATRIC: Normal mood and affect. LABS  I have reviewed all labs for this visit.    Results for orders placed or performed during the hospital encounter of 10/22/21   COVID-19, Rapid    Specimen: Nasopharyngeal Swab   Result Value Ref Range    SARS-CoV-2, NAAT Not Detected Not Detected   Blood Gas, Venous   Result Value Ref Range    pH, Omar 7.516 (H) 7.350 - 7.450    pCO2, Omar 29.0 (L) 40.0 - 50.0 mmHg    pO2, Omar 48.6 (H) 25 - 40 mmHg    HCO3, Venous 22.9 (L) 23.0 - 29.0 mmol/L    Base Excess, Omar 0.8 -3.0 - 3.0 mmol/L    O2 Sat, Omar 87 Not Established %    Carboxyhemoglobin 1.2 0.0 - 1.5 %    MetHgb, Omar 0.7 <1.5 %    TC02 (Calc), Omar 24 Not Established mmol/L    O2 Therapy Unknown    Lactic Acid, Plasma   Result Value Ref Range    Lactic Acid 1.2 0.4 - 2.0 mmol/L   CBC Auto Differential   Result Value Ref Range    WBC 5.3 4.0 - 11.0 K/uL    RBC 3.45 (L) 4.00 - 5.20 M/uL    Hemoglobin 10.9 (L) 12.0 - 16.0 g/dL    Hematocrit 32.2 (L) 36.0 - 48.0 %    MCV 93.5 80.0 - 100.0 fL    MCH 31.5 26.0 - 34.0 pg    MCHC 33.7 31.0 - 36.0 g/dL    RDW 17.1 (H) 12.4 - 15.4 %    Platelets 766 994 - 833 K/uL    MPV 7.7 5.0 - 10.5 fL    Neutrophils % 82.6 %    Lymphocytes % 9.8 %    Monocytes % 5.0 %    Eosinophils % 2.0 %    Basophils % 0.6 %    Neutrophils Absolute 4.4 1.7 - 7.7 K/uL    Lymphocytes Absolute 0.5 (L) 1.0 - 5.1 K/uL    Monocytes Absolute 0.3 0.0 - 1.3 K/uL    Eosinophils Absolute 0.1 0.0 - 0.6 K/uL    Basophils Absolute 0.0 0.0 - 0.2 K/uL   Comprehensive Metabolic Panel w/ Reflex to MG   Result Value Ref Range    Sodium 131 (L) 136 - 145 mmol/L    Potassium reflex Magnesium 3.5 3.5 - 5.1 mmol/L    Chloride 95 (L) 99 - 110 mmol/L    CO2 22 21 - 32 mmol/L    Anion Gap 14 3 - 16    Glucose 120 (H) 70 - 99 mg/dL    BUN 12 7 - 20 mg/dL    CREATININE <0.5 (L) 0.6 - 1.2 mg/dL    GFR Non-African American >60 >60    GFR African American >60 >60    Calcium 9.1 8.3 - 10.6 mg/dL    Total Protein 7.0 6.4 - 8.2 g/dL    Albumin 3.5 3.4 - 5.0 g/dL    Albumin/Globulin Ratio 1.0 (L) 1.1 - 2.2    Total Bilirubin 0.4 0.0 - 1.0 mg/dL    Alkaline Phosphatase 107 40 - 129 U/L    ALT 17 10 - 40 U/L    AST 18 15 - 37 U/L    Globulin 3.5 Not Established g/dL   Protime-INR   Result Value Ref Range    Protime 13.8 (H) 9.9 - 12.7 sec    INR 1.21 (H) 0.88 - 1.12   APTT   Result Value Ref Range    aPTT 32.4 26.2 - 38.6 sec   Troponin   Result Value Ref Range    Troponin <0.01 <0.01 ng/mL   Brain Natriuretic Peptide   Result Value Ref Range    Pro- (H) 0 - 449 pg/mL   Magnesium   Result Value Ref Range    Magnesium 1.90 1.80 - 2.40 mg/dL   EKG 12 Lead   Result Value Ref Range    Ventricular Rate 122 BPM    Atrial Rate 122 BPM    P-R Interval 142 ms    QRS Duration 76 ms    Q-T Interval 348 ms    QTc Calculation (Bazett) 495 ms    P Axis 83 degrees    R Axis 21 degrees    T Axis 81 degrees    Diagnosis       Sinus tachycardiaSeptal infarct , age undeterminedAbnormal ECGWhen compared with ECG of 16-AUG-2021 13:51,Septal infarct is now PresentNonspecific T wave abnormality, worse in Lateral leads       EKG  Sinus tachycardia, rate 122, normal axis, QTC prolonged at 495, no acute ST or T wave changes from prior on 8/16/2021    RADIOLOGY  X-RAYS:  I have reviewed radiologic plain film image(s). ALL OTHER NON-PLAIN FILM IMAGES SUCH AS CT, ULTRASOUND AND MRI HAVE BEEN READ BY THE RADIOLOGIST. CT CHEST PULMONARY EMBOLISM W CONTRAST   Final Result   1. multiple acute bilateral pulmonary emboli. 2. Nonspecific multifocal bilateral ground-glass opacification. In the   setting of pulmonary emboli, the differential includes pulmonary infarct with   or without superimposed atypical infectious versus inflammatory pneumonitis. 3. Mild CHF. Findings were discussed with LAURA Johnson at 3:28 pm on 10/22/2021.          XR CHEST PORTABLE   Final Result   Hazy opacities noted in the bilateral upper lungs likely reflecting airspace   disease,

## 2021-10-22 NOTE — CONSULTS
Pharmacy to Manage Heparin Infusion per Hospital Policy    Dx: PE  Pt wt = 57.2 kg. Baseline anti-Xa and/or aPTT = < 0.04 / 32.3 sec at 1623. Oral factor Xa-inhibitors may alter and elevate anti-Xa levels used for unfractionated heparin monitoring. As a result, anti-Xa monitoring is not accurate while Xa-inhibitor activity is detectable. Utilize aPTT monitoring when patient received an oral factor Xa-inhibitor (apixaban, betrixaban, edoxaban or rivaroxaban) within 72 hours prior to admission (please document last administration time). The goal is to allow a washout of oral factor Xa-inhibitors by using aPTT for 72 hours, then change to ant-Xa levels for UFH. High Dose Heparin Infusion  Heparin 4600 units IVP bolus followed by Heparin infusion at 1030 units/hr (recommended initial max dose 2100 units/hr). Recheck aPTT in 6 hours. Goal anti-Xa 0.3-0.7 IU/mL  Goal aPTT = 60-90 seconds. anti-Xa < 0.1  Heparin 80 units/kg bolus  Increase infusion by 4 units/kg/hr  anti-Xa 0.1-0.29    Heparin 40 units/kg bolus Increase infusion by 2 units/kg/hr  anti-Xa 0.3-0.7  No bolus No change   anti-Xa 0.71-0.8    No bolus Decrease infusion by 1 units/kg/hr  anti-Xa 0.81-0.99   No bolus Decrease infusion by 2 units/kg/hr  anti-Xa 1 or more   Hold heparin for 1 hour Decrease infusion by 3 units/kg/hr    Obtain anti-Xa 6 hours after bolus and 6 hours after any dose change until two consecutive therapeutic anti-Xa are achieved- then daily. Kori GamezD 10/22/2021  3:45 PM    10/22/2021   Anti-Xa: 0.76 at 2217  - Heparin infusion decreased to _970_ units/hr. - Recheck Anti-Xa in 6 hours at 0530. Marta Cui PharmD    10/22/2021 11:25 PM     10/23/2021   Anti-Xa: 0.70 at 0543  - Heparin infusion continued at _970_ units/hr. - Recheck Anti-Xa in 6 hours at 1200.    Marta Cui PharmD    10/23/2021 6:17 AM    10/23/21  Anti-Xa 0.64 at 13:45  - No changes needed  - Continue infusion at current rate of 970 unis/hr  - Daily Anti-Xa level   Candy Grimes/[facility]. 10/23/21

## 2021-10-23 LAB
ANTI-XA UNFRAC HEPARIN: 0.64 IU/ML (ref 0.3–0.7)
ANTI-XA UNFRAC HEPARIN: 0.7 IU/ML (ref 0.3–0.7)
BASOPHILS ABSOLUTE: 0 K/UL (ref 0–0.2)
BASOPHILS RELATIVE PERCENT: 1.1 %
EKG ATRIAL RATE: 122 BPM
EKG DIAGNOSIS: NORMAL
EKG P AXIS: 83 DEGREES
EKG P-R INTERVAL: 142 MS
EKG Q-T INTERVAL: 348 MS
EKG QRS DURATION: 76 MS
EKG QTC CALCULATION (BAZETT): 495 MS
EKG R AXIS: 21 DEGREES
EKG T AXIS: 81 DEGREES
EKG VENTRICULAR RATE: 122 BPM
EOSINOPHILS ABSOLUTE: 0.1 K/UL (ref 0–0.6)
EOSINOPHILS RELATIVE PERCENT: 2.2 %
HCT VFR BLD CALC: 28.8 % (ref 36–48)
HEMOGLOBIN: 9.9 G/DL (ref 12–16)
LV EF: 28 %
LVEF MODALITY: NORMAL
LYMPHOCYTES ABSOLUTE: 0.5 K/UL (ref 1–5.1)
LYMPHOCYTES RELATIVE PERCENT: 11.3 %
MCH RBC QN AUTO: 32.2 PG (ref 26–34)
MCHC RBC AUTO-ENTMCNC: 34.3 G/DL (ref 31–36)
MCV RBC AUTO: 94 FL (ref 80–100)
MONOCYTES ABSOLUTE: 0.3 K/UL (ref 0–1.3)
MONOCYTES RELATIVE PERCENT: 7.1 %
NEUTROPHILS ABSOLUTE: 3.3 K/UL (ref 1.7–7.7)
NEUTROPHILS RELATIVE PERCENT: 78.3 %
PDW BLD-RTO: 17.2 % (ref 12.4–15.4)
PLATELET # BLD: 363 K/UL (ref 135–450)
PMV BLD AUTO: 7.2 FL (ref 5–10.5)
RBC # BLD: 3.06 M/UL (ref 4–5.2)
WBC # BLD: 4.2 K/UL (ref 4–11)

## 2021-10-23 PROCEDURE — 6370000000 HC RX 637 (ALT 250 FOR IP): Performed by: INTERNAL MEDICINE

## 2021-10-23 PROCEDURE — 93306 TTE W/DOPPLER COMPLETE: CPT

## 2021-10-23 PROCEDURE — 6360000002 HC RX W HCPCS: Performed by: INTERNAL MEDICINE

## 2021-10-23 PROCEDURE — 6370000000 HC RX 637 (ALT 250 FOR IP)

## 2021-10-23 PROCEDURE — 2580000003 HC RX 258: Performed by: INTERNAL MEDICINE

## 2021-10-23 PROCEDURE — 93010 ELECTROCARDIOGRAM REPORT: CPT | Performed by: INTERNAL MEDICINE

## 2021-10-23 PROCEDURE — 85025 COMPLETE CBC W/AUTO DIFF WBC: CPT

## 2021-10-23 PROCEDURE — 6370000000 HC RX 637 (ALT 250 FOR IP): Performed by: HOSPITALIST

## 2021-10-23 PROCEDURE — 85520 HEPARIN ASSAY: CPT

## 2021-10-23 PROCEDURE — 2060000000 HC ICU INTERMEDIATE R&B

## 2021-10-23 RX ORDER — HYDROXYZINE HYDROCHLORIDE 10 MG/1
10 TABLET, FILM COATED ORAL 3 TIMES DAILY PRN
Status: DISCONTINUED | OUTPATIENT
Start: 2021-10-23 | End: 2021-10-25 | Stop reason: HOSPADM

## 2021-10-23 RX ADMIN — ACETAMINOPHEN 650 MG: 325 TABLET ORAL at 16:52

## 2021-10-23 RX ADMIN — HEPARIN SODIUM AND DEXTROSE 970 UNITS/HR: 10000; 5 INJECTION INTRAVENOUS at 16:44

## 2021-10-23 RX ADMIN — Medication: at 04:30

## 2021-10-23 RX ADMIN — ASPIRIN 81 MG: 81 TABLET, COATED ORAL at 10:10

## 2021-10-23 RX ADMIN — SODIUM CHLORIDE, PRESERVATIVE FREE 10 ML: 5 INJECTION INTRAVENOUS at 21:09

## 2021-10-23 RX ADMIN — HYDROXYZINE HYDROCHLORIDE 10 MG: 10 TABLET, FILM COATED ORAL at 13:50

## 2021-10-23 RX ADMIN — HYDROXYZINE HYDROCHLORIDE 10 MG: 10 TABLET, FILM COATED ORAL at 21:09

## 2021-10-23 RX ADMIN — CYANOCOBALAMIN TAB 500 MCG 1000 MCG: 500 TAB at 10:09

## 2021-10-23 RX ADMIN — Medication 3 MG: at 21:09

## 2021-10-23 ASSESSMENT — PAIN SCALES - GENERAL
PAINLEVEL_OUTOF10: 4
PAINLEVEL_OUTOF10: 0

## 2021-10-23 NOTE — PROGRESS NOTES
Hospitalist Progress Note      PCP: Maria Isabel Mercado, APRN - CNP    Date of Admission: 10/22/2021    Chief Complaint: Shortness of breath    Hospital Course: This is a 72-year-old female with a history of breast cancer on chemotherapy admitted with shortness of breath diagnosis of pulmonary embolism started on IV heparin    Subjective: Patient denies any chest pain or shortness of breath on room air complains of shortness of breath due to anxiety, panic attack yet she refusing any anxiolytic agent. Medications:  Reviewed    Infusion Medications    heparin (PORCINE) Infusion 970 Units/hr (10/23/21 0034)    sodium chloride       Scheduled Medications    aspirin  81 mg Oral Daily    vitamin B-12  1,000 mcg Oral Daily    sodium chloride flush  5-40 mL IntraVENous 2 times per day    influenza virus vaccine  0.5 mL IntraMUSCular Prior to discharge     PRN Meds: heparin (porcine), heparin (porcine), melatonin, sodium chloride flush, sodium chloride, ondansetron **OR** ondansetron, polyethylene glycol, acetaminophen **OR** acetaminophen      Intake/Output Summary (Last 24 hours) at 10/23/2021 0904  Last data filed at 10/23/2021 0416  Gross per 24 hour   Intake 50 ml   Output 0 ml   Net 50 ml       Physical Exam Performed:    /69   Pulse 103   Temp 97.6 °F (36.4 °C) (Oral)   Resp 19   Ht 5' 7.5\" (1.715 m)   Wt 125 lb 4.8 oz (56.8 kg)   LMP  (LMP Unknown)   SpO2 94%   BMI 19.34 kg/m²     General appearance: No apparent distress, appears stated age and cooperative. HEENT: Pupils equal, round, and reactive to light. Conjunctivae/corneas clear. Neck: Supple, with full range of motion. No jugular venous distention. Trachea midline. Respiratory:  Normal respiratory effort. Clear to auscultation, bilaterally without Rales/Wheezes/Rhonchi. Cardiovascular: Regular rate and rhythm with normal S1/S2 without murmurs, rubs or gallops.   Abdomen: Soft, non-tender, non-distended with normal bowel sounds. Musculoskeletal: No clubbing, cyanosis or edema bilaterally. Full range of motion without deformity. Skin: Skin color, texture, turgor normal.  No rashes or lesions. Neurologic:  Neurovascularly intact without any focal sensory/motor deficits. Cranial nerves: II-XII intact, grossly non-focal.  Psychiatric: Alert and oriented, thought content appropriate, normal insight  Capillary Refill: Brisk,3 seconds, normal   Peripheral Pulses: +2 palpable, equal bilaterally       Labs:   Recent Labs     10/22/21  1349 10/23/21  0543   WBC 5.3 4.2   HGB 10.9* 9.9*   HCT 32.2* 28.8*    363     Recent Labs     10/22/21  1349   *   K 3.5   CL 95*   CO2 22   BUN 12   CREATININE <0.5*   CALCIUM 9.1     Recent Labs     10/22/21  1349   AST 18   ALT 17   BILITOT 0.4   ALKPHOS 107     Recent Labs     10/22/21  1349   INR 1.21*     Recent Labs     10/22/21  1349   TROPONINI <0.01       Urinalysis:      Lab Results   Component Value Date    NITRU Negative 08/16/2021    WBCUA 6-9 07/14/2021    BACTERIA 1+ 07/14/2021    RBCUA 0-2 07/14/2021    BLOODU Negative 08/16/2021    SPECGRAV <=1.005 08/16/2021    GLUCOSEU Negative 08/16/2021       Radiology:  CT CHEST PULMONARY EMBOLISM W CONTRAST   Final Result   1. multiple acute bilateral pulmonary emboli. 2. Nonspecific multifocal bilateral ground-glass opacification. In the   setting of pulmonary emboli, the differential includes pulmonary infarct with   or without superimposed atypical infectious versus inflammatory pneumonitis. 3. Mild CHF. Findings were discussed with LAURA Del Rio at 3:28 pm on 10/22/2021. XR CHEST PORTABLE   Final Result   Hazy opacities noted in the bilateral upper lungs likely reflecting airspace   disease, however malignant process cannot be excluded. Further evaluation   with chest CT is recommended.          VL Extremity Venous Bilateral    (Results Pending)           Assessment/Plan:    Active Hospital Problems    Diagnosis

## 2021-10-23 NOTE — PLAN OF CARE
Problem: Nutrition  Goal: Optimal nutrition therapy  Outcome: Ongoing  Note: Nutrition Problem #1: Severe malnutrition  Intervention: Food and/or Nutrient Delivery: Continue Current Diet, Start Oral Nutrition Supplement  Nutritional Goals: Pt will consume greater than 50% of meals and ONS this admission w/o further weight loss

## 2021-10-23 NOTE — PROGRESS NOTES
Comprehensive Nutrition Assessment    Type and Reason for Visit:  Initial, Positive Nutrition Screen (MSt=2: weight loss, decreased appetite)    Nutrition Recommendations/Plan:   1. Continue General diet order, free of therapeutic restrictions, to promote adequate nutrient intake  2. Add Ensure with meals - flavor preferences added   3. Encourage PO intakes as tolerated   4. Consider addition of appetite stimulant - MD to advise  5. Monitor nutrition adequacy, pertinent labs, bowel habits, wt changes, and clinical progress    Nutrition Assessment:  79 yo female admitted with pulmonary embolism. Hx of breast cancer, on chemo. Pt nutritionally compromised AEB poor appetite and PO intakes and nutriiton focused physical exam. Pt reports UBW of 150 lb, now 125 lb. Pt reports poor appetite since starting chemo. C/o taste changes. Drinks Ensure at home, will add and prefers chocolate. Encouraged PO intakes. Menu brought to bedside to assist with meal ordering. Malnutrition Assessment:  Malnutrition Status:  Severe malnutrition    Context:  Chronic Illness     Findings of the 6 clinical characteristics of malnutrition:  Energy Intake:  7 - 75% or less estimated energy requirements for 1 month or longer  Weight Loss:   (7.4% weight loss in 2 months)     Body Fat Loss:  7 - Severe body fat loss Buccal region, Orbital   Muscle Mass Loss:  7 - Severe muscle mass loss Temples (temporalis), Clavicles (pectoralis & deltoids)    Estimated Daily Nutrient Needs:  Energy (kcal):  4742-4214 kcal; Weight Used for Energy Requirements:  Ideal (63 kg)     Protein (g):  63-76 g; Weight Used for Protein Requirements:  Ideal (1.0-1.2 g/kg)        Fluid (ml/day):   ; Method Used for Fluid Requirements:  1 ml/kcal      Nutrition Related Findings:  7.4% weight loss in 2 months. BM x1 on 10/23. Na 131. Wounds:  None       Current Nutrition Therapies:    ADULT DIET;  Regular    Anthropometric Measures:  · Height: 5' 7.5\" (171.5 cm)  · Current Body Weight: 125 lb (56.7 kg)   · Usual Body Weight: 135 lb (61.2 kg) (standing scale 8/16/21)     · Ideal Body Weight: 138 lbs; % Ideal Body Weight 90.6 %   · BMI: 19.3  · BMI Categories: Underweight (BMI less than 22) age over 72       Nutrition Diagnosis:   · Severe malnutrition related to inadequate protein-energy intake, catabolic illness as evidenced by poor intake prior to admission, weight loss, severe loss of subcutaneous fat, severe muscle loss      Nutrition Interventions:   Food and/or Nutrient Delivery:  Continue Current Diet, Start Oral Nutrition Supplement  Nutrition Education/Counseling:  No recommendation at this time   Coordination of Nutrition Care:  Continue to monitor while inpatient    Goals:  Pt will consume greater than 50% of meals and ONS this admission w/o further weight loss       Nutrition Monitoring and Evaluation:   Behavioral-Environmental Outcomes:  None Identified   Food/Nutrient Intake Outcomes:  Food and Nutrient Intake, Supplement Intake  Physical Signs/Symptoms Outcomes:  Meal Time Behavior, Nutrition Focused Physical Findings, Weight     Discharge Planning:    Continue current diet, Continue Oral Nutrition Supplement     Electronically signed by Radha Davis, MS, RD, LD on 10/23/21 at 1:17 PM EDT    Contact: [phone]

## 2021-10-24 PROCEDURE — 6370000000 HC RX 637 (ALT 250 FOR IP): Performed by: INTERNAL MEDICINE

## 2021-10-24 PROCEDURE — 2580000003 HC RX 258: Performed by: INTERNAL MEDICINE

## 2021-10-24 PROCEDURE — 6370000000 HC RX 637 (ALT 250 FOR IP): Performed by: HOSPITALIST

## 2021-10-24 PROCEDURE — 2060000000 HC ICU INTERMEDIATE R&B

## 2021-10-24 PROCEDURE — 99223 1ST HOSP IP/OBS HIGH 75: CPT | Performed by: INTERNAL MEDICINE

## 2021-10-24 RX ORDER — LISINOPRIL 2.5 MG/1
2.5 TABLET ORAL DAILY
Status: DISCONTINUED | OUTPATIENT
Start: 2021-10-24 | End: 2021-10-25 | Stop reason: HOSPADM

## 2021-10-24 RX ADMIN — HYDROXYZINE HYDROCHLORIDE 10 MG: 10 TABLET, FILM COATED ORAL at 20:57

## 2021-10-24 RX ADMIN — APIXABAN 10 MG: 5 TABLET, FILM COATED ORAL at 16:38

## 2021-10-24 RX ADMIN — SODIUM CHLORIDE, PRESERVATIVE FREE 10 ML: 5 INJECTION INTRAVENOUS at 08:37

## 2021-10-24 RX ADMIN — HYDROXYZINE HYDROCHLORIDE 10 MG: 10 TABLET, FILM COATED ORAL at 12:46

## 2021-10-24 RX ADMIN — CYANOCOBALAMIN TAB 500 MCG 1000 MCG: 500 TAB at 08:37

## 2021-10-24 RX ADMIN — ASPIRIN 81 MG: 81 TABLET, COATED ORAL at 08:37

## 2021-10-24 RX ADMIN — LISINOPRIL 2.5 MG: 2.5 TABLET ORAL at 12:48

## 2021-10-24 RX ADMIN — Medication 3 MG: at 20:58

## 2021-10-24 RX ADMIN — SODIUM CHLORIDE, PRESERVATIVE FREE 10 ML: 5 INJECTION INTRAVENOUS at 20:58

## 2021-10-24 RX ADMIN — HYDROXYZINE HYDROCHLORIDE 10 MG: 10 TABLET, FILM COATED ORAL at 05:22

## 2021-10-24 ASSESSMENT — PAIN SCALES - GENERAL
PAINLEVEL_OUTOF10: 0

## 2021-10-24 NOTE — CONSULTS
[facility]    Consult Note      Requesting Physician:  Dr. Mullins Patches:  PE        HISTORY OF PRESENT ILLNESS:      Ms. Daniel Villarreal  is a 78 y.o. female we are seeing in consultation for A pulmonary embolism. She is undergoing treatment for breast cancer currently by my partner Dr. Dean Colmenares. She has done relatively well with treatment but developed worsening shortness of breath over the last month. This was felt to be due to deconditioning which is reasonable. Her tumor is a T1c, N1, M0, grade 3. Tumor is ER positive, TN positive and HER-2/cory negative. The patient is status post 4 cycles of Adriamycin/Cytoxan and just received her seventh cycle of Taxol with Pertuzumab and Herceptin bio similar. Her biggest complaint on admission was shortness of breath. She denies chest pain or significant edema.       Past Medical History:    Past Medical History:   Diagnosis Date    [facility]     Cancer ([facility]     Kidney stone      Past Surgical History:    Past Surgical History:   Procedure Laterality Date    COLONOSCOPY      MRI BREAST BX USING DEVICE RIGHT Right 5/28/2021    MRI BREAST BX USING DEVICE RIGHT 5/28/2021 [address] [facility]    MRI NEEDLE BIOPSY EACH ADDL RIGHT Right 5/28/2021    MRI NEEDLE BIOPSY EACH ADDL RIGHT 5/28/2021 [facility]    PORT SURGERY N/A 5/25/2021    RIGHT PORT A CATH PLACEMENT performed by Stephany Lord DO at [address] Right 5/7/2021    US BREAST BIOPSY NEEDLE ADDITIONAL RIGHT 5/7/2021 Ryan García  [facility] BREAST NEEDLE BIOPSY LEFT Left 5/7/2021    US BREAST NEEDLE BIOPSY LEFT 5/7/2021 Ryan García MD [address] [facility]    US BREAST NEEDLE BIOPSY RIGHT Right 5/7/2021    US BREAST NEEDLE BIOPSY RIGHT 5/7/2021 Ryan García MD [address] [place] [address] Current Medications:    Current Facility-Administered Medications   Medication Dose Route Frequency Provider Last Rate Last Admin    hydrOXYzine (ATARAX) tablet 10 mg  10 mg Oral TID PRN Selina Burnett MD   10 mg at 10/24/21 0522    heparin (porcine) injection 4,600 Units  4,600 Units IntraVENous PRN Thania Torres MD        heparin (porcine) injection 2,300 Units  2,300 Units IntraVENous PRN Thania Torres MD        heparin 25,000 units in dextrose 5% 250 mL (premix) infusion  970 Units/hr IntraVENous Continuous Thania Torres MD 9.7 mL/hr at 10/23/21 1644 970 Units/hr at 10/23/21 1644    aspirin EC tablet 81 mg  81 mg Oral Daily Thania Torres MD   81 mg at 10/24/21 7613    vitamin B-12 (CYANOCOBALAMIN) tablet 1,000 mcg  1,000 mcg Oral Daily Thania Torres MD   1,000 mcg at 10/24/21 0837    melatonin tablet 3 mg  3 mg Oral Nightly PRN Thania Torres MD   3 mg at 10/23/21 2109    sodium chloride flush 0.9 % injection 5-40 mL  5-40 mL IntraVENous 2 times per day Thania Torres MD   10 mL at 10/24/21 0837    sodium chloride flush 0.9 % injection 5-40 mL  5-40 mL IntraVENous PRN Thania Torres MD        0.9 % sodium chloride infusion  25 mL IntraVENous PRN Thania Torres MD        ondansetron (ZOFRAN-ODT) disintegrating tablet 4 mg  4 mg Oral Q8H PRN Thania Torres MD        Or    ondansetron (ZOFRAN) injection 4 mg  4 mg IntraVENous Q6H PRN Thania Torres MD        polyethylene glycol (GLYCOLAX) packet 17 g  17 g Oral Daily PRN Thania Torres MD        acetaminophen (TYLENOL) tablet 650 mg  650 mg Oral Q6H PRN Thania Torres MD   650 mg at 10/23/21 1652    Or    acetaminophen (TYLENOL) suppository 650 mg  650 mg Rectal Q6H PRN Thania Torres MD        influenza quadrivalent split vaccine (FLUZONE;FLUARIX;FLULAVAL;AFLURIA) injection 0.5 mL  0.5 mL IntraMUSCular Prior to discharge Thania Torres MD Allergies: Allergies   Allergen Reactions    Bactrim [Sulfamethoxazole-Trimethoprim]        Social History:   Social History     Socioeconomic History    Marital status:      Spouse name: Not on file    Number of children: Not on file    Years of education: Not on file    Highest education level: Not on file   Occupational History    Not on file   Tobacco Use    Smoking status: Passive Smoke Exposure - Never Smoker    Smokeless tobacco: Never Used   Vaping Use    Vaping Use: Never used   Substance and Sexual Activity    Alcohol use: No    Drug use: No    Sexual activity: Not Currently     Partners: Male   Other Topics Concern    Not on file   Social History Narrative    Not on file     Social Determinants of Health     Financial Resource Strain:     Difficulty of Paying Living Expenses:    Food Insecurity:     Worried About Running Out of Food in the Last Year:     [address] in the Last Year:    Transportation Needs:     Lack of Transportation (Medical):  Lack of Transportation (Non-Medical):    Physical Activity:     Days of Exercise per Week:     Minutes of Exercise per Session:    Stress:     Feeling of Stress :    Social Connections:     Frequency of Communication with Friends and Family:     Frequency of Social Gatherings with Friends and Family:     Attends Jewish Services:     Active Member of Clubs or Organizations:     Attends Club or Organization Meetings:     Marital Status:    Intimate Partner Violence:     Fear of Current or Ex-Partner:     Emotionally Abused:     Physically Abused:     Sexually Abused:           Family History:     Family History   Problem Relation Age of Onset    High Cholesterol Mother     Diabetes Father     Cancer Paternal Grandmother 76        Bladder     REVIEW OF SYSTEMS:      Constitutional:  No weight loss, No fever, No chills, No night sweats. Energy level good.   Eyes:  No impairment or change in vision  ENT / Mouth: No pain, abnormal ulceration, bleeding, nasal drip or change in voice or hearing  Cardiovascular:  No chest pain, palpitations, new edema, or calf discomfort  Respiratory:  Shortness of breath as noted above  Breast:  No pain, discharge, change in appearance or texture  Gastrointestinal:  No pain, cramping, jaundice, change to eating and bowel habits  Urinary:  No pain, bleeding or change in continence  Genitalia: No pain, bleeding or discharge  Musculoskeletal:  No redness, pain, edema or weakness  Skin:  No pruritus, rash, change to nodules or lesions  Neurologic:  No discomfort, change in mental status, speech, sensory or motor activity  Psychiatric:  No change in concentration or change to affect or mood  Endocrine:  No hot flashes, increased thirst, or change to urine production  Hematologic: No petechiae, ecchymosis or bleeding  Lymphatic:  No lymphadenopathy or lymphedema  Allergy / Immunologic:  No eczema, hives, frequent or recurrent infections    PHYSICAL EXAM:      Vitals:  /71   Pulse 118   Temp 97.8 °F (36.6 °C) (Oral)   Resp 21   Ht 5' 7.5\" (1.715 m)   Wt 125 lb (56.7 kg)   LMP  (LMP Unknown)   SpO2 90%   BMI 19.29 kg/m²   CONSTITUTIONAL: awake, alert, cooperative, no apparent distress   EYES: pupils equal, round and reactive to light, sclera clear and conjunctiva normal  ENT: Normocephalic, without obvious abnormality, atraumatic  NECK: supple, symmetrical, no jugular venous distension and no carotid bruits   HEMATOLOGIC/LYMPHATIC: no cervical, supraclavicular or axillary lymphadenopathy   LUNGS:Decreased breath sounds bilaterally. CARDIOVASCULAR: regular rate and rhythm, normal S1 and S2, no murmur noted  ABDOMEN: normal bowel sounds x 4, soft, non-distended, non-tender, no masses palpated, no hepatosplenomgaly   MUSCULOSKELETAL: full range of motion noted, tone is normal  NEUROLOGIC: awake, alert, oriented to name, place and time. Motor skills grossly intact.    SKIN: Normal skin color, texture, turgor and no jaundice. appears intact   EXTREMITIES: no LE edema       DATA:    PT/INR:    Recent Labs     10/22/21  1349   PROT 7.0   INR 1.21*     PTT:    Recent Labs     10/22/21  1349 10/22/21  1623   APTT 32.4 32.3     CMP:    Lab Results   Component Value Date     10/22/2021    K 3.5 10/22/2021    CL 95 10/22/2021    CO2 22 10/22/2021    BUN 12 10/22/2021    PROT 7.0 10/22/2021    PROT 7.8 07/10/2012     :    Lab Results   Component Value Date    MG 1.90 10/22/2021     Phosphorus:  No components found for: PO4  Calcium:  No components found for: CA  CBC:    Lab Results   Component Value Date    WBC 4.2 10/23/2021    RBC 3.06 10/23/2021    HGB 9.9 10/23/2021    HCT 28.8 10/23/2021    MCV 94.0 10/23/2021    RDW 17.2 10/23/2021     10/23/2021     DIFF:  Lab Results   Component Value Date    MCV 94.0 10/23/2021    RDW 17.2 10/23/2021      [email or web address]  Uric Acid:  @labcrnt(URIC)@    Radiology Review:  CT scan reviewed personally and shown to the patient's daughter.         Problem List  Patient Active Problem List   Diagnosis    Perforation of sigmoid colon due to diverticulitis    Normocytic anemia    Pure hypercholesterolemia    Malignant neoplasm of right breast in female, estrogen receptor positive ([address]    Malignant neoplasm of upper-inner quadrant of left breast in female, estrogen receptor positive ([address]    Chemotherapy-induced neutropenia (HCC)    Fatigue    Tachycardia    Neutropenia, drug-induced (HCC)    Severe malnutrition (HCC)    Neutropenia (HCC)    Failure to thrive in adult    Recurrent falls    Bacteriuria    Acute pulmonary embolism without acute cor pulmonale (HCC)       IMPRESSION/RECOMMENDATIONS:    Breast carcinoma:  -Diagnosis 5/28/2021  -Right breast  -T1c, N1, M0  -ER positive, [place] positive and HER-2/cory positive  -Status post 4 cycles of dose dense Adriamycin/Cytoxan  -Completed her seventh cycle of a planned 12 of weekly Taxol with Herceptin bio similar and pertuzumab  -No clinical evidence of recurrence or progression    Pulmonary embolism:  -I feel she could be discharged on Eliquis 5 mg p.o. twice daily  -I would consider a loading dose for 7 days of 10 mg p.o. twice daily for seven days    Cardiac dysfunction:  -She had an echocardiogram performed 8/17/2021 which demonstrated a normal ejection fraction of 55%  -10/22/2021 ejection fraction was noted to be 27%. -This could be from her Herceptin bio similar  -This treatment will need to be held and her echocardiogram repeated at a later date  -Cardiology is going to see the patient    No oncology treatment is planned while she is an inpatient        Thank you for asking me to see the patient.        Cristiano Jarrett MD  Please Contact Through [employer]

## 2021-10-24 NOTE — CONSULTS
[address]  Cardiac Consult     Referring Provider:  Jennifer Barrios, APRN - CNP         History of Present Illness:  77 y/o female with breast CA admitted with dyspnea and found to have pulmonary emboli and new LV systolic dysfunction. She has been undergoing chemo and is s/p 4 cycles of Adriamycin/cytoxan. She has had progressive dyspnea for a few weeks. Associated tachycardia. Became very severe so came to the ER. CTPA showed multiple bilateral pulmonary emboli. ECHO shows new LV systolic dysfunction. She has severe dyspnea. Worsens with any activity or movement. Some associated chest tightness with breathing. Past Medical History:   has a past medical history of Breast cancer ([address] Cancer ([facility] [address] and Kidney stone. Surgical History:   has a past surgical history that includes [address] LOC DEVICE 1ST LESION RIGHT (Right, 5/7/2021); US BREAST BIOPSY W LOC DEVICE EACH ADDL LESION RIGHT (Right, 5/7/2021); US BREAST BIOPSY W LOC DEVICE 1ST LESION LEFT (Left, 5/7/2021); Colonoscopy; Port Surgery (N/A, 5/25/2021); MRI BIOPSY BREAST W LOC DEVICE RIGHT 1ST LESION (Right, 5/28/2021); and MRI BIOPSY BREAST W LOC DEVICE RIGHT EACH ADDL (Right, 5/28/2021). Social History:   reports that she is a non-smoker but has been exposed to tobacco smoke. She has never used smokeless tobacco. She reports that she does not drink alcohol and does not use drugs. Family History:  family history includes Cancer (age of onset: 76) in her paternal grandmother; Diabetes in her father; High Cholesterol in her mother. Medications:   aspirin  81 mg Oral Daily    vitamin B-12  1,000 mcg Oral Daily    sodium chloride flush  5-40 mL IntraVENous 2 times per day    influenza virus vaccine  0.5 mL IntraMUSCular Prior to discharge         Allergies:  Bactrim [sulfamethoxazole-trimethoprim]     ? Medications and dosages reviewed.     ROS:  ?Full ROS obtained and negative except as mentioned in HPI      Physical Examination:    Vitals:    10/24/21 0835   BP: 115/71   Pulse: 118   Resp: 21   Temp: 97.8 °F (36.6 °C)   SpO2: 90%        · GENERAL: ill appearing female on O2 appearing dyspneic  · NEUROLOGICAL: Alert and oriented  · PSYCH: Calm affect  · SKIN: Warm and dry, No visible rash,   · EYES: Pupils equal and round, Sclera non-icteric,   · HENT:  External ears and nose unremarkable, mucus membranes moist  · MUSCULOSKELETAL: Normal cephalic, neck supple  · CAROTID: Normal upstroke, no bruits  · CARDIAC: JVP normal, Normal PMI, tachycardic regular rate and rhythm, normal S1S2, no murmur, rub, + gallop  · RESPIRATORY: Normal respiratory effort, clear to auscultation bilaterally  · EXTREMITIES: No LE edema  · GASTROINTESTINAL: normal bowel sounds, soft, non-tender, No hepatomegaly     All testing and labs listed below were personally reviewed. ECHO 10/23-Images personally reviewed   Normal left ventricle size and wall thickness. The left ventricular systolic function is severely reduced with an ejection    fraction of 25-30 %. EF by Fay's method estimated at 27%. Severe global hypokinesis with regional variation. Left ventricular diastolic filling pressure are indeterminate. The right ventricle is normal in size and function. There is protruding echodensity from anterior RA wall seen in multiple    views, possibly artifact, but given patient's clinical history,    mass/thrombus cannot be excluded. Mild to moderate mitral regurgitation. Mild pulmonic and tricuspid regurgitation. Systolic pulmonary artery pressure (SPAP) is normal and estimated at 35 mmHg    (right atrial pressure 3 mmHg). There is a large left pleural effusion.        Compared to last echo on 8/17/2021 (EF 55%), left ventricle systolic    function has significantly declined while RV size and function appears    normal. RIght atrial findings as above - not seen on review of images prior,    unclear significance. ECHO 8/17/2021-Personally reviewed  Summary   LV systolic function is normal with EF estimated at 55%. No regional wall motion abnormalities. Grade I diastolic dysfunction with normal LV filling pressure. Mild mitral regurgitation. Inadequate tricuspid regurgitation jet to estimate systolic artery pressure   (SPAP). EKG 10/22 (tracings reviewed)  Sinus tachycardia      CTPA  1. multiple acute bilateral pulmonary emboli. 2. Nonspecific multifocal bilateral ground-glass opacification. In the   setting of pulmonary emboli, the differential includes pulmonary infarct with   or without superimposed atypical infectious versus inflammatory pneumonitis. 3. Mild CHF. Findings were discussed with LAURA Izaguirre at 3:28 pm on 10/22/2021. TELE:Tracings reviewed  Sinus tachycardia      Assessment:     Breast CA:  Oncology following. Undergoing chemo but now has new severe LV systolic dysfunction  Plan per oncology    Respiratory failure:  Bilateral pulmonary emboli and LV dysfunction  No evidence of fluid overload on exam  Treat PE with anticoagulation  Try to institute CHF therapy    Cardiomyopathy/CHF:  No obvious fluid overload. Tachycardic. Try to institute low dose lisinopril  Later try low dose coreg but hesitant at this time due to need for tachycardia to compensate    Pulmonary Emboli:  Multiple bilateral.   Agree with anticoagulation  Heparin acutely and transition to eliquis    Plan:  As above  Pt is very ill and prognosis seems guarded.   I am not sure that she or her family understand severity of her illness      Thank you for allowing me to participate in the care of this individual.      Alta Jacobsen M.D., [address] [facility]

## 2021-10-24 NOTE — PROGRESS NOTES
Hospitalist Progress Note      PCP: SOUTH Yadav - CNP    Date of Admission: 10/22/2021    Chief Complaint: Shortness of breath    Hospital Course: This is a 65-year-old female with a history of breast cancer on chemotherapy admitted with shortness of breath diagnosis of pulmonary embolism started on IV heparin, 2D echo on 10/23/2021 with reduced EF 25 to 30% cardiology consulted    Subjective: Patient continues complain of shortness of breath denies any chest pain no nausea vomiting. Medications:  Reviewed    Infusion Medications    heparin (PORCINE) Infusion 970 Units/hr (10/23/21 1644)    sodium chloride       Scheduled Medications    aspirin  81 mg Oral Daily    vitamin B-12  1,000 mcg Oral Daily    sodium chloride flush  5-40 mL IntraVENous 2 times per day    influenza virus vaccine  0.5 mL IntraMUSCular Prior to discharge     PRN Meds: hydrOXYzine, heparin (porcine), heparin (porcine), melatonin, sodium chloride flush, sodium chloride, ondansetron **OR** ondansetron, polyethylene glycol, acetaminophen **OR** acetaminophen      Intake/Output Summary (Last 24 hours) at 10/24/2021 0951  Last data filed at 10/24/2021 0837  Gross per 24 hour   Intake 700 ml   Output --   Net 700 ml       Physical Exam Performed:    /71   Pulse 118   Temp 97.8 °F (36.6 °C) (Oral)   Resp 21   Ht 5' 7.5\" (1.715 m)   Wt 125 lb (56.7 kg)   LMP  (LMP Unknown)   SpO2 90%   BMI 19.29 kg/m²     General appearance: No apparent distress, appears stated age and cooperative. HEENT: Pupils equal, round, and reactive to light. Conjunctivae/corneas clear. Neck: Supple, with full range of motion. No jugular venous distention. Trachea midline. Respiratory:  Normal respiratory effort. Clear to auscultation, bilaterally without Rales/Wheezes/Rhonchi. Cardiovascular: Regular rate and rhythm with normal S1/S2 without murmurs, rubs or gallops.   Abdomen: Soft, non-tender, non-distended with normal bowel sounds. Musculoskeletal: No clubbing, cyanosis or edema bilaterally. Full range of motion without deformity. Skin: Skin color, texture, turgor normal.  No rashes or lesions. Neurologic:  Neurovascularly intact without any focal sensory/motor deficits. Cranial nerves: II-XII intact, grossly non-focal.  Psychiatric: Alert and oriented, thought content appropriate, normal insight  Capillary Refill: Brisk,3 seconds, normal   Peripheral Pulses: +2 palpable, equal bilaterally       Labs:   Recent Labs     10/22/21  1349 10/23/21  0543   WBC 5.3 4.2   HGB 10.9* 9.9*   HCT 32.2* 28.8*    363     Recent Labs     10/22/21  1349   *   K 3.5   CL 95*   CO2 22   BUN 12   CREATININE <0.5*   CALCIUM 9.1     Recent Labs     10/22/21  1349   AST 18   ALT 17   BILITOT 0.4   ALKPHOS 107     Recent Labs     10/22/21  1349   INR 1.21*     Recent Labs     10/22/21  1349   TROPONINI <0.01       Urinalysis:      Lab Results   Component Value Date    NITRU Negative 08/16/2021    WBCUA 6-9 07/14/2021    BACTERIA 1+ 07/14/2021    RBCUA 0-2 07/14/2021    BLOODU Negative 08/16/2021    SPECGRAV <=1.005 08/16/2021    GLUCOSEU Negative 08/16/2021       Radiology:  CT CHEST PULMONARY EMBOLISM W CONTRAST   Final Result   1. multiple acute bilateral pulmonary emboli. 2. Nonspecific multifocal bilateral ground-glass opacification. In the   setting of pulmonary emboli, the differential includes pulmonary infarct with   or without superimposed atypical infectious versus inflammatory pneumonitis. 3. Mild CHF. Findings were discussed with LAURA Horn at 3:28 pm on 10/22/2021. XR CHEST PORTABLE   Final Result   Hazy opacities noted in the bilateral upper lungs likely reflecting airspace   disease, however malignant process cannot be excluded. Further evaluation   with chest CT is recommended.          VL Extremity Venous Bilateral    (Results Pending)           Assessment/Plan:    Active Hospital Problems    Diagnosis  Acute pulmonary embolism without acute cor pulmonale (HCC) [I26.99]     Severe malnutrition (HCC) [E43]     Malignant neoplasm of right breast in female, estrogen receptor positive ([facility] [address] [C50.911, Z17.0]      1. Admitted with acute pulmonary embolism without acute cor pulmonale due to underlying malignancy continue with IV heparin no hypoxia. Consulted heme-onc for recommendation of anticoagulation. Recommendation appreciated we will discontinue heparin drip and start her on Eliquis 10 mg p.o. twice daily for 7 days followed by Eliquis 5 mg p.o. twice daily. 2D echo results noted, Doppler of lower extremity pending. 2.  Malignant neoplasm of right breast on chemotherapy heme-onc consulted, currently chemo on hold due to new diagnosis of cardiomyopathy with a reduced EF noted on 10/23/2021  3. Anxiety patient refused to take low-dose of benzodiazepines , started on Atarax as needed. 4.  2D echo 10/23/21 -     Normal left ventricle size and wall thickness.   The left ventricular systolic function is severely reduced with an ejection   fraction of 25-30 %.   EF by Fay's method estimated at 27%.   Severe global hypokinesis with regional variation.   Left ventricular diastolic filling pressure are indeterminate.   The right ventricle is normal in size and function.   There is protruding echodensity from anterior RA wall seen in multiple   views, possibly artifact, but given patient's clinical history,   mass/thrombus cannot be excluded.   Mild to moderate mitral regurgitation.   Mild pulmonic and tricuspid regurgitation.   Systolic pulmonary artery pressure (SPAP) is normal and estimated at 35 mmHg   (right atrial pressure 3 mmHg).   There is a large left pleural effusion. Cardiomyopathy due to chemotherapy, chemotherapy on hold. cardiology consult appreciated. Started on low-dose lisinopril, patient has dentist take Coreg. Patient with a normal 2D echo on 8/17/2021.     DVT Prophylaxis: Eliquis  Diet: ADULT DIET;  Regular  ADULT ORAL NUTRITION SUPPLEMENT; Breakfast, Lunch, Dinner; Standard High Calorie/High Protein Oral Supplement  Code Status: Full Code    PT/OT Eval Status:     Hyun Mata MD

## 2021-10-25 VITALS
HEIGHT: 68 IN | OXYGEN SATURATION: 94 % | RESPIRATION RATE: 22 BRPM | BODY MASS INDEX: 18.94 KG/M2 | TEMPERATURE: 97.9 F | DIASTOLIC BLOOD PRESSURE: 75 MMHG | HEART RATE: 126 BPM | WEIGHT: 125 LBS | SYSTOLIC BLOOD PRESSURE: 115 MMHG

## 2021-10-25 PROCEDURE — 6370000000 HC RX 637 (ALT 250 FOR IP): Performed by: INTERNAL MEDICINE

## 2021-10-25 PROCEDURE — 97162 PT EVAL MOD COMPLEX 30 MIN: CPT

## 2021-10-25 PROCEDURE — 93970 EXTREMITY STUDY: CPT

## 2021-10-25 PROCEDURE — 97166 OT EVAL MOD COMPLEX 45 MIN: CPT

## 2021-10-25 PROCEDURE — 2580000003 HC RX 258: Performed by: INTERNAL MEDICINE

## 2021-10-25 PROCEDURE — 97116 GAIT TRAINING THERAPY: CPT

## 2021-10-25 PROCEDURE — 99233 SBSQ HOSP IP/OBS HIGH 50: CPT | Performed by: INTERNAL MEDICINE

## 2021-10-25 PROCEDURE — 97530 THERAPEUTIC ACTIVITIES: CPT

## 2021-10-25 PROCEDURE — 6370000000 HC RX 637 (ALT 250 FOR IP): Performed by: HOSPITALIST

## 2021-10-25 PROCEDURE — 6360000002 HC RX W HCPCS: Performed by: NURSE PRACTITIONER

## 2021-10-25 RX ORDER — CARVEDILOL 3.12 MG/1
3.12 TABLET ORAL 2 TIMES DAILY WITH MEALS
Qty: 60 TABLET | Refills: 0 | Status: ON HOLD | OUTPATIENT
Start: 2021-10-25 | End: 2021-11-02 | Stop reason: HOSPADM

## 2021-10-25 RX ORDER — CARVEDILOL 3.12 MG/1
3.12 TABLET ORAL 2 TIMES DAILY WITH MEALS
Status: DISCONTINUED | OUTPATIENT
Start: 2021-10-25 | End: 2021-10-25 | Stop reason: HOSPADM

## 2021-10-25 RX ORDER — LORAZEPAM 2 MG/ML
0.5 INJECTION INTRAMUSCULAR ONCE
Status: COMPLETED | OUTPATIENT
Start: 2021-10-25 | End: 2021-10-25

## 2021-10-25 RX ORDER — LISINOPRIL 2.5 MG/1
2.5 TABLET ORAL DAILY
Qty: 30 TABLET | Refills: 0 | Status: ON HOLD | OUTPATIENT
Start: 2021-10-26 | End: 2021-11-21 | Stop reason: HOSPADM

## 2021-10-25 RX ADMIN — APIXABAN 10 MG: 5 TABLET, FILM COATED ORAL at 08:25

## 2021-10-25 RX ADMIN — CYANOCOBALAMIN TAB 500 MCG 1000 MCG: 500 TAB at 08:25

## 2021-10-25 RX ADMIN — SODIUM CHLORIDE, PRESERVATIVE FREE 10 ML: 5 INJECTION INTRAVENOUS at 08:25

## 2021-10-25 RX ADMIN — ASPIRIN 81 MG: 81 TABLET, COATED ORAL at 08:25

## 2021-10-25 RX ADMIN — LISINOPRIL 2.5 MG: 2.5 TABLET ORAL at 08:25

## 2021-10-25 RX ADMIN — LORAZEPAM 0.5 MG: 2 INJECTION INTRAMUSCULAR; INTRAVENOUS at 02:11

## 2021-10-25 ASSESSMENT — PAIN SCALES - GENERAL: PAINLEVEL_OUTOF10: 0

## 2021-10-25 NOTE — DISCHARGE INSTR - COC
Continuity of Care Form    Patient Name: Alfredo Patterson   :  1945  MRN:  [id number]    Admit date:  10/22/2021  Discharge date:  10/25/2021    Code Status Order: Full Code   Advance Directives:      Admitting Physician:  Darinel Zelaya MD  PCP: SOUTH Walters CNP    Discharging Nurse: [facility] Unit/Room#: 4141/4223-70  Discharging Unit Phone Number: [phone]    Emergency Contact:   Extended Emergency Contact Information  Primary Emergency Contact: Jasen Ortega [address] Phone: [phone]  Relation: Parent  Secondary Emergency Contact: Jose Christy, [address] Phone: [phone]  Relation: Child    Past Surgical History:  Past Surgical History:   Procedure Laterality Date    COLONOSCOPY      MRI BREAST BX USING DEVICE RIGHT Right 2021    MRI BREAST BX USING DEVICE RIGHT 2021 [facility]    MRI NEEDLE BIOPSY EACH ADDL RIGHT Right 2021    MRI NEEDLE BIOPSY EACH ADDL RIGHT 2021 [facility]    PORT SURGERY N/A 2021    RIGHT PORT A CATH PLACEMENT performed by Boogie Holly DO at [address] Right 2021    US BREAST BIOPSY NEEDLE ADDITIONAL RIGHT [address] BREAST NEEDLE BIOPSY LEFT Left 2021    US BREAST NEEDLE BIOPSY LEFT 2021 Delmer Villasenor MD [facility] [address] BREAST NEEDLE BIOPSY RIGHT Right 2021    US BREAST NEEDLE BIOPSY RIGHT 2021 Delmer Villasenor MD [facility]       Immunization History:   Immunization History   Administered Date(s) Administered    COVID-19, [email or web address], PF, 30mcg/0.3mL 2021, 2021    Influenza Virus Vaccine 10/31/2010, 10/16/2017, 2019    Influenza, MDCK Quadv, IM, PF (Flucelvax 2 yrs and older) 10/01/2020    Influenza, Quadv, IM, PF (6 mo and older Fluzone, Flulaval, Fluarix, and 3 yrs and older Afluria) 10/15/2018, 2019       Active Problems:  Patient Active Problem List   Diagnosis Code    Perforation of sigmoid colon due to diverticulitis K57.20    Normocytic anemia D64.9    Pure hypercholesterolemia E78.00    Malignant neoplasm of right breast in female, estrogen receptor positive ([facility] [address] C50.911, Z17.0    Malignant neoplasm of upper-inner quadrant of left breast in female, estrogen receptor positive ([facility] [address] C50.212, Z17.0    Chemotherapy-induced neutropenia (HCC) D70.1, T45.1X5A    Fatigue R53.83    Tachycardia R00.0    Neutropenia, drug-induced (HCC) D70.2    Severe malnutrition (HCC) E43    Neutropenia (HCC) D70.9    Failure to thrive in adult R62.7    Recurrent falls R29.6    Bacteriuria R82.71    Acute pulmonary embolism without acute cor pulmonale (HCC) I26.99       Isolation/Infection:   Isolation            No Isolation          Patient Infection Status       Infection Onset Added Last Indicated Last Indicated By Review Planned Expiration Resolved Resolved By    None active    Resolved    COVID-19 Rule Out 10/22/21 10/22/21 10/22/21 COVID-19, Rapid (Ordered)   10/22/21 Rule-Out Test Resulted            Nurse Assessment:  Last Vital Signs: /75   Pulse 126   Temp 97.9 °F (36.6 °C) (Oral)   Resp 22   Ht 5' 7.5\" (1.715 m)   Wt 125 lb (56.7 kg)   LMP  (LMP Unknown)   SpO2 94%   BMI 19.29 kg/m²     Last documented pain score (0-10 scale): Pain Level: 0  Last Weight:   Wt Readings from Last 1 Encounters:   10/24/21 125 lb (56.7 kg)     Mental Status:  alert/oriented     IV Access:  - None (Port- deaccessed)     Nursing Mobility/ADLs:  Walking   Assisted  Transfer  Assisted  Bathing  Assisted  Dressing  Independent  Toileting  Independent  Feeding  Independent  Med Admin  Independent  Med Delivery   whole    Wound Care Documentation and Therapy:        Elimination:  Continence:   · Bowel:  Yes  · Bladder: Yes  Urinary Catheter: None   Colostomy/Ileostomy/Ileal Conduit: No       Date of Last BM: 10/25/2021    Intake/Output Summary (Last 24 hours) at 10/25/2021 1529  Last data filed at 10/25/2021 0957  Gross per 24 hour   Intake 500 ml   Output 550 ml   Net -50 ml     I/O last 3 completed shifts: In: 500 [P.O.:500]  Out: 550 [Urine:550]    Safety Concerns: At Risk for Falls    Impairments/Disabilities:      None    Nutrition Therapy:  Current Nutrition Therapy:   - Oral Diet:  General    Routes of Feeding: Oral  Liquids: No Restrictions  Daily Fluid Restriction: no  Last Modified Barium Swallow with Video (Video Swallowing Test): not done    Treatments at the Time of Hospital Discharge:   Respiratory Treatments: RA  Oxygen Therapy:  is not on home oxygen therapy.   Ventilator:    - No ventilator support    Rehab Therapies: Physical Therapy, Occupational Therapy and Nurse  Weight Bearing Status/Restrictions: No weight bearing restirctions  Other Medical Equipment (for information only, NOT a DME order):  walker  Other Treatments: HOME HEALTH CARE: LEVEL 3 [address] to establish plan of care for patient over 60 day period   Nursing  Initial home SN evaluation visit to occur within 24-48 hours for:  1)  medication management  2)  VS and clinical assessment  3)  S&S chronic disease exacerbation education + when to contact MD/NP  4)  care coordination  Medication Reconciliation during 1st SN visit  PT/OT/Speech   Evaluations in home within 24-48 hours of discharge to include DME and home safety   Frontload therapy 5 days, then 3x a week   OT to evaluate if patient has [address] needs for personal care    evaluation within 24-48 hours to evaluate resources & insurance for potential AL, [place], LTC, and Medicaid options   Palliative Care referral within 5 days of hospital discharge   PCP Visit scheduled within 3 - 7 days of hospital discharge [address] (If patient is agreeable and meets guidelines)      Patient's personal belongings (please select all that are sent with patient): With patient    RN SIGNATURE:  Electronically signed by Hawa Stoner RN on 10/25/21 at 5:04 PM EDT    CASE MANAGEMENT/SOCIAL WORK SECTION    Inpatient Status Date: ***    Readmission Risk Assessment Score:  Readmission Risk              Risk of Unplanned Readmission:  19           Discharging to Facility/ Agency   Name:  [facility]    Address: [address] [place] [address], [address] [place] [address]., [place] [address]  Phone: [phone]  Fax: [phone]      Dialysis Facility (if applicable)   · Name:  · Address:  · Dialysis Schedule:  · Phone:  · Fax:    / signature: {Esignature:[id number]:::0}    PHYSICIAN SECTION    Prognosis: Good    Condition at Discharge: Stable    Rehab Potential (if transferring to Rehab): Good    Recommended Labs or Other Treatments After Discharge: PT/OT, skilled nursing    Physician Certification: I certify the above information and transfer of Rubin Pa  is necessary for the continuing treatment of the diagnosis listed and that she requires Home Care for less 30 days.      Update Admission H&P: No change in H&P    PHYSICIAN SIGNATURE:  Electronically signed by Shaunna Mcdaniels MD on 10/25/21 at 3:30 PM EDT

## 2021-10-25 NOTE — FLOWSHEET NOTE
10/25/21 0813   Vital Signs   Temp 97.9 °F (36.6 °C)   Temp Source Oral   Pulse 117   Heart Rate Source Monitor   Resp 20   /68   BP Location Left upper arm   MAP (mmHg) 81   Patient Position Semi fowlers   Level of Consciousness Alert (0)   MEWS Score 3   Patient Currently in Pain Other (comment)  (pt states discomfort)   Oxygen Therapy   SpO2 90 %   O2 Device None (Room air)

## 2021-10-25 NOTE — PROGRESS NOTES
Bedside report received from [place]. Patient resting comfortably in bed. No signs of discomfort or distress. Bed is in lowest position, wheels locked, 2/2 side rails up. Bedside table and call light within reach. White board updated. Will continue to monitor patient. Sandy Mireles RN    12:45 AM  Message sent to cross cover \"Patient admitted for PE's. Was previously on hep gtt, now on eloquis. Patient is very anxious (this is her normal), she has previously refused benzos, but now is asking for something stronger than atarax. VSS, she runs a little tachy but appears to be normal for her. \" Sandy Mireles RN

## 2021-10-25 NOTE — PROGRESS NOTES
[address] 83   PROGRESS NOTE  [phone]      Attending Physician: Shira Jerome MD  Reason for Consultation/Chief Complaint: Cardiomyopathy, acute PE    Subjective     Patient complains of general malaise and persistent shortness of breath this morning, but has been maintaining O2 saturation in the mid-90s on room air. CURRENT Medications:  lisinopril (PRINIVIL;ZESTRIL) tablet 2.5 mg, Daily  apixaban (ELIQUIS) tablet 10 mg, BID  hydrOXYzine (ATARAX) tablet 10 mg, TID PRN  heparin (porcine) injection 4,600 Units, PRN  heparin (porcine) injection 2,300 Units, PRN  aspirin EC tablet 81 mg, Daily  vitamin B-12 (CYANOCOBALAMIN) tablet 1,000 mcg, Daily  melatonin tablet 3 mg, Nightly PRN  sodium chloride flush 0.9 % injection 5-40 mL, 2 times per day  sodium chloride flush 0.9 % injection 5-40 mL, PRN  0.9 % sodium chloride infusion, PRN  ondansetron (ZOFRAN-ODT) disintegrating tablet 4 mg, Q8H PRN   Or  ondansetron (ZOFRAN) injection 4 mg, Q6H PRN  polyethylene glycol (GLYCOLAX) packet 17 g, Daily PRN  acetaminophen (TYLENOL) tablet 650 mg, Q6H PRN   Or  acetaminophen (TYLENOL) suppository 650 mg, Q6H PRN  influenza quadrivalent split vaccine (FLUZONE;FLUARIX;FLULAVAL;AFLURIA) injection 0.5 mL, Prior to discharge        Allergies:  Bactrim [sulfamethoxazole-trimethoprim]     Review of Systems:   General: No chills or sweats. Cardiac: No chest pain or palpitations. Respiratory: Positive for shortness of breath. No cough. GI: No nausea, vomiting, or diarrhea. Neuro: No lightheadedness or dizziness. Objective   PHYSICAL EXAM:    Vitals:    10/25/21 0813   BP: 105/68   Pulse: 117   Resp: 20   Temp: 97.9 °F (36.6 °C)   SpO2: 90%    Weight: 125 lb (56.7 kg)      General: Cachectic adult female lying in bed. HEENT: Normocephalic, atraumatic, non-icteric, hearing intact, nares normal, mucous membranes moist.  Neck: No JVD. Heart: Tachycardic with regular rhythm. Normal S1 and S2.  No murmurs, gallops or rubs. Chest: Port noted on right. Lungs: Normal respiratory effort. Clear to auscultation bilaterally. No wheezes, rales, or rhonchi. Abdomen: Soft, non-tender. Normoactive bowel sounds. No masses or organomegaly. Skin: No rashes, wounds, or lesions. Pulses: 2+ and symmetric. Extremities: No clubbing, cyanosis, or edema. Psych: Normal mood and affect. Neuro: Alert and oriented to person, place, and time. No focal deficits noted. Labs   CBC:   Lab Results   Component Value Date    WBC 4.2 10/23/2021    RBC 3.06 10/23/2021    HGB 9.9 10/23/2021    HCT 28.8 10/23/2021    MCV 94.0 10/23/2021    RDW 17.2 10/23/2021     10/23/2021     CMP:  Lab Results   Component Value Date     10/22/2021    K 3.5 10/22/2021    CL 95 10/22/2021    CO2 22 10/22/2021    BUN 12 10/22/2021    CREATININE <0.5 10/22/2021    GFRAA >60 10/22/2021    GFRAA >60 07/10/2012    AGRATIO 1.0 10/22/2021    LABGLOM >60 10/22/2021    GLUCOSE 120 10/22/2021    PROT 7.0 10/22/2021    PROT 7.8 07/10/2012    CALCIUM 9.1 10/22/2021    BILITOT 0.4 10/22/2021    ALKPHOS 107 10/22/2021    AST 18 10/22/2021    ALT 17 10/22/2021     PT/INR:  No results found for: PTINR  HgBA1c:  Lab Results   Component Value Date    LABA1C 5.6 09/13/2018     Lab Results   Component Value Date    TROPONINI <0.01 10/22/2021         Cardiac Data     TTE 10/23/21:  Conclusions   Summary   Normal left ventricle size and wall thickness. The left ventricular systolic function is severely reduced with an ejection   fraction of 25-30 %. EF by Fay's method estimated at 27%. Severe global hypokinesis with regional variation. Left ventricular diastolic filling pressure are indeterminate. The right ventricle is normal in size and function. There is protruding echodensity from anterior RA wall seen in multiple   views, possibly artifact, but given patient's clinical history,   mass/thrombus cannot be excluded.    Mild to moderate mitral regurgitation. Mild pulmonic and tricuspid regurgitation. Systolic pulmonary artery pressure (SPAP) is normal and estimated at 35 mmHg   (right atrial pressure 3 mmHg). There is a large left pleural effusion. Compared to last echo on 8/17/2021 (EF 55%), left ventricle systolic   function has significantly declined while RV size and function appears   normal. RIght atrial findings as above - not seen on review of images prior,   unclear significance. Assessment and Plan:      1. Newly diagnosed cardiomyopathy - Concern for possible cardiotoxicity from adriamycin or Herceptin bio similar. However, has not had recent formal ischemic evaluation. LVEF 25-30% on TTE this admission, was 55% on last TTE from 8/17/21. Currently appears to be near a clinically euvolemic state.  -Pending clinical course, can consider proceeding with formal ischemic evaluation, but this can be arranged as an outpatient once patient has had time to recover from her acute PE  -Will need to discuss switching to less cardiotoxic chemotherapy regimen with oncology team, if additional options exist  -Start coreg 3.125 mg BID  -Continue lisinopril 10 mg daily  -Hold off on diuresis for now - Can plan to discharge with PO lasix 20 mg as needed for weight gain >3 lbs in one day or 5 pounds over 3 days  -Will arrange for close follow-up with [facility] in 1-2 weeks and can plan to further titrate ACEI/beta-blocker and/or add spironolactone as tolerated    2. Acute bilateral PE   -On Eliquis  -RV size and systolic function normal on TTE    3. Right atrial echodensity  -May represent artifact, but given recent PE and known underlying malignancy cannot definitively exclude thrombus or mass  -On Eliquis as above and can consider obtaining outpatient cardiac MRI for further evaluation, which may also help provide further insight into etiology of cardiomyopathy    4. Sinus tachycardia  -Secondary to above    5.  H/o breast cancer  -S/p 4 cycles of Adriamycin/Cytoxan and 7 of 12 planned cycles of Taxol with Herceptin bio similar and pertuzumab  -Oncology following and will likely need to consider switching to less cardiotoxic regimen if other options exist      Thank you for allowing us to participate in the care of Shemar Vidales. Please call me with any questions [phone]. Dari Alarcon,   [facility]   [phone] [facility]  [phone] [address] [place] [address]  10/25/2021 11:59 AM      I will address the patient's cardiac risk factors and adjusted pharmacologic treatment as needed. In addition, I have reinforced the need for patient directed risk factor modification. All questions and concerns were addressed to the patient/family. Alternatives to my treatment were discussed. The note was completed using EMR. Every effort was made to ensure accuracy; however, inadvertent computerized transcription errors may be present.

## 2021-10-25 NOTE — PROGRESS NOTES
ONCOLOGY HEMATOLOGY CARE PROGRESS NOTE      SUBJECTIVE:       She is feeling better and ready to leave today. Plans for cardiac cath outpatient at later date. ROS:     Constitutional:  No weight loss, No fever, No chills, No night sweats. Energy level good. Eyes:  No impairment or change in vision  ENT / Mouth:  No pain, abnormal ulceration, bleeding, nasal drip or change in voice or hearing  Cardiovascular:  No chest pain, palpitations, new edema, or calf discomfort  Respiratory:  No pain, hemoptysis, change to breathing  Breast:  No pain, discharge, change in appearance or texture  Gastrointestinal:  No pain, cramping, jaundice, change to eating and bowel habits  Urinary:  No pain, bleeding or change in continence  Genitalia: No pain, bleeding or discharge  Musculoskeletal:  No redness, pain, edema or weakness  Skin:  No pruritus, rash, change to nodules or lesions  Neurologic:  No discomfort, change in mental status, speech, sensory or motor activity  Psychiatric:  No change in concentration or change to affect or mood  Endocrine:  No hot flashes, increased thirst, or change to urine production  Hematologic: No petechiae, ecchymosis or bleeding  Lymphatic:  No lymphadenopathy or lymphedema  Allergy / Immunologic:  No eczema, hives, frequent or recurrent infections    OBJECTIVE        Physical    VITALS:  /68   Pulse 117   Temp 97.9 °F (36.6 °C) (Oral)   Resp 20   Ht 5' 7.5\" (1.715 m)   Wt 125 lb (56.7 kg)   LMP  (LMP Unknown)   SpO2 90%   BMI 19.29 kg/m²   TEMPERATURE:  Current - Temp: 97.9 °F (36.6 °C);  Max - Temp  Av °F (36.7 °C)  Min: 97.7 °F (36.5 °C)  Max: 98.4 °F (36.9 °C)  PULSE OXIMETRY RANGE: SpO2  Av %  Min: 90 %  Max: 95 %  24HR INTAKE/OUTPUT:    Intake/Output Summary (Last 24 hours) at 10/25/2021 1117  Last data filed at 10/25/2021 0957  Gross per 24 hour   Intake 500 ml   Output 650 ml   Net -150 ml       CONSTITUTIONAL: awake, alert, cooperative, no apparent distress   EYES: pupils equal, round and reactive to light, sclera clear and conjunctiva normal  ENT: Normocephalic, without obvious abnormality, atraumatic  NECK: supple, symmetrical, no jugular venous distension and no carotid bruits   HEMATOLOGIC/LYMPHATIC: no cervical, supraclavicular or axillary lymphadenopathy   LUNGS: no increased work of breathing and clear to auscultation   CARDIOVASCULAR: regular rate and rhythm, normal S1 and S2, no murmur noted  ABDOMEN: normal bowel sounds x 4, soft, non-distended, non-tender, no masses palpated, no hepatosplenomgaly   MUSCULOSKELETAL: full range of motion noted, tone is normal  NEUROLOGIC: awake, alert, oriented to name, place and time. Motor skills grossly intact. SKIN: Normal skin color, texture, turgor and no jaundice.  appears intact   EXTREMITIES: no LE edema       Data      Recent Labs     10/22/21  1349 10/23/21  0543   WBC 5.3 4.2   HGB 10.9* 9.9*   HCT 32.2* 28.8*    363   MCV 93.5 94.0        Recent Labs     10/22/21  1349   *   K 3.5   CL 95*   CO2 22   BUN 12   CREATININE <0.5*     Recent Labs     10/22/21  1349   AST 18   ALT 17   BILITOT 0.4   ALKPHOS 107       Magnesium:    Lab Results   Component Value Date    MG 1.90 10/22/2021    MG 2.00 08/16/2021         Problem List  Patient Active Problem List   Diagnosis    Perforation of sigmoid colon due to diverticulitis    Normocytic anemia    Pure hypercholesterolemia    Malignant neoplasm of right breast in female, estrogen receptor positive ([address]    Malignant neoplasm of upper-inner quadrant of left breast in female, estrogen receptor positive ([address]    Chemotherapy-induced neutropenia (HCC)    Fatigue    Tachycardia    Neutropenia, drug-induced ([address]    Severe malnutrition (HCC)    Neutropenia (HCC)    Failure to thrive in adult    Recurrent falls    Bacteriuria    Acute pulmonary embolism without acute cor pulmonale (HCC)       ASSESSMENT AND PLAN:        Breast carcinoma:  -Diagnosis 5/28/2021  -Right breast  -T1c, N1, M0  -ER positive, CT positive and HER-2/croy positive  -Status post 4 cycles of dose dense Adriamycin/Cytoxan  -Completed her seventh cycle of a planned 12 of weekly Taxol with Herceptin bio similar and pertuzumab  -No clinical evidence of recurrence or progression     Pulmonary embolism:  - Eliquis with loading dose      Cardiac dysfunction:  -She had an echocardiogram performed 8/17/2021 which demonstrated a normal ejection fraction of 55%  -10/22/2021 ejection fraction was noted to be 27%. -This could be from her Herceptin bio similar  -This treatment will need to be held and her echocardiogram repeated at a later date  -Cardiology is going to see the patient- plans for outpatient cardiac cath after her PE is treated to assess EF% after stabilization of her PE.          ONCOLOGIC DISPOSITION: can dc today, follow up with Dr. Tone Maya next week- Herceptin/Perjeta on hold until EF improves. May consider Taxol only- defer to Dr. Tone Maya next week.        Faizan Flor CNP   Please contact through [facility]

## 2021-10-25 NOTE — PROGRESS NOTES
Physical Therapy  Facility/Department: [facility]  Daily Treatment Note  NAME: Emilie Nguyen  : 1945  MRN: [id number]    Date of Service: 10/25/2021    Discharge Recommendations:  24 hour supervision or assist, Home with Home health PT   PT Equipment Recommendations  Equipment Needed: Yes  Mobility Devices: Tanya Nailer: Rolling    Assessment   Body structures, Functions, Activity limitations: Decreased strength;Decreased endurance;Decreased balance  Assessment: pt is 77 yo female admit with acute pulm embolism w/o acute cor pulmonale; severe malnutrition, breast CA current chemo; PMH of note: falls (), tachycardia, neutropenia; pt lives alone and is ind at baseline, pt reports decline in function since starting chemo; pt presents with decreased functional mobility requiring cga for ambulation short distances due to unsteadiness; pt requires sba and supervision for transfers; pt would benefit from Acute Inpatient Skilled PT to address these deficits; PT recommends pt return home when medically appropriate with 24hr supervision and assist as needed with HOME PT and RW  Treatment Diagnosis: decreased functional mobility and gait abnormality  Specific instructions for Next Treatment: progress gait and stair training  Prognosis: Good  Decision Making: Medium Complexity  PT Education: Goals;PT Role;Plan of Care;Precautions;Transfer Training;Energy Conservation;General Safety; Adaptive Device Training;Gait Training;Disease Specific Education; Functional Mobility Training  Disease Specific Education: Pt educated on importance of OOB mobility, prevention of complications of bedrest, and general safety during hospitalization.  Pt verbalized understanding  Patient Education: pt verbalized and demonstrated understanding  Barriers to Learning: none  REQUIRES PT FOLLOW UP: Yes  Activity Tolerance  Activity Tolerance: Patient limited by fatigue;Patient limited by endurance;Treatment limited secondary to medical complications (free text)  Activity Tolerance: tachycardia; 120-133 during session and with activity; /73 supine, 109/65 sitting; 92% on room air at rest; initial report of dizziness upon standing that improved with time     Patient Diagnosis(es): The encounter diagnosis was Multiple subsegmental pulmonary emboli without acute cor pulmonale ([facility] has a past medical history of Breast cancer ([facility] Cancer ([facility] and Kidney stone. has a past surgical history that includes [address] RIGHT (Right, 5/7/2021); US BREAST BIOPSY W LOC DEVICE EACH ADDL LESION RIGHT (Right, 5/7/2021); US BREAST BIOPSY W LOC DEVICE 1ST LESION LEFT (Left, 5/7/2021); Colonoscopy; Port Surgery (N/A, 5/25/2021); MRI BIOPSY BREAST W LOC DEVICE RIGHT 1ST LESION (Right, 5/28/2021); and MRI BIOPSY BREAST W LOC DEVICE RIGHT EACH ADDL (Right, 5/28/2021). Restrictions  Restrictions/Precautions  Restrictions/Precautions: Up as Tolerated, Fall Risk, General Precautions  Position Activity Restriction  Other position/activity restrictions: telemetry  Subjective   General  Chart Reviewed:  Yes  Additional Pertinent Hx: breast CA current chemo  Response To Previous Treatment: Not applicable  Family / Caregiver Present: No  Referring Practitioner: Gary Pendleton  Subjective  Subjective: pt motivated to participate, limited by tachycardia  General Comment  Comments: RN cleared pt for PT  Pain Screening  Patient Currently in Pain: No  Vital Signs  Patient Currently in Pain: No       Orientation  Orientation  Overall Orientation Status: Within Normal Limits  Cognition   Cognition  Overall Cognitive Status: WFL  Objective   Bed mobility  Supine to Sit: Modified independent  Comment: hob elevated similar to bed at home, pt in chair at end of session  Transfers  Sit to Stand: Supervision  Stand to sit: Supervision  Bed to Chair: Stand by assistance  Ambulation  Ambulation?: Yes  More Ambulation?: Yes  Ambulation 1  Surface: level tile  Device: No Device  Assistance: Contact guard assistance  Quality of Gait: reaching for furniture, one episode of unsteadiness requiring cga to correct  Gait Deviations: Slow Aubrie;Decreased step length;Decreased step height;Deviated path  Distance: 20 + 30 feet  Comments: slightly unsteady  Ambulation 2  Surface - 2: level tile  Device 2: Rolling Walker  Assistance 2: Stand by assistance  Quality of Gait 2: steady, no lob  Gait Deviations: Slow Aubrie;Decreased step length;Decreased step height  Distance: 50 feet  Comments: pt reports increased feeling of safety with RW, observable increased steadiness with RW, pt demonstrated safe turning walker management  Stairs/Curb  Stairs?: Yes  Stairs  # Steps : 2  Stairs Height: 2\"  Rails: Bilateral  Device: No Device  Assistance: Stand by assistance     Balance  Posture: Fair  Sitting - Static: Fair;+  Sitting - Dynamic: Fair  Standing - Static: Fair;-  Standing - Dynamic: Fair;-      AROM RLE (degrees)  RLE AROM: WFL  AROM LLE (degrees)  LLE AROM : WFL  Strength RLE  Strength RLE: WFL  Comment: overall 4/5  Strength LLE  Strength LLE: WFL  Comment: overall 4/5       AM-PAC Score  AM-PAC Inpatient Mobility Raw Score : 20 (10/25/21 1412)  AM-PAC Inpatient T-Scale Score : 47.67 (10/25/21 1412)  Mobility Inpatient CMS 0-100% Score: 35.83 (10/25/21 1412)  Mobility Inpatient CMS G-Code Modifier : CJ (10/25/21 1412)          Goals  Short term goals  Time Frame for Short term goals: one week 11/1 unless otherwise indicated  Short term goal 1: pt will transfer sit to and from stand with mod Ind by 10/26/21  Short term goal 2: pt will ambulate 150 feet with RW with sup  Short term goal 3: pt will negotiate 2 steps with B rails with sba to enter home  Short term goal 4: pt will demonstrate and tolerate 10 reps of 3 B LE therex  Patient Goals   Patient goals : to have stronger legs    Plan    Plan  Times per week: 3-5  Times per day: Daily  Specific instructions for Next Treatment: progress gait and stair training  Current Treatment Recommendations: Strengthening, ROM, Balance Training, Functional Mobility Training, Transfer Training, Endurance Training, Gait Training, Stair training, Home Exercise Program, Safety Education & Training, Patient/Caregiver Education & Training, Equipment Evaluation, Education, & procurement  Safety Devices  Type of devices: All fall risk precautions in place, Call light within reach, Chair alarm in place, Gait belt, Patient at risk for falls, Left in chair, Nurse notified     Therapy Time   Individual Concurrent Group Co-treatment   Time In 1300         Time Out 1349         Minutes 49         Timed Code Treatment Minutes: 39 Minutes     If pt is unable to be seen after this session, please let this note serve as discharge summary. Please see case management note for discharge disposition. Thank you.   Sheri Rodriguez, PT

## 2021-10-25 NOTE — CARE COORDINATION
[facility]    Referral received from  to follow for home care services. I will follow for needs, and speak with patient to verify demos. [facility]    DC order noted, all docs needed have been faxed to [facility] for home care services.     Home care to see patient within 24-48 hrs    Pallavi Casey RN, BSN CTN  [facility] [phone]

## 2021-10-25 NOTE — PROGRESS NOTES
Occupational Therapy   Occupational Therapy Initial Assessment  Date: 10/25/2021   Patient Name: Sondra Liu  MRN: [id number]     : 1945    Date of Service: 10/25/2021    Discharge Recommendations:  24 hour supervision or assist       Assessment   Performance deficits / Impairments: Decreased balance;Decreased functional mobility ; Decreased strength;Decreased ADL status  Assessment: pt normally independent with BADL's & functional mobility without AD; pt with increased HR with minimal exertion, loss of balance with turns, requiring CGA with gait belt & RW for safe mobility/standing balance; pt to benefit from skilled OT services  OT Education: OT Role;Plan of Care;Precautions  Patient Education: disease specific: importance of OOB to chair for meals/ ADL's for short periods of time, use of nurse call light for assist with transfers/bathroom mobility  Barriers to Learning: none perceived  REQUIRES OT FOLLOW UP: Yes  Activity Tolerance  Activity Tolerance: Patient Tolerated treatment well  Activity Tolerance: semi-olea's on RA:  BP = 107/77, HR = 121;  sitting EOB:  BP = 109/65, HR = 128, 94 % O 2 sats; sitting in chair after bathroom mobility:  BP = 123/69, HR = 134, 95 % O 2 sats; Safety Devices  Safety Devices in place: Yes  Type of devices: Call light within reach; Chair alarm in place; Left in chair;Nurse notified           Patient Diagnosis(es): The encounter diagnosis was Multiple subsegmental pulmonary emboli without acute cor pulmonale ([facility] [address] has a past medical history of Breast cancer ([facility] [address] Cancer ([facility] [address] and Kidney stone. has a past surgical history that includes [address] RIGHT (Right, 2021); US BREAST BIOPSY W LOC DEVICE EACH ADDL LESION RIGHT (Right, 2021); US BREAST BIOPSY W LOC DEVICE 1ST LESION LEFT (Left, 2021);  Colonoscopy; Port Surgery (N/A, 2021); MRI BIOPSY BREAST W LOC DEVICE RIGHT 1ST LESION (Right, 2021); and MRI BIOPSY BREAST W LOC DEVICE RIGHT EACH ADDL (Right, 5/28/2021).            Restrictions  Restrictions/Precautions  Restrictions/Precautions: Up as Tolerated, Fall Risk, General Precautions  Position Activity Restriction  Other position/activity restrictions: telemetry    Subjective   General  Chart Reviewed: Yes  Patient assessed for rehabilitation services?: Yes  Additional Pertinent Hx: fall with Left wrist fx July 2021  Family / Caregiver Present: No  Referring Practitioner: Dr. Bridgette Cifuentes  Diagnosis: SOB, PE,Right  breast Ca with chemo  General Comment  Comments: RN cleared pt for OT; pt awake in bed, agreeable to therapy  Patient Currently in Pain: No  Vital Signs  Patient Currently in Pain: No  Social/Functional History  Social/Functional History  Lives With: Alone  Type of Home: House  Home Layout: Two level (14 with  R rail to lower level living area-kitchen)  Home Access: Stairs to enter with rails  Entrance Stairs - Number of Steps: 2  Entrance Stairs - Rails: Both  Bathroom Shower/Tub: Tub/Shower unit, Shower chair with back  [employer]: Standard  Bathroom Equipment: Grab bars in shower, Hand-held shower, Grab bars around toilet  Bathroom Accessibility: Walker accessible  Home Equipment: Jaky Lennox, Grab bars (adjustable bed)  Receives Help From: Family (mom and daughter)  ADL Assistance: Independent  Homemaking Assistance: Independent  Ambulation Assistance: Independent  Transfer Assistance: Independent  Active : Yes  Mode of Transportation: Car  Occupation: Retired  Leisure & Hobbies: watch TV for now, reading, dogs  Additional Comments: hasn't driven since chemo started; son does grocery shopping, reports 80 y.o mother can stay with upon discharge home Roam Warren is like a 79 y.o\"       Objective   Hearing: Within functional limits    Orientation  Overall Orientation Status: Within Functional Limits  Observation/Palpation  Posture: Fair (thoracic kyphosis)  Balance  Sitting Balance: Supervision  Standing Balance: Contact guard assistance (without AD, loss of balance with turns)  Standing Balance  Time: 1-2 minutes x 2  Activity: bathroom mobility  Functional Mobility  Functional - Mobility Device: No device (gait belt)  Activity: To/from bathroom  Assist Level: Contact guard assistance  Functional Mobility Comments: Physical Therapy to instruct pt in use of RW  Toilet Transfers  Toilet - Technique: Ambulating  Equipment Used: Grab bars  Toilet Transfer: Contact guard assistance  ADL  Feeding: Independent  Grooming: Contact guard assistance (standing at sink to brush teeth)  LE Dressing: Contact guard assistance (standing; supervision seated to doff/unique socks)  Tone RUE  RUE Tone: Normotonic  Tone LUE  LUE Tone: Normotonic  Coordination  Movements Are Fluid And Coordinated: Yes     Bed mobility  Sit to Supine: Unable to assess (Left up in chair with Physical Therapy upon exiting)  Transfers  Sit to stand: Contact guard assistance  Stand to sit: Contact guard assistance     Vision - Basic Assessment  Prior Vision: Wears glasses only for reading    Cognition  Overall Cognitive Status: WFL        Sensation  Overall Sensation Status: WFL (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)    LUE AROM : WFL  RUE AROM : WFL             Plan   Plan  Times per week: 3x/ week  Current Treatment Recommendations: Strengthening, Balance Training, Functional Mobility Training, Safety Education & Training, Self-Care / ADL, Endurance Training      AM-PAC Score        AM-[facility] Inpatient Daily Activity Raw Score: 20 (10/25/21 1413)  AM-PAC Inpatient ADL T-Scale Score : 42.03 (10/25/21 1413)  ADL Inpatient CMS 0-100% Score: 38.32 (10/25/21 1413)  ADL Inpatient CMS G-Code Modifier : Dakota Angulo (10/25/21 [facility]3)    Goals  Short term goals  Time Frame for Short term goals: 1 week(11-01-21)  Short term goal 1: supervision with bathroom mobility with RW by 10-30-21  Short term goal 2: supervision standing ADL's for 5 minutes by 11-01-21  Short term goal 3: tolerate 15 reps BUE AROM exercises  Patient Goals   Patient goals : go home with assist       Therapy Time   Individual Concurrent Group Co-treatment   Time In [address]         Time Out 1340         Minutes [address]

## 2021-10-25 NOTE — CARE COORDINATION
CASE MANAGEMENT DISCHARGE SUMMARY      Discharge to: home with [address] ordered/agency: walker through [employer]     Transportation: daughter     Confirmed discharge plan with: patient/daughter/RN/Carie with [facility]/Giancarlo with [employer]      Patient: yes     Family:  yes    Name: Contact number: Tessa Parsons 63     Facility/Agency, name:  [facility] faxed   Phone number for report to facility: [phone]     RN, name: Michael Parekh     Note: Discharging nurse to complete [facility], reconcile [facility], and place final copy with patient's discharge packet.

## 2021-10-25 NOTE — PROGRESS NOTES
Physician Progress Note      Young Salinas  CSN #:                  [id number]  :                       1945  ADMIT DATE:       10/22/2021 12:58 PM  [facility] DATE:  RESPONDING  PROVIDER #:        Adelina Dunbar MD          QUERY TEXT:    Pt admitted with PE's d/t breast cancer malignancy. Noted documentation of   *Severe malnutrition\" per RD consult note 10/23 and on attending undated   problem list.  If possible, please document in progress notes and discharge   summary:    The medical record reflects the following:  Risk Factors: Breast cancer, chemo, CHF/cardiomyopathy, PE    Clinical Indicators: Per RD consult 10/23-\"Severe malnutrition  Context:  Chronic Illness  Findings of the 6 clinical characteristics of malnutrition:  Energy Intake:  7 - 75% or less estimated energy requirements for 1 month or   longer  Weight Loss:   (7.4% weight loss in 2 months)  Body Fat Loss:  7 - Severe body fat loss Buccal region, Orbital  Muscle Mass Loss:  7 - Severe muscle mass loss Temples (temporalis), Clavicles   (pectoralis & deltoids)\"    Treatment: RD consult, ONS supplied three times daily with Regular meal diet,   ongoing supportive nursing care    thank you,  Micheal Gonzalez RN, BSN  [email or web address],  Options provided:  -- Severe malnutrition confirmed POA  -- Malnutrition confirmed as, please specify stage mild or moderate, please   specify stage mild or moderate. -- BMI 19 is underweight  -- Other - I will add my own diagnosis  -- Disagree - Not applicable / Not valid  -- Disagree - Clinically unable to determine / Unknown  -- Refer to Clinical Documentation Reviewer    PROVIDER RESPONSE TEXT:    The diagnosis of severe malnutrition was confirmed as POA.     Query created by: Chiquita Guevara on 10/25/2021 1:19 PM      Electronically signed by:  Adelina Dunbar MD 10/25/2021 1:21 PM

## 2021-10-26 ENCOUNTER — FOLLOWUP TELEPHONE ENCOUNTER (OUTPATIENT)
Dept: TELEMETRY | Age: 76
End: 2021-10-26

## 2021-10-26 NOTE — TELEPHONE ENCOUNTER
1st Attempt; No Answer- Left HIPAA compliant voicemail with Non-Urgent Heart Failure Resource Line number for call back.        Steve Thompson RN

## 2021-10-26 NOTE — DISCHARGE SUMMARY
Hospital Medicine Discharge Summary    Patient ID: Pasha Morrow      Patient's PCP: Shy Banerjee, APRN - CNP    Admit Date: 10/22/2021     Discharge Date: 10/25/2021      Admitting Physician: Tamsen Apley, MD     Discharge Physician: Marysol Ford MD     Discharge Diagnoses: Active Hospital Problems    Diagnosis     Acute pulmonary embolism without acute cor pulmonale (HCC) [I26.99]     Severe malnutrition (HCC) [E43]     Malignant neoplasm of right breast in female, estrogen receptor positive ([facility] [address] [C50.911, Z17.0]        The patient was seen and examined on day of discharge and this discharge summary is in conjunction with any daily progress note from day of discharge. Hospital Course:   78 y.o. female who presented to [facility] with above complaints  Patient with PMH of breast CA on chemotherapy presents to the ED with complaints of shortness of breath. Patient reports she has been short of breath since Monday, gradually getting worse. It is present at rest and on exertion. Patient had last round of chemo treatment on Monday. Patient reports some chest tightness which is particularly worse on deep inspiration. Also complains of chronic mild back pain. Denies any subjective fevers or chills. Mild nonproductive cough with some hemoptysis. She does endorse to having nosebleeds since she started chemo. denies any asymmetric leg swelling or cramping in the calves.     Acute PE  - stable on room air  - echo without heart strain  - started on eliquis    Acute systolic heart failure  - echo with EF 25-30%  - cardiology consulted  - started on coreg, lisinopril    Breast cancer  - hem/onc consulted  - supportive care  - follow up as outpatient for further chemo plan    Anxiety  - poorly controlled  - follow up outpatient    Physical Exam Performed:     /75   Pulse 126   Temp 97.9 °F (36.6 °C) (Oral)   Resp 22   Ht 5' 7.5\" (1.715 m)   Wt 125 lb (56.7 kg)   LMP  (LMP Unknown)   SpO2 94%   BMI 19.29 kg/m²       General appearance: No apparent distress, appears stated age and cooperative. HEENT: Pupils equal, round, and reactive to light. Conjunctivae/corneas clear. Neck: Supple, with full range of motion. No jugular venous distention. Trachea midline. Respiratory:  Normal respiratory effort. Clear to auscultation, bilaterally without Rales/Wheezes/Rhonchi. Cardiovascular: Regular rate and rhythm with normal S1/S2 without murmurs, rubs or gallops. Abdomen: Soft, non-tender, non-distended with normal bowel sounds. Musculoskeletal: No clubbing, cyanosis or edema bilaterally. Full range of motion without deformity. Skin: Skin color, texture, turgor normal.  No rashes or lesions. Neurologic:  Neurovascularly intact without any focal sensory/motor deficits. Cranial nerves: II-XII intact, grossly non-focal.  Psychiatric: Alert and oriented, thought content appropriate, normal insight  Capillary Refill: Brisk,3 seconds, normal   Peripheral Pulses: +2 palpable, equal bilaterally     Labs: For convenience and continuity at follow-up the following most recent labs are provided:      CBC:    Lab Results   Component Value Date    WBC 4.2 10/23/2021    HGB 9.9 10/23/2021    HCT 28.8 10/23/2021     10/23/2021       Renal:    Lab Results   Component Value Date     10/22/2021    K 3.5 10/22/2021    CL 95 10/22/2021    CO2 22 10/22/2021    BUN 12 10/22/2021    CREATININE <0.5 10/22/2021    CALCIUM 9.1 10/22/2021    PHOS 3.3 08/16/2021         Significant Diagnostic Studies    Radiology:   VL Extremity Venous Bilateral   Final Result      CT CHEST PULMONARY EMBOLISM W CONTRAST   Final Result   1. multiple acute bilateral pulmonary emboli. 2. Nonspecific multifocal bilateral ground-glass opacification. In the   setting of pulmonary emboli, the differential includes pulmonary infarct with   or without superimposed atypical infectious versus inflammatory pneumonitis.    3. Mild CHF. Findings were discussed with ALEXIS Joan Lesch at 3:28 pm on 10/22/2021. XR CHEST PORTABLE   Final Result   Hazy opacities noted in the bilateral upper lungs likely reflecting airspace   disease, however malignant process cannot be excluded. Further evaluation   with chest CT is recommended.                 Consults:     IP CONSULT TO HOSPITALIST  IP CONSULT TO ONCOLOGY  IP CONSULT TO CARDIOLOGY  IP CONSULT TO HOME CARE NEEDS    Disposition:  [facility]    Condition at Discharge: Stable    Discharge Instructions/Follow-up:  Follow up with PCP, hem/onc, cardiology within 1-2 weeks    Code Status:  Full code    Activity: activity as tolerated    Diet: regular diet      Discharge Medications:     Discharge Medication List as of 10/25/2021  5:07 PM           Details   apixaban starter pack (ELIQUIS DVT/PE STARTER PACK) 5 MG TBPK tablet Take 1 tablet by mouth See Admin Instructions, Disp-74 tablet, R-0Normal      lisinopril (PRINIVIL;ZESTRIL) 2.5 MG tablet Take 1 tablet by mouth daily, Disp-30 tablet, R-0Normal      carvedilol (COREG) 3.125 MG tablet Take 1 tablet by mouth 2 times daily (with meals), Disp-60 tablet, R-0Normal              Details   docusate sodium (COLACE) 100 MG capsule Take 100 mg by mouth every 8 hours as needed for ConstipationHistorical Med      !! loperamide (IMODIUM) 2 MG capsule Take 2 mg by mouth 4 times daily as needed for DiarrheaHistorical Med      !! loperamide (IMODIUM) 2 MG capsule Take 1 capsule by mouthHistorical Med      ondansetron (ZOFRAN) 8 MG tablet Take 1 tablet by mouthHistorical Med      melatonin (RA MELATONIN) 3 MG TABS tablet Take 1 tablet by mouth nightly as needed (sleep), Disp-20 tablet, R-0Print      aspirin 81 MG EC tablet Take 81 mg by mouth dailyHistorical Med      Multiple Vitamin (MULTI-VITAMIN PO) Take by mouthHistorical Med      Cholecalciferol (VITAMIN D3) 5000 units TABS Take by mouthHistorical Med      Cyanocobalamin (VITAMIN B-12) 1000 MCG extended release tablet Take 1,000 mcg by mouth dailyHistorical Med       !! - Potential duplicate medications found. Please discuss with provider. Time Spent on discharge is more than 30 minutes in the examination, evaluation, counseling and review of medications and discharge plan. Signed:    Bello Flaherty MD   10/25/2021      Thank you SOUTH Mancer[place] CNP for the opportunity to be involved in this patient's care. If you have any questions or concerns please feel free to contact me at [phone].

## 2021-10-27 ENCOUNTER — APPOINTMENT (OUTPATIENT)
Dept: GENERAL RADIOLOGY | Age: 76
DRG: 291 | End: 2021-10-27
Payer: MEDICARE

## 2021-10-27 ENCOUNTER — HOSPITAL ENCOUNTER (INPATIENT)
Age: 76
LOS: 6 days | Discharge: HOME HEALTH | DRG: 291 | End: 2021-11-02
Attending: EMERGENCY MEDICINE | Admitting: INTERNAL MEDICINE
Payer: MEDICARE

## 2021-10-27 ENCOUNTER — APPOINTMENT (OUTPATIENT)
Dept: CT IMAGING | Age: 76
DRG: 291 | End: 2021-10-27
Payer: MEDICARE

## 2021-10-27 ENCOUNTER — FOLLOWUP TELEPHONE ENCOUNTER (OUTPATIENT)
Dept: TELEMETRY | Age: 76
End: 2021-10-27

## 2021-10-27 DIAGNOSIS — I50.23 ACUTE ON CHRONIC SYSTOLIC CONGESTIVE HEART FAILURE (HCC): ICD-10-CM

## 2021-10-27 DIAGNOSIS — N30.00 ACUTE CYSTITIS WITHOUT HEMATURIA: ICD-10-CM

## 2021-10-27 DIAGNOSIS — J96.01 ACUTE RESPIRATORY FAILURE WITH HYPOXIA (HCC): Primary | ICD-10-CM

## 2021-10-27 PROBLEM — I50.9 CHF (CONGESTIVE HEART FAILURE) (HCC): Status: ACTIVE | Noted: 2021-10-27

## 2021-10-27 PROBLEM — I50.43 CHF (CONGESTIVE HEART FAILURE), NYHA CLASS I, ACUTE ON CHRONIC, COMBINED (HCC): Status: ACTIVE | Noted: 2021-10-27

## 2021-10-27 PROBLEM — N39.0 UTI (URINARY TRACT INFECTION): Status: ACTIVE | Noted: 2021-10-27

## 2021-10-27 LAB
A/G RATIO: 0.9 (ref 1.1–2.2)
ALBUMIN SERPL-MCNC: 2.9 G/DL (ref 3.4–5)
ALP BLD-CCNC: 98 U/L (ref 40–129)
ALT SERPL-CCNC: 27 U/L (ref 10–40)
ANION GAP SERPL CALCULATED.3IONS-SCNC: 11 MMOL/L (ref 3–16)
AST SERPL-CCNC: 20 U/L (ref 15–37)
BACTERIA: ABNORMAL /HPF
BASE EXCESS VENOUS: 1.9 MMOL/L (ref -3–3)
BASOPHILS ABSOLUTE: 0 K/UL (ref 0–0.2)
BASOPHILS RELATIVE PERCENT: 0.3 %
BILIRUB SERPL-MCNC: 0.3 MG/DL (ref 0–1)
BILIRUBIN URINE: NEGATIVE
BLOOD, URINE: NEGATIVE
BUN BLDV-MCNC: 19 MG/DL (ref 7–20)
CALCIUM SERPL-MCNC: 9.2 MG/DL (ref 8.3–10.6)
CARBOXYHEMOGLOBIN: 0.9 % (ref 0–1.5)
CHLORIDE BLD-SCNC: 98 MMOL/L (ref 99–110)
CLARITY: CLEAR
CO2: 25 MMOL/L (ref 21–32)
COLOR: YELLOW
CREAT SERPL-MCNC: <0.5 MG/DL (ref 0.6–1.2)
CRYSTALS, UA: ABNORMAL /HPF
EKG ATRIAL RATE: 123 BPM
EKG DIAGNOSIS: NORMAL
EKG P AXIS: 81 DEGREES
EKG P-R INTERVAL: 146 MS
EKG Q-T INTERVAL: 336 MS
EKG QRS DURATION: 72 MS
EKG QTC CALCULATION (BAZETT): 481 MS
EKG R AXIS: 22 DEGREES
EKG T AXIS: 78 DEGREES
EKG VENTRICULAR RATE: 123 BPM
EOSINOPHILS ABSOLUTE: 0 K/UL (ref 0–0.6)
EOSINOPHILS RELATIVE PERCENT: 0.2 %
EPITHELIAL CELLS, UA: ABNORMAL /HPF (ref 0–5)
GFR AFRICAN AMERICAN: >60
GFR NON-AFRICAN AMERICAN: >60
GLOBULIN: 3.4 G/DL
GLUCOSE BLD-MCNC: 126 MG/DL (ref 70–99)
GLUCOSE URINE: NEGATIVE MG/DL
HCO3 VENOUS: 26.8 MMOL/L (ref 23–29)
HCT VFR BLD CALC: 27.5 % (ref 36–48)
HEMOGLOBIN: 9.3 G/DL (ref 12–16)
KETONES, URINE: 15 MG/DL
LACTIC ACID: 1 MMOL/L (ref 0.4–2)
LEUKOCYTE ESTERASE, URINE: ABNORMAL
LYMPHOCYTES ABSOLUTE: 0.4 K/UL (ref 1–5.1)
LYMPHOCYTES RELATIVE PERCENT: 5.8 %
MAGNESIUM: 1.8 MG/DL (ref 1.8–2.4)
MCH RBC QN AUTO: 31.7 PG (ref 26–34)
MCHC RBC AUTO-ENTMCNC: 33.9 G/DL (ref 31–36)
MCV RBC AUTO: 93.6 FL (ref 80–100)
METHEMOGLOBIN VENOUS: 0.7 %
MICROSCOPIC EXAMINATION: YES
MONOCYTES ABSOLUTE: 0.4 K/UL (ref 0–1.3)
MONOCYTES RELATIVE PERCENT: 6.5 %
MUCUS: ABNORMAL /LPF
NEUTROPHILS ABSOLUTE: 5.3 K/UL (ref 1.7–7.7)
NEUTROPHILS RELATIVE PERCENT: 87.2 %
NITRITE, URINE: NEGATIVE
O2 SAT, VEN: 87 %
O2 THERAPY: ABNORMAL
PCO2, VEN: 43.6 MMHG (ref 40–50)
PDW BLD-RTO: 17.3 % (ref 12.4–15.4)
PH UA: 5.5 (ref 5–8)
PH VENOUS: 7.41 (ref 7.35–7.45)
PLATELET # BLD: 478 K/UL (ref 135–450)
PMV BLD AUTO: 7.1 FL (ref 5–10.5)
PO2, VEN: 55.9 MMHG (ref 25–40)
POTASSIUM SERPL-SCNC: 4 MMOL/L (ref 3.5–5.1)
PRO-BNP: 1098 PG/ML (ref 0–449)
PROTEIN UA: NEGATIVE MG/DL
RAPID INFLUENZA  B AGN: NEGATIVE
RAPID INFLUENZA A AGN: NEGATIVE
RBC # BLD: 2.94 M/UL (ref 4–5.2)
RBC UA: ABNORMAL /HPF (ref 0–4)
SARS-COV-2, NAAT: NOT DETECTED
SODIUM BLD-SCNC: 134 MMOL/L (ref 136–145)
SPECIFIC GRAVITY UA: >=1.03 (ref 1–1.03)
TCO2 CALC VENOUS: 28 MMOL/L
TOTAL PROTEIN: 6.3 G/DL (ref 6.4–8.2)
TROPONIN: 0.01 NG/ML
URINE TYPE: ABNORMAL
UROBILINOGEN, URINE: 0.2 E.U./DL
WBC # BLD: 6.1 K/UL (ref 4–11)
WBC UA: ABNORMAL /HPF (ref 0–5)

## 2021-10-27 PROCEDURE — 83605 ASSAY OF LACTIC ACID: CPT

## 2021-10-27 PROCEDURE — 84484 ASSAY OF TROPONIN QUANT: CPT

## 2021-10-27 PROCEDURE — 6360000004 HC RX CONTRAST MEDICATION: Performed by: EMERGENCY MEDICINE

## 2021-10-27 PROCEDURE — 6370000000 HC RX 637 (ALT 250 FOR IP): Performed by: EMERGENCY MEDICINE

## 2021-10-27 PROCEDURE — 99284 EMERGENCY DEPT VISIT MOD MDM: CPT

## 2021-10-27 PROCEDURE — 83735 ASSAY OF MAGNESIUM: CPT

## 2021-10-27 PROCEDURE — 93005 ELECTROCARDIOGRAM TRACING: CPT | Performed by: EMERGENCY MEDICINE

## 2021-10-27 PROCEDURE — 80053 COMPREHEN METABOLIC PANEL: CPT

## 2021-10-27 PROCEDURE — 85025 COMPLETE CBC W/AUTO DIFF WBC: CPT

## 2021-10-27 PROCEDURE — 6360000002 HC RX W HCPCS: Performed by: INTERNAL MEDICINE

## 2021-10-27 PROCEDURE — 6360000002 HC RX W HCPCS: Performed by: EMERGENCY MEDICINE

## 2021-10-27 PROCEDURE — 71260 CT THORAX DX C+: CPT

## 2021-10-27 PROCEDURE — 1200000000 HC SEMI PRIVATE

## 2021-10-27 PROCEDURE — 2580000003 HC RX 258: Performed by: EMERGENCY MEDICINE

## 2021-10-27 PROCEDURE — 71045 X-RAY EXAM CHEST 1 VIEW: CPT

## 2021-10-27 PROCEDURE — 6370000000 HC RX 637 (ALT 250 FOR IP): Performed by: INTERNAL MEDICINE

## 2021-10-27 PROCEDURE — 94640 AIRWAY INHALATION TREATMENT: CPT

## 2021-10-27 PROCEDURE — 2700000000 HC OXYGEN THERAPY PER DAY

## 2021-10-27 PROCEDURE — 83880 ASSAY OF NATRIURETIC PEPTIDE: CPT

## 2021-10-27 PROCEDURE — 87040 BLOOD CULTURE FOR BACTERIA: CPT

## 2021-10-27 PROCEDURE — 87804 INFLUENZA ASSAY W/OPTIC: CPT

## 2021-10-27 PROCEDURE — 2580000003 HC RX 258: Performed by: INTERNAL MEDICINE

## 2021-10-27 PROCEDURE — 05H533Z INSERTION OF INFUSION DEVICE INTO RIGHT SUBCLAVIAN VEIN, PERCUTANEOUS APPROACH: ICD-10-PCS | Performed by: INTERNAL MEDICINE

## 2021-10-27 PROCEDURE — 81001 URINALYSIS AUTO W/SCOPE: CPT

## 2021-10-27 PROCEDURE — 82803 BLOOD GASES ANY COMBINATION: CPT

## 2021-10-27 PROCEDURE — 94761 N-INVAS EAR/PLS OXIMETRY MLT: CPT

## 2021-10-27 PROCEDURE — 96374 THER/PROPH/DIAG INJ IV PUSH: CPT

## 2021-10-27 PROCEDURE — 87635 SARS-COV-2 COVID-19 AMP PRB: CPT

## 2021-10-27 PROCEDURE — 93010 ELECTROCARDIOGRAM REPORT: CPT | Performed by: INTERNAL MEDICINE

## 2021-10-27 RX ORDER — ACETAMINOPHEN 325 MG/1
650 TABLET ORAL EVERY 6 HOURS PRN
Status: DISCONTINUED | OUTPATIENT
Start: 2021-10-27 | End: 2021-11-02 | Stop reason: HOSPADM

## 2021-10-27 RX ORDER — SODIUM CHLORIDE 0.9 % (FLUSH) 0.9 %
5-40 SYRINGE (ML) INJECTION EVERY 12 HOURS SCHEDULED
Status: DISCONTINUED | OUTPATIENT
Start: 2021-10-27 | End: 2021-11-02 | Stop reason: HOSPADM

## 2021-10-27 RX ORDER — FUROSEMIDE 10 MG/ML
40 INJECTION INTRAMUSCULAR; INTRAVENOUS 2 TIMES DAILY
Status: DISCONTINUED | OUTPATIENT
Start: 2021-10-27 | End: 2021-10-31

## 2021-10-27 RX ORDER — ONDANSETRON 4 MG/1
4 TABLET, ORALLY DISINTEGRATING ORAL EVERY 8 HOURS PRN
Status: DISCONTINUED | OUTPATIENT
Start: 2021-10-27 | End: 2021-11-02 | Stop reason: HOSPADM

## 2021-10-27 RX ORDER — MECOBALAMIN 5000 MCG
5 TABLET,DISINTEGRATING ORAL NIGHTLY
Status: DISCONTINUED | OUTPATIENT
Start: 2021-10-27 | End: 2021-11-02 | Stop reason: HOSPADM

## 2021-10-27 RX ORDER — CARVEDILOL 3.12 MG/1
3.12 TABLET ORAL 2 TIMES DAILY WITH MEALS
Status: DISCONTINUED | OUTPATIENT
Start: 2021-10-27 | End: 2021-11-02

## 2021-10-27 RX ORDER — LISINOPRIL 5 MG/1
2.5 TABLET ORAL DAILY
Status: DISCONTINUED | OUTPATIENT
Start: 2021-10-27 | End: 2021-11-02 | Stop reason: HOSPADM

## 2021-10-27 RX ORDER — 0.9 % SODIUM CHLORIDE 0.9 %
500 INTRAVENOUS SOLUTION INTRAVENOUS ONCE
Status: COMPLETED | OUTPATIENT
Start: 2021-10-27 | End: 2021-10-27

## 2021-10-27 RX ORDER — ASPIRIN 81 MG/1
81 TABLET ORAL DAILY
Status: DISCONTINUED | OUTPATIENT
Start: 2021-10-27 | End: 2021-11-02 | Stop reason: HOSPADM

## 2021-10-27 RX ORDER — ONDANSETRON 4 MG/1
8 TABLET, ORALLY DISINTEGRATING ORAL EVERY 8 HOURS PRN
Status: DISCONTINUED | OUTPATIENT
Start: 2021-10-27 | End: 2021-11-02 | Stop reason: HOSPADM

## 2021-10-27 RX ORDER — POLYETHYLENE GLYCOL 3350 17 G/17G
17 POWDER, FOR SOLUTION ORAL DAILY PRN
Status: DISCONTINUED | OUTPATIENT
Start: 2021-10-27 | End: 2021-11-02 | Stop reason: HOSPADM

## 2021-10-27 RX ORDER — IPRATROPIUM BROMIDE AND ALBUTEROL SULFATE 2.5; .5 MG/3ML; MG/3ML
1 SOLUTION RESPIRATORY (INHALATION) ONCE
Status: COMPLETED | OUTPATIENT
Start: 2021-10-27 | End: 2021-10-27

## 2021-10-27 RX ORDER — DEXAMETHASONE SODIUM PHOSPHATE 10 MG/ML
6 INJECTION INTRAMUSCULAR; INTRAVENOUS ONCE
Status: COMPLETED | OUTPATIENT
Start: 2021-10-27 | End: 2021-10-27

## 2021-10-27 RX ORDER — ACETAMINOPHEN 650 MG/1
650 SUPPOSITORY RECTAL EVERY 6 HOURS PRN
Status: DISCONTINUED | OUTPATIENT
Start: 2021-10-27 | End: 2021-11-02 | Stop reason: HOSPADM

## 2021-10-27 RX ORDER — LANOLIN ALCOHOL/MO/W.PET/CERES
3 CREAM (GRAM) TOPICAL NIGHTLY PRN
Status: DISCONTINUED | OUTPATIENT
Start: 2021-10-27 | End: 2021-11-02 | Stop reason: HOSPADM

## 2021-10-27 RX ORDER — LORAZEPAM 0.5 MG/1
0.5 TABLET ORAL EVERY 6 HOURS PRN
Status: DISCONTINUED | OUTPATIENT
Start: 2021-10-27 | End: 2021-11-02 | Stop reason: HOSPADM

## 2021-10-27 RX ORDER — SODIUM CHLORIDE 0.9 % (FLUSH) 0.9 %
5-40 SYRINGE (ML) INJECTION PRN
Status: DISCONTINUED | OUTPATIENT
Start: 2021-10-27 | End: 2021-11-02 | Stop reason: HOSPADM

## 2021-10-27 RX ORDER — SODIUM CHLORIDE 9 MG/ML
25 INJECTION, SOLUTION INTRAVENOUS PRN
Status: DISCONTINUED | OUTPATIENT
Start: 2021-10-27 | End: 2021-11-02 | Stop reason: HOSPADM

## 2021-10-27 RX ORDER — FUROSEMIDE 10 MG/ML
40 INJECTION INTRAMUSCULAR; INTRAVENOUS ONCE
Status: COMPLETED | OUTPATIENT
Start: 2021-10-27 | End: 2021-10-27

## 2021-10-27 RX ORDER — ONDANSETRON 2 MG/ML
4 INJECTION INTRAMUSCULAR; INTRAVENOUS EVERY 6 HOURS PRN
Status: DISCONTINUED | OUTPATIENT
Start: 2021-10-27 | End: 2021-11-02 | Stop reason: HOSPADM

## 2021-10-27 RX ADMIN — Medication 5 MG: at 20:07

## 2021-10-27 RX ADMIN — APIXABAN 5 MG: 5 TABLET, FILM COATED ORAL at 20:07

## 2021-10-27 RX ADMIN — DEXAMETHASONE SODIUM PHOSPHATE 6 MG: 10 INJECTION INTRAMUSCULAR; INTRAVENOUS at 09:17

## 2021-10-27 RX ADMIN — SODIUM CHLORIDE 500 ML: 9 INJECTION, SOLUTION INTRAVENOUS at 09:20

## 2021-10-27 RX ADMIN — CEFTRIAXONE SODIUM 1000 MG: 1 INJECTION, POWDER, FOR SOLUTION INTRAMUSCULAR; INTRAVENOUS at 11:34

## 2021-10-27 RX ADMIN — LISINOPRIL 2.5 MG: 5 TABLET ORAL at 17:31

## 2021-10-27 RX ADMIN — AZITHROMYCIN MONOHYDRATE 500 MG: 500 INJECTION, POWDER, LYOPHILIZED, FOR SOLUTION INTRAVENOUS at 12:27

## 2021-10-27 RX ADMIN — IOPAMIDOL 75 ML: 755 INJECTION, SOLUTION INTRAVENOUS at 10:01

## 2021-10-27 RX ADMIN — FUROSEMIDE 40 MG: 10 INJECTION, SOLUTION INTRAMUSCULAR; INTRAVENOUS at 11:24

## 2021-10-27 RX ADMIN — FUROSEMIDE 40 MG: 10 INJECTION, SOLUTION INTRAMUSCULAR; INTRAVENOUS at 17:31

## 2021-10-27 RX ADMIN — Medication 10 ML: at 20:07

## 2021-10-27 RX ADMIN — CARVEDILOL 3.12 MG: 3.12 TABLET, FILM COATED ORAL at 17:31

## 2021-10-27 RX ADMIN — IPRATROPIUM BROMIDE AND ALBUTEROL SULFATE 1 AMPULE: .5; 2.5 SOLUTION RESPIRATORY (INHALATION) at 10:05

## 2021-10-27 RX ADMIN — ASPIRIN 81 MG: 81 TABLET, COATED ORAL at 17:31

## 2021-10-27 ASSESSMENT — PAIN SCALES - GENERAL
PAINLEVEL_OUTOF10: 6
PAINLEVEL_OUTOF10: 0

## 2021-10-27 ASSESSMENT — PAIN DESCRIPTION - LOCATION: LOCATION: CHEST

## 2021-10-27 ASSESSMENT — PAIN DESCRIPTION - PAIN TYPE: TYPE: ACUTE PAIN

## 2021-10-27 NOTE — ED NOTES
Patient identified as a positive fall risk on the ED triage fall screening. Patient placed in fall precautions which includes:  yellow fall risk bracelet on wrist and yellow socks on feet. Patient instructed on importance of not getting out of bed or ambulating without assistance for safety. Pt verbalized understanding.      Lilly Casey RN  10/27/21 7270

## 2021-10-27 NOTE — ED NOTES
Bed: 12  Expected date:   Expected time:   Means of arrival: [place]  Comments:  [facility]  10/27/21 7280

## 2021-10-27 NOTE — TELEPHONE ENCOUNTER
Upon attempting to make second hospital follow up call attempt this writer notes pt returned to ER for shortness of breath. Pt undergoing work up now.  Anna Villarreal RN

## 2021-10-27 NOTE — CONSULTS
PS Cardiology @ [phone]  RE: worsening heart failure, per Dr. Jeremiah Yepez called back @ [phone]

## 2021-10-27 NOTE — PROGRESS NOTES
4 Eyes Skin Assessment     The patient is being assess for  Transfer to New Unit    I agree that 2 RN's have performed a thorough Head to Toe Skin Assessment on the patient. ALL assessment sites listed below have been assessed. Areas assessed by both nurses: yes   [x]   Head, Face, and Ears   [x]   Shoulders, Back, and Chest  [x]   Arms, Elbows, and Hands   [x]   Coccyx, Sacrum, and Ischum  [x]   Legs, Feet, and Heels        Does the Patient have Skin Breakdown?   No         Nacho Prevention initiated:  No   Wound Care Orders initiated:  No      [facility] nurse consulted for Pressure Injury (Stage 3,4, Unstageable, DTI, NWPT, and Complex wounds):  No      Nurse 1 eSignature: Electronically signed by Cailin Beasley RN on 10/27/21 at 3:36 PM EDT    **SHARE this note so that the co-signing nurse is able to place an eSignature**    Nurse 2 eSignature: {Esignature:[id number]}

## 2021-10-27 NOTE — PROGRESS NOTES
Patient admitted to room 209 from ED. Patient oriented to room, call light, bed rails, phone, lights and bathroom. Patient instructed about the schedule of the day including: vital sign frequency, lab draws, possible tests, frequency of MD and staff rounds, including RN/MD rounding together at bedside, daily weights, and I &O's. Patient instructed about prescribed diet, how to use 8MENU, and television. Bed alarm in place, patient aware of placement and reason. Telemetry box in place, patient aware of placement and reason. Bed locked, in lowest position, side rails up 2/4, call light within reach. Will continue to monitor.

## 2021-10-27 NOTE — CARE COORDINATION
[facility] has not admitted yet; patient declined start of care  Feeling too sick.  Spoke w patient's mother  She said [facility] could call back after md appointment 10/29 to see if patient would agree to home care services    Destinee Yusuf RN, BSN CTN  [facility] [phone]

## 2021-10-27 NOTE — ED NOTES
EXAMINATION:   ONE XRAY VIEW OF THE CHEST       10/22/2021 1:26 pm       COMPARISON:   Chest x-ray dated 08/16/2021.       HISTORY:   ORDERING SYSTEM PROVIDED HISTORY: SOB   TECHNOLOGIST PROVIDED HISTORY:   Reason for exam:->SOB       FINDINGS:   LINES/TUBES/OTHER: Right IJ port is noted with its tip in satisfactory   position.       HEART/MEDIASTINUM: The cardiomediastinal silhouette is within normal limits.       PLEURA/LUNGS: Hazy opacities noted in the bilateral upper lungs likely   reflecting airspace disease, however malignant process cannot be excluded. There are no pleural effusions.  There is no appreciable pneumothorax.       BONES/SOFT TISSUE: No acute abnormality.                  Ed REENA Dwyer  10/27/21 0819

## 2021-10-27 NOTE — ED NOTES
Pt was d/c from hospital on Monday with bilateral lung blood clot per pt. Pt state that once she was at home she started to get increase WOB. Pt state that she is normally on RA at home. Pt oxygen level was 87-88 on RA pt placed on 2 lit of oxygen per NC. Pt gets very SOB with going from bed to bedside commode with 2 lit oxygen. Pt is currently getting chemo of breast cancer. Pt state that she is afraid to go home since she lives alone. Pt with increase WOB and audible wheezing at rest. Pt medicated per MAR. Pt with monitors in place. Pt with pure wick in place d/t getting lasix.       Echo Antony RN  10/27/21 3164

## 2021-10-27 NOTE — ED PROVIDER NOTES
EMERGENCY DEPARTMENT ENCOUNTER        Pt Name: Mihir West  MRN: [id number]  [place] 1945  Date of evaluation: 10/27/2021  Provider: Michelle Ruffin MD  PCP: SOUTH Dennison CNP      CHIEF COMPLAINT       Chief Complaint   Patient presents with    Shortness of Breath     Since monday, hx of breast cancer, was in the hospital last week and was found to have b/l PE, pt was sent home on blood thinners, unsure of which ones, pt was given one duoneb in route with some relief     Cough       HISTORY OFPRESENT ILLNESS   (Location/Symptom, Timing/Onset, Context/Setting, Quality, Duration, Modifying Factors,Severity)  Note limiting factors. Mihir West is a 78 y.o. female presenting today due to concern for increasing shortness of breath with cough over the last few days. She was just admitted within the last week for bilateral pulmonary emboli and started on Eliquis. She denies any significant chest pain or abdominal pain. She is currently being treated for breast cancer. She did get her COVID vaccine. She did receive a DuoNeb on route by EMS and reported that had some mild improvement of symptoms. She denies any history of smoking. Due to worsening shortness of breath and now needing oxygen which is new for her, she came to the emergency department for further evaluation. She also had a recent echocardiogram showing concern for systolic congestive heart failure and states that is new since starting chemotherapy. No reported fever. REVIEW OF SYSTEMS    (2-9 systems for level 4, 10 or more for level 5)     Review of Systems   Constitutional: Positive for activity change and fatigue. Negative for chills, diaphoresis and fever. HENT: Positive for congestion. Eyes: Negative for visual disturbance. Respiratory: Positive for cough, chest tightness, shortness of breath and wheezing. Negative for stridor. Cardiovascular: Negative for chest pain.    Gastrointestinal: Positive for diarrhea (with chemo). Negative for abdominal pain and vomiting. Genitourinary: Negative for dysuria, flank pain and pelvic pain. Musculoskeletal: Negative for back pain and neck pain. Neurological: Positive for weakness (generalized) and light-headedness. Negative for syncope and headaches. Hematological: Bruises/bleeds easily (on Eliquis). Psychiatric/Behavioral: Negative for confusion. The patient is nervous/anxious. Positives and Pertinent negatives as per HPI.       PASTMEDICAL HISTORY     Past Medical History:   Diagnosis Date    [facility] [address]     Cancer ([facility] [address] [facility]     Kidney stone          SURGICAL HISTORY       Past Surgical History:   Procedure Laterality Date    COLONOSCOPY      MRI BREAST BX USING DEVICE RIGHT Right 5/28/2021    MRI BREAST BX USING DEVICE RIGHT 5/28/2021 [facility]    MRI NEEDLE BIOPSY EACH ADDL RIGHT Right 5/28/2021    MRI NEEDLE BIOPSY EACH ADDL RIGHT 5/28/2021 [facility]    PORT SURGERY N/A 5/25/2021    RIGHT PORT A CATH PLACEMENT [facility] Jose [facility] [address] Right 5/7/2021    US BREAST BIOPSY NEEDLE ADDITIONAL RIGHT 5/7/2021 Eduard Pereira  [facility] BREAST NEEDLE BIOPSY LEFT Left 5/7/2021    US BREAST NEEDLE BIOPSY LEFT 5/7/2021 Eduard Pereira MD [facility] [address] [facility] BREAST NEEDLE BIOPSY RIGHT Right 5/7/2021    US BREAST NEEDLE BIOPSY RIGHT 5/7/2021 Eduard Pereira MD [facility] [address]       Current Discharge Medication List      CONTINUE these medications which have NOT CHANGED    Details   apixaban starter pack (ELIQUIS DVT/PE STARTER PACK) 5 MG TBPK tablet Take 1 tablet by mouth See Admin Instructions  Qty: 74 tablet, Refills: 0      lisinopril (PRINIVIL;ZESTRIL) 2.5 MG tablet Take 1 tablet by mouth daily  Qty: 30 tablet, Refills: 0      carvedilol (COREG) 3.125 MG tablet Take 1 tablet by mouth 2 times daily (with meals)  Qty: 60 tablet, Refills: 0      docusate sodium (COLACE) 100 MG capsule Take 100 mg by mouth every 8 hours as needed for Constipation      !! loperamide (IMODIUM) 2 MG capsule Take 2 mg by mouth 4 times daily as needed for Diarrhea      !! loperamide (IMODIUM) 2 MG capsule Take 1 capsule by mouth      ondansetron (ZOFRAN) 8 MG tablet Take 1 tablet by mouth      melatonin (RA MELATONIN) 3 MG TABS tablet Take 1 tablet by mouth nightly as needed (sleep)  Qty: 20 tablet, Refills: 0      aspirin 81 MG EC tablet Take 81 mg by mouth daily      Multiple Vitamin (MULTI-VITAMIN PO) Take by mouth      Cholecalciferol (VITAMIN D3) 5000 units TABS Take by mouth      Cyanocobalamin (VITAMIN B-12) 1000 MCG extended release tablet Take 1,000 mcg by mouth daily       ! ! - Potential duplicate medications found. Please discuss with provider. ALLERGIES     Bactrim [sulfamethoxazole-trimethoprim]    FAMILY HISTORY       Family History   Problem Relation Age of Onset    High Cholesterol Mother     Diabetes Father     Cancer Paternal Grandmother 76        Bladder          SOCIAL HISTORY       Social History     Socioeconomic History    Marital status:       Spouse name: None    Number of children: None    Years of education: None    Highest education level: None   Occupational History    None   Tobacco Use    Smoking status: Passive Smoke Exposure - Never Smoker    Smokeless tobacco: Never Used   Vaping Use    Vaping Use: Never used   Substance and Sexual Activity    Alcohol use: No    Drug use: No    Sexual activity: Not Currently     Partners: Male   Other Topics Concern    None   Social History Narrative    None     Social Determinants of Health     Financial Resource Strain:     Difficulty of Paying Living Expenses:    Food Insecurity:     Worried About Running Out of Food in the Last Year:     Ran Out of Food in the Last Year:    Transportation Needs:     Lack of Transportation (Medical):  Lack of Transportation (Non-Medical):    Physical Activity:     Days of Exercise per Week:     Minutes of Exercise per Session:    Stress:     Feeling of Stress :    Social Connections:     Frequency of Communication with Friends and Family:     Frequency of Social Gatherings with Friends and Family:     Attends Gnosticist Services:     Active Member of Clubs or Organizations:     Attends Club or Organization Meetings:     Marital Status:    Intimate Partner Violence:     Fear of Current or Ex-Partner:     Emotionally Abused:     Physically Abused:     Sexually Abused:        SCREENINGS    E[place]leston Coma Scale  Eye Opening: Spontaneous  Best Verbal Response: Oriented  Best Motor Response: Obeys commands  E[place]leston Coma Scale Score: 15           PHYSICAL EXAM    (up to 7 for level 4, 8 or more for level 5)     ED Triage Vitals   BP Temp Temp src Pulse Resp SpO2 Height Weight   -- -- -- -- -- -- -- --       Physical Exam  Vitals and nursing note reviewed. Constitutional:       General: She is awake. She is in acute distress (moderate). Appearance: She is well-groomed and underweight. She is ill-appearing. She is not toxic-appearing or diaphoretic. Interventions: She is not intubated. HENT:      Head: Normocephalic and atraumatic. Mouth/Throat:      Mouth: Mucous membranes are dry. Eyes:      General:         Right eye: No discharge. Left eye: No discharge. Pupils: Pupils are equal, round, and reactive to light. Cardiovascular:      Rate and Rhythm: Regular rhythm. Tachycardia present. Pulses: Normal pulses. Pulmonary:      Effort: Tachypnea and respiratory distress (mild) present. No bradypnea, accessory muscle usage, prolonged expiration or retractions. She is not intubated. Breath sounds: Normal air entry. No stridor. Examination of the right-upper field reveals wheezing. Examination of the left-upper field reveals wheezing.  Examination of the right-middle field reveals wheezing. Examination of the left-middle field reveals wheezing. Examination of the right-lower field reveals decreased breath sounds and wheezing. Examination of the left-lower field reveals decreased breath sounds and wheezing. Decreased breath sounds, wheezing and rhonchi present. Abdominal:      General: Abdomen is flat. Bowel sounds are normal. There is no distension. Palpations: Abdomen is soft. Tenderness: There is no abdominal tenderness. There is no guarding or rebound. Musculoskeletal:         General: No deformity or signs of injury. Cervical back: Normal range of motion and neck supple. No rigidity or tenderness. Skin:     General: Skin is warm and dry. Neurological:      General: No focal deficit present. Mental Status: She is alert and oriented to person, place, and time. Mental status is at baseline. GCS: GCS eye subscore is 4. GCS verbal subscore is 5. GCS motor subscore is 6. Cranial Nerves: No dysarthria. Sensory: Sensation is intact. No sensory deficit. Motor: Motor function is intact. No weakness, tremor, atrophy, abnormal muscle tone or seizure activity. Psychiatric:         Attention and Perception: Attention normal.         Mood and Affect: Affect normal. Mood is anxious. Speech: Speech normal.         Behavior: Behavior normal. Behavior is cooperative.              DIAGNOSTIC RESULTS   :    Labs Reviewed   CBC WITH AUTO DIFFERENTIAL - Abnormal; Notable for the following components:       Result Value    RBC 2.94 (*)     Hemoglobin 9.3 (*)     Hematocrit 27.5 (*)     RDW 17.3 (*)     Platelets 201 (*)     Lymphocytes Absolute 0.4 (*)     All other components within normal limits    Narrative:     Performed at:  [facility]) - [address], [address] [place] [address]   Phone [phone]   COMPREHENSIVE METABOLIC PANEL - Abnormal; Notable for the following components:    Sodium 134 (*)     Chloride 98 (*)     Glucose 126 (*)     CREATININE <0.5 (*)     Total Protein 6.3 (*)     Albumin 2.9 (*)     Albumin/Globulin Ratio 0.9 (*)     All other components within normal limits    Narrative:     Performed at:  [place] [address] [employer]   Phone [phone] - Abnormal; Notable for the following components:    Pro-BNP 1,098 (*)     All other components within normal limits    Narrative:     Performed at:  [place] [address] [employer]   Phone [phone] - Abnormal; Notable for the following components:    Ketones, Urine 15 (*)     Leukocyte Esterase, Urine SMALL (*)     All other components within normal limits    Narrative:     Performed at:  [place] [address] [employer]   Phone [phone]   BLOOD GAS, VENOUS - Abnormal; Notable for the following components:    pO2, Omar 55.9 (*)     All other components within normal limits    Narrative:     Performed at:  [place] [address] [employer]   Phone [phone]   MICROSCOPIC URINALYSIS - Abnormal; Notable for the following components:    Mucus, UA 1+ (*)     WBC, UA  (*)     Bacteria, UA 2+ (*)     Crystals, UA Few Ca.  Oxalate (*)     All other components within normal limits    Narrative:     Performed at:  [place] [address] [employer]   Phone [phone]   CBC - Abnormal; Notable for the following components:    RBC 2.88 (*)     Hemoglobin 8.9 (*)     Hematocrit 26.7 (*)     RDW 17.1 (*)     Platelets 294 (*)     All other components within normal limits    Narrative:     Performed at:  [place] [address] [employer]   Phone [phone]   RENAL FUNCTION PANEL - Abnormal; Notable for the following components:    Glucose 104 (*)     BUN 22 (*)     CREATININE <0.5 (*)     Albumin 3.0 (*)     All other components within normal limits    Narrative:     Performed at:  [address], [facility] [place]   Phone [phone], RAPID    Narrative:     Performed at:  [place] [address] [facility]   Phone [phone]   RAPID INFLUENZA A/B ANTIGENS    Narrative:     Performed at:  [place] [address] [employer]   Phone [phone]   CULTURE, BLOOD 1   CULTURE, BLOOD 2   TROPONIN    Narrative:     Performed at:  [address], [facility] [place]   Phone [phone]   MAGNESIUM    Narrative:     Performed at:  [address], [facility] [place]   Phone [phone]   LACTIC ACID, PLASMA    Narrative:     Performed at:  [address], [facility] [place]   Phone [phone]       All other labs were within normal range or not returned asof this dictation. EKG: All EKG's are interpreted by the Emergency Department Physician who either signs or Co-signs this chart in the absence of a cardiologist.    The Ekg interpreted by me shows  sinus tachycardia, aznu=293  Axis is   Normal  QTc is  within an acceptable range  Intervals and Durations are unremarkable.       ST Segments: no acute change and nonspecific changes  No significant change from prior EKG dated - 8/16/21  No STEMI           RADIOLOGY:   Non-plain film images such as CT, Ultrasound and MRI are read by the radiologist. Joel Doss images are visualized and preliminarily interpreted by the  ED Provider with the belowfindings:        Interpretation per the Radiologist below, if available at the time of this note:    CT CHEST PULMONARY EMBOLISM W CONTRAST   Final Result   Within the limitations of the exam, no new or worsening pulmonary emboli   identified. Redemonstration of pulmonary emboli involving bilateral upper   lobes, segmental/subsegmental pulmonary arteries. Previously noted pulmonary   emboli involving right middle and right lower lobe subsegmental pulmonary   arteries are not definitely evident within the limitations. Significantly worsened areas of consolidation and ground-glass opacity   involving lungs bilaterally, right worse than left with bilateral upper lobe   predominance, concerning for worsening pneumonia. Bilateral pleural effusions, right worse than left, worsened. XR CHEST PORTABLE   Final Result   Worsening diffuse bilateral airspace disease predominantly within the   bilateral upper lobes. PROCEDURES   Unless otherwise noted below, none     Procedures    CRITICAL CARE TIME   Time: 45 minutes  Includes examination, speaking with consultants, lab interpretation, charting, treating for acute respiratory failure requiring oxygen  Excludes separate billable procedures. Patient at risk for serious decompensation if not treated for this life-threatening condition. CONSULTS: Spoke with Dr. Griselda Lowery based on worsening hypoxia with concern for systolic heart failure causing her symptoms and he reported that if hospitalist needs cardiology, they would be happy to consult but at this point have no additional recommendations besides diuresis. Spoke with Dr. Melissa Duran for admission. At this point I feel it is more likely related to congestive heart failure but at that point CT was not back. Upon reading the CT note, there was concern for pneumonia as well and therefore I did start her on Rocephin and azithromycin but at this point I feel her symptoms are more likely from CHF. Covid test was negative.   IP CONSULT TO CARDIOLOGY  IP CONSULT TO HOSPITALIST  IP CONSULT TO [facility] SERVICES    EMERGENCY DEPARTMENT COURSE and DIFFERENTIAL DIAGNOSIS/MDM:   Vitals:    Vitals:    10/27/21 1745 10/27/21 1954 10/27/21 2334 10/28/21 0357   BP:  97/64 (!) 95/59 100/65   Pulse:  103 95 93   Resp:  24 16 18   Temp:  97.5 °F (36.4 °C) 97.6 °F (36.4 °C) 97.6 °F (36.4 °C)   TempSrc:  Oral Oral Oral   SpO2:  94% 94% 93%   Weight: 121 lb 9.6 oz (55.2 kg)   119 lb 14.4 oz (54.4 kg)   Height: 5' 7\" (1.702 m)          Patient was given the following medications:  Medications   apixaban (ELIQUIS) tablet 5 mg (5 mg Oral Given 10/27/21 2007)   aspirin EC tablet 81 mg (81 mg Oral Given 10/27/21 1731)   carvedilol (COREG) tablet 3.125 mg (3.125 mg Oral Given 10/27/21 1731)   lisinopril (PRINIVIL;ZESTRIL) tablet 2.5 mg (2.5 mg Oral Given 10/27/21 1731)   melatonin tablet 3 mg (has no administration in time range)   ondansetron (ZOFRAN-ODT) disintegrating tablet 8 mg (has no administration in time range)   furosemide (LASIX) injection 40 mg (40 mg IntraVENous Given 10/27/21 1731)   sodium chloride flush 0.9 % injection 5-40 mL (5 mLs IntraVENous Not Given 10/27/21 2010)   sodium chloride flush 0.9 % injection 5-40 mL (10 mLs IntraVENous Given 10/27/21 2007)   0.9 % sodium chloride infusion (has no administration in time range)   ondansetron (ZOFRAN-ODT) disintegrating tablet 4 mg (has no administration in time range)     Or   ondansetron (ZOFRAN) injection 4 mg (has no administration in time range)   polyethylene glycol (GLYCOLAX) packet 17 g (has no administration in time range)   acetaminophen (TYLENOL) tablet 650 mg (has no administration in time range)     Or   acetaminophen (TYLENOL) suppository 650 mg (has no administration in time range)   cefTRIAXone (ROCEPHIN) 1000 mg IVPB in 50 mL D5W minibag (has no administration in time range)   LORazepam (ATIVAN) tablet 0.5 mg (has no administration in time range)   melatonin disintegrating tablet 5 mg (5 mg Oral Given 10/27/21 2007) ipratropium-albuterol (DUONEB) nebulizer solution 1 ampule (1 ampule Inhalation Given 10/27/21 1005)   dexamethasone (DECADRON) injection 6 mg (6 mg IntraVENous Given 10/27/21 0917)   0.9 % sodium chloride bolus (0 mLs IntraVENous Stopped 10/27/21 1114)   iopamidol (ISOVUE-370) 76 % injection 75 mL (75 mLs IntraVENous Given 10/27/21 1001)   furosemide (LASIX) injection 40 mg (40 mg IntraVENous Given 10/27/21 1124)   cefTRIAXone (ROCEPHIN) 1000 mg IVPB in 50 mL D5W minibag (0 mg IntraVENous Stopped 10/27/21 1223)   azithromycin (ZITHROMAX) 500 mg in D5W 250ml addavial (0 mg IntraVENous Stopped 10/27/21 1544)     Patient was evaluated due to concern for worsening shortness of breath over the last few days and now needing oxygen. She had significant wheezing on initial evaluation but denied any history of smoking and at this point I feel that COPD is less likely. Based on wheezing, I feel that her symptoms are more likely related to CHF exacerbation. X-ray showed possibility of pneumonia although she denies any fever and had no leukocytosis. Based on recent pulmonary embolism history, I did order CT to ensure no worsening clot burden and radiology reported no changes from pulmonary emboli standpoint but did state that she had signs of worsening pulmonary edema along with possible pneumonia. Based on this, I did ultimately order Rocephin and azithromycin in case she does truly have pneumonia since she is considered immunocompromised on chemotherapy. She did initially receive IV fluids based on tachycardia but after obtaining further information, I ultimately did give her Lasix in case her symptoms are related to fluid overload. She tolerated nasal cannula and was stable at time of admission. I informed her upon initial evaluation that she would need to be admitted and she felt comfortable with this plan. The patient tolerated their visit well.    The patient and / or the family were informed of the results of any tests, a time was given to answer questions. FINAL IMPRESSION      1. Acute respiratory failure with hypoxia ([facility] [address]    2. Acute on chronic systolic congestive heart failure ([facility] [address]    3. Acute cystitis without hematuria          DISPOSITION/PLAN   DISPOSITION Admitted 10/27/2021 10:51:45 AM      PATIENT REFERRED TO:  No follow-up provider specified.     DISCHARGEMEDICATIONS:  Current Discharge Medication List          DISCONTINUED MEDICATIONS:  Current Discharge Medication List                 (Please note that portions of this note were completed with a voicerecognition program.  Efforts were made to edit the dictations but occasionally words are mis-transcribed.)    Jewels Maldonado MD (electronically signed)            Jewels Maldonado MD  10/28/21 9086

## 2021-10-28 LAB
ALBUMIN SERPL-MCNC: 3 G/DL (ref 3.4–5)
ANION GAP SERPL CALCULATED.3IONS-SCNC: 15 MMOL/L (ref 3–16)
BUN BLDV-MCNC: 22 MG/DL (ref 7–20)
CALCIUM SERPL-MCNC: 8.8 MG/DL (ref 8.3–10.6)
CHLORIDE BLD-SCNC: 100 MMOL/L (ref 99–110)
CO2: 27 MMOL/L (ref 21–32)
CREAT SERPL-MCNC: <0.5 MG/DL (ref 0.6–1.2)
GFR AFRICAN AMERICAN: >60
GFR NON-AFRICAN AMERICAN: >60
GLUCOSE BLD-MCNC: 104 MG/DL (ref 70–99)
HCT VFR BLD CALC: 26.7 % (ref 36–48)
HEMOGLOBIN: 8.9 G/DL (ref 12–16)
IRON SATURATION: 14 % (ref 15–50)
IRON: 27 UG/DL (ref 37–145)
MCH RBC QN AUTO: 31.1 PG (ref 26–34)
MCHC RBC AUTO-ENTMCNC: 33.4 G/DL (ref 31–36)
MCV RBC AUTO: 92.9 FL (ref 80–100)
PDW BLD-RTO: 17.1 % (ref 12.4–15.4)
PHOSPHORUS: 4.8 MG/DL (ref 2.5–4.9)
PLATELET # BLD: 471 K/UL (ref 135–450)
PMV BLD AUTO: 7.4 FL (ref 5–10.5)
POTASSIUM SERPL-SCNC: 3.9 MMOL/L (ref 3.5–5.1)
RBC # BLD: 2.88 M/UL (ref 4–5.2)
SODIUM BLD-SCNC: 142 MMOL/L (ref 136–145)
TOTAL IRON BINDING CAPACITY: 189 UG/DL (ref 260–445)
WBC # BLD: 5.2 K/UL (ref 4–11)

## 2021-10-28 PROCEDURE — 80069 RENAL FUNCTION PANEL: CPT

## 2021-10-28 PROCEDURE — 99222 1ST HOSP IP/OBS MODERATE 55: CPT | Performed by: INTERNAL MEDICINE

## 2021-10-28 PROCEDURE — 97166 OT EVAL MOD COMPLEX 45 MIN: CPT

## 2021-10-28 PROCEDURE — 97110 THERAPEUTIC EXERCISES: CPT

## 2021-10-28 PROCEDURE — 97116 GAIT TRAINING THERAPY: CPT

## 2021-10-28 PROCEDURE — 97535 SELF CARE MNGMENT TRAINING: CPT

## 2021-10-28 PROCEDURE — 83550 IRON BINDING TEST: CPT

## 2021-10-28 PROCEDURE — 2700000000 HC OXYGEN THERAPY PER DAY

## 2021-10-28 PROCEDURE — 6360000002 HC RX W HCPCS: Performed by: INTERNAL MEDICINE

## 2021-10-28 PROCEDURE — 85027 COMPLETE CBC AUTOMATED: CPT

## 2021-10-28 PROCEDURE — 1200000000 HC SEMI PRIVATE

## 2021-10-28 PROCEDURE — 6370000000 HC RX 637 (ALT 250 FOR IP): Performed by: INTERNAL MEDICINE

## 2021-10-28 PROCEDURE — 97162 PT EVAL MOD COMPLEX 30 MIN: CPT

## 2021-10-28 PROCEDURE — 94761 N-INVAS EAR/PLS OXIMETRY MLT: CPT

## 2021-10-28 PROCEDURE — 2580000003 HC RX 258: Performed by: INTERNAL MEDICINE

## 2021-10-28 PROCEDURE — 83540 ASSAY OF IRON: CPT

## 2021-10-28 RX ADMIN — CARVEDILOL 3.12 MG: 3.12 TABLET, FILM COATED ORAL at 17:18

## 2021-10-28 RX ADMIN — Medication 3 MG: at 20:17

## 2021-10-28 RX ADMIN — APIXABAN 5 MG: 5 TABLET, FILM COATED ORAL at 20:17

## 2021-10-28 RX ADMIN — Medication 10 ML: at 20:17

## 2021-10-28 RX ADMIN — ASPIRIN 81 MG: 81 TABLET, COATED ORAL at 10:01

## 2021-10-28 RX ADMIN — FUROSEMIDE 40 MG: 10 INJECTION, SOLUTION INTRAMUSCULAR; INTRAVENOUS at 17:18

## 2021-10-28 RX ADMIN — CARVEDILOL 3.12 MG: 3.12 TABLET, FILM COATED ORAL at 10:01

## 2021-10-28 RX ADMIN — APIXABAN 5 MG: 5 TABLET, FILM COATED ORAL at 10:01

## 2021-10-28 RX ADMIN — LORAZEPAM 0.5 MG: 0.5 TABLET ORAL at 14:55

## 2021-10-28 RX ADMIN — LORAZEPAM 0.5 MG: 0.5 TABLET ORAL at 20:17

## 2021-10-28 RX ADMIN — FUROSEMIDE 40 MG: 10 INJECTION, SOLUTION INTRAMUSCULAR; INTRAVENOUS at 10:02

## 2021-10-28 RX ADMIN — SODIUM CHLORIDE 25 ML: 9 INJECTION, SOLUTION INTRAVENOUS at 10:03

## 2021-10-28 RX ADMIN — Medication 5 MG: at 20:17

## 2021-10-28 RX ADMIN — Medication 10 ML: at 10:02

## 2021-10-28 RX ADMIN — CEFTRIAXONE SODIUM 1000 MG: 1 INJECTION, POWDER, FOR SOLUTION INTRAMUSCULAR; INTRAVENOUS at 10:05

## 2021-10-28 ASSESSMENT — ENCOUNTER SYMPTOMS
PHOTOPHOBIA: 0
SHORTNESS OF BREATH: 1
RHINORRHEA: 0
VOMITING: 0
ABDOMINAL PAIN: 0
CONSTIPATION: 0
DIARRHEA: 1
CHEST TIGHTNESS: 1
EYE PAIN: 0
BLOOD IN STOOL: 0
DIARRHEA: 0
BACK PAIN: 0
COUGH: 1
SORE THROAT: 0
NAUSEA: 0
STRIDOR: 0
WHEEZING: 1

## 2021-10-28 ASSESSMENT — PAIN SCALES - GENERAL
PAINLEVEL_OUTOF10: 0

## 2021-10-28 NOTE — CONSULTS
Comprehensive Nutrition Assessment    Type and Reason for Visit:  Initial, Consult, Positive Nutrition Screen (MST=2: weight loss, decreased appetite)    Nutrition Recommendations/Plan:   1. Recommend continuing General diet order, free of therapeutic restrictions, to promote adequate nutrient intake  2. Add Ensure with meals - favors chocolate   3. Add Magic Cups BID - monitor acceptance   4. Encourage PO intakes as tolerated   5. Consider addition of appetite stimulant - MD to advise   6. Monitor nutrition adequacy, pertinent labs, bowel habits, wt changes, and clinical progress    Nutrition Assessment:  77 yo female admitted with CHF. Hx of breast cancer on chemo. Pt severely malnourished AEB decreased PO intakes, weight loss, and severe muscle and fat loss. C/o taste changes, decreasing PO intakes. Pt drinks Ensure at home, will continue during admission. Took bites of lunch this afternoon. Agreeable to Zeinab. Consulted for [facility] nutrition education, not appropriate at this time d/t severe malnutrition. Malnutrition Assessment:  Malnutrition Status:  Severe malnutrition    Context:  Chronic Illness     Findings of the 6 clinical characteristics of malnutrition:  Energy Intake:  7 - 75% or less estimated energy requirements for 1 month or longer  Weight Loss:  7 - Greater than 7.5% over 3 months     Body Fat Loss:  7 - Severe body fat loss Orbital, Buccal region   Muscle Mass Loss:  7 - Severe muscle mass loss Temples (temporalis), Clavicles (pectoralis & deltoids)    Estimated Daily Nutrient Needs:  Energy (kcal):  0482-3494 kcal; Weight Used for Energy Requirements:  Ideal (62 kg)     Protein (g):  62-75 g; Weight Used for Protein Requirements:  Ideal (1.0-1.2 g/kg)        Fluid (ml/day):   ; Method Used for Fluid Requirements:  1 ml/kcal      Nutrition Related Findings:  11% weight loss in 2 months. Appears frail. -700 mL since admission.       Wounds:  None       Current Nutrition Therapies:    ADULT DIET; Regular    Anthropometric Measures:  · Height: 5' 7\" (170.2 cm)  · Current Body Weight: 119 lb (54 kg)   · Usual Body Weight: 135 lb (61.2 kg) (standing scale on 8/16/21)     · Ideal Body Weight: 135 lbs; % Ideal Body Weight 88.1 %   · BMI: 18.6  · BMI Categories: Underweight (BMI less than 22) age over 72       Nutrition Diagnosis:   · Severe malnutrition related to inadequate protein-energy intake, catabolic illness as evidenced by poor intake prior to admission, weight loss 7.5% in 3 months, severe loss of subcutaneous fat, severe muscle loss      Nutrition Interventions:   Food and/or Nutrient Delivery:  Continue Current Diet, Start Oral Nutrition Supplement  Nutrition Education/Counseling:  Education not appropriate   Coordination of Nutrition Care:  Continue to monitor while inpatient    Goals:  Pt will consume greater than 50% of meals and ONS this admission       Nutrition Monitoring and Evaluation:   Behavioral-Environmental Outcomes:  None Identified   Food/Nutrient Intake Outcomes:  Food and Nutrient Intake, Supplement Intake  Physical Signs/Symptoms Outcomes:  Nutrition Focused Physical Findings, Weight     Discharge Planning:    Continue current diet, Continue Oral Nutrition Supplement     Electronically signed by Elida Briones MS, RD, LD on 10/28/21 at 2:37 PM EDT    Contact: [phone]

## 2021-10-28 NOTE — PLAN OF CARE
Problem: Nutrition  Goal: Optimal nutrition therapy  Outcome: Ongoing  Note: Nutrition Problem #1: Severe malnutrition  Intervention: Food and/or Nutrient Delivery: Continue Current Diet, Start Oral Nutrition Supplement  Nutritional Goals: Pt will consume greater than 50% of meals and ONS this admission

## 2021-10-28 NOTE — PLAN OF CARE
Problem: Falls - Risk of:  Goal: Will remain free from falls  Description: Will remain free from falls  Outcome: Ongoing  Note: Pt will remain free from falls throughout hospital stay. Fall precautions in place, bed alarm on, bed in lowest position with wheels locked and side rails 2/4 up. Room door open and hourly rounding completed. Will continue to monitor throughout shift. Problem: OXYGENATION/RESPIRATORY FUNCTION  Goal: Patient will achieve/maintain normal respiratory rate/effort  Description: Respiratory rate and effort will be within normal limits for the patient  Outcome: Ongoing  Note: Pt on 2L nasal cannula     Problem: HEMODYNAMIC STATUS  Goal: Patient has stable vital signs and fluid balance  Outcome: Ongoing  Note: BP 97/64   Pulse 103   Temp 97.5 °F (36.4 °C) (Oral)   Resp 24   Ht 5' 7\" (1.702 m)   Wt 121 lb 9.6 oz (55.2 kg)   LMP  (LMP Unknown)   SpO2 94%   BMI 19.05 kg/m²        Patient's EF (Ejection Fraction) is less than 40%    Heart Failure Medications:  Diuretics[de-identified] Furosemide    (One of the following REQUIRED for EF <40%/SYSTOLIC FAILURE but MAY be used in EF% >40%/DIASTOLIC FAILURE)        ACE[de-identified] Lisinopril        ARB[de-identified] None         ARNI[de-identified] None    (Beta Blockers)  NON- Evidenced Based Beta Blocker (for EF% >40%/DIASTOLIC FAILURE): None    Evidenced Based Beta Blocker::(REQUIRED for EF% <40%/SYSTOLIC FAILURE) Carvedilol- Coreg  . .................................................................................................................................................. Patient's weights and intake/output reviewed: Yes    Patient's Last Weight: 121 lbs obtained by standing scale. Difference of 4 lbs less than last documented weight.       Intake/Output Summary (Last 24 hours) at 10/27/2021 2234  Last data filed at 10/27/2021 2008  Gross per 24 hour   Intake 480 ml   Output 1250 ml   Net -770 ml       Comorbidities Reviewed Yes    Patient has a past medical history of Breast cancer [facility]), Cancer ([facility] [address] and Kidney stone. >>For CHF and Comorbidity documentation on Education Time and Topics, please see Education Tab    Progressive Mobility Assessment:  What is this patient's Current Level of Mobility?: Ambulatory- with Assistance  How was this patient Mobilized today?:  Up to Toilet/Shower and Up in Room                 With Whom? Nurse, PCA, PT, and OT                 Level of Difficulty/Assistance: 1x Assist     Pt resting in bed at this time on  2 L O2. Pt denies shortness of breath. Pt without lower extremity edema.      Patient and/or Family's stated Goal of Care this Admission: reduce shortness of breath, increase activity tolerance, better understand heart failure and disease management, be more comfortable, and reduce lower extremity edema prior to discharge        :

## 2021-10-28 NOTE — CONSULTS
[facility]  [phone]      Attending Physician: Naresh Enamorado MD  Reason for Consultation/Chief Complaint: Acute LV systolic heart failure    Subjective   History of Present Illness:  Madyson Shah is a 78 y.o. female with a history of breast cancer and anemia of chronic disease who recently admitted with acute bilateral pulmonary emboli. During her admission, a TTE was obtained and showed an LVEF of 25-30% with global hypokinesis and regional variation, normal RV size and systolic function, mild-moderate mitral regurgitation, and mild pulmonic regurgitation. There was also a right atrial echodensity noted that may have been due to artifact, but given her recent PE and known underlying malignancy thrombus or mass could not be definitely excluded and there were plans to obtain an outpatient cardiac MRI for additional evaluation. During her admission, she was started on Eliquis as well as coreg and lisinopril and discharged on 10/25/21. However, shortly after getting home she started to feel more short of breath and developed a cough, so she came back to the ED yesterday. On arrival to the ED, the patient was hypoxic and started on 2L supplemental O2. Initial troponin T was negative and ECG showed sinus tachycardia with no ischemic changes. Pro-BNP was elevated at 1,098. Another CTPA was obtained and did not show any new or worsening pulmonary emboli, but there appeared to be worsening consolidations in the bilateral lungs concerning for pneumonia and worsening bilateral pleural effusions. Past Medical History:   has a past medical history of Breast cancer ([address] Cancer ([address] and Kidney stone.     Surgical History:   has a past surgical history that includes [facility] W LOC DEVICE 1ST LESION RIGHT (Right, 5/7/2021); US BREAST BIOPSY W LOC DEVICE EACH ADDL LESION RIGHT (Right, 5/7/2021); US BREAST BIOPSY W LOC DEVICE 1ST LESION LEFT (Left, 5/7/2021); Colonoscopy; Port Surgery (N/A, 5/25/2021); MRI BIOPSY BREAST W LOC DEVICE RIGHT 1ST LESION (Right, 5/28/2021); and MRI BIOPSY BREAST W LOC DEVICE RIGHT EACH ADDL (Right, 5/28/2021). Social History:   reports that she is a non-smoker but has been exposed to tobacco smoke. She has never used smokeless tobacco. She reports that she does not drink alcohol and does not use drugs. Family History:  family history includes Cancer (age of onset: 76) in her paternal grandmother; Diabetes in her father; High Cholesterol in her mother. Home Medications:  Were reviewed and are listed in nursing record and/or below  Prior to Admission medications    Medication Sig Start Date End Date Taking?  Authorizing Provider   apixaban starter pack (ELIQUIS DVT/PE STARTER PACK) 5 MG TBPK tablet Take 1 tablet by mouth See Admin Instructions 10/25/21  Yes Marla Luke MD   lisinopril (PRINIVIL;ZESTRIL) 2.5 MG tablet Take 1 tablet by mouth daily 10/26/21  Yes Marla Luke MD   carvedilol (COREG) 3.125 MG tablet Take 1 tablet by mouth 2 times daily (with meals) 10/25/21  Yes Marla Luke MD   docusate sodium (COLACE) 100 MG capsule Take 100 mg by mouth every 8 hours as needed for Constipation   Yes Historical Provider, MD   loperamide (IMODIUM) 2 MG capsule Take 2 mg by mouth 4 times daily as needed for Diarrhea   Yes Historical Provider, MD   loperamide (IMODIUM) 2 MG capsule Take 1 capsule by mouth 9/7/21  Yes Historical Provider, MD   ondansetron (ZOFRAN) 8 MG tablet Take 1 tablet by mouth 6/21/21  Yes Historical Provider, MD   melatonin (RA MELATONIN) 3 MG TABS tablet Take 1 tablet by mouth nightly as needed (sleep) 5/30/21  Yes Sherly Drake, APRN - CNP   aspirin 81 MG EC tablet Take 81 mg by mouth daily   Yes Historical Provider, MD   Multiple Vitamin (MULTI-VITAMIN PO) Take by mouth   Yes Historical Provider, MD   Cholecalciferol (VITAMIN D3) 5000 units TABS Take by mouth   Yes Historical Provider, MD Cyanocobalamin (VITAMIN B-12) 1000 MCG extended release tablet Take 1,000 mcg by mouth daily   Yes Historical Provider, MD        CURRENT Medications:  apixaban (ELIQUIS) tablet 5 mg, BID  aspirin EC tablet 81 mg, Daily  carvedilol (COREG) tablet 3.125 mg, BID WC  lisinopril (PRINIVIL;ZESTRIL) tablet 2.5 mg, Daily  melatonin tablet 3 mg, Nightly PRN  ondansetron (ZOFRAN-ODT) disintegrating tablet 8 mg, Q8H PRN  furosemide (LASIX) injection 40 mg, BID  sodium chloride flush 0.9 % injection 5-40 mL, 2 times per day  sodium chloride flush 0.9 % injection 5-40 mL, PRN  0.9 % sodium chloride infusion, PRN  ondansetron (ZOFRAN-ODT) disintegrating tablet 4 mg, Q8H PRN   Or  ondansetron (ZOFRAN) injection 4 mg, Q6H PRN  polyethylene glycol (GLYCOLAX) packet 17 g, Daily PRN  acetaminophen (TYLENOL) tablet 650 mg, Q6H PRN   Or  acetaminophen (TYLENOL) suppository 650 mg, Q6H PRN  cefTRIAXone (ROCEPHIN) 1000 mg IVPB in 50 mL D5W minibag, Daily  LORazepam (ATIVAN) tablet 0.5 mg, Q6H PRN  melatonin disintegrating tablet 5 mg, Nightly        Allergies:  Bactrim [sulfamethoxazole-trimethoprim]     Review of Systems:   Review of Systems   Constitutional: Negative for chills and fever. HENT: Positive for congestion. Negative for rhinorrhea and sore throat. Eyes: Negative for photophobia, pain and visual disturbance. Respiratory: Positive for cough and shortness of breath. Cardiovascular: Negative for chest pain, palpitations and leg swelling. Gastrointestinal: Negative for abdominal pain, blood in stool, constipation, diarrhea, nausea and vomiting. Endocrine: Negative for cold intolerance and heat intolerance. Genitourinary: Negative for difficulty urinating, dysuria and hematuria. Musculoskeletal: Negative for arthralgias, joint swelling and myalgias. Skin: Negative for rash and wound. Allergic/Immunologic: Negative for environmental allergies and food allergies.    Neurological: Negative for dizziness, syncope and light-headedness. Hematological: Negative for adenopathy. Does not bruise/bleed easily. Psychiatric/Behavioral: Negative for dysphoric mood. The patient is not nervous/anxious. Objective   PHYSICAL EXAM:    Vitals:    10/28/21 1215   BP: 121/70   Pulse: 102   Resp: 18   Temp: 98.4 °F (36.9 °C)   SpO2: 95%    Weight: 119 lb 14.4 oz (54.4 kg)       General: Cachectic female lying in bed in no acute distress. HEENT: Normocephalic, atraumatic, non-icteric, hearing intact, nares normal, mucous membranes moist.  Neck: Supple, trachea midline. No adenopathy. No thyromegaly. +Hepatojugular reflux. Heart: Mildly tachycardic with regular rhythm. Normal S1 and S2. No murmurs, gallops or rubs. Chest: Port noted on right. Lungs: Normal respiratory effort. Breath sounds diminished bilaterally with mild rales present. Abdomen: Soft, non-tender. Normoactive bowel sounds. No masses or organomegaly. Skin: No rashes, wounds, or lesions. Pulses: 2+ and symmetric. Extremities: No clubbing, cyanosis, or edema. Musculoskeletal: Spontaneously moves all four extremities. Psych: Normal mood and affect. Neuro: Alert and oriented to person, place, and time. No focal deficits noted.       Labs   CBC:   Lab Results   Component Value Date    WBC 5.2 10/28/2021    RBC 2.88 10/28/2021    HGB 8.9 10/28/2021    HCT 26.7 10/28/2021    MCV 92.9 10/28/2021    RDW 17.1 10/28/2021     10/28/2021     CMP:  Lab Results   Component Value Date     10/28/2021    K 3.9 10/28/2021    K 3.5 10/22/2021     10/28/2021    CO2 27 10/28/2021    BUN 22 10/28/2021    CREATININE <0.5 10/28/2021    GFRAA >60 10/28/2021    GFRAA >60 07/10/2012    AGRATIO 0.9 10/27/2021    LABGLOM >60 10/28/2021    GLUCOSE 104 10/28/2021    PROT 6.3 10/27/2021    PROT 7.8 07/10/2012    CALCIUM 8.8 10/28/2021    BILITOT 0.3 10/27/2021    ALKPHOS 98 10/27/2021    AST 20 10/27/2021    ALT 27 10/27/2021     PT/INR:  No results found for: PTINR  HgBA1c:  Lab Results   Component Value Date    LABA1C 5.6 09/13/2018     Lab Results   Component Value Date    TROPONINI 0.01 10/27/2021         Cardiac Data     Last EKG: Sinus tachycardia, no ischemic changes. Echo:  TTE 8/17/21:  Conclusions   Summary   LV systolic function is normal with EF estimated at 55%. No regional wall motion abnormalities. Grade I diastolic dysfunction with normal LV filling pressure. Mild mitral regurgitation. Inadequate tricuspid regurgitation jet to estimate systolic artery pressure   (SPAP). TTE 10/23/21:  Conclusions   Summary   Normal left ventricle size and wall thickness. The left ventricular systolic function is severely reduced with an ejection   fraction of 25-30 %. EF by Fay's method estimated at 27%. Severe global hypokinesis with regional variation. Left ventricular diastolic filling pressure are indeterminate. The right ventricle is normal in size and function. There is protruding echodensity from anterior RA wall seen in multiple   views, possibly artifact, but given patient's clinical history,   mass/thrombus cannot be excluded. Mild to moderate mitral regurgitation. Mild pulmonic and tricuspid regurgitation. Systolic pulmonary artery pressure (SPAP) is normal and estimated at 35 mmHg   (right atrial pressure 3 mmHg). There is a large left pleural effusion. Compared to last echo on 8/17/2021 (EF 55%), left ventricle systolic   function has significantly declined while RV size and function appears   normal. RIght atrial findings as above - not seen on review of images prior,   unclear significance. Stress Test:  Treadmill GXT 3/30/10:  Negative for evidence of ischemia. Cath: N/A    Other Studies:   Chest X-ray 10/27/21: Worsening diffuse bilateral airspace disease predominantly within the   bilateral upper lobes. CTPA 10/27/21:   Within the limitations of the exam, no new or worsening pulmonary emboli identified.  Redemonstration of pulmonary emboli involving bilateral upper   lobes, segmental/subsegmental pulmonary arteries.  Previously noted pulmonary   emboli involving right middle and right lower lobe subsegmental pulmonary   arteries are not definitely evident within the limitations.       Significantly worsened areas of consolidation and ground-glass opacity   involving lungs bilaterally, right worse than left with bilateral upper lobe   predominance, concerning for worsening pneumonia.       Bilateral pleural effusions, right worse than left, worsened. Assessment and Plan      1. Acute LV systolic heart failure - Newly diagnosed with cardiomyopathy during recent admission for bilateral PE. At that time, TTE showed LVEF of 25-30% from 55% on previous TTE From 8/17/21. Concern for possible cardiotoxicity from adriamycin or Herceptin bio similar. However, has not had recent formal ischemic evaluation. She currently appears hypervolemic on exam with bilateral rales and hepatojugular reflux noted. CTPA yesterday showed worsening bilateral pleural effusions with suspected superimposed pneumonia. -Recommend diuresing with IV lasix 40 mg BID with aim to make patient goal net negative 2-3L over the next 24 hours  -Continue coreg 3.125 mg BID and lisinopril 2.5 mg daily  -Monitor strict I/Os, obtain daily standing weights  -2L per day fluid restriction, low sodium diet  -Trend renal panels and magnesium level and maintain K>4 and Mg>2  -Pending clinical course, can consider proceeding with formal ischemic evaluation, but this can be arranged as an outpatient once patient has had time to recover from her other acute medical issues  -Will need to discuss switching to less cardiotoxic chemotherapy regimen with oncology team, if additional options exist    2. Suspected bacterial pneumonia - CT demonstrated worsening bilateral pulmonary consolidations.   COVID-19 NAAT and rapid influenza A/B antigens were negative.  -Defer antibiotic management to primary team  -Blood cultures pending    3. Bilateral pulmonary emboli  -On Eliquis  -RV size and systolic function normal on TTE    4. Acute hypoxic respiratory failure  -Secondary to combination of bilateral PEs, acute heart failure, and pneumonia  -Wean supplemental O2 as tolerated for goal O2 saturation >90%     5. Right atrial echodensity  -May represent artifact, but given recent PE and known underlying malignancy cannot definitively exclude thrombus or mass  -On Eliquis as above and can consider obtaining outpatient cardiac MRI for further evaluation, which may also help provide further insight into etiology of cardiomyopathy     6. Sinus tachycardia  -Secondary to above     7. H/o breast cancer  -S/p 4 cycles of Adriamycin/Cytoxan and 7 of 12 planned cycles of Taxol with Herceptin bio similar and pertuzumab  -Oncology following and will likely need to consider switching to less cardiotoxic regimen if other options exist       Thank you for allowing us to participate in the care of Shemar Vidales. Please call me with any questions [phone]. Jose J Thakur,   [address]  [phone] [facility]  [phone] [address] [place] [address]  10/28/2021 12:33 PM      I will address the patient's cardiac risk factors and adjusted pharmacologic treatment as needed. In addition, I have reinforced the need for patient directed risk factor modification. All questions and concerns were addressed to the patient/family. Alternatives to my treatment were discussed. The note was completed using EMR. Every effort was made to ensure accuracy; however, inadvertent computerized transcription errors may be present.

## 2021-10-28 NOTE — PLAN OF CARE
Problem: OXYGENATION/RESPIRATORY FUNCTION  Goal: Patient will maintain patent airway  Outcome: Ongoing  Note:   Patient's EF (Ejection Fraction) is less than 40%    Heart Failure Medications:  Diuretics[de-identified] Furosemide    (One of the following REQUIRED for EF <40%/SYSTOLIC FAILURE but MAY be used in EF% >40%/DIASTOLIC FAILURE)        ACE[de-identified] Lisinopril        ARB[de-identified] None         ARNI[de-identified] None    (Beta Blockers)  NON- Evidenced Based Beta Blocker (for EF% >40%/DIASTOLIC FAILURE): None    Evidenced Based Beta Blocker::(REQUIRED for EF% <40%/SYSTOLIC FAILURE) Carvedilol- Coreg  . .................................................................................................................................................. Patient's weights and intake/output reviewed: Yes    Patient's Last Weight: 119 lbs obtained by standing scale. Difference of 2 lbs less than last documented weight. Intake/Output Summary (Last 24 hours) at 10/28/2021 1208  Last data filed at 10/28/2021 1049  Gross per 24 hour   Intake 1157.5 ml   Output 2000 ml   Net -842.5 ml       Comorbidities Reviewed Yes    Patient has a past medical history of Breast cancer ([facility] [address] Cancer ([facility] [address] and Kidney stone. >>For CHF and Comorbidity documentation on Education Time and Topics, please see Education Tab    Progressive Mobility Assessment:  What is this patient's Current Level of Mobility?: Ambulatory- with Assistance  How was this patient Mobilized today?: Edge of Bed,  Up to Toilet/Shower, and Up in Room                 With Whom? Nurse, PCA, and Self                 Level of Difficulty/Assistance: 1x Assist     Pt resting in bed at this time on  2 L O2. Pt with complaints of shortness of breath. Pt without lower extremity edema.      Patient and/or Family's stated Goal of Care this Admission: reduce shortness of breath, increase activity tolerance, and be more comfortable prior to discharge        :      Problem: Falls - Risk of:  Goal: Will remain free from falls  Description: Will remain free from falls  10/28/2021 1208 by Viviana Jenkins RN  Outcome: Ongoing  Note: Pt will remain free from falls throughout hospital stay. Fall precautions in place, bed alarm on, bed in lowest position with wheels locked and side rails 2/4 up. Room door open and hourly rounding completed. Will continue to monitor throughout shift.

## 2021-10-28 NOTE — FLOWSHEET NOTE
Provided education on Advance Directives per ACP Referral. Patient said she has \"chemo brain,\" so she doesn't want to fill out the forms just yet. I was able to explain them to her and left a blank copy of Advance Directives for her. She said her son Arlette Hackett would be visiting her this weekend in attempts to help her complete the ADs. She appreciated my listening and my prayer as well. The patient shared about how difficult it was losing her  5 years ago. She has 5 children who support her a lot. She is grateful to have their love. She expressed physical agitation as she has had cancer for 6 months. \"I am doing my best to fight this thing, but it is so hard. \" I offered to pray with her and she appreciated my presence. Offered to have a  visit anytime during her hospital stay, and she said she would think about it.         10/28/21 1220   Encounter Summary   Services provided to: Patient   Referral/Consult From: Nurse   Continue Visiting   (10/28 AD explanation, listening, prayer)   Volunteer Visit Yes   Complexity of Encounter Moderate   Length of Encounter 30 minutes   Advance Care Planning Yes

## 2021-10-28 NOTE — ACP (ADVANCE CARE PLANNING)
Advance Care Planning     Advance Care Planning Inpatient Note  [facility] Department    Today's Date: 10/28/2021  Unit: [facility]ARD [facility]LEMET[facility]    Received request from Other: Richy Cunningham RN. Upon review of chart and communication with care team, request Health Care Provider's clarification of patient's decision making capacity. . Patient was/were present in the room during visit. Goals of ACP Conversation:  Discuss advance care planning documents  Facilitate a discussion related to patient's goals of care as they align with the patient's values and beliefs. Health Care Decision Makers:       Primary Decision Maker: Riley Witt - Parent - [phone]    Secondary Decision Maker: Wily Jewell - Child - [phone]    Supplemental (Other) Decision Maker: Melchor Yadav Child - [phone]    Summary:  Provided education on Advance Directives per ACP Referral. Patient said she has \"chemo brain,\" so she doesn't want to fill out the forms just yet. I was able to explain them to her and left a blank copy of Advance Directives for her. She said her son Rebeca Garcia would be visiting her this weekend in attempts to help her complete the ADs. She appreciated my listening and my prayer as well. Advance Care Planning Documents (Patient Wishes):  None     Assessment:  The patient shared about how difficult it was losing her  5 years ago. She has 5 children who support her a lot. She is grateful to have their love. She expressed physical agitation as she has had cancer for 6 months. \"I am doing my best to fight this thing, but it is so hard. \" I offered to pray with her and she appreciated my presence. Offered to have a  visit anytime during her hospital stay, and she said she would think about it.      Interventions:  Provided education on documents for clarity and greater understanding  Encouraged ongoing ACP conversation with future decision makers and loved ones  Reviewed but did not complete ACP document      Outcomes/Plan:  I instructed her to call our office number on the front page of her Advance Directive packet if she wishes to have our support in completing them. I told her that she could also tell any nurse about this need as well.     Electronically signed by Prashanth Fitzpatrick, ACP Specialist on 10/28/2021 at 12:07 PM

## 2021-10-28 NOTE — PROGRESS NOTES
Occupational Therapy   Occupational Therapy Initial Assessment/Treatment   Date: 10/28/2021   Patient Name: Damaris Caballero  MRN: [id number]     : 1945    Date of Service: 10/28/2021    Discharge Recommendations:  S Level 1, Home with Home health OT, 24 hour supervision or assist  OT Equipment Recommendations  Equipment Needed: No     Damaris Caballero scored a 19/24 on the AM-PAC ADL Inpatient form. Current research shows that an AM-PAC score of 18 or greater is typically associated with a discharge to the patient's home setting. Based on the patient's AM-PAC score, and their current ADL deficits, it is recommended that the patient have 2-3 sessions per week of Occupational Therapy at d/c to increase the patient's independence. At this time, this patient demonstrates the endurance and safety to discharge home with (home services) and a follow up treatment frequency of 2-3x/wk. Please see assessment section for further patient specific details. If patient discharges prior to next session this note will serve as a discharge summary. Please see below for the latest assessment towards goals. Assessment   Performance deficits / Impairments: Decreased endurance;Decreased strength;Decreased functional mobility ; Decreased ADL status; Decreased high-level IADLs;Decreased balance  Assessment: Pt is a 75yr old female admitted for SOB. Pt with recent admission on 10/25 for PEs. Pt with hx of breast cancer. Pt reports independence with ADLs at baseline. Pt currently limited by decreased endurance, strength, and overall deconditioned. Pt requires min A for LB ADLs and CGA for mobility. Anticipate pt would benefit from ongoing OT in order to increase functional status. Prognosis: Good;Fair;Guarded  Decision Making: Medium Complexity  OT Education: OT Role;ADL Adaptive Strategies; Plan of Care;Precautions; Energy Conservation;Transfer Training  Patient Education: d/c recommendation  REQUIRES OT FOLLOW UP: mother (80yrs old) is living at the house; able to provide assist)  Type of Home: House  Home Layout: Two level  Home Access: Stairs to enter with rails  Entrance Stairs - Number of Steps: 2 CONY; 14 step to access lower level  Entrance Stairs - Rails: Both  Bathroom Shower/Tub: Tub/Shower unit, Shower chair with back  [employer]: Standard  Bathroom Equipment: Grab bars in shower, Hand-held shower, Toilet raiser  Bathroom Accessibility: Walker accessible  Home Equipment: Cane, Rolling walker, Grab bars  Receives Help From: Family (5 kids and mother)  ADL Assistance: Independent  Homemaking Assistance: Needs assistance (Assistance from family)  Ambulation Assistance: Independent (reports d/c on 10/25 with RW, but hasn't been using it)  Transfer Assistance: Independent  Active : Yes  Mode of Transportation: Car       Objective   Vision: Within Functional Limits  Hearing: Within functional limits    Orientation  Overall Orientation Status: Within Functional Limits     Balance  Sitting Balance: Supervision  Standing Balance: Contact guard assistance  Functional Mobility  Functional - Mobility Device: No device  Activity: To/from bathroom; Other  Assist Level: Contact guard assistance  Functional Mobility Comments: 2L of O2; pt easily fatigues with minimal activity  Toilet Transfers  Toilet - Technique: Ambulating  Equipment Used: Standard toilet  Toilet Transfer: Contact guard assistance  ADL  Feeding: Independent  Grooming: Stand by assistance  UE Bathing: Supervision  LE Bathing: Contact guard assistance  UE Dressing: Stand by assistance  LE Dressing: Minimal assistance  Toileting: Stand by assistance  Additional Comments: Pt with fair tolerance for ADLs. pt with noticeable SOB with minimal exertion.   Tone RUE  RUE Tone: Normotonic  Tone LUE  LUE Tone: Normotonic     Bed mobility  Supine to Sit: Stand by assistance  Sit to Supine: Unable to assess  Scooting: Stand by assistance  Transfers  Sit to stand: Contact guard assistance  Stand to sit: Contact guard assistance     Cognition  Overall Cognitive Status: Exceptions  Arousal/Alertness: Appropriate responses to stimuli  Following Commands:  Follows multistep commands with increased time  Attention Span: Appears intact  Memory: Decreased recall of recent events  Safety Judgement: Decreased awareness of need for assistance  Insights: Decreased awareness of deficits                 LUE AROM (degrees)  LUE AROM : WFL  Left Hand AROM (degrees)  Left Hand AROM: WFL  RUE AROM (degrees)  RUE AROM : WFL  Right Hand AROM (degrees)  Right Hand AROM: WFL  LUE Strength  Gross LUE Strength: Exceptions to [facility]  RUE Strength  Gross RUE Strength: Exceptions to [address] [place] [address]  Times per week: 3-5  Current Treatment Recommendations: Strengthening, Functional Mobility Training, Patient/Caregiver Education & Training, Endurance Training, Balance Training, Safety Education & Training, Self-Care / ADL      AM-[facility] Score        AM-[facility] Inpatient Daily Activity Raw Score: 19 (10/28/21 1458)  AM-PAC Inpatient ADL T-Scale Score : 40.22 (10/28/21 1458)  ADL Inpatient CMS 0-100% Score: 42.8 (10/28/21 1458)  ADL Inpatient CMS G-Code Modifier : CK (10/28/21 1458)    Goals  Short term goals  Time Frame for Short term goals: By 11/06/21  Short term goal 1: Pt will complete bathing with supervision  Short term goal 2: Pt will complete dressing with set-up A  Short term goal 3: Pt will tolerate x4 mins of standing at sink to complete grooming with supervision  Short term goal 4: Pt will complete bathroom mobility with SBA  Patient Goals   Patient goals : to get healthy       Therapy Time   Individual Concurrent Group Co-treatment   Time In 3185         Time Out 1341         Minutes 63         Timed Code Treatment Minutes: [address], OTR/L

## 2021-10-28 NOTE — PROGRESS NOTES
Physical Therapy    Facility/Department: [facility] A2 CARD TELEMETRY  Initial Assessment    NAME: Madyson Shah  : 1945  MRN: [id number]    Date of Service: 10/28/2021    Discharge Recommendations:  24 hour supervision or assist, Home with Home health PT   PT Equipment Recommendations  Equipment Needed: No  If pt discharges prior to next PT session this note will serve as discharge summary. Assessment   Body structures, Functions, Activity limitations: Decreased functional mobility ; Decreased endurance  Assessment: pt is 77 yo female adm with SOB, she is also undergoing chemo due to L Breast cancer. She is on 2L O2 via nc. PLOF: pt was amb indep without device, indep ADLs. lives in bilevel home with good family support. Today functioning just below baseline with CG for amb 50 ft without device, fatigue reported. Pt will benefit from skilled PT to address deficits. Recommend home 24 hr supervision and home PT at discharge  Treatment Diagnosis: decreased gait and endurance  Prognosis: Good  Decision Making: Medium Complexity  PT Education: Goals;PT Role;Plan of Care;Energy Conservation;General Safety; Disease Specific Education;Transfer Training;Gait Training  Patient Education: Disease specific: pt educated in energy conservation, prevention of complications of bedrest, safety for gait and transfers. Pt voices understanding  Barriers to Learning: none  REQUIRES PT FOLLOW UP: Yes  Activity Tolerance  Activity Tolerance: Patient Tolerated treatment well  Activity Tolerance: SpO2 96%    /70       Patient Diagnosis(es): The primary encounter diagnosis was Acute respiratory failure with hypoxia ([address] Diagnoses of Acute on chronic systolic congestive heart failure (HCC) and Acute cystitis without hematuria were also pertinent to this visit. has a past medical history of Breast cancer ([address] Cancer ([address] and Kidney stone.    has a past surgical history that includes [address] LESION RIGHT (Right, 5/7/2021); US BREAST BIOPSY W LOC DEVICE EACH ADDL LESION RIGHT (Right, 5/7/2021); US BREAST BIOPSY W LOC DEVICE 1ST LESION LEFT (Left, 5/7/2021); Colonoscopy; Port Surgery (N/A, 5/25/2021); MRI BIOPSY BREAST W LOC DEVICE RIGHT 1ST LESION (Right, 5/28/2021); and MRI BIOPSY BREAST W LOC DEVICE RIGHT EACH ADDL (Right, 5/28/2021).     Restrictions  Restrictions/Precautions  Restrictions/Precautions: General Precautions, Up as Tolerated  Position Activity Restriction  Other position/activity restrictions: 2L of O2  Vision/Hearing  Vision: Within Functional Limits  Hearing: Within functional limits     Subjective  General  Chart Reviewed: Yes  Patient assessed for rehabilitation services?: Yes  Family / Caregiver Present: No  Referring Practitioner: Chris Dobbs  Referral Date : 10/28/21  Diagnosis: SOB, Ca breast Left, on chemo  Follows Commands: Within Functional Limits  General Comment  Comments: RN cleared pt for therapy, pt resting in bed on approach  Subjective  Subjective: Pt agrees to therapy, states she was up in chair for several hours and prefers to return to bed post ambulation  Pain Screening  Patient Currently in Pain: Denies  Vital Signs- see activity tolerance section  Patient Currently in Pain: Denies       Orientation  Overall Orientation Status: Within Normal Limits  Social/Functional History  Social/Functional History  Lives With: Alone  Type of Home: House  Home Layout: Two level  Home Access: Stairs to enter with rails  Entrance Stairs - Number of Steps: 2 CONY; 14 step to access lower level  Entrance Stairs - Rails: Both  Bathroom Shower/Tub: Tub/Shower unit, Shower chair with back  [employer]: Standard  Bathroom Equipment: Grab bars in shower, Hand-held shower, Toilet raiser  Bathroom Accessibility: Walker accessible  Home Equipment: Cane, Rolling walker, Grab bars  Receives Help From: Family  ADL Assistance: Independent  Homemaking Assistance: Needs assistance  Ambulation Assistance: Independent  Transfer Assistance: Independent  Active : Yes  Mode of Transportation: Car  Objective   RLE AROM: WFL  LLE AROM : WFL  Strength LLE: WFL  Strength RUE: WFL        Bed mobility  Supine to Sit: Stand by assistance  Sit to Supine: Stand by assistance     Transfers  Sit to Stand: Contact guard assistance  Stand to sit: Contact guard assistance    Ambulation  Surface: level tile  Device: No Device  Other Apparatus: O2  Assistance: Contact guard assistance  Quality of Gait: appropriate pace, stride and foot clearance, no loss of balance, early fatigue  Distance: 50 ft  Comments: SpO2 96%,  post amb        Exercises  Heelslides: 5 x B  Gluteal Sets: 10 x B  Hip Abduction: 5 x B  Knee Short Arc Quad: 10 x B  Ankle Pumps: 10 x B     Plan   Times per week: 3- 5 x wk  Current Treatment Recommendations: Strengthening, Transfer Training, Endurance Training, Functional Mobility Training, Safety Education & Training, Gait Training  Safety Devices  Type of devices:  All fall risk precautions in place, Patient at risk for falls, Bed alarm in place, Call light within reach, Left in bed, Nurse notified, Gait belt  AM-PAC Score  AM-PAC Inpatient Mobility Raw Score : 21 (10/28/21 1537)  AM-PAC Inpatient T-Scale Score : 50.25 (10/28/21 1537)  Mobility Inpatient CMS 0-100% Score: 28.97 (10/28/21 1537)  Mobility Inpatient CMS G-Code Modifier : CJ (10/28/21 1537)   Goals  Short term goals  Time Frame for Short term goals: 1 week (11/4) unless otherwise specified  Short term goal 1: pt to be modified indep in bed mob  Short term goal 2: pt to be indep with transfers  Short term goal 3: pt to amb 150 ft with least restrictive or no device supervised  Short term goal 4: pt to participate in LE Ex 12-15 reps by 10/31  Patient Goals   Patient goals : \"to not have any complications being in bed so  much\"     Therapy Time   Individual Concurrent Group Co-treatment   Time In 4172         Time Out 9613 Minutes 33         Timed Code Treatment Minutes: [address] [facility] JAREN Garay

## 2021-10-29 LAB
ALBUMIN SERPL-MCNC: 2.9 G/DL (ref 3.4–5)
ANION GAP SERPL CALCULATED.3IONS-SCNC: 10 MMOL/L (ref 3–16)
BUN BLDV-MCNC: 33 MG/DL (ref 7–20)
CALCIUM SERPL-MCNC: 9.1 MG/DL (ref 8.3–10.6)
CHLORIDE BLD-SCNC: 96 MMOL/L (ref 99–110)
CO2: 32 MMOL/L (ref 21–32)
CREAT SERPL-MCNC: 0.6 MG/DL (ref 0.6–1.2)
GFR AFRICAN AMERICAN: >60
GFR NON-AFRICAN AMERICAN: >60
GLUCOSE BLD-MCNC: 104 MG/DL (ref 70–99)
HCT VFR BLD CALC: 28 % (ref 36–48)
HEMOGLOBIN: 9.5 G/DL (ref 12–16)
MAGNESIUM: 2 MG/DL (ref 1.8–2.4)
MCH RBC QN AUTO: 31.9 PG (ref 26–34)
MCHC RBC AUTO-ENTMCNC: 33.8 G/DL (ref 31–36)
MCV RBC AUTO: 94.6 FL (ref 80–100)
PDW BLD-RTO: 17.1 % (ref 12.4–15.4)
PHOSPHORUS: 3.5 MG/DL (ref 2.5–4.9)
PLATELET # BLD: 505 K/UL (ref 135–450)
PMV BLD AUTO: 6.8 FL (ref 5–10.5)
POTASSIUM SERPL-SCNC: 3.5 MMOL/L (ref 3.5–5.1)
RBC # BLD: 2.96 M/UL (ref 4–5.2)
SODIUM BLD-SCNC: 138 MMOL/L (ref 136–145)
WBC # BLD: 4.9 K/UL (ref 4–11)

## 2021-10-29 PROCEDURE — 6360000002 HC RX W HCPCS: Performed by: INTERNAL MEDICINE

## 2021-10-29 PROCEDURE — 6370000000 HC RX 637 (ALT 250 FOR IP): Performed by: INTERNAL MEDICINE

## 2021-10-29 PROCEDURE — 80069 RENAL FUNCTION PANEL: CPT

## 2021-10-29 PROCEDURE — 83735 ASSAY OF MAGNESIUM: CPT

## 2021-10-29 PROCEDURE — 94761 N-INVAS EAR/PLS OXIMETRY MLT: CPT

## 2021-10-29 PROCEDURE — 85027 COMPLETE CBC AUTOMATED: CPT

## 2021-10-29 PROCEDURE — 99233 SBSQ HOSP IP/OBS HIGH 50: CPT | Performed by: INTERNAL MEDICINE

## 2021-10-29 PROCEDURE — 97535 SELF CARE MNGMENT TRAINING: CPT

## 2021-10-29 PROCEDURE — 97110 THERAPEUTIC EXERCISES: CPT

## 2021-10-29 PROCEDURE — 1200000000 HC SEMI PRIVATE

## 2021-10-29 PROCEDURE — 2700000000 HC OXYGEN THERAPY PER DAY

## 2021-10-29 PROCEDURE — 97116 GAIT TRAINING THERAPY: CPT

## 2021-10-29 PROCEDURE — 2580000003 HC RX 258: Performed by: INTERNAL MEDICINE

## 2021-10-29 PROCEDURE — 36415 COLL VENOUS BLD VENIPUNCTURE: CPT

## 2021-10-29 RX ORDER — POTASSIUM CHLORIDE 20 MEQ/1
40 TABLET, EXTENDED RELEASE ORAL ONCE
Status: COMPLETED | OUTPATIENT
Start: 2021-10-29 | End: 2021-10-29

## 2021-10-29 RX ADMIN — FUROSEMIDE 40 MG: 10 INJECTION, SOLUTION INTRAMUSCULAR; INTRAVENOUS at 09:51

## 2021-10-29 RX ADMIN — CEFTRIAXONE SODIUM 1000 MG: 1 INJECTION, POWDER, FOR SOLUTION INTRAMUSCULAR; INTRAVENOUS at 09:56

## 2021-10-29 RX ADMIN — POTASSIUM CHLORIDE 40 MEQ: 20 TABLET, EXTENDED RELEASE ORAL at 11:56

## 2021-10-29 RX ADMIN — Medication 10 ML: at 09:57

## 2021-10-29 RX ADMIN — LORAZEPAM 0.5 MG: 0.5 TABLET ORAL at 19:53

## 2021-10-29 RX ADMIN — CARVEDILOL 3.12 MG: 3.12 TABLET, FILM COATED ORAL at 12:42

## 2021-10-29 RX ADMIN — ASPIRIN 81 MG: 81 TABLET, COATED ORAL at 08:32

## 2021-10-29 RX ADMIN — Medication 5 MG: at 19:53

## 2021-10-29 RX ADMIN — APIXABAN 5 MG: 5 TABLET, FILM COATED ORAL at 08:30

## 2021-10-29 RX ADMIN — LISINOPRIL 2.5 MG: 5 TABLET ORAL at 08:33

## 2021-10-29 RX ADMIN — Medication 10 ML: at 19:53

## 2021-10-29 RX ADMIN — CARVEDILOL 3.12 MG: 3.12 TABLET, FILM COATED ORAL at 18:35

## 2021-10-29 RX ADMIN — APIXABAN 5 MG: 5 TABLET, FILM COATED ORAL at 19:53

## 2021-10-29 ASSESSMENT — PAIN SCALES - GENERAL
PAINLEVEL_OUTOF10: 0

## 2021-10-29 NOTE — PROGRESS NOTES
[address]   PROGRESS NOTE  [phone]      Attending Physician: Sandra Mathew MD  Reason for Consultation/Chief Complaint: Acute heart failure    Subjective     Patient received 40 mg IV lasix BID yesterday and was documented net negative 1.5L over the last 24 hours. Still states that she does not feel well this morning. Shortness of breath is mildly improved. CURRENT Medications:  apixaban (ELIQUIS) tablet 5 mg, BID  aspirin EC tablet 81 mg, Daily  carvedilol (COREG) tablet 3.125 mg, BID WC  lisinopril (PRINIVIL;ZESTRIL) tablet 2.5 mg, Daily  melatonin tablet 3 mg, Nightly PRN  ondansetron (ZOFRAN-ODT) disintegrating tablet 8 mg, Q8H PRN  furosemide (LASIX) injection 40 mg, BID  sodium chloride flush 0.9 % injection 5-40 mL, 2 times per day  sodium chloride flush 0.9 % injection 5-40 mL, PRN  0.9 % sodium chloride infusion, PRN  ondansetron (ZOFRAN-ODT) disintegrating tablet 4 mg, Q8H PRN   Or  ondansetron (ZOFRAN) injection 4 mg, Q6H PRN  polyethylene glycol (GLYCOLAX) packet 17 g, Daily PRN  acetaminophen (TYLENOL) tablet 650 mg, Q6H PRN   Or  acetaminophen (TYLENOL) suppository 650 mg, Q6H PRN  cefTRIAXone (ROCEPHIN) 1000 mg IVPB in 50 mL D5W minibag, Daily  LORazepam (ATIVAN) tablet 0.5 mg, Q6H PRN  melatonin disintegrating tablet 5 mg, Nightly        Allergies:  Bactrim [sulfamethoxazole-trimethoprim]     Review of Systems:   General: Positive for general malaise. Cardiac: No chest pain or palpitations. Respiratory: Positive for shortness of breath. GI: No nausea, vomiting, or diarrhea. Neuro: No lightheadedness or dizziness. Objective   PHYSICAL EXAM:    Vitals:    10/29/21 1139   BP:    Pulse:    Resp:    Temp: 97.3 °F (36.3 °C)   SpO2:     Weight: 119 lb 9.6 oz (54.3 kg)      General: Cachectic female lying in bed in no acute distress.   HEENT: Normocephalic, atraumatic, non-icteric, hearing intact, nares normal, mucous membranes moist.  Neck: +Hepatojugular reflux. Heart: Mildly tachycardic with regular rhythm. Normal S1 and S2. No murmurs, gallops or rubs. Chest: Port noted on right. Lungs: Normal respiratory effort. Breath sounds diminished bilaterally with mild rales present. Abdomen: Soft, non-tender. Normoactive bowel sounds. No masses or organomegaly. Skin: No rashes, wounds, or lesions. Pulses: 2+ and symmetric. Extremities: No clubbing, cyanosis, or edema. Psych: Normal mood and affect. Neuro: Alert and oriented to person, place, and time. No focal deficits noted. Labs   CBC:   Lab Results   Component Value Date    WBC 4.9 10/29/2021    RBC 2.96 10/29/2021    HGB 9.5 10/29/2021    HCT 28.0 10/29/2021    MCV 94.6 10/29/2021    RDW 17.1 10/29/2021     10/29/2021     CMP:  Lab Results   Component Value Date     10/29/2021    K 3.5 10/29/2021    K 3.5 10/22/2021    CL 96 10/29/2021    CO2 32 10/29/2021    BUN 33 10/29/2021    CREATININE 0.6 10/29/2021    GFRAA >60 10/29/2021    GFRAA >60 07/10/2012    AGRATIO 0.9 10/27/2021    LABGLOM >60 10/29/2021    GLUCOSE 104 10/29/2021    PROT 6.3 10/27/2021    PROT 7.8 07/10/2012    CALCIUM 9.1 10/29/2021    BILITOT 0.3 10/27/2021    ALKPHOS 98 10/27/2021    AST 20 10/27/2021    ALT 27 10/27/2021     PT/INR:  No results found for: PTINR  HgBA1c:  Lab Results   Component Value Date    LABA1C 5.6 09/13/2018     Lab Results   Component Value Date    TROPONINI 0.01 10/27/2021         Cardiac Data     Last EKG: Sinus tachycardia, no ischemic changes.     Echo:  TTE 8/17/21:  Conclusions   Summary   LV systolic function is normal with EF estimated at 55%.   No regional wall motion abnormalities.   Grade I diastolic dysfunction with normal LV filling pressure.   Mild mitral regurgitation.   Inadequate tricuspid regurgitation jet to estimate systolic artery pressure   (SPAP).      TTE 10/23/21:  Conclusions   Summary   Normal left ventricle size and wall thickness.   The left ventricular systolic function recent admission for bilateral PEs. At that time, TTE showed LVEF of 25-30% from 55% on previous echo from 8/17/21. Concern for possible cardiotoxicity from adriamycin or Herceptin bio similar. Tequila Lang, has not had any recent formal ischemic evaluation.   -Overall, patient remains clinically hypervolemic and recommend continued diuresis with IV lasix 40 mg BID  -Continue coreg 3.125 mg BID and lisinopril 2.5 mg daily  -Continue to monitor strict I/Os, obtain daily standing weights  -2L per day fluid restriction, low sodium diet  -Trend renal panels and magnesium levels and maintain K>4 and Mg>2  -Pending clinical course and overall prognosis from malignancy standpoint, can consider proceeding with formal ischemic evaluation once patient has had time to recover from her other acute medical issues (this can be done as an outpatient)  -Will need to discuss switching to less cardiotoxic chemotherapy regimen with oncology team, if additional options exist     2. Suspected bacterial pneumonia - CT demonstrated worsening bilateral pulmonary consolidations. COVID-19 NAAT and rapid influenza A/B antigens were negative.  -Defer antibiotic management to primary team  -Blood cultures pending     3. Bilateral pulmonary emboli  -On Eliquis  -RV size and systolic function normal on TTE     4. Acute hypoxic respiratory failure  -Secondary to combination of bilateral PEs, acute heart failure, and pneumonia  -Wean supplemental O2 as tolerated for goal O2 saturation >90%     5. Right atrial echodensity  -May represent artifact, but given recent PE and known underlying malignancy cannot definitively exclude thrombus or mass  -On Eliquis as above and can consider obtaining outpatient cardiac MRI for further evaluation, which may also help provide further insight into etiology of cardiomyopathy     6.  Sinus tachycardia  -Secondary to above     7. H/o breast cancer  -S/p 4 cycles of Adriamycin/Cytoxan and 7 of 12 planned cycles of Taxol with Herceptin bio similar and pertuzumab  -Oncology following and will likely need to consider switching to less cardiotoxic regimen if other options exist      Thank you for allowing us to participate in the care of Shemar Vidales. Please call me with any questions [phone]. Liane Vargas DO  [facility]  [phone] [facility]  [phone] [address] [place] [address]  10/29/2021 11:59 AM      I will address the patient's cardiac risk factors and adjusted pharmacologic treatment as needed. In addition, I have reinforced the need for patient directed risk factor modification. All questions and concerns were addressed to the patient/family. Alternatives to my treatment were discussed. The note was completed using EMR. Every effort was made to ensure accuracy; however, inadvertent computerized transcription errors may be present.

## 2021-10-29 NOTE — PROGRESS NOTES
Physical Therapy  Facility/Department: [facility] A2 CARD TELEMETRY  Daily Treatment Note  NAME: Kylie Beltre  : 1945  MRN: [id number]    Date of Service: 10/29/2021    Discharge Recommendations:  24 hour supervision or assist, Home with Home health PT      If pt discharges prior to next PT session this note will serve as discharge summary. Assessment   Body structures, Functions, Activity limitations: Decreased functional mobility ; Decreased endurance  Assessment: pt voices motivation and is progressing toward therapy goals. Amb 80 ft with RW and SBA, transfers SBA, bed mob supervised. Pt remains on 2L O2, vitals stable with activity. Pt able to perform LE Ex in standing today. Remains below baseline function and will benefit from skilled PT to address deficits. Recommend Home 24 hr assist at discharge with home PT  Treatment Diagnosis: decreased gait and endurance  Prognosis: Good  PT Education: Goals;PT Role;Plan of Care;Energy Conservation;General Safety; Disease Specific Education;Transfer Training;Gait Training  Patient Education: Disease specific: pt educated in energy conservation, prevention of complications of bedrest, safety for gait and transfers. Pt voices understanding  Barriers to Learning: none  REQUIRES PT FOLLOW UP: Yes  Activity Tolerance  Activity Tolerance: Patient Tolerated treatment well     Patient Diagnosis(es): The primary encounter diagnosis was Acute respiratory failure with hypoxia ([address] Diagnoses of Acute on chronic systolic congestive heart failure (HCC) and Acute cystitis without hematuria were also pertinent to this visit. has a past medical history of Breast cancer ([address] Cancer ([address] and Kidney stone. has a past surgical history that includes [address] RIGHT (Right, 2021); US BREAST BIOPSY W LOC DEVICE EACH ADDL LESION RIGHT (Right, 2021); US BREAST BIOPSY W LOC DEVICE 1ST LESION LEFT (Left, 2021);  Colonoscopy;  [place] [address] Surgery (N/A, 5/25/2021); MRI BIOPSY BREAST W LOC DEVICE RIGHT 1ST LESION (Right, 5/28/2021); and MRI BIOPSY BREAST W LOC DEVICE RIGHT EACH ADDL (Right, 5/28/2021).     Restrictions  Restrictions/Precautions: General Precautions, Up as Tolerated  Other position/activity restrictions: 2L of O2  Subjective   Chart Reviewed: Yes  Family / Caregiver Present: No  Referring Practitioner: Fabiola  Subjective: Pt agrees to therapy, needs to urinate  Comments: RN cleared pt for therapy, pt resting in bed on approach  Pain Screening  Patient Currently in Pain: Denies    Vital Signs  Temp: 97.3 °F (36.3 °C)  Temp Source: Oral  Pulse: 100  Heart Rate Source: Monitor  BP: 102/66  BP Location: Left upper arm  Patient Currently in Pain: Denies  Oxygen Therapy  SpO2: 97 %  Pulse Oximeter Device Mode: Intermittent  O2 Device: Nasal cannula       Orientation  Overall Orientation Status: Within Normal Limits     Objective   Bed mobility  Supine to Sit: Supervision  Sit to Supine: Supervision     Transfers  Sit to Stand: Stand by assistance  Stand to sit: Stand by assistance   Comment: pt assisted to restroom to urinate, Toilet transfers supervised, pt manages garments indep and able to stand at sink to wash hands without loss of balance    Ambulation  Surface: level tile  Device: Rolling Walker  Assistance: Stand by assistance  Quality of Gait: appropriate pace, stride and foot clearance, no loss of balance, early fatigue  Distance: 80 ft pacing in room  Comments: SpO2 97%   post amb      Exercises  Standing march 10 x B holding walker  Sit to and from stand 5 x in succession  Standing B heel raise holding walker 10 x  Upper Extremity: Alternate UE row with upper trunk rotation 10 x B seated       AM-PAC Score  AM-PAC Inpatient Mobility Raw Score : 21 (10/29/21 1136)  AM-PAC Inpatient T-Scale Score : 50.25 (10/29/21 1136)  Mobility Inpatient CMS 0-100% Score: 28.97 (10/29/21 1136)  Mobility Inpatient CMS G-Code Modifier : Zac Perez (10/29/21 1136)   Goals  Short term goals  Time Frame for Short term goals: 1 week (11/4) unless otherwise specified  Short term goal 1: pt to be modified indep in bed mob: 10/29 supervision  Short term goal 2: pt to be indep with transfers: 10/29 SBA  Short term goal 3: pt to amb 150 ft with least restrictive or no device supervised: 10/29 amb with RW 80 ft SBA  Short term goal 4: pt to participate in LE Ex 12-15 reps by 10/31: 10/29 progressing, 10 reps  Patient Goals   Patient goals : \"to not have any complications being in bed so  much\"    Plan    Times per week: 3- 5 x wk  Current Treatment Recommendations: Strengthening, Transfer Training, Endurance Training, Functional Mobility Training, Safety Education & Training, Gait Training  Safety Devices  Type of devices:  All fall risk precautions in place, Patient at risk for falls, Bed alarm in place, Call light within reach, Left in bed, Nurse notified, Gait belt (handoff to Caroline Martin RN)     Therapy Time   Individual Concurrent Group Co-treatment   Time In 1115         Time Out 1144         Minutes 636 Ramon Sanchez, PT

## 2021-10-29 NOTE — PROGRESS NOTES
Hospitalist Progress Note      PCP: SOUTH Luke - CNP    Date of Admission: 10/27/2021    Chief Complaint: SOB    Subjective: no new c/o. Medications:  Reviewed    Infusion Medications    sodium chloride Stopped (10/28/21 1045)     Scheduled Medications    apixaban  5 mg Oral BID    aspirin  81 mg Oral Daily    carvedilol  3.125 mg Oral BID WC    lisinopril  2.5 mg Oral Daily    furosemide  40 mg IntraVENous BID    sodium chloride flush  5-40 mL IntraVENous 2 times per day    cefTRIAXone (ROCEPHIN) IV  1,000 mg IntraVENous Daily    melatonin  5 mg Oral Nightly     PRN Meds: melatonin, ondansetron, sodium chloride flush, sodium chloride, ondansetron **OR** ondansetron, polyethylene glycol, acetaminophen **OR** acetaminophen, LORazepam      Intake/Output Summary (Last 24 hours) at 10/29/2021 1059  Last data filed at 10/29/2021 1045  Gross per 24 hour   Intake 820 ml   Output 2600 ml   Net -1780 ml       Physical Exam Performed:    /67   Pulse 96   Temp 97.4 °F (36.3 °C) (Oral)   Resp 16   Ht 5' 7\" (1.702 m)   Wt 119 lb 9.6 oz (54.3 kg)   LMP  (LMP Unknown)   SpO2 95%   BMI 18.73 kg/m²     General appearance: No apparent distress, appears stated age and cooperative. HEENT: Pupils equal, round, and reactive to light. Conjunctivae/corneas clear. Neck: Supple, with full range of motion. No jugular venous distention. Trachea midline. Respiratory:  Normal respiratory effort. Clear to auscultation, bilaterally without Rales/Wheezes/Rhonchi. Cardiovascular: Regular rate and rhythm with normal S1/S2 without murmurs, rubs or gallops. Abdomen: Soft, non-tender, non-distended with normal bowel sounds. Musculoskeletal: No clubbing, cyanosis or edema bilaterally. Full range of motion without deformity. Skin: Skin color, texture, turgor normal.  No rashes or lesions. Neurologic:  Neurovascularly intact without any focal sensory/motor deficits.  Cranial nerves: II-XII intact, grossly non-focal.  Psychiatric: Alert and oriented, thought content appropriate, normal insight  Capillary Refill: Brisk,< 3 seconds   Peripheral Pulses: +2 palpable, equal bilaterally       Labs:   Recent Labs     10/27/21  0840 10/28/21  0612 10/29/21  0655   WBC 6.1 5.2 4.9   HGB 9.3* 8.9* 9.5*   HCT 27.5* 26.7* 28.0*   * 471* 505*     Recent Labs     10/27/21  0840 10/28/21  0612 10/29/21  0655   * 142 138   K 4.0 3.9 3.5   CL 98* 100 96*   CO2 25 27 32   BUN 19 22* 33*   CREATININE <0.5* <0.5* 0.6   CALCIUM 9.2 8.8 9.1   PHOS  --  4.8 3.5     Recent Labs     10/27/21  0840   AST 20   ALT 27   BILITOT 0.3   ALKPHOS 98     No results for input(s): INR in the last 72 hours. Recent Labs     10/27/21  0840   TROPONINI 0.01       Urinalysis:      Lab Results   Component Value Date    NITRU Negative 10/27/2021    WBCUA  10/27/2021    BACTERIA 2+ 10/27/2021    RBCUA 0-2 10/27/2021    BLOODU Negative 10/27/2021    SPECGRAV >=1.030 10/27/2021    GLUCOSEU Negative 10/27/2021       Consults:    IP CONSULT TO CARDIOLOGY  IP CONSULT TO HOSPITALIST  IP CONSULT TO SPIRITUAL SERVICES  IP CONSULT TO HEART FAILURE NURSE/COORDINATOR  IP CONSULT TO DIETITIAN      Assessment/Plan:    Active Hospital Problems    Diagnosis     UTI (urinary tract infection) [N39.0]     CHF (congestive heart failure) ([facility]) [I50.9]     CHF (congestive heart failure), NYHA class I, acute on chronic, combined ([facility] [address] [I50.43]     Severe malnutrition ([facility]) [E43]     Anemia [D64.9]        CHF - acute on chronic  sytolic failure w/ reduced EF 25-30% by Echo 23 October. Likely due to hypertensive heart disease. Continue current medical management and follow I/O as well as clinical response on diuresis as ordered.      UTI - admission U/A c/w acute cystitis. Started on empiric Rocephin in ED on 27 October pending Cx results.   Changed to DAILY dosing.     Anemia - likely due in part to chemo, w/out evidence of active bleeding/hemolysis. Stable and asymptomatic w/out indication for transfusion. Follow serial labs. Reviewed and documented as above.     HypoNatremia - etiology clinically unable to determine but likely hypovolemic. Follow serial labs off IVF. Reviewed and documented as above.     Pulmonary embolism - acute, recently dx and anticoagulated on Eliquis - continued.       Breast cancer - on Chemotherapy followed outpt by Dr Divine Doty consulted.     Anxiety - poorly controlled on home meds. Ativan PRN ordered.         DVT Prophylaxis: Eliquis      Recent Labs     10/27/21  0840 10/28/21  0612 10/29/21  0655   * 471* 505*     Diet: ADULT DIET; Regular  ADULT ORAL NUTRITION SUPPLEMENT; Breakfast, Lunch, Dinner; Standard High Calorie/High Protein Oral Supplement  ADULT ORAL NUTRITION SUPPLEMENT; Lunch, Dinner; Frozen Oral Supplement  Code Status: Full Code      PT/OT Eval Status: not yet ordered     Dispo - Likely to home Sat/Sunday 30/31 October at the earliest pending clinical course and subspecialty recs.      Aime Augustin MD

## 2021-10-29 NOTE — PLAN OF CARE
Problem: Falls - Risk of:  Goal: Will remain free from falls  Description: Will remain free from falls  10/28/2021 2117 by Anjelica Chau RN  Outcome: Ongoing  Note: Pt will remain free from falls throughout hospital stay. Fall precautions in place, bed alarm on, bed in lowest position with wheels locked and side rails 2/4 up. Room door open and hourly rounding completed. Will continue to monitor throughout shift.     Problem: OXYGENATION/RESPIRATORY FUNCTION  Goal: Patient will achieve/maintain normal respiratory rate/effort  Description: Respiratory rate and effort will be within normal limits for the patient  Outcome: Ongoing  Note: Pt currently on 2L nasal cannula     Problem: HEMODYNAMIC STATUS  Goal: Patient has stable vital signs and fluid balance  Outcome: Ongoing  Note: BP (!) 93/56   Pulse 96   Temp 97.6 °F (36.4 °C) (Oral)   Resp 16   Ht 5' 7\" (1.702 m)   Wt 119 lb 14.4 oz (54.4 kg)   LMP  (LMP Unknown)   SpO2 95%   BMI 18.78 kg/m²     Problem: Nutrition  Goal: Optimal nutrition therapy  10/28/2021 1445 by Casey Chao, MS, RD, LD  Outcome: Ongoing  Note: Nutrition Problem #1: Severe malnutrition  Intervention: Food and/or Nutrient Delivery: Continue Current Diet, Start Oral Nutrition Supplement  Nutritional Goals: Pt will consume greater than 50% of meals and ONS this admission    Problem: OXYGENATION/RESPIRATORY FUNCTION  Goal: Patient will maintain patent airway  10/29/2021 2119 by Anjelica Chau RN  Outcome: Ongoing  Note:   Patient's EF (Ejection Fraction) is less than 40%    Heart Failure Medications:  Diuretics[de-identified] Furosemide    (One of the following REQUIRED for EF <40%/SYSTOLIC FAILURE but MAY be used in EF% >40%/DIASTOLIC FAILURE)        ACE[de-identified] Lisinopril        ARB[de-identified] None         ARNI[de-identified] None    (Beta Blockers)  NON- Evidenced Based Beta Blocker (for EF% >40%/DIASTOLIC FAILURE): None    Evidenced Based Beta Blocker::(REQUIRED for EF% <40%/SYSTOLIC FAILURE) Carvedilol- Coreg  ................................................................................................................................................... Patient's weights and intake/output reviewed: Yes    Patient's Last Weight: 119 lbs obtained by standing scale. Difference of 2 lbs less than last documented weight. Intake/Output Summary (Last 24 hours) at 10/28/2021 1208  Last data filed at 10/28/2021 1049  Gross per 24 hour   Intake 1157.5 ml   Output 2000 ml   Net -842.5 ml       Comorbidities Reviewed Yes    Patient has a past medical history of Breast cancer ([facility] [address] Cancer ([facility] [address] and Kidney stone. >>For CHF and Comorbidity documentation on Education Time and Topics, please see Education Tab    Progressive Mobility Assessment:  What is this patient's Current Level of Mobility?: Ambulatory- with Assistance  How was this patient Mobilized today?: Edge of Bed,  Up to Toilet/Shower, and Up in Room                 With Whom? Nurse, PCA, and Self                 Level of Difficulty/Assistance: 1x Assist     Pt resting in bed at this time on  2 L O2. Pt with complaints of shortness of breath. Pt without lower extremity edema.      Patient and/or Family's stated Goal of Care this Admission: reduce shortness of breath, increase activity tolerance, and be more comfortable prior to discharge        :

## 2021-10-29 NOTE — PLAN OF CARE
Problem: Falls - Risk of:  Goal: Will remain free from falls  Description: Will remain free from falls  Outcome: Ongoing     Problem: OXYGENATION/RESPIRATORY FUNCTION  Goal: Patient will maintain patent airway  Outcome: Ongoing      Patient's EF (Ejection Fraction) is less than 40%    Heart Failure Medications:  Diuretics[de-identified] Furosemide    (One of the following REQUIRED for EF <40%/SYSTOLIC FAILURE but MAY be used in EF% >40%/DIASTOLIC FAILURE)        ACE[de-identified] Lisinopril        ARB[de-identified] None         ARNI[de-identified] None    (Beta Blockers)  NON- Evidenced Based Beta Blocker (for EF% >40%/DIASTOLIC FAILURE): None    Evidenced Based Beta Blocker::(REQUIRED for EF% <40%/SYSTOLIC FAILURE) Carvedilol- Coreg  . .................................................................................................................................................. Patient's weights and intake/output reviewed: Yes    Patient's Last Weight: 119 lbs obtained by standing scale. Difference of 0 lbs  0  than last documented weight. Intake/Output Summary (Last 24 hours) at 10/29/2021 1849  Last data filed at 10/29/2021 1836  Gross per 24 hour   Intake 1480 ml   Output 2720 ml   Net -1240 ml       Comorbidities Reviewed Yes    Patient has a past medical history of Breast cancer ([facility] [address] Cancer ([address] and Kidney stone. >>For CHF and Comorbidity documentation on Education Time and Topics, please see Education Tab    Progressive Mobility Assessment:  What is this patient's Current Level of Mobility?: Ambulatory- with Assistance  How was this patient Mobilized today?: Edge of Bed, Up to Chair, Bedside Commode, and  Up to Toilet/Shower                 With Whom? Nurse, PCA, PT, OT, and Self                 Level of Difficulty/Assistance: 1x Assist     Pt resting in bed at this time on  2 L O2. Pt denies shortness of breath. Pt without lower extremity edema.      Patient and/or Family's stated Goal of Care this Admission: reduce shortness of breath, increase activity tolerance, better understand heart failure and disease management, be more comfortable, and reduce lower extremity edema prior to discharge      Problem: Nutrition  Goal: Optimal nutrition therapy  Outcome: Ongoing

## 2021-10-29 NOTE — CARE COORDINATION
CM met pt at bedside who states she has assistance at home. Pt states she lives with her mother who is \"90 going on 65\"; CM will continue to follow for possible HHC and home O2 needs.   Ronn Lanes, RN

## 2021-10-29 NOTE — CONSULTS
[address] 1945    History:  Past Medical History:   Diagnosis Date    Breast cancer ([facility] [address]     Cancer ([facility] [address]     Kidney stone        ECHO:  10/23/21 25-30%    ACE/ARB: lidinopril 2.5mg daily    BB: Carvediolol 3.125 mg BID    Last Hospital Admission:  10/22/21  Multiple subsegmental pulmonary emboli    Discharge plans: to return home    Family Present: none  Advanced Directives: patient full code  Patient's stated goal of care: be more comfortable prior to discharge; long term goals include symptom managment  Lifestyle goal discussed: diet and fluid intake    Patient resting in bed at this time on 2 L O2. Pt denies shortness of breath; no complaints of chest pain. Pt states she lives at home. Mentioned her diet largely consists of whatever she can tolerate due to chemo. Pt states she drinks not enough of fluid per day. States she takes all her home medications as prescribed. Patient has a scale at home. Patient denies concerns paying for medications. Writer briefly discussed CHF education with patient. Pt stating she is not feeling well today. States she struggles to eat due to chemo. States her doc advised her to do what she needs to to take in some food. States her kids are always on her about fluid intake. Provided with CHF book and fluid management cup.      Patient recent weights and intake/output reviewed:    Patient Vitals for the past 96 hrs (Last 3 readings):   Weight   10/29/21 0415 119 lb 9.6 oz (54.3 kg)   10/28/21 0357 119 lb 14.4 oz (54.4 kg)   10/27/21 1745 121 lb 9.6 oz (55.2 kg)         Intake/Output Summary (Last 24 hours) at 10/29/2021 1242  Last data filed at 10/29/2021 1045  Gross per 24 hour   Intake 820 ml   Output 2600 ml   Net -1780 ml        Notified patient to call the doctor post discharge if she experiences shortness of breath, chest pain, swelling, cough, or weight gain of 2-3 pounds in a day / 5 pounds in a week. Also notified patient to call the doctor if she feels dizzy, increased fatigue, decreased or difficulty urinating. Reviewed the red, yellow, green zones of heart failure self management. Encouraged patient to call the MD with early signs of an acute heart failure exacerbation or when in the yellow zone. Patient provided with both written and verbal education on CHF signs/symptoms, causes, discharge medications, daily weights, low sodium diet, activity, fluid restriction, and follow-up. Pt verbalized understanding. No additional questions at this time. Stated she will alert her nurse with any questions. PATIENT/CAREGIVER TEACHING:    Level of patient/caregiver understanding able to:   [ Taryn Boehringer Verbalize understanding [ ] Demonstrate understanding [ ] Teach back   [ ] Needs reinforcement [ ] Other:     Education Time: 10 min    Recommendations:   1. Encourage follow-up appointment compliance. 2. Educate further on fluid restriction 48 oz - 64 oz during inpatient stay so he can understand how to measure intake at home. 3. Review sodium restrictions. Encourage patient to not add table salt to her foods and avoid foods that are high in sodium. 4. Continue to educate on S/S. Stress the importance of calling the MD with the earliest signs of an acute exacerbation. 5. Emphasize daily weights - instruct patient to call the MD if she gains 2-3 lb in a day or 5 lb in a week. 6. Provided patient with CHF Resource Line for questions and concerns.        Anna Villarreal RN   10/29/2021 12:42 PM

## 2021-10-29 NOTE — PROGRESS NOTES
Pt. Blood pressure was 103/67 and was concerned taking carvedilol. Student nurse consulted with assigned nurse and was advised to hold medication at this time.

## 2021-10-29 NOTE — PROGRESS NOTES
Occupational Therapy  Facility/Department: [facility] A2 CARD TELEMETRY  Daily Treatment Note  NAME: Lavona Duane  : 1945  MRN: [id number]    Date of Service: 10/29/2021    Discharge Recommendations:  S Level 1, Home with Home health OT, 24 hour supervision or assist       Lavona Duane scored a 19/24 on the AM-PAC ADL Inpatient form. Current research shows that an AM-PAC score of 18 or greater is typically associated with a discharge to the patient's home setting. Based on the patient's AM-PAC score, and their current ADL deficits, it is recommended that the patient have 2-3 sessions per week of Occupational Therapy at d/c to increase the patient's independence. At this time, this patient demonstrates the endurance and safety to discharge home with (home  services) and a follow up treatment frequency of 2-3x/wk. Please see assessment section for further patient specific details. If patient discharges prior to next session this note will serve as a discharge summary. Please see below for the latest assessment towards goals. Assessment   Performance deficits / Impairments: Decreased endurance;Decreased strength;Decreased functional mobility ; Decreased ADL status; Decreased high-level IADLs;Decreased balance  Assessment: Pt motivated to participate in therapy. Pt continues to be limited by decreased endurance requiring 2L of O2 during ADLs. Pt tolerated toileting and short mobility in room with SBA/CGA. Anticipate safe d/c home with 24hr supervision and HHOT. Prognosis: Good;Fair;Guarded  OT Education: OT Role;ADL Adaptive Strategies; Plan of Care;Precautions; Energy Conservation;Transfer Training;Home Exercise Program  Patient Education: d/c recommendation  REQUIRES OT FOLLOW UP: Yes  Activity Tolerance  Activity Tolerance: Patient limited by fatigue  Activity Tolerance: Pt with SOB noted during minimal exertion; Pt on 2L of O2  Safety Devices  Safety Devices in place: Yes  Type of devices:  All fall risk precautions in place; Left in chair;Call light within reach;Nurse notified; Patient at risk for falls; Chair alarm in place         Patient Diagnosis(es): The primary encounter diagnosis was Acute respiratory failure with hypoxia ([facility] [address] Diagnoses of Acute on chronic systolic congestive heart failure (HCC) and Acute cystitis without hematuria were also pertinent to this visit. has a past medical history of Breast cancer ([facility] [address] Cancer ([address] and Kidney stone. has a past surgical history that includes [address] RIGHT (Right, 5/7/2021); US BREAST BIOPSY W LOC DEVICE EACH ADDL LESION RIGHT (Right, 5/7/2021); US BREAST BIOPSY W LOC DEVICE 1ST LESION LEFT (Left, 5/7/2021); Colonoscopy; Port Surgery (N/A, 5/25/2021); MRI BIOPSY BREAST W LOC DEVICE RIGHT 1ST LESION (Right, 5/28/2021); and MRI BIOPSY BREAST W LOC DEVICE RIGHT EACH ADDL (Right, 5/28/2021). Restrictions  Restrictions/Precautions  Restrictions/Precautions: General Precautions, Up as Tolerated  Position Activity Restriction  Other position/activity restrictions: 2L of O2  Subjective   General  Chart Reviewed: Yes, Orders, Progress Notes, History and Physical, Imaging  Patient assessed for rehabilitation services?: Yes  Additional Pertinent Hx: hx of breast cancer, 10/25 admission for PE  Family / Caregiver Present: No  Referring Practitioner: Esha Retana MD  Diagnosis: SOB  Subjective  Subjective: Pt pleasant and agreeable to OT. Pt reporting fatigue this date. General Comment  Comments: RN okay for therapy  Vital Signs  Patient Currently in Pain: Denies   Orientation  Orientation  Overall Orientation Status: Within Functional Limits  Objective    ADL  Feeding: Independent  Toileting: Stand by assistance        Balance  Sitting Balance: Supervision  Standing Balance: Contact guard assistance  Functional Mobility  Functional - Mobility Device: No device  Activity: To/from bathroom; Other  Assist Level: Contact guard assistance  Functional Mobility Comments: 2L of O2; SOB noted with minimal activity; Toilet Transfers  Toilet - Technique: Ambulating  Equipment Used: Standard toilet  Toilet Transfer: Contact guard assistance  Bed mobility  Supine to Sit: Supervision  Sit to Supine: Supervision  Scooting: Stand by assistance  Transfers  Sit to stand: Stand by assistance  Stand to sit: Stand by assistance                       Cognition  Overall Cognitive Status: Exceptions  Arousal/Alertness: Appropriate responses to stimuli  Following Commands:  Follows multistep commands with increased time  Attention Span: Appears intact  Memory: Decreased recall of recent events  Safety Judgement: Decreased awareness of need for assistance  Insights: Decreased awareness of deficits                    Type of ROM/Therapeutic Exercise  Type of ROM/Therapeutic Exercise: AROM  Comment: 1lb weight  Exercises  Shoulder Flexion: x15  Shoulder Extension: x15  Horizontal ABduction: x15  Horizontal ADduction: x15  Elbow Flexion: x15  Elbow Extension: x15  Wrist Flexion: x15  Wrist Extension: x15                    Plan   Plan  Times per week: 3-5  Current Treatment Recommendations: Strengthening, Functional Mobility Training, Patient/Caregiver Education & Training, Endurance Training, Balance Training, Safety Education & Training, Self-Care / ADL  G-Code     OutComes Score                                                  -PAC Score        -[facility] Inpatient Daily Activity Raw Score: 19 (10/29/21 1547)  AM-PAC Inpatient ADL T-Scale Score : 40.22 (10/29/21 1547)  ADL Inpatient CMS 0-100% Score: 42.8 (10/29/21 1547)  ADL Inpatient CMS G-Code Modifier : CK (10/29/21 1547)    Goals  Short term goals  Time Frame for Short term goals: By 11/06/21- ongoing as of 10/29  Short term goal 1: Pt will complete bathing with supervision  Short term goal 2: Pt will complete dressing with set-up A  Short term goal 3: Pt will tolerate x4 mins of standing at sink to complete grooming with supervision  Short term goal 4: Pt will complete bathroom mobility with SBA  Patient Goals   Patient goals : to get healthy       Therapy Time   Individual Concurrent Group Co-treatment   Time In 1500         Time Out 1524         Minutes 24         Timed Code Treatment Minutes: 24 Minutes       Farooq Chavira OTR/L

## 2021-10-30 LAB
ANION GAP SERPL CALCULATED.3IONS-SCNC: 8 MMOL/L (ref 3–16)
BUN BLDV-MCNC: 25 MG/DL (ref 7–20)
CALCIUM SERPL-MCNC: 9.2 MG/DL (ref 8.3–10.6)
CHLORIDE BLD-SCNC: 100 MMOL/L (ref 99–110)
CO2: 32 MMOL/L (ref 21–32)
CREAT SERPL-MCNC: <0.5 MG/DL (ref 0.6–1.2)
GFR AFRICAN AMERICAN: >60
GFR NON-AFRICAN AMERICAN: >60
GLUCOSE BLD-MCNC: 109 MG/DL (ref 70–99)
HCT VFR BLD CALC: 28 % (ref 36–48)
HEMOGLOBIN: 9.5 G/DL (ref 12–16)
MAGNESIUM: 2 MG/DL (ref 1.8–2.4)
MCH RBC QN AUTO: 31.3 PG (ref 26–34)
MCHC RBC AUTO-ENTMCNC: 33.9 G/DL (ref 31–36)
MCV RBC AUTO: 92.4 FL (ref 80–100)
PDW BLD-RTO: 16.7 % (ref 12.4–15.4)
PLATELET # BLD: 521 K/UL (ref 135–450)
PMV BLD AUTO: 7 FL (ref 5–10.5)
POTASSIUM SERPL-SCNC: 4.6 MMOL/L (ref 3.5–5.1)
RBC # BLD: 3.03 M/UL (ref 4–5.2)
SODIUM BLD-SCNC: 140 MMOL/L (ref 136–145)
WBC # BLD: 5.3 K/UL (ref 4–11)

## 2021-10-30 PROCEDURE — 6370000000 HC RX 637 (ALT 250 FOR IP): Performed by: INTERNAL MEDICINE

## 2021-10-30 PROCEDURE — 99233 SBSQ HOSP IP/OBS HIGH 50: CPT | Performed by: INTERNAL MEDICINE

## 2021-10-30 PROCEDURE — 2700000000 HC OXYGEN THERAPY PER DAY

## 2021-10-30 PROCEDURE — 6360000002 HC RX W HCPCS: Performed by: INTERNAL MEDICINE

## 2021-10-30 PROCEDURE — 94761 N-INVAS EAR/PLS OXIMETRY MLT: CPT

## 2021-10-30 PROCEDURE — 80048 BASIC METABOLIC PNL TOTAL CA: CPT

## 2021-10-30 PROCEDURE — 85027 COMPLETE CBC AUTOMATED: CPT

## 2021-10-30 PROCEDURE — 83735 ASSAY OF MAGNESIUM: CPT

## 2021-10-30 PROCEDURE — 36415 COLL VENOUS BLD VENIPUNCTURE: CPT

## 2021-10-30 PROCEDURE — 2580000003 HC RX 258: Performed by: INTERNAL MEDICINE

## 2021-10-30 PROCEDURE — 1200000000 HC SEMI PRIVATE

## 2021-10-30 RX ADMIN — FUROSEMIDE 40 MG: 10 INJECTION, SOLUTION INTRAMUSCULAR; INTRAVENOUS at 17:16

## 2021-10-30 RX ADMIN — Medication 10 ML: at 20:43

## 2021-10-30 RX ADMIN — APIXABAN 5 MG: 5 TABLET, FILM COATED ORAL at 09:26

## 2021-10-30 RX ADMIN — CARVEDILOL 3.12 MG: 3.12 TABLET, FILM COATED ORAL at 17:16

## 2021-10-30 RX ADMIN — Medication 5 MG: at 20:43

## 2021-10-30 RX ADMIN — LORAZEPAM 0.5 MG: 0.5 TABLET ORAL at 20:43

## 2021-10-30 RX ADMIN — CARVEDILOL 3.12 MG: 3.12 TABLET, FILM COATED ORAL at 09:26

## 2021-10-30 RX ADMIN — CEFTRIAXONE SODIUM 1000 MG: 1 INJECTION, POWDER, FOR SOLUTION INTRAMUSCULAR; INTRAVENOUS at 09:33

## 2021-10-30 RX ADMIN — ASPIRIN 81 MG: 81 TABLET, COATED ORAL at 09:26

## 2021-10-30 RX ADMIN — APIXABAN 5 MG: 5 TABLET, FILM COATED ORAL at 20:43

## 2021-10-30 RX ADMIN — FUROSEMIDE 40 MG: 10 INJECTION, SOLUTION INTRAMUSCULAR; INTRAVENOUS at 09:27

## 2021-10-30 RX ADMIN — Medication 10 ML: at 09:29

## 2021-10-30 ASSESSMENT — PAIN SCALES - GENERAL
PAINLEVEL_OUTOF10: 0

## 2021-10-30 NOTE — PLAN OF CARE
Problem: Falls - Risk of:  Goal: Will remain free from falls  Description: Will remain free from falls  10/29/2021 2243 by Axel Pacheco RN  Outcome: Ongoing  Note: Pt will remain free from falls throughout hospital stay. Fall precautions in place, bed alarm on, bed in lowest position with wheels locked and side rails 2/4 up. Room door open and hourly rounding completed. Will continue to monitor throughout shift. Problem: Pain:  Goal: Pain level will decrease  Description: Pain level will decrease  Outcome: Ongoing  Note: Pt will be satisfied with pain control. Pt uses numeric pain rating scale with reassessments after pain med administration. Will continue to monitor progression throughout shift.

## 2021-10-30 NOTE — PLAN OF CARE
Problem: HEMODYNAMIC STATUS  Goal: Patient has stable vital signs and fluid balance  Outcome: Ongoing     Problem: FLUID AND ELECTROLYTE IMBALANCE  Goal: Fluid and electrolyte balance are achieved/maintained  Outcome: Ongoing     Problem: ACTIVITY INTOLERANCE/IMPAIRED MOBILITY  Goal: Mobility/activity is maintained at optimum level for patient  Outcome: Ongoing     Patient's EF (Ejection Fraction) is less than 40%    Heart Failure Medications:  Diuretics[de-identified] Furosemide    (One of the following REQUIRED for EF <40%/SYSTOLIC FAILURE but MAY be used in EF% >40%/DIASTOLIC FAILURE)        ACE[de-identified] Lisinopril        ARB[de-identified] None         ARNI[de-identified] None    (Beta Blockers)  NON- Evidenced Based Beta Blocker (for EF% >40%/DIASTOLIC FAILURE): None    Evidenced Based Beta Blocker::(REQUIRED for EF% <40%/SYSTOLIC FAILURE) Carvedilol- Coreg  . .................................................................................................................................................. Patient's weights and intake/output reviewed: Yes    Patient's Last Weight: 122 lbs obtained by standing scale. Difference of 3 lbs more than last documented weight. Intake/Output Summary (Last 24 hours) at 10/30/2021 1111  Last data filed at 10/30/2021 1103  Gross per 24 hour   Intake 1500 ml   Output 2330 ml   Net -830 ml       Comorbidities Reviewed Yes    Patient has a past medical history of Breast cancer ([address] Cancer ([address] and Kidney stone. >>For CHF and Comorbidity documentation on Education Time and Topics, please see Education Tab    Progressive Mobility Assessment:  What is this patient's Current Level of Mobility?: Ambulatory- with Assistance  How was this patient Mobilized today?: Edge of Bed,  Up to Toilet/Shower, and Up in Room                 With Whom? Nurse, PCA, and Self                 Level of Difficulty/Assistance: 1x Assist     Pt resting in bed at this time on  1 L O2. Pt with complaints of shortness of breath.  Pt with nonpitting lower extremity edema.      Patient and/or Family's stated Goal of Care this Admission: reduce shortness of breath, increase activity tolerance, better understand heart failure and disease management, be more comfortable, and reduce lower extremity edema prior to discharge        :

## 2021-10-30 NOTE — PROGRESS NOTES
Hospitalist Progress Note      PCP: SOUTH Back - CNP    Date of Admission: 10/27/2021    Chief Complaint: Shortness of breath    Hospital Course:     Subjective: Patient complains of shortness of breath on 1 L oxygen denies any chest pain no nausea vomiting. Medications:  Reviewed    Infusion Medications    sodium chloride Stopped (10/28/21 1045)     Scheduled Medications    apixaban  5 mg Oral BID    aspirin  81 mg Oral Daily    carvedilol  3.125 mg Oral BID WC    lisinopril  2.5 mg Oral Daily    furosemide  40 mg IntraVENous BID    sodium chloride flush  5-40 mL IntraVENous 2 times per day    cefTRIAXone (ROCEPHIN) IV  1,000 mg IntraVENous Daily    melatonin  5 mg Oral Nightly     PRN Meds: melatonin, ondansetron, sodium chloride flush, sodium chloride, ondansetron **OR** ondansetron, polyethylene glycol, acetaminophen **OR** acetaminophen, LORazepam      Intake/Output Summary (Last 24 hours) at 10/30/2021 0917  Last data filed at 10/30/2021 0824  Gross per 24 hour   Intake 1600 ml   Output 1780 ml   Net -180 ml       Physical Exam Performed:    /67   Pulse 105   Temp 98 °F (36.7 °C) (Oral)   Resp 18   Ht 5' 7\" (1.702 m)   Wt 122 lb (55.3 kg)   LMP  (LMP Unknown)   SpO2 98%   BMI 19.11 kg/m²     General appearance: No apparent distress, appears stated age and cooperative. HEENT: Pupils equal, round, and reactive to light. Conjunctivae/corneas clear. Neck: Supple, with full range of motion. No jugular venous distention. Trachea midline. Respiratory:  Normal respiratory effort. Clear to auscultation, bilaterally without Rales/Wheezes/Rhonchi. Cardiovascular: Regular rate and rhythm with normal S1/S2 without murmurs, rubs or gallops. Abdomen: Soft, non-tender, non-distended with normal bowel sounds. Musculoskeletal: No clubbing, cyanosis or edema bilaterally. Full range of motion without deformity.   Skin: Skin color, texture, turgor normal.  No rashes or lesions. Neurologic:  Neurovascularly intact without any focal sensory/motor deficits. Cranial nerves: II-XII intact, grossly non-focal.  Psychiatric: Alert and oriented, thought content appropriate, normal insight  Capillary Refill: Brisk,3 seconds, normal   Peripheral Pulses: +2 palpable, equal bilaterally       Labs:   Recent Labs     10/28/21  0612 10/29/21  0655 10/30/21  0638   WBC 5.2 4.9 5.3   HGB 8.9* 9.5* 9.5*   HCT 26.7* 28.0* 28.0*   * 505* 521*     Recent Labs     10/28/21  0612 10/29/21  0655 10/30/21  0638    138 140   K 3.9 3.5 4.6    96* 100   CO2 27 32 32   BUN 22* 33* 25*   CREATININE <0.5* 0.6 <0.5*   CALCIUM 8.8 9.1 9.2   PHOS 4.8 3.5  --      No results for input(s): AST, ALT, BILIDIR, BILITOT, ALKPHOS in the last 72 hours. No results for input(s): INR in the last 72 hours. No results for input(s): Wade Shorts in the last 72 hours. Urinalysis:      Lab Results   Component Value Date    NITRU Negative 10/27/2021    WBCUA  10/27/2021    BACTERIA 2+ 10/27/2021    RBCUA 0-2 10/27/2021    BLOODU Negative 10/27/2021    SPECGRAV >=1.030 10/27/2021    GLUCOSEU Negative 10/27/2021       Radiology:  CT CHEST PULMONARY EMBOLISM W CONTRAST   Final Result   Within the limitations of the exam, no new or worsening pulmonary emboli   identified. Redemonstration of pulmonary emboli involving bilateral upper   lobes, segmental/subsegmental pulmonary arteries. Previously noted pulmonary   emboli involving right middle and right lower lobe subsegmental pulmonary   arteries are not definitely evident within the limitations. Significantly worsened areas of consolidation and ground-glass opacity   involving lungs bilaterally, right worse than left with bilateral upper lobe   predominance, concerning for worsening pneumonia. Bilateral pleural effusions, right worse than left, worsened.          XR CHEST PORTABLE   Final Result   Worsening diffuse bilateral airspace disease predominantly within the   bilateral upper lobes. Assessment/Plan:    Active Hospital Problems    Diagnosis     UTI (urinary tract infection) [N39.0]     CHF (congestive heart failure) ([facility]) [I50.9]     CHF (congestive heart failure), NYHA class I, acute on chronic, combined ([facility] [address] [I50.43]     Severe malnutrition ([facility]) [E43]     Anemia [D64.9]      1. CHF acute on chronic systolic with reduced EF of 25 to 30% by echo 10/23/2021 likely due to hypertensive heart disease continue the current care. 2.  UTI on IV Rocephin since 10/27/2021, blood cultures so far negative urine culture pending. 3.  Anemia likely due to chemotherapy without evidence of active bleeding or hemolysis stable. 4.  Hyponatremia stable. 5.  Pulmonary embolism acute recently diagnosed and anticoagulated on Eliquis. 6.  Breast cancer on chemotherapy followed by Dr. Lanette Hoyt. 7.  Anxiety continue with home medication. DVT Prophylaxis: Eliquis  Diet: ADULT ORAL NUTRITION SUPPLEMENT; Breakfast, Lunch, Dinner; Standard High Calorie/High Protein Oral Supplement  ADULT ORAL NUTRITION SUPPLEMENT; Lunch, Dinner; Frozen Oral Supplement  ADULT DIET; Regular;  Low Sodium (2 gm); 2000 ml  Code Status: Full Code    PT/OT Eval Status:     Mihir Pichardo MD

## 2021-10-30 NOTE — PLAN OF CARE
Problem: OXYGENATION/RESPIRATORY FUNCTION  Goal: Patient will maintain patent airway  10/29/2021 2249 by Bridget Barnes RN  Outcome: Ongoing  Note:   Patient's EF (Ejection Fraction) is less than 40%    Heart Failure Medications:  Diuretics[de-identified] Furosemide    (One of the following REQUIRED for EF <40%/SYSTOLIC FAILURE but MAY be used in EF% >40%/DIASTOLIC FAILURE)        ACE[de-identified] Lisinopril        ARB[de-identified] None         ARNI[de-identified] None    (Beta Blockers)  NON- Evidenced Based Beta Blocker (for EF% >40%/DIASTOLIC FAILURE): None    Evidenced Based Beta Blocker::(REQUIRED for EF% <40%/SYSTOLIC FAILURE) Carvedilol- Coreg  . .................................................................................................................................................. Patient's weights and intake/output reviewed: Yes    Patient's Last Weight: 119 lbs 9.6 kg  lbs obtained by standing scale. Difference of 0 lbs less than last documented weight. Intake/Output Summary (Last 24 hours) at 10/29/2021 2246  Last data filed at 10/29/2021 1836  Gross per 24 hour   Intake 1480 ml   Output 2720 ml   Net -1240 ml       Comorbidities Reviewed Yes    Patient has a past medical history of Breast cancer ([facility] [address] Cancer ([address] and Kidney stone. >>For CHF and Comorbidity documentation on Education Time and Topics, please see Education Tab    Progressive Mobility Assessment:  What is this patient's Current Level of Mobility?: Ambulatory- with Assistance  How was this patient Mobilized today?: Edge of Bed, Up to Chair, and  Up to Toilet/Shower                 With Whom? Nurse, PCA, PT, and OT                 Level of Difficulty/Assistance: 1x Assist     Pt resting in bed at this time on  2 L O2. Pt denies shortness of breath. Pt without lower extremity edema.      Patient and/or Family's stated Goal of Care this Admission: reduce shortness of breath, increase activity tolerance, better understand heart failure and disease management, be more comfortable, and reduce lower extremity edema prior to discharge        :   10/29/2021 2246 by Deena Peter RN  Outcome: Ongoing

## 2021-10-30 NOTE — PROGRESS NOTES
[facility]   Daily Cardiovascular Progress Note    Admit Date: 10/27/2021    CC:Shortness of Breath (Since monday, hx of breast cancer, was in the hospital last week and was found to have b/l PE, pt was sent home on blood thinners, unsure of which ones, pt was given one duoneb in route with some relief ) and Cough      HPI: Jd Moran has no new complaints today.       Medications/Labs all Reviewed:  Patient Active Problem List   Diagnosis    Perforation of sigmoid colon due to diverticulitis    Anemia    Pure hypercholesterolemia    Malignant neoplasm of right breast in female, estrogen receptor positive ([facility] [address]    Malignant neoplasm of upper-inner quadrant of left breast in female, estrogen receptor positive ([facility] [address]    Chemotherapy-induced neutropenia (HCC)    Fatigue    Tachycardia    Neutropenia, drug-induced (HCC)    Severe malnutrition (HCC)    Neutropenia (HCC)    Failure to thrive in adult    Recurrent falls    Bacteriuria    Acute pulmonary embolism without acute cor pulmonale (HCC)    UTI (urinary tract infection)    CHF (congestive heart failure) (HCC)    CHF (congestive heart failure), NYHA class I, acute on chronic, combined (HCC)       Medications:   apixaban  5 mg Oral BID    aspirin  81 mg Oral Daily    carvedilol  3.125 mg Oral BID WC    lisinopril  2.5 mg Oral Daily    furosemide  40 mg IntraVENous BID    sodium chloride flush  5-40 mL IntraVENous 2 times per day    cefTRIAXone (ROCEPHIN) IV  1,000 mg IntraVENous Daily    melatonin  5 mg Oral Nightly       Infusion Medications:   sodium chloride Stopped (10/28/21 1045)       Labs:  Lab Results   Component Value Date    WBC 5.3 10/30/2021    HGB 9.5 (L) 10/30/2021    HCT 28.0 (L) 10/30/2021    MCV 92.4 10/30/2021     (H) 10/30/2021     Lab Results   Component Value Date    CREATININE <0.5 (L) 10/30/2021    BUN 25 (H) 10/30/2021     10/30/2021    K 4.6 10/30/2021     10/30/2021    CO2 32 10/30/2021     Lab Results   Component Value Date    INR 1.21 (H) 10/22/2021    PROTIME 13.8 (H) 10/22/2021        Physical Examination:    /67   Pulse 105   Temp 98 °F (36.7 °C) (Oral)   Resp 18   Ht 5' 7\" (1.702 m)   Wt 122 lb (55.3 kg)   LMP  (LMP Unknown)   SpO2 98%   BMI 19.11 kg/m²    Wt Readings from Last 3 Encounters:   10/30/21 122 lb (55.3 kg)   10/24/21 125 lb (56.7 kg)   08/18/21 135 lb 1.6 oz (61.3 kg)       Intake/Output Summary (Last 24 hours) at 10/30/2021 0949  Last data filed at 10/30/2021 3264  Gross per 24 hour   Intake 2080 ml   Output 1780 ml   Net 300 ml       Respiratory:  · Resp Assessment: Reduced respiratory effort   · Resp Auscultation: mild rales to auscultation bilaterally   Cardiovascular:  · Auscultation: regular rhythm and normal rate, normal S1S2, no murmur, rub or gallop  · Palpation:  Nl PMI  · JVP:  elevated  · Extremities:+ Edema  Abdomen:  · Soft, non-tender  · Normal bowel sounds  Extremities:  ·  No Cyanosis or Clubbing  Neurological/Psychiatric:  · Oriented to time, place, and person  · Fatigued appearing  Skin Warm and dry    Assessment:    Principal Problem:    CHF (congestive heart failure) ([facility] [address]  Active Problems:      Plan:  1. Continue diuresis for net negative fluid balance daily  2. Salt and fluid restriction as necessary  3. The patient should be on these medications:   ~beta-blockers (preferably coreg or metoprolol XL)   ~ACE/ARB   ~diuretics to keep euvolemic   4. Agree with Dr Laura Sarmiento note  \"Acute LV systolic heart failure - Diagnosed with new cardiomyopathy during recent admission for bilateral PEs.   At that time, TTE showed LVEF of 25-30% from 55% on previous echo from 8/17/21.  Concern for possible cardiotoxicity from adriamycin or Herceptin bio similar. [place], has not had any recent formal ischemic evaluation.   -Overall, patient remains clinically hypervolemic and recommend continued diuresis with IV lasix 40 mg BID  -Continue coreg 3.125 mg BID and lisinopril 2.5 mg daily  -Continue to monitor strict I/Os, obtain daily standing weights  -2L per day fluid restriction, low sodium diet  -Trend renal panels and magnesium levels and maintain K>4 and Mg>2  -Pending clinical course and overall prognosis from malignancy standpoint, can consider proceeding with formal ischemic evaluation once patient has had time to recover from her other acute medical issues (this can be done as an outpatient)  -Will need to discuss switching to less cardiotoxic chemotherapy regimen with oncology team, if additional options exist\"    All questions and concerns were addressed to the patient/family. Alternatives to my treatment were discussed. The note was completed using EMR. Every effort was made to ensure accuracy; however, inadvertent computerized transcription errors may be present.     Eric Fontana MD, VERON[place], [facility], [address] [place] [address]  10/30/2021 9:49 AM

## 2021-10-31 LAB
ANION GAP SERPL CALCULATED.3IONS-SCNC: 13 MMOL/L (ref 3–16)
BLOOD CULTURE, ROUTINE: NORMAL
BUN BLDV-MCNC: 22 MG/DL (ref 7–20)
CALCIUM SERPL-MCNC: 9.3 MG/DL (ref 8.3–10.6)
CHLORIDE BLD-SCNC: 94 MMOL/L (ref 99–110)
CO2: 31 MMOL/L (ref 21–32)
CREAT SERPL-MCNC: <0.5 MG/DL (ref 0.6–1.2)
CULTURE, BLOOD 2: NORMAL
GFR AFRICAN AMERICAN: >60
GFR NON-AFRICAN AMERICAN: >60
GLUCOSE BLD-MCNC: 107 MG/DL (ref 70–99)
MAGNESIUM: 2 MG/DL (ref 1.8–2.4)
POTASSIUM SERPL-SCNC: 4 MMOL/L (ref 3.5–5.1)
PRO-BNP: 783 PG/ML (ref 0–449)
SODIUM BLD-SCNC: 138 MMOL/L (ref 136–145)

## 2021-10-31 PROCEDURE — 6360000002 HC RX W HCPCS: Performed by: INTERNAL MEDICINE

## 2021-10-31 PROCEDURE — 1200000000 HC SEMI PRIVATE

## 2021-10-31 PROCEDURE — 99233 SBSQ HOSP IP/OBS HIGH 50: CPT | Performed by: INTERNAL MEDICINE

## 2021-10-31 PROCEDURE — 83880 ASSAY OF NATRIURETIC PEPTIDE: CPT

## 2021-10-31 PROCEDURE — 2580000003 HC RX 258: Performed by: INTERNAL MEDICINE

## 2021-10-31 PROCEDURE — 83735 ASSAY OF MAGNESIUM: CPT

## 2021-10-31 PROCEDURE — 6370000000 HC RX 637 (ALT 250 FOR IP): Performed by: INTERNAL MEDICINE

## 2021-10-31 PROCEDURE — 80048 BASIC METABOLIC PNL TOTAL CA: CPT

## 2021-10-31 RX ORDER — FUROSEMIDE 10 MG/ML
80 INJECTION INTRAMUSCULAR; INTRAVENOUS 2 TIMES DAILY
Status: DISCONTINUED | OUTPATIENT
Start: 2021-10-31 | End: 2021-11-01

## 2021-10-31 RX ADMIN — ASPIRIN 81 MG: 81 TABLET, COATED ORAL at 09:14

## 2021-10-31 RX ADMIN — LORAZEPAM 0.5 MG: 0.5 TABLET ORAL at 20:43

## 2021-10-31 RX ADMIN — FUROSEMIDE 80 MG: 10 INJECTION, SOLUTION INTRAMUSCULAR; INTRAVENOUS at 17:52

## 2021-10-31 RX ADMIN — CEFTRIAXONE SODIUM 1000 MG: 1 INJECTION, POWDER, FOR SOLUTION INTRAMUSCULAR; INTRAVENOUS at 09:28

## 2021-10-31 RX ADMIN — FUROSEMIDE 80 MG: 10 INJECTION, SOLUTION INTRAMUSCULAR; INTRAVENOUS at 09:15

## 2021-10-31 RX ADMIN — Medication 10 ML: at 20:44

## 2021-10-31 RX ADMIN — Medication 5 MG: at 20:43

## 2021-10-31 RX ADMIN — CARVEDILOL 3.12 MG: 3.12 TABLET, FILM COATED ORAL at 09:14

## 2021-10-31 RX ADMIN — APIXABAN 5 MG: 5 TABLET, FILM COATED ORAL at 09:14

## 2021-10-31 RX ADMIN — APIXABAN 5 MG: 5 TABLET, FILM COATED ORAL at 20:43

## 2021-10-31 RX ADMIN — CARVEDILOL 3.12 MG: 3.12 TABLET, FILM COATED ORAL at 17:52

## 2021-10-31 RX ADMIN — Medication 10 ML: at 09:15

## 2021-10-31 ASSESSMENT — PAIN SCALES - GENERAL
PAINLEVEL_OUTOF10: 0

## 2021-10-31 NOTE — PROGRESS NOTES
Hospitalist Progress Note      PCP: SOUTH Walters CNP    Date of Admission: 10/27/2021    Chief Complaint: Shortness of breath    Hospital Course:     Subjective: Patient complains of shortness of breath not feeling well today denies any chest pain no nausea vomiting no diarrhea. Medications:  Reviewed    Infusion Medications    sodium chloride Stopped (10/28/21 1045)     Scheduled Medications    furosemide  80 mg IntraVENous BID    apixaban  5 mg Oral BID    aspirin  81 mg Oral Daily    carvedilol  3.125 mg Oral BID WC    lisinopril  2.5 mg Oral Daily    sodium chloride flush  5-40 mL IntraVENous 2 times per day    cefTRIAXone (ROCEPHIN) IV  1,000 mg IntraVENous Daily    melatonin  5 mg Oral Nightly     PRN Meds: melatonin, ondansetron, sodium chloride flush, sodium chloride, ondansetron **OR** ondansetron, polyethylene glycol, acetaminophen **OR** acetaminophen, LORazepam      Intake/Output Summary (Last 24 hours) at 10/31/2021 0957  Last data filed at 10/31/2021 0926  Gross per 24 hour   Intake 880 ml   Output 3100 ml   Net -2220 ml       Physical Exam Performed:    /67   Pulse 109   Temp 97.9 °F (36.6 °C) (Oral)   Resp 18   Ht 5' 7\" (1.702 m)   Wt 119 lb 9.6 oz (54.3 kg)   LMP  (LMP Unknown)   SpO2 95%   BMI 18.73 kg/m²     General appearance: No apparent distress, appears stated age and cooperative. HEENT: Pupils equal, round, and reactive to light. Conjunctivae/corneas clear. Neck: Supple, with full range of motion. No jugular venous distention. Trachea midline. Respiratory:  Normal respiratory effort. Clear to auscultation, bilaterally without Rales/Wheezes/Rhonchi. Cardiovascular: Regular rate and rhythm with normal S1/S2 without murmurs, rubs or gallops. Abdomen: Soft, non-tender, non-distended with normal bowel sounds. Musculoskeletal: No clubbing, cyanosis or edema bilaterally. Full range of motion without deformity.   Skin: Skin color, texture, turgor normal.  No rashes or lesions. Neurologic:  Neurovascularly intact without any focal sensory/motor deficits. Cranial nerves: II-XII intact, grossly non-focal.  Psychiatric: Alert and oriented, thought content appropriate, normal insight  Capillary Refill: Brisk,3 seconds, normal   Peripheral Pulses: +2 palpable, equal bilaterally       Labs:   Recent Labs     10/29/21  0655 10/30/21  0638   WBC 4.9 5.3   HGB 9.5* 9.5*   HCT 28.0* 28.0*   * 521*     Recent Labs     10/29/21  0655 10/30/21  0638 10/31/21  0558    140 138   K 3.5 4.6 4.0   CL 96* 100 94*   CO2 32 32 31   BUN 33* 25* 22*   CREATININE 0.6 <0.5* <0.5*   CALCIUM 9.1 9.2 9.3   PHOS 3.5  --   --      No results for input(s): AST, ALT, BILIDIR, BILITOT, ALKPHOS in the last 72 hours. No results for input(s): INR in the last 72 hours. No results for input(s): Dagmar Kras in the last 72 hours. Urinalysis:      Lab Results   Component Value Date    NITRU Negative 10/27/2021    WBCUA  10/27/2021    BACTERIA 2+ 10/27/2021    RBCUA 0-2 10/27/2021    BLOODU Negative 10/27/2021    SPECGRAV >=1.030 10/27/2021    GLUCOSEU Negative 10/27/2021       Radiology:  CT CHEST PULMONARY EMBOLISM W CONTRAST   Final Result   Within the limitations of the exam, no new or worsening pulmonary emboli   identified. Redemonstration of pulmonary emboli involving bilateral upper   lobes, segmental/subsegmental pulmonary arteries. Previously noted pulmonary   emboli involving right middle and right lower lobe subsegmental pulmonary   arteries are not definitely evident within the limitations. Significantly worsened areas of consolidation and ground-glass opacity   involving lungs bilaterally, right worse than left with bilateral upper lobe   predominance, concerning for worsening pneumonia. Bilateral pleural effusions, right worse than left, worsened.          XR CHEST PORTABLE   Final Result   Worsening diffuse bilateral airspace disease predominantly within the   bilateral upper lobes. Assessment/Plan:    Active Hospital Problems    Diagnosis     UTI (urinary tract infection) [N39.0]     CHF (congestive heart failure) ([facility]) [I50.9]     CHF (congestive heart failure), NYHA class I, acute on chronic, combined ([facility] [address] [I50.43]     Severe malnutrition (HCC) [E43]     Anemia [D64.9]      1. CHF acute on chronic systolic with reduced EF of 25 to 30% by echo 10/23/2021 while patient was admitted for bilateral pulmonary embolism, previous echo 8/17/2021 with a EF of 55%, concern for possible cardiotoxicity from Adriamycin or Herceptin bio similar. Cardiology consulted and following recommended to continue diuresis with IV Lasix 80 mg twice daily continue (dose was increased from 40 to 80 mg today) with the Coreg 3.125 mg twice daily and lisinopril 2.5 mg daily. 2 L/day fluid restriction, low-sodium diet. Daily renal panel keep potassium greater than 4, magnesium more than 2.  2.  UTI on IV Rocephin since 10/27/2021, blood cultures so far negative, appears no urine culture was sent on admission. We will discontinue IV Rocephin today. 3.  Anemia likely due to chemotherapy without evidence of active bleeding or hemolysis stable. 4.  Hyponatremia resolved  5. Pulmonary embolism acute recently diagnosed and anticoagulated on Eliquis. 6.  Breast cancer on chemotherapy followed by Dr. Cherylene Leas. 7.  Anxiety continue with home medication.       DVT Prophylaxis: Eliquis  Diet: ADULT ORAL NUTRITION SUPPLEMENT; Breakfast, Lunch, Dinner; Standard High Calorie/High Protein Oral Supplement  ADULT ORAL NUTRITION SUPPLEMENT; Lunch, Dinner; Frozen Oral Supplement  ADULT DIET; Regular; Low Sodium (2 gm); 2000 ml  Code Status: Full Code    PT/OT Eval Status: Consulted    Dispo -possible discharge pending PT OT evaluation recommendation, recommended 24-hour supervision on 10/29/2021.     Alfie Webber MD

## 2021-10-31 NOTE — PROGRESS NOTES
[address] 83   Daily Cardiovascular Progress Note    Admit Date: 10/27/2021    CC:Shortness of Breath (Since monday, hx of breast cancer, was in the hospital last week and was found to have b/l PE, pt was sent home on blood thinners, unsure of which ones, pt was given one duoneb in route with some relief ) and Cough      HPI: Alfredo Patterson has on-going complaints of fatigue and \"not feeling good. \"      Medications/Labs all Reviewed:  Patient Active Problem List   Diagnosis    Perforation of sigmoid colon due to diverticulitis    Anemia    Pure hypercholesterolemia    Malignant neoplasm of right breast in female, estrogen receptor positive ([address]    Malignant neoplasm of upper-inner quadrant of left breast in female, estrogen receptor positive ([address]    Chemotherapy-induced neutropenia (HCC)    Fatigue    Tachycardia    Neutropenia, drug-induced (HCC)    Severe malnutrition (HCC)    Neutropenia (HCC)    Failure to thrive in adult    Recurrent falls    Bacteriuria    Acute pulmonary embolism without acute cor pulmonale (HCC)    UTI (urinary tract infection)    CHF (congestive heart failure) (HCC)    CHF (congestive heart failure), NYHA class I, acute on chronic, combined (HCC)       Medications:   apixaban  5 mg Oral BID    aspirin  81 mg Oral Daily    carvedilol  3.125 mg Oral BID WC    lisinopril  2.5 mg Oral Daily    furosemide  40 mg IntraVENous BID    sodium chloride flush  5-40 mL IntraVENous 2 times per day    cefTRIAXone (ROCEPHIN) IV  1,000 mg IntraVENous Daily    melatonin  5 mg Oral Nightly       Infusion Medications:   sodium chloride Stopped (10/28/21 1045)       Labs:  Lab Results   Component Value Date    WBC 5.3 10/30/2021    HGB 9.5 (L) 10/30/2021    HCT 28.0 (L) 10/30/2021    MCV 92.4 10/30/2021     (H) 10/30/2021     Lab Results   Component Value Date    CREATININE <0.5 (L) 10/31/2021    BUN 22 (H) 10/31/2021     10/31/2021    K 4.0 10/31/2021 CL 94 (L) 10/31/2021    CO2 31 10/31/2021     Lab Results   Component Value Date    INR 1.21 (H) 10/22/2021    PROTIME 13.8 (H) 10/22/2021        Physical Examination:    /65   Pulse 105   Temp 97.9 °F (36.6 °C) (Oral)   Resp 18   Ht 5' 7\" (1.702 m)   Wt 119 lb 9.6 oz (54.3 kg)   LMP  (LMP Unknown)   SpO2 92%   BMI 18.73 kg/m²    Wt Readings from Last 3 Encounters:   10/31/21 119 lb 9.6 oz (54.3 kg)   10/24/21 125 lb (56.7 kg)   08/18/21 135 lb 1.6 oz (61.3 kg)       Intake/Output Summary (Last 24 hours) at 10/31/2021 0848  Last data filed at 10/31/2021 0400  Gross per 24 hour   Intake 1240 ml   Output 2800 ml   Net -1560 ml       Respiratory:  · Resp Assessment: Reduced respiratory effort   · Resp Auscultation: mild rales to auscultation bilaterally   Cardiovascular:  · Auscultation: regular rhythm and normal rate, normal S1S2, no murmur, rub or gallop  · Palpation:  Nl PMI  · JVP:  elevated  · Extremities:+ Edema  Abdomen:  · Soft, non-tender  · Normal bowel sounds  Extremities:  ·  No Cyanosis or Clubbing  Neurological/Psychiatric:  · Oriented to time, place, and person  · Fatigued appearing  Skin Warm and dry    Assessment:    Principal Problem:    CHF (congestive heart failure) ([facility] [address]  Active Problems:      Plan:  1. Continue diuresis for net negative fluid balance daily   ~will add some additional lasix today due to clinical worsening CHF. 2. Salt and fluid restriction as necessary  3. The patient should be on these medications:   ~beta-blockers (preferably coreg or metoprolol XL)   ~ACE/ARB     All questions and concerns were addressed to the patient/family. Alternatives to my treatment were discussed. The note was completed using EMR. Every effort was made to ensure accuracy; however, inadvertent computerized transcription errors may be present.     Ben Pereyra MD, VERONICA, Ronn Lanes, -[address] [place] [address]  10/31/2021 8:48 AM

## 2021-10-31 NOTE — PLAN OF CARE
Problem: OXYGENATION/RESPIRATORY FUNCTION  Goal: Patient will achieve/maintain normal respiratory rate/effort  Description: Respiratory rate and effort will be within normal limits for the patient  Outcome: Ongoing     Problem: HEMODYNAMIC STATUS  Goal: Patient has stable vital signs and fluid balance  Outcome: Ongoing     Problem: FLUID AND ELECTROLYTE IMBALANCE  Goal: Fluid and electrolyte balance are achieved/maintained  Outcome: Ongoing     Patient's EF (Ejection Fraction) is less than 40%    Heart Failure Medications:  Diuretics[de-identified] Furosemide    (One of the following REQUIRED for EF <40%/SYSTOLIC FAILURE but MAY be used in EF% >40%/DIASTOLIC FAILURE)        ACE[de-identified] Lisinopril        ARB[de-identified] None         ARNI[de-identified] None    (Beta Blockers)  NON- Evidenced Based Beta Blocker (for EF% >40%/DIASTOLIC FAILURE): None    Evidenced Based Beta Blocker::(REQUIRED for EF% <40%/SYSTOLIC FAILURE) Carvedilol- Coreg  . .................................................................................................................................................. Patient's weights and intake/output reviewed: Yes    Patient's Last Weight: 119 lbs obtained by standing scale. Difference of 3 lbs less than last documented weight. Intake/Output Summary (Last 24 hours) at 10/31/2021 0732  Last data filed at 10/31/2021 0400  Gross per 24 hour   Intake 1240 ml   Output 3000 ml   Net -1760 ml       Comorbidities Reviewed Yes    Patient has a past medical history of Breast cancer ([facility] [address] Cancer ([facility] [address] and Kidney stone. >>For CHF and Comorbidity documentation on Education Time and Topics, please see Education Tab    Progressive Mobility Assessment:  What is this patient's Current Level of Mobility?: Ambulatory- with Assistance  How was this patient Mobilized today?: Edge of Bed,  Up to Toilet/Shower, and Up in Room                 With Whom?  Nurse, PCA, and Self                 Level of Difficulty/Assistance: 1x Assist     Pt resting in bed at this time on room air. Pt with complaints of shortness of breath. Pt without lower extremity edema.      Patient and/or Family's stated Goal of Care this Admission: reduce shortness of breath, increase activity tolerance, better understand heart failure and disease management, and be more comfortable prior to discharge        :

## 2021-10-31 NOTE — PLAN OF CARE
Problem: Falls - Risk of:  Goal: Will remain free from falls  Description: Will remain free from falls  Outcome: Ongoing     Problem: Pain:  Goal: Pain level will decrease  Description: Pain level will decrease  Outcome: Ongoing     Problem: Nutrition  Goal: Optimal nutrition therapy  Outcome: Ongoing     Problem: OXYGENATION/RESPIRATORY FUNCTION  Goal: Patient will maintain patent airway  Outcome: Ongoing       Patient's EF (Ejection Fraction) is less than 40%    Heart Failure Medications:  Diuretics[de-identified] Furosemide    (One of the following REQUIRED for EF <40%/SYSTOLIC FAILURE but MAY be used in EF% >40%/DIASTOLIC FAILURE)        ACE[de-identified] Lisinopril        ARB[de-identified] None         ARNI[de-identified] None    (Beta Blockers)  NON- Evidenced Based Beta Blocker (for EF% >40%/DIASTOLIC FAILURE): None    Evidenced Based Beta Blocker::(REQUIRED for EF% <40%/SYSTOLIC FAILURE) Carvedilol- Coreg  . .................................................................................................................................................. Patient's weights and intake/output reviewed: Yes    Patient's Last Weight: 122 lbs obtained by standing scale. Difference of 3 lbs more than last documented weight. Intake/Output Summary (Last 24 hours) at 10/30/2021 2159  Last data filed at 10/30/2021 1933  Gross per 24 hour   Intake 1000 ml   Output 3160 ml   Net -2160 ml       Comorbidities Reviewed Yes    Patient has a past medical history of Breast cancer ([facility] [address] Cancer ([facility] [address] and Kidney stone. >>For CHF and Comorbidity documentation on Education Time and Topics, please see Education Tab    Progressive Mobility Assessment:  What is this patient's Current Level of Mobility?: Ambulatory- with Assistance  How was this patient Mobilized today?: Edge of Bed                 With Whom? Nurse and Self                 Level of Difficulty/Assistance: 1x Assist     Pt resting in bed at this time on  1 L O2. Pt with complaints of shortness of breath.  Pt without lower extremity edema.      Patient and/or Family's stated Goal of Care this Admission: reduce shortness of breath, increase activity tolerance, better understand heart failure and disease management, be more comfortable, and reduce lower extremity edema prior to discharge        :

## 2021-11-01 LAB
ANION GAP SERPL CALCULATED.3IONS-SCNC: 12 MMOL/L (ref 3–16)
BUN BLDV-MCNC: 24 MG/DL (ref 7–20)
CALCIUM SERPL-MCNC: 9.4 MG/DL (ref 8.3–10.6)
CHLORIDE BLD-SCNC: 90 MMOL/L (ref 99–110)
CO2: 32 MMOL/L (ref 21–32)
CREAT SERPL-MCNC: <0.5 MG/DL (ref 0.6–1.2)
GFR AFRICAN AMERICAN: >60
GFR NON-AFRICAN AMERICAN: >60
GLUCOSE BLD-MCNC: 110 MG/DL (ref 70–99)
MAGNESIUM: 2.1 MG/DL (ref 1.8–2.4)
POTASSIUM SERPL-SCNC: 3.5 MMOL/L (ref 3.5–5.1)
SODIUM BLD-SCNC: 134 MMOL/L (ref 136–145)

## 2021-11-01 PROCEDURE — 99233 SBSQ HOSP IP/OBS HIGH 50: CPT | Performed by: INTERNAL MEDICINE

## 2021-11-01 PROCEDURE — 83735 ASSAY OF MAGNESIUM: CPT

## 2021-11-01 PROCEDURE — 1200000000 HC SEMI PRIVATE

## 2021-11-01 PROCEDURE — 2580000003 HC RX 258: Performed by: INTERNAL MEDICINE

## 2021-11-01 PROCEDURE — 80048 BASIC METABOLIC PNL TOTAL CA: CPT

## 2021-11-01 PROCEDURE — 6360000002 HC RX W HCPCS: Performed by: NURSE PRACTITIONER

## 2021-11-01 PROCEDURE — 6360000002 HC RX W HCPCS: Performed by: INTERNAL MEDICINE

## 2021-11-01 PROCEDURE — 6370000000 HC RX 637 (ALT 250 FOR IP): Performed by: INTERNAL MEDICINE

## 2021-11-01 PROCEDURE — 94761 N-INVAS EAR/PLS OXIMETRY MLT: CPT

## 2021-11-01 PROCEDURE — 97530 THERAPEUTIC ACTIVITIES: CPT

## 2021-11-01 PROCEDURE — P9045 ALBUMIN (HUMAN), 5%, 250 ML: HCPCS | Performed by: NURSE PRACTITIONER

## 2021-11-01 PROCEDURE — 2700000000 HC OXYGEN THERAPY PER DAY

## 2021-11-01 PROCEDURE — 97116 GAIT TRAINING THERAPY: CPT

## 2021-11-01 RX ORDER — ALBUMIN, HUMAN INJ 5% 5 %
25 SOLUTION INTRAVENOUS ONCE
Status: COMPLETED | OUTPATIENT
Start: 2021-11-01 | End: 2021-11-01

## 2021-11-01 RX ORDER — POTASSIUM CHLORIDE 20 MEQ/1
40 TABLET, EXTENDED RELEASE ORAL ONCE
Status: COMPLETED | OUTPATIENT
Start: 2021-11-01 | End: 2021-11-01

## 2021-11-01 RX ORDER — POTASSIUM CHLORIDE 20 MEQ/1
40 TABLET, EXTENDED RELEASE ORAL ONCE
Status: DISCONTINUED | OUTPATIENT
Start: 2021-11-01 | End: 2021-11-01

## 2021-11-01 RX ADMIN — LORAZEPAM 0.5 MG: 0.5 TABLET ORAL at 06:15

## 2021-11-01 RX ADMIN — Medication 10 ML: at 20:14

## 2021-11-01 RX ADMIN — ASPIRIN 81 MG: 81 TABLET, COATED ORAL at 08:39

## 2021-11-01 RX ADMIN — Medication 10 ML: at 10:39

## 2021-11-01 RX ADMIN — ALBUMIN (HUMAN) 25 G: 12.5 INJECTION, SOLUTION INTRAVENOUS at 20:12

## 2021-11-01 RX ADMIN — APIXABAN 5 MG: 5 TABLET, FILM COATED ORAL at 08:39

## 2021-11-01 RX ADMIN — POTASSIUM CHLORIDE 40 MEQ: 20 TABLET, EXTENDED RELEASE ORAL at 08:43

## 2021-11-01 RX ADMIN — CARVEDILOL 3.12 MG: 3.12 TABLET, FILM COATED ORAL at 08:40

## 2021-11-01 RX ADMIN — CARVEDILOL 3.12 MG: 3.12 TABLET, FILM COATED ORAL at 17:00

## 2021-11-01 RX ADMIN — LORAZEPAM 0.5 MG: 0.5 TABLET ORAL at 17:01

## 2021-11-01 RX ADMIN — LISINOPRIL 2.5 MG: 5 TABLET ORAL at 08:41

## 2021-11-01 RX ADMIN — APIXABAN 5 MG: 5 TABLET, FILM COATED ORAL at 20:14

## 2021-11-01 RX ADMIN — Medication 5 MG: at 20:14

## 2021-11-01 RX ADMIN — FUROSEMIDE 80 MG: 10 INJECTION, SOLUTION INTRAMUSCULAR; INTRAVENOUS at 10:39

## 2021-11-01 ASSESSMENT — PAIN SCALES - GENERAL
PAINLEVEL_OUTOF10: 0

## 2021-11-01 NOTE — PROGRESS NOTES
[address] 83   PROGRESS NOTE  [phone]      Attending Physician: Amrit Ng MD  Reason for Consultation/Chief Complaint: Acute heart failure    Subjective     Patient received 80 mg IV lasix BID yesterday and was documented net negative 1.7L over the last 24 hours. Weight is 118 lbs today from 119 lbs yesterday. Reports that her shortness of breath has improved and denies any chest pain. CURRENT Medications:  potassium chloride (KLOR-CON M) extended release tablet 40 mEq, Once  furosemide (LASIX) injection 80 mg, BID  apixaban (ELIQUIS) tablet 5 mg, BID  aspirin EC tablet 81 mg, Daily  carvedilol (COREG) tablet 3.125 mg, BID WC  lisinopril (PRINIVIL;ZESTRIL) tablet 2.5 mg, Daily  melatonin tablet 3 mg, Nightly PRN  ondansetron (ZOFRAN-ODT) disintegrating tablet 8 mg, Q8H PRN  sodium chloride flush 0.9 % injection 5-40 mL, 2 times per day  sodium chloride flush 0.9 % injection 5-40 mL, PRN  0.9 % sodium chloride infusion, PRN  ondansetron (ZOFRAN-ODT) disintegrating tablet 4 mg, Q8H PRN   Or  ondansetron (ZOFRAN) injection 4 mg, Q6H PRN  polyethylene glycol (GLYCOLAX) packet 17 g, Daily PRN  acetaminophen (TYLENOL) tablet 650 mg, Q6H PRN   Or  acetaminophen (TYLENOL) suppository 650 mg, Q6H PRN  LORazepam (ATIVAN) tablet 0.5 mg, Q6H PRN  melatonin disintegrating tablet 5 mg, Nightly        Allergies:  Bactrim [sulfamethoxazole-trimethoprim]     Review of Systems:   General: Positive for general malaise. Cardiac: No chest pain or palpitations. Respiratory: Positive for shortness of breath. No cough. GI: No nausea, vomiting, or diarrhea. Neuro: No lightheadedness or dizziness. Objective   PHYSICAL EXAM:    Vitals:    11/01/21 1130   BP: (!) 99/59   Pulse: 110   Resp: 16   Temp: 97.6 °F (36.4 °C)   SpO2: 96%    Weight: 118 lb 6.4 oz (53.7 kg)      General: Cachectic female lying in bed in no acute distress.   HEENT: Normocephalic, atraumatic, non-icteric, hearing intact, nares wall thickness.   The left ventricular systolic function is severely reduced with an ejection   fraction of 25-30 %.   EF by Fay's method estimated at 27%.   Severe global hypokinesis with regional variation.   Left ventricular diastolic filling pressure are indeterminate.   The right ventricle is normal in size and function.   There is protruding echodensity from anterior RA wall seen in multiple   views, possibly artifact, but given patient's clinical history,   mass/thrombus cannot be excluded.   Mild to moderate mitral regurgitation.   Mild pulmonic and tricuspid regurgitation.   Systolic pulmonary artery pressure (SPAP) is normal and estimated at 35 mmHg   (right atrial pressure 3 mmHg).   There is a large left pleural effusion.      Compared to last echo on 8/17/2021 (EF 55%), left ventricle systolic   function has significantly declined while RV size and function appears   normal. RIght atrial findings as above - not seen on review of images prior,   unclear significance.     Stress Test:  Treadmill GXT 3/30/10:  Negative for evidence of ischemia.     Cath: N/A     Other Studies:   Chest X-ray 10/27/21: Worsening diffuse bilateral airspace disease predominantly within the   bilateral upper lobes.      CTPA 10/27/21:   Within the limitations of the exam, no new or worsening pulmonary emboli   identified.  Redemonstration of pulmonary emboli involving bilateral upper   lobes, segmental/subsegmental pulmonary arteries.  Previously noted pulmonary   emboli involving right middle and right lower lobe subsegmental pulmonary   arteries are not definitely evident within the limitations.       Significantly worsened areas of consolidation and ground-glass opacity   involving lungs bilaterally, right worse than left with bilateral upper lobe   predominance, concerning for worsening pneumonia.       Bilateral pleural effusions, right worse than left, worsened.        Assessment and Plan      1. Acute LV systolic heart failure - Diagnosed with new cardiomyopathy during recent admission for bilateral PEs. At that time, TTE showed LVEF of 25-30% from 55% on previous echo from 8/17/21. Concern for possible cardiotoxicity from adriamycin or Herceptin bio similar. Cookie Mccurdy, has not had any recent formal ischemic evaluation.   -Overall, patient has diuresed well with IV lasix and now appears to be nearing a euvolemic state  -Received 80 mg IV lasix earlier this morning and will plan to transition to PO tomorrow  -Continue coreg 3.125 mg BID and lisinopril 2.5 mg daily  -Continue to monitor strict I/Os, obtain daily standing weights  -2L per day fluid restriction, low sodium diet  -Trend renal panels and magnesium levels and maintain K>4 and Mg>2  -Pending clinical course and overall prognosis from malignancy standpoint, can consider proceeding with formal ischemic evaluation once patient has had time to recover from her other acute medical issues (this can be done as an outpatient)  -Will need to discuss switching to less cardiotoxic chemotherapy regimen with oncology team, if additional options exist     2. Bilateral pulmonary emboli  -On Eliquis  -RV size and systolic function normal on TTE     3. Acute hypoxic respiratory failure  -Secondary to combination of bilateral PEs, acute heart failure, and pneumonia  -Wean supplemental O2 as tolerated for goal O2 saturation >90%     4. Right atrial echodensity  -May represent artifact, but given recent PE and known underlying malignancy cannot definitively exclude thrombus or mass  -On Eliquis as above and can consider obtaining outpatient cardiac MRI for further evaluation, which may also help provide further insight into etiology of cardiomyopathy     5.  Sinus tachycardia  -Secondary to above     6. H/o breast cancer  -S/p 4 cycles of Adriamycin/Cytoxan and 7 of 12 planned cycles of Taxol with Herceptin bio similar and pertuzumab  -Given recently diagnosed cardiomyopathy, will likely need to consider switching to less cardiotoxic regimen if other options exist - This will need to be discussed further with oncology      Thank you for allowing us to participate in the care of [facility]. Please call me with any questions [phone]. Edna Marcos,   [address]  [phone] [facility]  [phone] [address] [place] [address]  11/1/2021 1:07 PM      I will address the patient's cardiac risk factors and adjusted pharmacologic treatment as needed. In addition, I have reinforced the need for patient directed risk factor modification. All questions and concerns were addressed to the patient/family. Alternatives to my treatment were discussed. The note was completed using EMR. Every effort was made to ensure accuracy; however, inadvertent computerized transcription errors may be present.

## 2021-11-01 NOTE — PROGRESS NOTES
Patient repots \"anxiety\" to Matilda primary nurse recommended PRN Ativan. Will check back in 20 minutes. Blood pressure rechecked previously noted Primary nurse agreed to give Carvedilol.

## 2021-11-01 NOTE — CARE COORDINATION
[facility]    Referral received from  to follow for home care services. I will follow for needs, and speak with patient to verify demos.     Griselda Montague RN, BSN CTN  [facility] [phone]

## 2021-11-01 NOTE — CARE COORDINATION
CM met pt and granddaughter in the room;  Per granddaughter, she and her mother will be stopping by and taking turns caring for pt at discharge. Ref made to [facility]. CM will continue to follow for needs.   Carlos Flores RN

## 2021-11-01 NOTE — PROGRESS NOTES
Physical Therapy  Facility/Department: [facility] A2 CARD TELEMETRY  Daily Treatment Note  NAME: Emilie Nguyen  : 1945  MRN: [id number]    Date of Service: 2021    Discharge Recommendations:  24 hour supervision or assist, Home with Home health PT   PT Equipment Recommendations  Equipment Needed: No    Assessment   Body structures, Functions, Activity limitations: Decreased functional mobility ; Decreased endurance;Decreased strength;Decreased balance  Assessment: Pt participated well with PT this date despite c/o fatigue and generalized weakness. Pt able to amb increased distance and performed transfers largely at SBA level. Pt is still occasionally unsteady when ambulating, requiring at least close SBA for safety. Recommend pt return home with 24-hr sup/assist from family and home PT. Treatment Diagnosis: Decreased gait and endurance  Specific instructions for Next Treatment: Progress ther ex and mobility as tolerated  Prognosis: Good  Decision Making: Medium Complexity  PT Education: Goals;PT Role;Plan of Care;Energy Conservation;General Safety; Disease Specific Education;Transfer Training;Gait Training;Family Education; Functional Mobility Training;Equipment;Precautions; Injury Prevention  Patient Education: Disease-specific education: pt educated in energy conservation, prevention of complications of bedrest, safety for gait and transfers. Pt voices understanding. REQUIRES PT FOLLOW UP: Yes  Activity Tolerance: Patient Tolerated treatment well;Patient limited by endurance  Activity Tolerance: Pt with increased work of breathing with amb of distances longer than ~20-30 feet. Patient Diagnosis(es): The primary encounter diagnosis was Acute respiratory failure with hypoxia ([facility] [address] Diagnoses of Acute on chronic systolic congestive heart failure (HCC) and Acute cystitis without hematuria were also pertinent to this visit.      has a past medical history of Breast cancer ([address] Cancer ([address] and Kidney stone.   has a past surgical history that includes [address] RIGHT (Right, 5/7/2021); US BREAST BIOPSY W LOC DEVICE EACH ADDL LESION RIGHT (Right, 5/7/2021); US BREAST BIOPSY W LOC DEVICE 1ST LESION LEFT (Left, 5/7/2021); Colonoscopy; Port Surgery (N/A, 5/25/2021); MRI BIOPSY BREAST W LOC DEVICE RIGHT 1ST LESION (Right, 5/28/2021); and MRI BIOPSY BREAST W LOC DEVICE RIGHT EACH ADDL (Right, 5/28/2021). Restrictions  Restrictions/Precautions  Restrictions/Precautions: General Precautions, Up as Tolerated, Fall Risk  Position Activity Restriction  Other position/activity restrictions: Pt on room air today     Subjective   General  Chart Reviewed: Yes  Response To Previous Treatment: Patient reporting fatigue but able to participate. Family / Caregiver Present: Yes (granddaughter)  Referring Practitioner: Dr. Jose Maria Madison: Pt agreeable to work with PT this morning. States she's feeling quite fatigued today.   General Comment  Comments: Pt using bathroom upon entry of PT, RN cleared pt for therapy  Pain Screening  Patient Currently in Pain: Denies   Intervention List: Patient able to continue with treatment    Vital Signs  Pulse: 115  Heart Rate Source: Monitor  BP: 115/75  BP Location: Right upper arm  Patient Position: Sitting;Up in chair  Patient Currently in Pain: Denies  Oxygen Therapy  SpO2: 98 %  Pulse Oximeter Device Mode: Intermittent  Pulse Oximeter Device Location: Finger  O2 Device: None (Room air)    Orientation  Orientation  Overall Orientation Status: Within Normal Limits    Objective   Bed mobility  Supine to Sit: Unable to assess (pt OOB before & after therapy)  Scooting: Supervision (to scoot forward<>backward in chair)     Transfers  Sit to Stand: Stand by assistance;Minimal Assistance  Stand to sit: Stand by assistance  Bed to Chair: Stand by assistance (close SBA with use of RW, moving from toilet<>chair)  Comment: Pt occasionally unsteady with transfers, LOB x 1 posteriorly during sit>stand with [place] x 1 needed from PT to correct. Ambulation  Surface: level tile  Device: Rolling Walker  Assistance: Contact guard assistance;Stand by assistance  Quality of Gait: Pt amb with slightly decreased aubrie and speed now compared to baseline. Mildly unsteady, erika. when turning. LOB x 1 posteriorly when turning 180* degrees, corrected with Ventura from PT. Gait Deviations: Slow Aubrie;Decreased step length;Decreased step height  Distance: x 90 feet     Balance  Posture: Fair  Sitting - Static: Good  Sitting - Dynamic: Good;-  Standing - Static: Good;-  Standing - Dynamic: Fair (using RW)     Exercises  Knee Long Arc Quad: x 15 BLE  Ankle Pumps: x 15 BLE  Comments: Reviewed with pt and family seated LE exercises pt can perform on her own for strengthening (included ankle pumps, LAQ, marching); pt verbalizes understanding. Other Activities: Other (see comment)  Comment: Pt assisted out of bathroom at start of therapy session using RW with CGA x 1. Able to stand at sink to wash/dry hands with close SBA for safety.     AM-PAC Score  -PAC Inpatient Mobility Raw Score : 21 (11/01/21 1708)  AM-PAC Inpatient T-Scale Score : 50.25 (11/01/21 1708)  Mobility Inpatient CMS 0-100% Score: 28.97 (11/01/21 1708)  Mobility Inpatient CMS G-Code Modifier : CJ (11/01/21 1708)    Goals  Short term goals  Time Frame for Short term goals: 1 week (11/4) unless otherwise specified  Short term goal 1: pt to be modified indep in bed mob  Short term goal 2: pt to be indep with transfers  Short term goal 3: pt to amb 150 ft with least restrictive or no device supervised  Short term goal 4: pt to participate in LE Ex 12-15 reps by 10/31 - MET 11/01/21  Patient Goals   Patient goals : Gema Wick not have any complications being in bed so  much\"    Plan    Times per week: 3-5x/week in acute care  Times per day: Daily  Specific instructions for Next Treatment: Progress ther ex and mobility as tolerated  Current Treatment Recommendations: Strengthening, Transfer Training, Endurance Training, Functional Mobility Training, Safety Education & Training, Gait Training, Balance Training, Patient/Caregiver Education & Training, Home Exercise Program, Equipment Evaluation, Education, & procurement  Safety Devices: All fall risk precautions in place, Patient at risk for falls, Call light within reach, Nurse notified, Gait belt, Left in chair, Chair alarm in place     Therapy Time   Individual Concurrent Group Co-treatment   Time In 0912         Time Out 0935         Minutes 23         Timed Code Treatment Minutes: Olinda Marmolejo [address] #[id number]    If pt is unable to be seen after this session, please let this note serve as discharge summary. Please see case management note for discharge disposition. Thank you.

## 2021-11-01 NOTE — PLAN OF CARE
Problem: Falls - Risk of:  Goal: Will remain free from falls  Description: Will remain free from falls  Outcome: Ongoing     Problem: Nutrition  Goal: Optimal nutrition therapy  Outcome: Ongoing     Problem: Pain:  Goal: Pain level will decrease  Description: Pain level will decrease  Outcome: Ongoing     Problem: OXYGENATION/RESPIRATORY FUNCTION  Goal: Patient will maintain patent airway  Outcome: Ongoing       Patient's EF (Ejection Fraction) is less than 40%    Heart Failure Medications:  Diuretics[de-identified] Furosemide    (One of the following REQUIRED for EF <40%/SYSTOLIC FAILURE but MAY be used in EF% >40%/DIASTOLIC FAILURE)        ACE[de-identified] Lisinopril        ARB[de-identified] None         ARNI[de-identified] None    (Beta Blockers)  NON- Evidenced Based Beta Blocker (for EF% >40%/DIASTOLIC FAILURE): None    Evidenced Based Beta Blocker::(REQUIRED for EF% <40%/SYSTOLIC FAILURE) Carvedilol- Coreg  . .................................................................................................................................................. Patient's weights and intake/output reviewed: Yes    Patient's Last Weight: 119 lbs obtained by standing scale. Difference of 3 lbs less than last documented weight. Intake/Output Summary (Last 24 hours) at 10/31/2021 2201  Last data filed at 10/31/2021 2054  Gross per 24 hour   Intake 669.81 ml   Output 2495 ml   Net -1825.19 ml       Comorbidities Reviewed Yes    Patient has a past medical history of Breast cancer ([facility] [address] Cancer ([facility] [address] and Kidney stone. >>For CHF and Comorbidity documentation on Education Time and Topics, please see Education Tab    Progressive Mobility Assessment:  What is this patient's Current Level of Mobility?: Ambulatory- with Assistance  How was this patient Mobilized today?: Avinash of Bed and  Up to Toilet/Shower                 With Whom? Nurse and Self                 Level of Difficulty/Assistance: 1x Assist     Pt resting in bed at this time on room air.  Pt with complaints of shortness of breath. Pt with nonpitting lower extremity edema.      Patient and/or Family's stated Goal of Care this Admission: reduce shortness of breath, increase activity tolerance, better understand heart failure and disease management, be more comfortable, and reduce lower extremity edema prior to discharge        :

## 2021-11-01 NOTE — PLAN OF CARE
Problem: Nutrition  Goal: Optimal nutrition therapy  Outcome: Ongoing  Note: Nutrition Problem #1: Severe malnutrition  Intervention: Food and/or Nutrient Delivery: Continue Current Diet, Continue Oral Nutrition Supplement  Nutritional Goals: Pt will consume greater than 50% of meals and ONS this admission

## 2021-11-01 NOTE — PROGRESS NOTES
Patient complained of shortness of breath with anxiety. O2 stats 96% on 1L Morning medication given. Tolerated ok, issue with size of potassium tablets. Rechecked patient and shortness of breath has decreased, feeling better.

## 2021-11-01 NOTE — PROGRESS NOTES
Comprehensive Nutrition Assessment    Type and Reason for Visit:  Reassess    Nutrition Recommendations/Plan:   1. Continue regular, low sodium diet w/ 2000ml FR   2. Continue Ensure TID  3. Continue Magic Cup BID  4. Encourage po intakes  5. Due to continued poor intakes and severity of malnutrition, recommend alternate means of nutrition if in line with goals of care. Consult RD for recommendations. 6. Monitor po intakes, nutrition adequacy, weights, pertinent labs, BMs    Nutrition Assessment:  Follow up: Pt continues to be nutritionally at risk AEB poor po intakes. Pt continues on regular, low sodium diet w/2000ml FR. 0-75% intakes per EMR. Pt reports continued poor appetite, stating \"I just feel horrible\". Ensure in place TID, Magic Cup in place BID - pt remains favorable. Family was at bedside with questions about balancing low sodium with increased nutrient needs r/t cancer. Discussed incorporating foods with extra calories from protein and fat. Discussed options for high fat , yet lower in sodium. Discussed smaller more frequent meals and snacks in between. No c/o N/V/D at time of visit. Will continue to monitor. Malnutrition Assessment:  Malnutrition Status:  Severe malnutrition    Context:  Chronic Illness     Findings of the 6 clinical characteristics of malnutrition:  Energy Intake:  7 - 75% or less estimated energy requirements for 1 month or longer  Weight Loss:  7 - Greater than 7.5% over 3 months     Body Fat Loss:  7 - Severe body fat loss Orbital, Buccal region   Muscle Mass Loss:  7 - Severe muscle mass loss Temples (temporalis), Clavicles (pectoralis & deltoids)    Estimated Daily Nutrient Needs:  Energy (kcal):  1488-4795 kcal; Weight Used for Energy Requirements:  Ideal (62 kg)     Protein (g):  75-93; Weight Used for Protein Requirements:   (1.2-1.5)        Method Used for Fluid Requirements:  1 ml/kcal      Nutrition Related Findings:  -6.8L since admission. BM x1 today.  Na 134 Wounds:  None (redness to sacrum noted.)       Current Nutrition Therapies:    ADULT ORAL NUTRITION SUPPLEMENT; Breakfast, Lunch, Dinner; Standard High Calorie/High Protein Oral Supplement  ADULT ORAL NUTRITION SUPPLEMENT; Lunch, Dinner; Frozen Oral Supplement  ADULT DIET; Regular;  Low Sodium (2 gm); 2000 ml    Anthropometric Measures:  · Height: 5' 7\" (170.2 cm)  · Current Body Weight: 118 lb 6.4 oz (53.7 kg)   · Usual Body Weight: 135 lb (61.2 kg) (standing scale on 8/16/21)     · Ideal Body Weight: 135 lbs; % Ideal Body Weight 88.1 %   · BMI: 18.5  · BMI Categories: Underweight (BMI less than 22) age over 72       Nutrition Diagnosis:   · Severe malnutrition related to inadequate protein-energy intake, catabolic illness as evidenced by poor intake prior to admission, weight loss 7.5% in 3 months, severe loss of subcutaneous fat, severe muscle loss      Nutrition Interventions:   Food and/or Nutrient Delivery:  Continue Current Diet, Continue Oral Nutrition Supplement  Nutrition Education/Counseling:  No recommendation at this time   Coordination of Nutrition Care:  Continue to monitor while inpatient    Goals:  Pt will consume greater than 50% of meals and ONS this admission       Nutrition Monitoring and Evaluation:   Behavioral-Environmental Outcomes:  None Identified   Food/Nutrient Intake Outcomes:  Food and Nutrient Intake, Supplement Intake  Physical Signs/Symptoms Outcomes:  Weight, Nutrition Focused Physical Findings     Discharge Planning:    Continue Oral Nutrition Supplement, Continue current diet     Electronically signed by Axel Rivero on 11/1/21 at 1:16 PM EDT    Contact: [phone]

## 2021-11-01 NOTE — PLAN OF CARE
Problem: OXYGENATION/RESPIRATORY FUNCTION  Goal: Patient will maintain patent airway  Outcome: Ongoing  Note:   Patient's EF (Ejection Fraction) is less than 40%    Heart Failure Medications:  Diuretics[de-identified] Furosemide    (One of the following REQUIRED for EF <40%/SYSTOLIC FAILURE but MAY be used in EF% >40%/DIASTOLIC FAILURE)        ACE[de-identified] Lisinopril        ARB[de-identified] None         ARNI[de-identified] None    (Beta Blockers)  NON- Evidenced Based Beta Blocker (for EF% >40%/DIASTOLIC FAILURE): None    Evidenced Based Beta Blocker::(REQUIRED for EF% <40%/SYSTOLIC FAILURE) Carvedilol- Coreg  . .................................................................................................................................................. Patient's weights and intake/output reviewed: Yes    Patient's Last Weight: 118 lbs obtained by standing scale. Difference of 1 lbs less than last documented weight. Intake/Output Summary (Last 24 hours) at 11/1/2021 1359  Last data filed at 11/1/2021 1216  Gross per 24 hour   Intake 530 ml   Output 2020 ml   Net -1490 ml       Comorbidities Reviewed Yes    Patient has a past medical history of Breast cancer ([facility] [address] Cancer ([facility] [address] and Kidney stone. >>For CHF and Comorbidity documentation on Education Time and Topics, please see Education Tab    Progressive Mobility Assessment:  What is this patient's Current Level of Mobility?: Ambulatory- with Assistance  How was this patient Mobilized today?: Edge of Bed, Up to Chair,  Up to Toilet/Shower, and Up in Room                 With Whom? Nurse, PCA, PT, OT, and Self                 Level of Difficulty/Assistance: 1x Assist     Pt resting in bed at this time on room air. Pt denies shortness of breath. Pt without lower extremity edema.      Patient and/or Family's stated Goal of Care this Admission: increase activity tolerance, be more comfortable, and reduce lower extremity edema prior to discharge        :      Problem: Falls - Risk of:  Goal: Will

## 2021-11-01 NOTE — DISCHARGE INSTR - COC
Continuity of Care Form    Patient Name: Dianne Hays   :  1945  MRN:  [id number]    Admit date:  10/27/2021  Discharge date:  2021    Code Status Order: Full Code   Advance Directives:      Admitting Physician:  Jacqueline Sampson MD  PCP: SOUTH Alvarez CNP    Discharging Nurse: [facility] Unit/Room#: 0209/0209-01  Discharging Unit Phone Number: [phone]    Emergency Contact:   Extended Emergency Contact Information  Primary Emergency Contact: Clemente Andrade [address] Phone: [phone]  Relation: Parent  Secondary Emergency Contact: Ken Dumas, [address] [facility] [place] Phone: [phone]  Relation: Child    Past Surgical History:  Past Surgical History:   Procedure Laterality Date    COLONOSCOPY      MRI BREAST BX USING DEVICE RIGHT Right 2021    MRI BREAST BX USING DEVICE RIGHT 2021 [address] [facility]    MRI NEEDLE BIOPSY EACH ADDL RIGHT Right 2021    MRI NEEDLE BIOPSY EACH ADDL RIGHT 2021 [address] [facility]    PORT SURGERY N/A 2021    RIGHT PORT A CATH PLACEMENT performed by Yrn Kwan DO at [address] Right 2021    US BREAST BIOPSY NEEDLE ADDITIONAL RIGHT 2021 [address] BREAST NEEDLE BIOPSY LEFT Left 2021    US BREAST NEEDLE BIOPSY LEFT 2021 [address] BREAST NEEDLE BIOPSY RIGHT Right 2021    US BREAST NEEDLE BIOPSY RIGHT 2021 Cory Beltrán MD [address] [facility]       Immunization History:   Immunization History   Administered Date(s) Administered    COVID-19, [employer], PF, 30mcg/0.3mL 2021, 2021    Influenza Virus Vaccine 10/31/2010, 10/16/2017, 2019    Influenza, MDCK Quadv, IM, PF (Flucelvax 2 yrs and older) 10/01/2020    Influenza, Quadv, IM, PF (6 mo and older Fluzone, Flulaval, Fluarix, and 3 yrs and older Afluria) 10/15/2018, 2019       Active Problems:  Patient Active Problem List   Diagnosis Code    Perforation of sigmoid colon due to diverticulitis K57.20    Anemia D64.9    Pure hypercholesterolemia E78.00    Malignant neoplasm of right breast in female, estrogen receptor positive ([facility] [address] C50.911, Z17.0    Malignant neoplasm of upper-inner quadrant of left breast in female, estrogen receptor positive ([facility] [address] C50.212, Z17.0    Chemotherapy-induced neutropenia ([facility]) D70.1, T45.1X5A    Fatigue R53.83    Tachycardia R00.0    Neutropenia, drug-induced ([facility]) D70.2    Severe malnutrition ([facility]) E43    Neutropenia ([facility]) D70.9    Failure to thrive in adult R62.7    Recurrent falls R29.6    Bacteriuria R82.71    Acute pulmonary embolism without acute cor pulmonale ([facility]) I26.99    UTI (urinary tract infection) N39.0    CHF (congestive heart failure) ([facility]) I50.9    CHF (congestive heart failure), NYHA class I, acute on chronic, combined ([facility]) I50.43       Isolation/Infection:   Isolation            No Isolation          Patient Infection Status       Infection Onset Added Last Indicated Last Indicated By Review Planned Expiration Resolved Resolved By    None active    Resolved    COVID-19 Rule Out 10/27/21 10/27/21 10/27/21 COVID-19, Rapid (Ordered)   10/27/21 Rule-Out Test Resulted    COVID-19 Rule Out 10/22/21 10/22/21 10/22/21 COVID-19, Rapid (Ordered)   10/22/21 Rule-Out Test Resulted            Nurse Assessment:  Last Vital Signs: BP (!) 90/54   Pulse 107   Temp 98 °F (36.7 °C) (Oral)   Resp 18   Ht 5' 7\" (1.702 m)   Wt 118 lb 6.4 oz (53.7 kg)   LMP  (LMP Unknown)   SpO2 94%   BMI 18.54 kg/m²     Last documented pain score (0-10 scale): Pain Level: 0  Last Weight:   Wt Readings from Last 1 Encounters:   11/01/21 118 lb 6.4 oz (53.7 kg)     Mental Status:  oriented    IV Access:  - None    Nursing Mobility/ADLs:  Walking   Independent  Transfer  Independent  Bathing  Assisted  Dressing  Assisted  Toileting  Independent  Feeding Independent  Med Admin  Independent  Med Delivery   whole    Wound Care Documentation and Therapy:        Elimination:  Continence:   · Bowel: Yes  · Bladder: Yes  Urinary Catheter: {Urinary Catheter:[id number]}   Colostomy/Ileostomy/Ileal Conduit: No       Date of Last BM: 11/1/2021    Intake/Output Summary (Last 24 hours) at 11/1/2021 1612  Last data filed at 11/1/2021 1216  Gross per 24 hour   Intake 410 ml   Output 1800 ml   Net -1390 ml     I/O last 3 completed shifts: In: 530 [P.O.:530]  Out: 2020 [Urine:2020]    Safety Concerns: At Risk for Falls    Impairments/Disabilities:      None    Nutrition Therapy:  Current Nutrition Therapy:   - Oral Diet:  Low Sodium (2gm)  - Oral Nutrition Supplement:  Frozen Oral  three times a day    Routes of Feeding: Oral  Liquids: No Restrictions  Daily Fluid Restriction: yes - amount 2000 mL  Last Modified Barium Swallow with Video (Video Swallowing Test): not done    Treatments at the Time of Hospital Discharge:   Respiratory Treatments: none  Oxygen Therapy:  is not on home oxygen therapy.   Ventilator:    - No ventilator support    Rehab Therapies: Physical Therapy, Occupational Therapy and Nurse  Weight Bearing Status/Restrictions: No weight bearing restirctions  Other Medical Equipment (for information only, NOT a DME order):  walker  Other Treatments: [facility]: LEVEL 3 [address] to establish plan of care for patient over 60 day period   Nursing  Initial home SN evaluation visit to occur within 24-48 hours for:  1)  medication management  2)  VS and clinical assessment  3)  S&S chronic disease exacerbation education + when to contact MD/NP  4)  care coordination  Medication Reconciliation during 1st SN visit  PT/OT/Speech   Evaluations in home within 24-48 hours of discharge to include DME and home safety   Frontload therapy 5 days, then 3x a week   OT to evaluate if patient has [address] needs for personal care    evaluation within 24-48 hours to evaluate resources & insurance for potential AL, [place], LTC, and Medicaid options   Palliative Care referral within 5 days of hospital discharge   PCP Visit scheduled within 3 - 7 days of hospital discharge [address] (If patient is agreeable and meets guidelines)      Patient's personal belongings (please select all that are sent with patient):  None    RN SIGNATURE:  Electronically signed by Ryan Cortes RN on 11/2/21 at 1:52 PM EDT    CASE MANAGEMENT/SOCIAL WORK SECTION    Inpatient Status Date: ***    Readmission Risk Assessment Score:  Readmission Risk              Risk of Unplanned Readmission:  27           Discharging to Facility/ [address]   [address]   [address] [facility]   [phone]   ·     Dialysis Facility (if applicable)   · Name:  · Address:  · Dialysis Schedule:  · Phone:  · Fax:    / signature: Electronically signed by Alanis Briseno RN on 11/2/21 at 12:24 PM EDT    PHYSICIAN SECTION    Prognosis: Good    Condition at Discharge: Stable    Rehab Potential (if transferring to Rehab): Good    Recommended Labs or Other Treatments After Discharge: Follow up with PCP within 1-2 weeks. Physician Certification: I certify the above information and transfer of Marino Suarez  is necessary for the continuing treatment of the diagnosis listed and that she requires [address] for less 30 days.      Update Admission H&P: No change in H&P    PHYSICIAN SIGNATURE:  Electronically signed by Catherine Horn MD on 11/2/21 at 10:38 AM EDT

## 2021-11-01 NOTE — PROGRESS NOTES
Hospitalist Progress Note      PCP: Babak Canales, APRN - CNP    Date of Admission: 10/27/2021    Chief Complaint: Shortness of breath      Subjective:  She says she isn't feeling well in general.  Uneasy, feels \"out of it\" after taking her lorazepam, anxious about going home. Realizes she is high risk for ongoing readmissions. Medications:  Reviewed    Infusion Medications    sodium chloride Stopped (10/28/21 1045)     Scheduled Medications    furosemide  80 mg IntraVENous BID    apixaban  5 mg Oral BID    aspirin  81 mg Oral Daily    carvedilol  3.125 mg Oral BID WC    lisinopril  2.5 mg Oral Daily    sodium chloride flush  5-40 mL IntraVENous 2 times per day    melatonin  5 mg Oral Nightly     PRN Meds: melatonin, ondansetron, sodium chloride flush, sodium chloride, ondansetron **OR** ondansetron, polyethylene glycol, acetaminophen **OR** acetaminophen, LORazepam      Intake/Output Summary (Last 24 hours) at 11/1/2021 1026  Last data filed at 11/1/2021 0919  Gross per 24 hour   Intake 669.81 ml   Output 1870 ml   Net -1200.19 ml       Physical Exam Performed:    /75   Pulse 115   Temp 97.5 °F (36.4 °C) (Oral)   Resp 16   Ht 5' 7\" (1.702 m)   Wt 118 lb 6.4 oz (53.7 kg)   LMP  (LMP Unknown)   SpO2 98%   BMI 18.54 kg/m²       General appearance: No apparent distress, appears stated age. HEENT: Pupils equal, round, and reactive to light. Conjunctivae/corneas clear. Neck: Supple, with full range of motion. No jugular venous distention. Trachea midline. Respiratory:  Normal respiratory effort. Clear to auscultation, bilaterally without Rales/Wheezes/Rhonchi. Diminished in bases. Cardiovascular: Regular rate and rhythm with normal S1/S2 without murmurs, rubs or gallops. Abdomen: Soft, non-tender, non-distended with normal bowel sounds. Musculoskeletal: No clubbing, cyanosis or edema bilaterally. Full range of motion without deformity.   Skin: Skin color, texture, turgor normal.  No rashes or lesions. Neurologic:  Neurovascularly intact without any focal sensory/motor deficits. Cranial nerves: II-XII intact, grossly non-focal.  Psychiatric: Alert and oriented, thought content appropriate, normal insight. Anxious affect. Capillary Refill: Brisk,3 seconds, normal   Peripheral Pulses: +2 palpable, equal bilaterally       Labs:   Recent Labs     10/30/21  0638   WBC 5.3   HGB 9.5*   HCT 28.0*   *     Recent Labs     10/30/21  0638 10/31/21  0558 11/01/21  0500    138 134*   K 4.6 4.0 3.5    94* 90*   CO2 32 31 32   BUN 25* 22* 24*   CREATININE <0.5* <0.5* <0.5*   CALCIUM 9.2 9.3 9.4     No results for input(s): AST, ALT, BILIDIR, BILITOT, ALKPHOS in the last 72 hours. No results for input(s): INR in the last 72 hours. No results for input(s): Elizabeth Azularch in the last 72 hours. Urinalysis:      Lab Results   Component Value Date    NITRU Negative 10/27/2021    WBCUA  10/27/2021    BACTERIA 2+ 10/27/2021    RBCUA 0-2 10/27/2021    BLOODU Negative 10/27/2021    SPECGRAV >=1.030 10/27/2021    GLUCOSEU Negative 10/27/2021       Radiology:  CT CHEST PULMONARY EMBOLISM W CONTRAST   Final Result   Within the limitations of the exam, no new or worsening pulmonary emboli   identified. Redemonstration of pulmonary emboli involving bilateral upper   lobes, segmental/subsegmental pulmonary arteries. Previously noted pulmonary   emboli involving right middle and right lower lobe subsegmental pulmonary   arteries are not definitely evident within the limitations. Significantly worsened areas of consolidation and ground-glass opacity   involving lungs bilaterally, right worse than left with bilateral upper lobe   predominance, concerning for worsening pneumonia. Bilateral pleural effusions, right worse than left, worsened.          XR CHEST PORTABLE   Final Result   Worsening diffuse bilateral airspace disease predominantly within the   bilateral upper lobes.                 Assessment/Plan:    Active Hospital Problems    Diagnosis     UTI (urinary tract infection) [N39.0]     CHF (congestive heart failure) ([facility]) [I50.9]     CHF (congestive heart failure), NYHA class I, acute on chronic, combined ([facility] [address] [I50.43]     Severe malnutrition (HCC) [E43]     Anemia [D64.9]        \"69 y.o. female with PMH of breast CA on chemotherapy , recently discharged [facility] October after dx of Bilateral PEs started on Eliquis, who represented to [facility] with progressive severe SOB/VEE since discharge. \"      Acute on chronic systolic CHF. New cardiomyopathy with EF 25-30% (normal 3 months ago). Probably due to chemo. Cardiology assisted with diuresis. Cardiology will consider outpatient ischemia eval once her acute issues resolve, if this seems appropriate given her prognosis in the future. - furosemide, lisinopril, carvedilol    Completed empiric treatment for UTI. Recent acute PE. Apixaban. Breast cancer. F/u soon with Dr. Caroline King for consideration of other options. Anxiety. Use benzodiazepine sparingly. Pt did not want to be DNR-CCA. Remains full code for now.       DVT Prophylaxis: apixaban  Diet: ADULT ORAL NUTRITION SUPPLEMENT; Breakfast, Lunch, Dinner; Standard High Calorie/High Protein Oral Supplement  ADULT ORAL NUTRITION SUPPLEMENT; Lunch, Dinner; Frozen Oral Supplement  ADULT DIET; Regular; Low Sodium (2 gm); 2000 ml  Code Status: Full Code. Confirmed in detail with patient and family. PT/OT Eval Status: rec'd home PT/OT    Dispo - once she is adequately diuresed, hopefully 11/2 - 11/3 with home PT/OT. She would refuse SNF, even if recommended. She lives with her elderly mother, has son next door.       Timothy Hudson MD

## 2021-11-02 VITALS
RESPIRATION RATE: 16 BRPM | TEMPERATURE: 97.8 F | BODY MASS INDEX: 18.71 KG/M2 | DIASTOLIC BLOOD PRESSURE: 60 MMHG | HEIGHT: 67 IN | SYSTOLIC BLOOD PRESSURE: 95 MMHG | HEART RATE: 97 BPM | WEIGHT: 119.2 LBS | OXYGEN SATURATION: 93 %

## 2021-11-02 LAB
ANION GAP SERPL CALCULATED.3IONS-SCNC: 12 MMOL/L (ref 3–16)
BUN BLDV-MCNC: 30 MG/DL (ref 7–20)
CALCIUM SERPL-MCNC: 9.6 MG/DL (ref 8.3–10.6)
CHLORIDE BLD-SCNC: 90 MMOL/L (ref 99–110)
CO2: 31 MMOL/L (ref 21–32)
CREAT SERPL-MCNC: 0.7 MG/DL (ref 0.6–1.2)
GFR AFRICAN AMERICAN: >60
GFR NON-AFRICAN AMERICAN: >60
GLUCOSE BLD-MCNC: 115 MG/DL (ref 70–99)
MAGNESIUM: 2.2 MG/DL (ref 1.8–2.4)
POTASSIUM SERPL-SCNC: 4.3 MMOL/L (ref 3.5–5.1)
SODIUM BLD-SCNC: 133 MMOL/L (ref 136–145)

## 2021-11-02 PROCEDURE — 99233 SBSQ HOSP IP/OBS HIGH 50: CPT | Performed by: INTERNAL MEDICINE

## 2021-11-02 PROCEDURE — 6370000000 HC RX 637 (ALT 250 FOR IP): Performed by: INTERNAL MEDICINE

## 2021-11-02 PROCEDURE — 2580000003 HC RX 258: Performed by: INTERNAL MEDICINE

## 2021-11-02 PROCEDURE — 83735 ASSAY OF MAGNESIUM: CPT

## 2021-11-02 PROCEDURE — 80048 BASIC METABOLIC PNL TOTAL CA: CPT

## 2021-11-02 RX ORDER — FUROSEMIDE 40 MG/1
40 TABLET ORAL DAILY
Qty: 60 TABLET | Refills: 3 | Status: ON HOLD | OUTPATIENT
Start: 2021-11-03 | End: 2021-11-21 | Stop reason: HOSPADM

## 2021-11-02 RX ORDER — METOPROLOL SUCCINATE 25 MG/1
12.5 TABLET, EXTENDED RELEASE ORAL DAILY
Qty: 30 TABLET | Refills: 3 | Status: SHIPPED | OUTPATIENT
Start: 2021-11-03 | End: 2021-11-09

## 2021-11-02 RX ORDER — METOPROLOL SUCCINATE 25 MG/1
12.5 TABLET, EXTENDED RELEASE ORAL DAILY
Status: DISCONTINUED | OUTPATIENT
Start: 2021-11-02 | End: 2021-11-02 | Stop reason: HOSPADM

## 2021-11-02 RX ORDER — CARVEDILOL 6.25 MG/1
6.25 TABLET ORAL 2 TIMES DAILY WITH MEALS
Status: DISCONTINUED | OUTPATIENT
Start: 2021-11-02 | End: 2021-11-02

## 2021-11-02 RX ORDER — FUROSEMIDE 40 MG/1
40 TABLET ORAL DAILY
Status: DISCONTINUED | OUTPATIENT
Start: 2021-11-02 | End: 2021-11-02 | Stop reason: HOSPADM

## 2021-11-02 RX ADMIN — METOPROLOL SUCCINATE 12.5 MG: 25 TABLET, EXTENDED RELEASE ORAL at 09:02

## 2021-11-02 RX ADMIN — LISINOPRIL 2.5 MG: 5 TABLET ORAL at 09:02

## 2021-11-02 RX ADMIN — FUROSEMIDE 40 MG: 40 TABLET ORAL at 09:03

## 2021-11-02 RX ADMIN — ASPIRIN 81 MG: 81 TABLET, COATED ORAL at 09:03

## 2021-11-02 RX ADMIN — APIXABAN 5 MG: 5 TABLET, FILM COATED ORAL at 09:03

## 2021-11-02 RX ADMIN — ACETAMINOPHEN 650 MG: 325 TABLET ORAL at 09:02

## 2021-11-02 RX ADMIN — Medication 10 ML: at 09:04

## 2021-11-02 ASSESSMENT — PAIN SCALES - GENERAL
PAINLEVEL_OUTOF10: 0
PAINLEVEL_OUTOF10: 4

## 2021-11-02 NOTE — PROGRESS NOTES
[facility]   PROGRESS NOTE  [phone]      Attending Physician: Romeo Bumpers, MD  Reason for Consultation/Chief Complaint: Acute heart failure    Subjective     Shortness of breath much improved from admission. Will plan to transition to PO diuretic today. Patient looking forward to being discharged. CURRENT Medications:  carvedilol (COREG) tablet 6.25 mg, BID WC  furosemide (LASIX) tablet 40 mg, Daily  apixaban (ELIQUIS) tablet 5 mg, BID  aspirin EC tablet 81 mg, Daily  lisinopril (PRINIVIL;ZESTRIL) tablet 2.5 mg, Daily  melatonin tablet 3 mg, Nightly PRN  ondansetron (ZOFRAN-ODT) disintegrating tablet 8 mg, Q8H PRN  sodium chloride flush 0.9 % injection 5-40 mL, 2 times per day  sodium chloride flush 0.9 % injection 5-40 mL, PRN  0.9 % sodium chloride infusion, PRN  ondansetron (ZOFRAN-ODT) disintegrating tablet 4 mg, Q8H PRN   Or  ondansetron (ZOFRAN) injection 4 mg, Q6H PRN  polyethylene glycol (GLYCOLAX) packet 17 g, Daily PRN  acetaminophen (TYLENOL) tablet 650 mg, Q6H PRN   Or  acetaminophen (TYLENOL) suppository 650 mg, Q6H PRN  LORazepam (ATIVAN) tablet 0.5 mg, Q6H PRN  melatonin disintegrating tablet 5 mg, Nightly        Allergies:  Bactrim [sulfamethoxazole-trimethoprim]     Review of Systems:   General: No fevers or chills. Cardiac: No chest pain or palpitations. Respiratory: Positive for shortness of breath. No cough. GI: No nausea, vomiting, or diarrhea. Neuro: No lightheadedness or dizziness. Objective   PHYSICAL EXAM:    Vitals:    11/02/21 0744   BP: 103/63   Pulse: 108   Resp: 16   Temp: 97.8 °F (36.6 °C)   SpO2: 97%    Weight: 119 lb 3.2 oz (54.1 kg)      General: Cachectic female lying in bed in no acute distress. HEENT: Normocephalic, atraumatic, non-icteric, hearing intact, nares normal, mucous membranes moist.  Neck: No appreciable JVD. Heart: Mildly tachycardic with regular rhythm. Normal S1 and S2. No murmurs, gallops or rubs.   Chest: Port noted on right.  Lungs: Normal respiratory effort. Breath sounds diminished bilaterally. No wheezes, rales, or rhonchi. Abdomen: Soft, non-tender. Normoactive bowel sounds. No masses or organomegaly. Skin: No rashes, wounds, or lesions. Pulses: 2+ and symmetric. Extremities: No clubbing, cyanosis, or edema. Psych: Normal mood and affect. Neuro: Alert and oriented to person, place, and time. No focal deficits noted. Labs   CBC:   Lab Results   Component Value Date    WBC 5.3 10/30/2021    RBC 3.03 10/30/2021    HGB 9.5 10/30/2021    HCT 28.0 10/30/2021    MCV 92.4 10/30/2021    RDW 16.7 10/30/2021     10/30/2021     CMP:  Lab Results   Component Value Date     11/02/2021    K 4.3 11/02/2021    K 3.5 10/22/2021    CL 90 11/02/2021    CO2 31 11/02/2021    BUN 30 11/02/2021    CREATININE 0.7 11/02/2021    GFRAA >60 11/02/2021    GFRAA >60 07/10/2012    AGRATIO 0.9 10/27/2021    LABGLOM >60 11/02/2021    GLUCOSE 115 11/02/2021    PROT 6.3 10/27/2021    PROT 7.8 07/10/2012    CALCIUM 9.6 11/02/2021    BILITOT 0.3 10/27/2021    ALKPHOS 98 10/27/2021    AST 20 10/27/2021    ALT 27 10/27/2021     PT/INR:  No results found for: PTINR  HgBA1c:  Lab Results   Component Value Date    LABA1C 5.6 09/13/2018     Lab Results   Component Value Date    TROPONINI 0.01 10/27/2021         Cardiac Data     Last EKG: Sinus tachycardia, no ischemic changes.     Echo:  TTE 8/17/21:  Conclusions   Summary   LV systolic function is normal with EF estimated at 55%.   No regional wall motion abnormalities.   Grade I diastolic dysfunction with normal LV filling pressure.   Mild mitral regurgitation.   Inadequate tricuspid regurgitation jet to estimate systolic artery pressure   (SPAP).      TTE 10/23/21:  Conclusions   Summary   Normal left ventricle size and wall thickness.   The left ventricular systolic function is severely reduced with an ejection   fraction of 25-30 %.   EF by Fay's method estimated at 27%.   Severe global hypokinesis with regional variation.   Left ventricular diastolic filling pressure are indeterminate.   The right ventricle is normal in size and function.   There is protruding echodensity from anterior RA wall seen in multiple   views, possibly artifact, but given patient's clinical history,   mass/thrombus cannot be excluded.   Mild to moderate mitral regurgitation.   Mild pulmonic and tricuspid regurgitation.   Systolic pulmonary artery pressure (SPAP) is normal and estimated at 35 mmHg   (right atrial pressure 3 mmHg).   There is a large left pleural effusion.      Compared to last echo on 8/17/2021 (EF 55%), left ventricle systolic   function has significantly declined while RV size and function appears   normal. RIght atrial findings as above - not seen on review of images prior,   unclear significance.     Stress Test:  Treadmill GXT 3/30/10:  Negative for evidence of ischemia.     Cath: N/A     Other Studies:   Chest X-ray 10/27/21: Worsening diffuse bilateral airspace disease predominantly within the   bilateral upper lobes.      CTPA 10/27/21: Within the limitations of the exam, no new or worsening pulmonary emboli   identified.  Redemonstration of pulmonary emboli involving bilateral upper   lobes, segmental/subsegmental pulmonary arteries.  Previously noted pulmonary   emboli involving right middle and right lower lobe subsegmental pulmonary   arteries are not definitely evident within the limitations.       Significantly worsened areas of consolidation and ground-glass opacity   involving lungs bilaterally, right worse than left with bilateral upper lobe   predominance, concerning for worsening pneumonia.       Bilateral pleural effusions, right worse than left, worsened.        Assessment and Plan      1. Acute LV systolic heart failure - Diagnosed with new cardiomyopathy during recent admission for bilateral PEs. At that time, TTE showed LVEF of 25-30% from 55% on previous echo from 8/17/21. Concern for possible cardiotoxicity from adriamycin or Herceptin bio similar. [facility], has not had any recent formal ischemic evaluation.   -Overall, patient has diuresed well with IV lasix and now appears to be nearing a euvolemic state  -Will transition to PO lasix 40 mg daily and plan to discharge on this dose  -Increase coreg to 6.25 mg BID  -Continue lisinopril 2.5 mg daily  -OK for discharge later today from cardiology standpoint. Will arrange for follow-up with [facility] in 1-2 weeks.  -Pending clinical course and overall prognosis from malignancy standpoint, can consider proceeding with formal ischemic evaluation once patient has had time to recover from her other acute medical issues (this can be arranged as an outpatient)     2. Bilateral pulmonary emboli  -On Eliquis  -RV size and systolic function normal on TTE     3. Acute hypoxic respiratory failure  -Resolved  -Secondary to combination of bilateral PEs, acute heart failure, and pneumonia    4. Right atrial echodensity  -May represent artifact, but given recent PE and known underlying malignancy cannot definitively exclude thrombus or mass  -On Eliquis as above and can consider obtaining outpatient cardiac MRI for further evaluation, which may also help provide further insight into etiology of cardiomyopathy     5. Sinus tachycardia  -Secondary to above     6. H/o breast cancer  -S/p 4 cycles of Adriamycin/Cytoxan and 7 of 12 planned cycles of Taxol with Herceptin bio similar and pertuzumab  -Given recently diagnosed cardiomyopathy, will likely need to consider switching to less cardiotoxic regimen if other options exist - This will need to be discussed further with oncology      Thank you for allowing us to participate in the care of [facility]. Please call me with any questions [phone].       Julian Barker, [address] [facility]  [phone] [facility]  [phone] [facility]  11/2/2021 8:35 AM      I will address the patient's cardiac risk factors and adjusted pharmacologic treatment as needed. In addition, I have reinforced the need for patient directed risk factor modification. All questions and concerns were addressed to the patient/family. Alternatives to my treatment were discussed. The note was completed using EMR. Every effort was made to ensure accuracy; however, inadvertent computerized transcription errors may be present.

## 2021-11-02 NOTE — DISCHARGE SUMMARY
Hospital Medicine Discharge Summary    Patient ID: Mary Hackett      Patient's PCP: SOUTH Back CNP    Admit Date: 10/27/2021     Discharge Date:   11/02/21     Admitting Physician: Eleanor Bustillos MD     Discharge Physician: Sabrina Whitaker MD     Discharge Diagnoses: Active Hospital Problems    Diagnosis     UTI (urinary tract infection) [N39.0]     CHF (congestive heart failure) ([facility]) [I50.9]     CHF (congestive heart failure), NYHA class I, acute on chronic, combined ([facility] [address] [I50.43]     Severe malnutrition ([facility]) [E43]     Anemia [D64.9]        The patient was seen and examined on day of discharge and this discharge summary is in conjunction with any daily progress note from day of discharge. Hospital Course:  \"69 y. o. female with PMH of breast CA on chemotherapy , recently discharged from [facility] 25 October after dx of Bilateral PEs started on Eliquis, who represented to Jovana Wheatley with progressive severe SOB/VEE since discharge. \"        Acute on chronic systolic CHF. New cardiomyopathy with EF 25-30% (normal 3 months ago). Probably due to chemo. Cardiology assisted with diuresis. Cardiology will consider outpatient ischemia eval once her acute issues resolve, if this seems appropriate given her prognosis in the future. - furosemide (treated with gtt, then PO, continued with 40 po qd upon discharge), lisinopril, metoprolol.     Completed empiric treatment for UTI.     Recent acute PE. Apixaban.     Breast cancer. F/u soon with Dr. Christiano Spann for consideration of other options.     Anxiety. Use benzodiazepine sparingly.     Pt realizes that she is high risk for ongoing readmissions. She did not want to be DNR-CCA, even though chance of meaningful recovery after cardiac arrest would be very low. Remains full code for now. Family present for this conversation.           Physical Exam Performed:     /63   Pulse 108   Temp 97.8 °F (36.6 °C) (Oral)   Resp 16 Ht 5' 7\" (1.702 m)   Wt 119 lb 3.2 oz (54.1 kg)   LMP  (LMP Unknown)   SpO2 97%   BMI 18.67 kg/m²       General appearance: No apparent distress, appears stated age. HEENT: Pupils equal, round, and reactive to light. Conjunctivae/corneas clear. Neck: Supple, with full range of motion. No jugular venous distention. Trachea midline. Respiratory:  Normal respiratory effort. Clear to auscultation, bilaterally without Rales/Wheezes/Rhonchi. Diminished in bases. Cardiovascular: Regular rate and rhythm with normal S1/S2 without murmurs, rubs or gallops. Abdomen: Soft, non-tender, non-distended with normal bowel sounds. Musculoskeletal: No clubbing, cyanosis or edema bilaterally. Full range of motion without deformity. Skin: Skin color, texture, turgor normal.  No rashes or lesions. Neurologic:  Neurovascularly intact without any focal sensory/motor deficits. Cranial nerves: II-XII intact, grossly non-focal.  Psychiatric: Alert and oriented, thought content appropriate, normal insight. Anxious affect. Capillary Refill: Brisk,3 seconds, normal   Peripheral Pulses: +2 palpable, equal bilaterally       Labs: For convenience and continuity at follow-up the following most recent labs are provided:      CBC:    Lab Results   Component Value Date    WBC 5.3 10/30/2021    HGB 9.5 10/30/2021    HCT 28.0 10/30/2021     10/30/2021       Renal:    Lab Results   Component Value Date     11/02/2021    K 4.3 11/02/2021    K 3.5 10/22/2021    CL 90 11/02/2021    CO2 31 11/02/2021    BUN 30 11/02/2021    CREATININE 0.7 11/02/2021    CALCIUM 9.6 11/02/2021    PHOS 3.5 10/29/2021         Significant Diagnostic Studies    Radiology:   CT CHEST PULMONARY EMBOLISM W CONTRAST   Final Result   Within the limitations of the exam, no new or worsening pulmonary emboli   identified. Redemonstration of pulmonary emboli involving bilateral upper   lobes, segmental/subsegmental pulmonary arteries.   Previously noted pulmonary   emboli involving right middle and right lower lobe subsegmental pulmonary   arteries are not definitely evident within the limitations. Significantly worsened areas of consolidation and ground-glass opacity   involving lungs bilaterally, right worse than left with bilateral upper lobe   predominance, concerning for worsening pneumonia. Bilateral pleural effusions, right worse than left, worsened. XR CHEST PORTABLE   Final Result   Worsening diffuse bilateral airspace disease predominantly within the   bilateral upper lobes. Consults:     IP CONSULT TO CARDIOLOGY  IP CONSULT TO HOSPITALIST  IP CONSULT TO SPIRITUAL SERVICES  IP CONSULT TO HEART FAILURE NURSE/COORDINATOR  IP CONSULT TO DIETITIAN    Disposition:  Home with Kristofer Matute     Condition at Discharge: Stable    Discharge Instructions/Follow-up:  Follow up with PCP within 1-2 weeks. Follow up with Dr. Genesis Reynolds soon to reconsider your options.     Code Status:  Full Code     Activity: activity as tolerated    Diet: regular diet      Discharge Medications:     Current Discharge Medication List           Details   metoprolol succinate (TOPROL XL) 25 MG extended release tablet Take 0.5 tablets by mouth daily  Qty: 30 tablet, Refills: 3      furosemide (LASIX) 40 MG tablet Take 1 tablet by mouth daily  Qty: 60 tablet, Refills: 3              Details   apixaban starter pack (ELIQUIS DVT/PE STARTER PACK) 5 MG TBPK tablet Take 1 tablet by mouth See Admin Instructions  Qty: 74 tablet, Refills: 0      lisinopril (PRINIVIL;ZESTRIL) 2.5 MG tablet Take 1 tablet by mouth daily  Qty: 30 tablet, Refills: 0      docusate sodium (COLACE) 100 MG capsule Take 100 mg by mouth every 8 hours as needed for Constipation      !! loperamide (IMODIUM) 2 MG capsule Take 2 mg by mouth 4 times daily as needed for Diarrhea      !! loperamide (IMODIUM) 2 MG capsule Take 1 capsule by mouth      ondansetron (ZOFRAN) 8 MG tablet Take 1 tablet by mouth melatonin (RA MELATONIN) 3 MG TABS tablet Take 1 tablet by mouth nightly as needed (sleep)  Qty: 20 tablet, Refills: 0      aspirin 81 MG EC tablet Take 81 mg by mouth daily      Multiple Vitamin (MULTI-VITAMIN PO) Take by mouth      Cholecalciferol (VITAMIN D3) 5000 units TABS Take by mouth      Cyanocobalamin (VITAMIN B-12) 1000 MCG extended release tablet Take 1,000 mcg by mouth daily       ! ! - Potential duplicate medications found. Please discuss with provider. Time Spent on discharge is more than 30 minutes in the examination, evaluation, counseling and review of medications and discharge plan. Signed:    Jeb Hernadez MD   11/2/2021      Thank you [facility] for the opportunity to be involved in this patient's care. If you have any questions or concerns please feel free to contact me at [phone].

## 2021-11-02 NOTE — PROGRESS NOTES
Pt d/c'd home with home health. Deaccessed port and stopped bleeding. Catheter intact. Pt tolerated well. No redness noted at site. Notified [facility] and removed tele box. Reviewed d/c instructions, home meds, and  f/u information utilizing teach-back method.

## 2021-11-02 NOTE — PLAN OF CARE
Problem: Falls - Risk of:  Goal: Will remain free from falls  Description: Will remain free from falls  11/2/2021 0052 by Adithya Hernández RN  Outcome: Ongoing     Problem: Nutrition  Goal: Optimal nutrition therapy  11/2/2021 0052 by Adithya Hernández RN  Outcome: Ongoing     Problem: Pain:  Goal: Pain level will decrease  Description: Pain level will decrease  11/2/2021 0052 by Adithya Hernández RN  Outcome: Ongoing     Problem: OXYGENATION/RESPIRATORY FUNCTION  Goal: Patient will maintain patent airway  11/2/2021 0052 by Adithya Hernández RN  Outcome: Ongoing       Patient's EF (Ejection Fraction) is less than 40%    Heart Failure Medications:  Diuretics[de-identified] Furosemide    (One of the following REQUIRED for EF <40%/SYSTOLIC FAILURE but MAY be used in EF% >40%/DIASTOLIC FAILURE)        ACE[de-identified] Lisinopril        ARB[de-identified] None         ARNI[de-identified] None    (Beta Blockers)  NON- Evidenced Based Beta Blocker (for EF% >40%/DIASTOLIC FAILURE): None    Evidenced Based Beta Blocker::(REQUIRED for EF% <40%/SYSTOLIC FAILURE) Carvedilol- Coreg  . .................................................................................................................................................. Patient's weights and intake/output reviewed: Yes    Patient's Last Weight: 118 lbs obtained by standing scale. Difference of 1 lbs less than last documented weight. Intake/Output Summary (Last 24 hours) at 11/2/2021 0053  Last data filed at 11/1/2021 2018  Gross per 24 hour   Intake 480 ml   Output 1050 ml   Net -570 ml       Comorbidities Reviewed Yes    Patient has a past medical history of Breast cancer ([facility] [address] Cancer ([facility] [address] and Kidney stone. >>For CHF and Comorbidity documentation on Education Time and Topics, please see Education Tab    Progressive Mobility Assessment:  What is this patient's Current Level of Mobility?: Ambulatory- with Assistance  How was this patient Mobilized today?: Edge of Bed and  Up to Toilet/Shower                 With Whom?  Nurse and Self                 Level of Difficulty/Assistance: 1x Assist     Pt resting in bed at this time on room air. Pt with complaints of shortness of breath. Pt without lower extremity edema.      Patient and/or Family's stated Goal of Care this Admission: reduce shortness of breath, increase activity tolerance, better understand heart failure and disease management, be more comfortable, and reduce lower extremity edema prior to discharge        :

## 2021-11-02 NOTE — CARE COORDINATION
CASE MANAGEMENT DISCHARGE SUMMARY      Discharge to: home w/[facility]  IMM given: (date) 11/01/2021  Transportation: private  Confirmed discharge plan with:     Patient: yes   RN, name: Nora Wilson RN    Note: [facility] will pull orders from epic.   Alanis Briseno, RN

## 2021-11-02 NOTE — CARE COORDINATION
[facility]    DC order noted, all docs needed have been faxed to [facility] for home care services.     Home care to see patient within 24-48 hrs    hcv confirmed    Needs bp cuff, scale, pulse ox  Spoke w granddaughter gregoria to confirm information  Mother main number and she request phone call to gregoria Rodriguez Both RN, BSN CTN  [facility] [phone]

## 2021-11-02 NOTE — PLAN OF CARE
Problem: OXYGENATION/RESPIRATORY FUNCTION  Goal: Patient will maintain patent airway  11/2/2021 0956 by Viviana Jenkins RN  Outcome: Ongoing  Note:   Patient's EF (Ejection Fraction) is less than 40%    Heart Failure Medications:  Diuretics[de-identified] Furosemide    (One of the following REQUIRED for EF <40%/SYSTOLIC FAILURE but MAY be used in EF% >40%/DIASTOLIC FAILURE)        ACE[de-identified] Lisinopril        ARB[de-identified] None         ARNI[de-identified] None    (Beta Blockers)  NON- Evidenced Based Beta Blocker (for EF% >40%/DIASTOLIC FAILURE): None    Evidenced Based Beta Blocker::(REQUIRED for EF% <40%/SYSTOLIC FAILURE) Metoprolol SUCCinate- Toprol XL  . .................................................................................................................................................. Patient's weights and intake/output reviewed: Yes    Patient's Last Weight: 119 lbs obtained by standing scale. Difference of 1 lbs more than last documented weight. Intake/Output Summary (Last 24 hours) at 11/2/2021 0956  Last data filed at 11/2/2021 0745  Gross per 24 hour   Intake 360 ml   Output 1000 ml   Net -640 ml       Comorbidities Reviewed Yes    Patient has a past medical history of Breast cancer ([facility] [address] Cancer ([facility] [address] and Kidney stone. >>For CHF and Comorbidity documentation on Education Time and Topics, please see Education Tab    Progressive Mobility Assessment:  What is this patient's Current Level of Mobility?: Ambulatory- with Assistance  How was this patient Mobilized today?: Edge of Bed, Up to Chair,  Up to Toilet/Shower, and Up in Room                 With Whom? Nurse, PCA, PT, OT, and Self                 Level of Difficulty/Assistance: 1x Assist     Pt resting in bed at this time on room air. Pt denies shortness of breath. Pt without lower extremity edema.      Patient and/or Family's stated Goal of Care this Admission: increase activity tolerance and be more comfortable prior to discharge        :      Problem: Falls - Risk of:  Goal: Will remain free from falls  Description: Will remain free from falls  11/2/2021 0956 by Ryan Cortes RN  Outcome: Ongoing  Note: Pt will remain free from falls throughout hospital stay. Fall precautions in place, bed alarm on, bed in lowest position with wheels locked and side rails 2/4 up. Room door open and hourly rounding completed. Will continue to monitor throughout shift. Problem: Pain:  Goal: Pain level will decrease  Description: Pain level will decrease  11/2/2021 0956 by Ryan Cortes RN  Outcome: Ongoing  Note: Pt will be satisfied with pain control. Pt uses numeric pain rating scale with reassessments after pain med administration. Will continue to monitor progression throughout shift.

## 2021-11-02 NOTE — PROGRESS NOTES
Secure message sent to JOSE Gaona, \"Pt here for CHF, Ef 25-30%, was getting diuresed, now off diuretics. BP is 78/48 MAP 58. States she feels tired. All other vitals are stable. Will look for orders.  Thanks\"    Order placed for 1x dose IV albumin 5% 25 g

## 2021-11-03 ENCOUNTER — FOLLOWUP TELEPHONE ENCOUNTER (OUTPATIENT)
Dept: TELEMETRY | Age: 76
End: 2021-11-03

## 2021-11-03 ENCOUNTER — HOSPITAL ENCOUNTER (EMERGENCY)
Age: 76
Discharge: HOME | End: 2021-11-03
Payer: MEDICARE

## 2021-11-03 VITALS
SYSTOLIC BLOOD PRESSURE: 111 MMHG | HEART RATE: 105 BPM | OXYGEN SATURATION: 97 % | RESPIRATION RATE: 18 BRPM | DIASTOLIC BLOOD PRESSURE: 62 MMHG | TEMPERATURE: 97.6 F

## 2021-11-03 DIAGNOSIS — R04.0 EPISTAXIS: Primary | ICD-10-CM

## 2021-11-03 DIAGNOSIS — Z01.89 ENCOUNTER FOR LABORATORY TEST: ICD-10-CM

## 2021-11-03 LAB
HCT VFR BLD CALC: 30.9 % (ref 36–48)
HEMOGLOBIN: 10.3 G/DL (ref 12–16)
MCH RBC QN AUTO: 30.4 PG (ref 26–34)
MCHC RBC AUTO-ENTMCNC: 33.3 G/DL (ref 31–36)
MCV RBC AUTO: 91.3 FL (ref 80–100)
PDW BLD-RTO: 17.1 % (ref 12.4–15.4)
PLATELET # BLD: 508 K/UL (ref 135–450)
PMV BLD AUTO: 6.9 FL (ref 5–10.5)
RBC # BLD: 3.38 M/UL (ref 4–5.2)
SPECIMEN STATUS: NORMAL
WBC # BLD: 6.7 K/UL (ref 4–11)

## 2021-11-03 PROCEDURE — 99282 EMERGENCY DEPT VISIT SF MDM: CPT

## 2021-11-03 PROCEDURE — 85027 COMPLETE CBC AUTOMATED: CPT

## 2021-11-03 NOTE — TELEPHONE ENCOUNTER
1st Attempt; No Answer- unable to leave VM message. Will attempt call again at later time.  Colt Ariza RN

## 2021-11-03 NOTE — ED PROVIDER NOTES
[facility] Emergency Department    CHIEF COMPLAINT  Epistaxis (since this morning, no active bleeding at this time )      [facility] VISIT  Evaluated by SURESH. My supervising physician was available for consultation. HISTORY OF PRESENT ILLNESS  Shante Morrison is a 78 y.o. female history of breast cancer currently on chemotherapy; PE on anticoagulation; presents emergency for evaluation of nosebleed. Patient states that the she was able to get the bleeding controlled prior to being evaluated however she was concerned about her blood count. Denies any trauma to the nose. Has been taking her anticoagulation as prescribed. Denies any history of nasal packing. No other complaints, modifying factors or associated symptoms. Nursing notes reviewed.    Past Medical History:   Diagnosis Date    [facility] ([facility]     Cancer ([facility]     Kidney stone      Past Surgical History:   Procedure Laterality Date    COLONOSCOPY      MRI BREAST BX USING DEVICE RIGHT Right 5/28/2021    MRI BREAST BX USING DEVICE RIGHT 5/28/2021 [facility]    MRI NEEDLE BIOPSY EACH ADDL RIGHT Right 5/28/2021    MRI NEEDLE BIOPSY EACH ADDL RIGHT 5/28/2021 [facility]    PORT SURGERY N/A 5/25/2021    RIGHT PORT A CATH PLACEMENT performed by Juan Carlos Rosado DO at [address] RIGHT Right 5/7/2021    US BREAST BIOPSY NEEDLE ADDITIONAL RIGHT 5/7/2021 Zulay Ramirez MD [facility]    US BREAST NEEDLE BIOPSY LEFT Left 5/7/2021    US BREAST NEEDLE BIOPSY LEFT 5/7/2021 Zulay Ramirez MD [facility]    US BREAST NEEDLE BIOPSY RIGHT Right 5/7/2021    US BREAST NEEDLE BIOPSY RIGHT 5/7/2021 Zulay Ramirez MD [facility]     Family History   Problem Relation Age of Onset    High Cholesterol Mother     Diabetes Father     Cancer Paternal Grandmother 76        Bladder     Social History     Socioeconomic History    Marital status:      Spouse name: Not on file    Number of children: Not on file    Years of education: Not on file    Highest education level: Not on file   Occupational History    Not on file   Tobacco Use    Smoking status: Passive Smoke Exposure - Never Smoker    Smokeless tobacco: Never Used   Vaping Use    Vaping Use: Never used   Substance and Sexual Activity    Alcohol use: No    Drug use: No    Sexual activity: Not Currently     Partners: Male   Other Topics Concern    Not on file   Social History Narrative    Not on file     Social Determinants of Health     Financial Resource Strain:     Difficulty of Paying Living Expenses:    Food Insecurity:     Worried About Running Out of Food in the Last Year:     [address] in the Last Year:    Transportation Needs:     Lack of Transportation (Medical):  Lack of Transportation (Non-Medical):    Physical Activity:     Days of Exercise per Week:     Minutes of Exercise per Session:    Stress:     Feeling of Stress :    Social Connections:     Frequency of Communication with Friends and Family:     Frequency of Social Gatherings with Friends and Family:     Attends [employer] Services:     Active Member of Clubs or Organizations:     Attends Club or Organization Meetings:     Marital Status:    Intimate Partner Violence:     Fear of Current or Ex-Partner:     Emotionally Abused:     Physically Abused:     Sexually Abused:      No current facility-administered medications for this encounter.      Current Outpatient Medications   Medication Sig Dispense Refill    metoprolol succinate (TOPROL XL) 25 MG extended release tablet Take 0.5 tablets by mouth daily 30 tablet 3    furosemide (LASIX) 40 MG tablet Take 1 tablet by mouth daily 60 tablet 3    apixaban starter pack (ELIQUIS DVT/PE STARTER PACK) 5 MG TBPK tablet Take 1 tablet by mouth See Admin Instructions 74 tablet 0    lisinopril (PRINIVIL;ZESTRIL) 2.5 MG tablet Take 1 tablet by mouth daily 30 tablet 0    docusate sodium (COLACE) 100 MG capsule Take 100 mg by mouth every 8 hours as needed for Constipation      loperamide (IMODIUM) 2 MG capsule Take 2 mg by mouth 4 times daily as needed for Diarrhea      loperamide (IMODIUM) 2 MG capsule Take 1 capsule by mouth      ondansetron (ZOFRAN) 8 MG tablet Take 1 tablet by mouth      melatonin (RA MELATONIN) 3 MG TABS tablet Take 1 tablet by mouth nightly as needed (sleep) 20 tablet 0    aspirin 81 MG EC tablet Take 81 mg by mouth daily      Multiple Vitamin (MULTI-VITAMIN PO) Take by mouth      Cholecalciferol (VITAMIN D3) 5000 units TABS Take by mouth      Cyanocobalamin (VITAMIN B-12) 1000 MCG extended release tablet Take 1,000 mcg by mouth daily       Allergies   Allergen Reactions    Bactrim [Sulfamethoxazole-Trimethoprim]        REVIEW OF SYSTEMS  10 systems reviewed, pertinent positives per HPI otherwise noted to be negative    PHYSICAL EXAM  /62   Pulse 105   Temp 97.6 °F (36.4 °C) (Oral)   Resp 18   LMP  (LMP Unknown)   SpO2 97%   GENERAL APPEARANCE: Awake and alert. Cooperative. HEAD: Normocephalic. Atraumatic. EYES: PERRL. EOM's grossly intact. ENT: Mucous membranes are moist.  Blood in bilateral nares. No active bleeding appreciated. Septal hematoma. NECK: Supple. HEART: RRR. No murmurs. EXTREMITIES: No peripheral edema. Moves all extremities equally. All extremities neurovascularly intact. SKIN: Warm and dry. No acute rashes. NEUROLOGICAL: Alert and oriented. CN's 2-12 intact. No gross facial drooping. Strength 5/5, sensation intact. PSYCHIATRIC: Normal mood and affect.     RADIOLOGY      LABS  Labs Reviewed   CBC - Abnormal; Notable for the following components:       Result Value    RBC 3.38 (*)     Hemoglobin 10.3 (*)     Hematocrit 30.9 (*)     RDW 17.1 (*)     Platelets 630 (*)     All other components within normal limits    Narrative:     Performed at:                  [id number],  [place], [address]   Phone [phone]   SAMPLE POSSIBLE BLOOD BANK TESTING       PROCEDURES  Unless otherwise noted below, none  Procedures      MDM  MDM  62-year-old female current breast cancer receiving chemotherapy, history of PE currently on anticoagulation, Eliquis presents to ED for evaluation of a nosebleed and concern for her hemoglobin and platelet count. Arrival to the emergency department patient's nosebleed has resolved on its own. Visualized with no active bleeding and there appreciated. No intervention or service was provided in regards to hemostasis or epistasis management. Did obtain a CBC to evaluate for patient's hemoglobin platelet count which was returned as 10.3 which is one-point above patient's prior hemoglobin readings. Platelets 5 8. Discussed with patient test results and she requested that she be discharged home. Do believe this is reasonable given her lack of other complaints. Recommended following up with primary care as well as heme-onc for further evaluation management. DISPOSITION  Patient was discharged to home in good condition    CLINICAL IMPRESSION  1. Epistaxis    2.  Encounter for laboratory test           Zhou Sprague PA-C  11/03/21 5821

## 2021-11-05 ENCOUNTER — TELEPHONE (OUTPATIENT)
Dept: FAMILY MEDICINE CLINIC | Age: 76
End: 2021-11-05

## 2021-11-05 NOTE — TELEPHONE ENCOUNTER
Contacted pt and she is really concerned about her weight gain per the heart dr from the hospital. She stated that she has fluid around her heart and if she painter 3-5 lbs to go to the ED. Pt is unclear on when she should go due to gaining 2 lbs today. She was started on Metoprolol 12.5 mg QD, and also Furosemide 40 mg. Could you please advise?

## 2021-11-05 NOTE — TELEPHONE ENCOUNTER
Received call from patient's daughter stating that mother was recently in hospital and was told that Pt has fluid around her heart. Pt was told that if she gains 3-5 lbs from fluid that she might need to have her medications adjusted. Pt's daughter Kristopher Shanks is requesting a call to her mother directly.      Please advise

## 2021-11-05 NOTE — TELEPHONE ENCOUNTER
Does she have a cardiologist she is following up with? Recommend following up with either cardiology or your oncologist about how they would like to manage this.

## 2021-11-08 NOTE — PROGRESS NOTES
[facility]  [phone]      Attending Physician: TORIN Lopez  Reason for Consultation/Chief Complaint:   Follow-up for LV systolic heart failure, cardiomyopathy    Subjective     Cindy Way is a 78 y.o. female with a history of LV systolic heart failure, LVH, breast cancer, anemia of chronic disease, and recently diagnosed bilateral pulmonary emboli who presents for follow-up. The patient has had multiple recent admissions for bilateral pulmonary emboli and acute heart failure. A TTE from 10/22/21 showed an LVEF of 25-30% with global hypokinesis and regional variation, normal RV size and systolic function, mild-moderate mitral regurgitation, and mild pulmonic regurgitation. Gweneth La Joya was also a right atrial echodensity noted that may have been due to artifact, but given her recent PE and known underlying malignancy thrombus or mass could not be definitely excluded and there were plans to obtain an outpatient cardiac MRI for additional evaluation. Further ischemic evaluation had been deferred until she had had additional time to recover from her acute pulmonary emboli. Since her most recent discharge, the patient reports that she continues to feel weak and still has shortness of breath at times. Her chest pain has improved. She denies any lower extremity edema or recent weight gain. She says that she has been compliant with her Eliquis and other medications. Past Medical History:   has a past medical history of Breast cancer ([address] Cancer ([address] and Kidney stone. Surgical History:   has a past surgical history that includes [address] LOC DEVICE 1ST LESION RIGHT (Right, 5/7/2021); US BREAST BIOPSY W LOC DEVICE EACH ADDL LESION RIGHT (Right, 5/7/2021); US BREAST BIOPSY W LOC DEVICE 1ST LESION LEFT (Left, 5/7/2021);  Colonoscopy; Port Surgery (N/A, 5/25/2021); MRI BIOPSY BREAST W LOC DEVICE RIGHT 1ST LESION (Right, 5/28/2021); and MRI BIOPSY BREAST W LOC DEVICE RIGHT EACH ADDL (Right, 5/28/2021). Social History:   reports that she is a non-smoker but has been exposed to tobacco smoke. She has never used smokeless tobacco. She reports that she does not drink alcohol and does not use drugs. Family History:  family history includes Cancer (age of onset: 76) in her paternal grandmother; Diabetes in her father; High Cholesterol in her mother. Home Medications:  Were reviewed and are listed in nursing record and/or below  Prior to Admission medications    Medication Sig Start Date End Date Taking? Authorizing Provider   metoprolol succinate (TOPROL XL) 25 MG extended release tablet Take 0.5 tablets by mouth daily 11/3/21  Yes Mary Muhammad MD   furosemide (LASIX) 40 MG tablet Take 1 tablet by mouth daily 11/3/21  Yes Mary Muhammad MD   apixaban starter pack (ELIQUIS DVT/PE STARTER PACK) 5 MG TBPK tablet Take 1 tablet by mouth See Admin Instructions 10/25/21  Yes Uche Ruiz MD   lisinopril (PRINIVIL;ZESTRIL) 2.5 MG tablet Take 1 tablet by mouth daily 10/26/21  Yes Uche Ruiz MD   docusate sodium (COLACE) 100 MG capsule Take 100 mg by mouth every 8 hours as needed for Constipation   Yes Historical Provider, MD   ondansetron (ZOFRAN) 8 MG tablet Take 1 tablet by mouth 6/21/21  Yes Historical Provider, MD   melatonin (RA MELATONIN) 3 MG TABS tablet Take 1 tablet by mouth nightly as needed (sleep) 5/30/21  Yes Sherly Drake, APRN - CNP   aspirin 81 MG EC tablet Take 81 mg by mouth daily   Yes Historical Provider, MD   Cholecalciferol (VITAMIN D3) 5000 units TABS Take by mouth   Yes Historical Provider, MD   Cyanocobalamin (VITAMIN B-12) 1000 MCG extended release tablet Take 1,000 mcg by mouth daily   Yes Historical Provider, MD        CURRENT Medications:  No current facility-administered medications for this visit.       Allergies:  Bactrim [sulfamethoxazole-trimethoprim]     Review of Systems:   Review of Systems Constitutional: Positive for fatigue. Negative for chills and fever. HENT: Negative for congestion, rhinorrhea and sore throat. Eyes: Negative for photophobia, pain and visual disturbance. Respiratory: Positive for shortness of breath. Negative for cough. Cardiovascular: Negative for chest pain and leg swelling. Gastrointestinal: Negative for abdominal pain, blood in stool, constipation, diarrhea, nausea and vomiting. Endocrine: Negative for cold intolerance and heat intolerance. Genitourinary: Negative for difficulty urinating, dysuria and hematuria. Musculoskeletal: Negative for arthralgias, joint swelling and myalgias. Skin: Negative for rash and wound. Allergic/Immunologic: Negative for environmental allergies and food allergies. Neurological: Positive for weakness. Negative for dizziness, syncope and light-headedness. Hematological: Negative for adenopathy. Does not bruise/bleed easily. Psychiatric/Behavioral: Negative for dysphoric mood. The patient is nervous/anxious. Objective   PHYSICAL EXAM:    Vitals:    11/09/21 0958   BP: (!) 106/48   Pulse: 107   SpO2: 97% on room air    Weight: 122 lb 8 oz (55.6 kg)       General: Cachectic adult female in no acute distress. HEENT: Normocephalic, atraumatic, non-icteric, hearing intact, nares normal, mucous membranes moist.  Neck: No JVD. Heart: Regular rate and rhythm. Normal S1 and S2. No murmurs, gallops or rubs. Chest: Port noted on right. Lungs: Normal respiratory effort. Clear to auscultation bilaterally. No wheezes, rales, or rhonchi. Abdomen: Soft, non-tender. Normoactive bowel sounds. No masses or organomegaly. Skin: No rashes, wounds, or lesions. Pulses: 2+ and symmetric. Extremities: No clubbing, cyanosis, or edema. Psych: Normal mood and affect. Neuro: Alert and oriented to person, place, and time. No focal deficits noted.       Labs   CBC:   Lab Results   Component Value Date    WBC 6.7 11/03/2021    RBC 3.38 11/03/2021    HGB 10.3 11/03/2021    HCT 30.9 11/03/2021    MCV 91.3 11/03/2021    RDW 17.1 11/03/2021     11/03/2021     CMP:  Lab Results   Component Value Date     11/02/2021    K 4.3 11/02/2021    K 3.5 10/22/2021    CL 90 11/02/2021    CO2 31 11/02/2021    BUN 30 11/02/2021    CREATININE 0.7 11/02/2021    GFRAA >60 11/02/2021    GFRAA >60 07/10/2012    AGRATIO 0.9 10/27/2021    LABGLOM >60 11/02/2021    GLUCOSE 115 11/02/2021    PROT 6.3 10/27/2021    PROT 7.8 07/10/2012    CALCIUM 9.6 11/02/2021    BILITOT 0.3 10/27/2021    ALKPHOS 98 10/27/2021    AST 20 10/27/2021    ALT 27 10/27/2021     PT/INR:  No results found for: PTINR  HgBA1c:  Lab Results   Component Value Date    LABA1C 5.6 09/13/2018     Lab Results   Component Value Date    TROPONINI 0.01 10/27/2021         Cardiac Data     Echo:   TTE 5/26/21:  Summary   Limited echo for LVEF baseline prior to chemotherapy. Ectopy present. -- Left ventricular systolic function is low normal with a visually   estimated ejection fraction of 50%. EF estimated by Fay's method at 46   %. The left ventricle is normal in size with mild concentric hypertrophy. Mild apical hypokinesis. Indeterminate diastolic function. Systolic pulmonary artery pressure (SPAP) is normal and estimated at 19 mmHg   (right atrial pressure 3 mmHg). Frequent ectopy throughout exam.      TTE 8/16/21:  Conclusions   Summary   LV systolic function is normal with EF estimated at 55%. No regional wall motion abnormalities. Grade I diastolic dysfunction with normal LV filling pressure. Mild mitral regurgitation. Inadequate tricuspid regurgitation jet to estimate systolic artery pressure   (SPAP). TTE 10/23/21:  Conclusions   Summary   Normal left ventricle size and wall thickness. The left ventricular systolic function is severely reduced with an ejection   fraction of 25-30 %. EF by Fay's method estimated at 27%.    Severe global hypokinesis with regional variation. Left ventricular diastolic filling pressure are indeterminate. The right ventricle is normal in size and function. There is protruding echodensity from anterior RA wall seen in multiple   views, possibly artifact, but given patient's clinical history,   mass/thrombus cannot be excluded. Mild to moderate mitral regurgitation. Mild pulmonic and tricuspid regurgitation. Systolic pulmonary artery pressure (SPAP) is normal and estimated at 35 mmHg   (right atrial pressure 3 mmHg). There is a large left pleural effusion      Stress Test: N/A  Treadmill GXT 3/30/10:  Negative for evidence of ischemia. Cath: N/A    Other Studies:   CTPA 10/22/21:  1. multiple acute bilateral pulmonary emboli. 2. Nonspecific multifocal bilateral ground-glass opacification. In the   setting of pulmonary emboli, the differential includes pulmonary infarct with   or without superimposed atypical infectious versus inflammatory pneumonitis. 3. Mild CHF. Bilateral Venous duplex ultrasound 10/25/21:  Conclusions        Summary        Acute totally occluding deep vein thrombosis involving the left peroneal    veins.    No other evidence of deep vein or superficial vein thrombosis involving the    left lower extremity and the right common femoral vein. Chest X-ray 10/27/21: Worsening diffuse bilateral airspace disease predominantly within the   bilateral upper lobes. CTPA 10/27/21:   Within the limitations of the exam, no new or worsening pulmonary emboli   identified.  Redemonstration of pulmonary emboli involving bilateral upper   lobes, segmental/subsegmental pulmonary arteries.  Previously noted pulmonary   emboli involving right middle and right lower lobe subsegmental pulmonary   arteries are not definitely evident within the limitations.       Significantly worsened areas of consolidation and ground-glass opacity   involving lungs bilaterally, right worse than left with bilateral upper lobe   predominance, concerning for worsening pneumonia.       Bilateral pleural effusions, right worse than left, worsened. Assessment:     1. LV systolic heart failure/cardiomyopathy - Diagnosed with new cardiomyopathy during recent admission for bilateral PEs.  At that time, TTE showed LVEF of 25-30% from 55% on previous echo from 8/17/21.  Concern for possible cardiotoxicity from adriamycin or Herceptin bio similar. Cookie Mccurdy, has not had any recent formal ischemic evaluation. Currently appears clinically euvolemic. 2. DVT/Bilateral pulmonary emboli - On Eliquis. RV size and systolic function normal on TTE. 3. Right atrial echodensity - May represent artifact, but given recent PE and known underlying malignancy cannot definitively exclude thrombus or mass. Plan to obtain cardiac MRI for further evaluation, which may also help provide further insight into etiology of cardiomyopathy  4. H/o breast cancer - S/p 4 cycles of Adriamycin/Cytoxan and 7 of 12 planned cycles of Taxol with Herceptin bio similar and pertuzumab. Previous regimen has been discontinued due to concern for cardiotoxicity. Follows with Dr. Patrice Bob and was recently started on anastrozole. 5. Sinus tachycardia - Secondary to above. Plan:     1. Will obtain cardiac MRI to help further characterize right atrial echodensity seen on recent TTE. Study may also help provide further insight into etiology of cardiomyopathy. 2. Increase metoprolol succinate to 25 mg once daily. 3. Change lisinopril to 2.5 mg qHS. 4. OK to discontinue aspirin. 5. Continue Eliquis 5 mg BID and lasix 40 mg daily. 6. Discussed importance of 2L per day fluid restriction and low sodium diet. 7. She will continue to need close follow-up with oncology for further monitoring and treatment of her breast cancer.   8. Pending clinical course and overall prognosis from malignancy standpoint, can consider proceeding with formal ischemic evaluation once patient has had time to recover from her recent pulmonary emboli. 9. Follow-up with me in 3 months. Scribe's attestation: This note was scribed in the presence of Dr. Cottie Cockayne, DO   by Malu Almonte LPN      It is a pleasure to assist in the care of Iggy Pineda. Please call with any questions. The scribes documentation has been prepared under my direction and personally reviewed by me in its entirety. I confirm that the note above accurately reflects all work, treatment, procedures, and medical decision making performed by me. I, Dr. Cottie Cockayne, personally performed the services described in this documentation as scribed by Malu Almonte LPN in my presence, and it is both accurate and complete to the best of our ability. Thank you for allowing us to participate in the care of Iggy Pineda. Please call me with any questions [phone]. Cottie Cockayne, DO  [facility]  [phone] [facility]      I will address the patient's cardiac risk factors and adjusted pharmacologic treatment as needed. In addition, I have reinforced the need for patient directed risk factor modification. All questions and concerns were addressed to the patient/family. Alternatives to my treatment were discussed. The note was completed using EMR. Every effort was made to ensure accuracy; however, inadvertent computerized transcription errors may be present.

## 2021-11-09 ENCOUNTER — OFFICE VISIT (OUTPATIENT)
Dept: CARDIOLOGY CLINIC | Age: 76
End: 2021-11-09
Payer: MEDICARE

## 2021-11-09 VITALS
HEIGHT: 67 IN | WEIGHT: 122.5 LBS | SYSTOLIC BLOOD PRESSURE: 106 MMHG | HEART RATE: 107 BPM | OXYGEN SATURATION: 97 % | DIASTOLIC BLOOD PRESSURE: 48 MMHG | BODY MASS INDEX: 19.23 KG/M2

## 2021-11-09 DIAGNOSIS — I26.94 MULTIPLE SUBSEGMENTAL PULMONARY EMBOLI WITHOUT ACUTE COR PULMONALE (HCC): ICD-10-CM

## 2021-11-09 DIAGNOSIS — R00.0 SINUS TACHYCARDIA: ICD-10-CM

## 2021-11-09 DIAGNOSIS — I82.452 ACUTE DEEP VEIN THROMBOSIS (DVT) OF LEFT PERONEAL VEIN (HCC): ICD-10-CM

## 2021-11-09 DIAGNOSIS — I50.22 CHRONIC SYSTOLIC HEART FAILURE (HCC): Primary | ICD-10-CM

## 2021-11-09 DIAGNOSIS — I51.89 RIGHT ATRIAL MASS: ICD-10-CM

## 2021-11-09 DIAGNOSIS — Z85.3 HISTORY OF BREAST CANCER: ICD-10-CM

## 2021-11-09 DIAGNOSIS — I42.9 CARDIOMYOPATHY, UNSPECIFIED TYPE (HCC): ICD-10-CM

## 2021-11-09 PROCEDURE — 1111F DSCHRG MED/CURRENT MED MERGE: CPT | Performed by: INTERNAL MEDICINE

## 2021-11-09 PROCEDURE — 1090F PRES/ABSN URINE INCON ASSESS: CPT | Performed by: INTERNAL MEDICINE

## 2021-11-09 PROCEDURE — G8427 DOCREV CUR MEDS BY ELIG CLIN: HCPCS | Performed by: INTERNAL MEDICINE

## 2021-11-09 PROCEDURE — 99214 OFFICE O/P EST MOD 30 MIN: CPT | Performed by: INTERNAL MEDICINE

## 2021-11-09 PROCEDURE — G8484 FLU IMMUNIZE NO ADMIN: HCPCS | Performed by: INTERNAL MEDICINE

## 2021-11-09 PROCEDURE — 3017F COLORECTAL CA SCREEN DOC REV: CPT | Performed by: INTERNAL MEDICINE

## 2021-11-09 PROCEDURE — 1036F TOBACCO NON-USER: CPT | Performed by: INTERNAL MEDICINE

## 2021-11-09 PROCEDURE — G8400 PT W/DXA NO RESULTS DOC: HCPCS | Performed by: INTERNAL MEDICINE

## 2021-11-09 PROCEDURE — G8420 CALC BMI NORM PARAMETERS: HCPCS | Performed by: INTERNAL MEDICINE

## 2021-11-09 PROCEDURE — 1123F ACP DISCUSS/DSCN MKR DOCD: CPT | Performed by: INTERNAL MEDICINE

## 2021-11-09 PROCEDURE — 4040F PNEUMOC VAC/ADMIN/RCVD: CPT | Performed by: INTERNAL MEDICINE

## 2021-11-09 RX ORDER — METOPROLOL SUCCINATE 25 MG/1
25 TABLET, EXTENDED RELEASE ORAL DAILY
Qty: 90 TABLET | Refills: 1 | Status: SHIPPED | OUTPATIENT
Start: 2021-11-09 | End: 2021-12-06

## 2021-11-15 ENCOUNTER — OFFICE VISIT (OUTPATIENT)
Dept: FAMILY MEDICINE CLINIC | Age: 76
End: 2021-11-15
Payer: MEDICARE

## 2021-11-15 VITALS
HEIGHT: 67 IN | DIASTOLIC BLOOD PRESSURE: 60 MMHG | WEIGHT: 121 LBS | HEART RATE: 101 BPM | SYSTOLIC BLOOD PRESSURE: 98 MMHG | BODY MASS INDEX: 18.99 KG/M2 | OXYGEN SATURATION: 98 %

## 2021-11-15 DIAGNOSIS — F41.9 ANXIETY: ICD-10-CM

## 2021-11-15 DIAGNOSIS — Z23 NEED FOR VACCINATION: ICD-10-CM

## 2021-11-15 DIAGNOSIS — K59.00 CONSTIPATION, UNSPECIFIED CONSTIPATION TYPE: ICD-10-CM

## 2021-11-15 DIAGNOSIS — I50.9 CONGESTIVE HEART FAILURE, UNSPECIFIED HF CHRONICITY, UNSPECIFIED HEART FAILURE TYPE (HCC): ICD-10-CM

## 2021-11-15 DIAGNOSIS — Z09 HOSPITAL DISCHARGE FOLLOW-UP: Primary | ICD-10-CM

## 2021-11-15 PROCEDURE — 1111F DSCHRG MED/CURRENT MED MERGE: CPT | Performed by: NURSE PRACTITIONER

## 2021-11-15 PROCEDURE — 36415 COLL VENOUS BLD VENIPUNCTURE: CPT | Performed by: NURSE PRACTITIONER

## 2021-11-15 PROCEDURE — 4040F PNEUMOC VAC/ADMIN/RCVD: CPT | Performed by: NURSE PRACTITIONER

## 2021-11-15 PROCEDURE — G8484 FLU IMMUNIZE NO ADMIN: HCPCS | Performed by: NURSE PRACTITIONER

## 2021-11-15 PROCEDURE — G8427 DOCREV CUR MEDS BY ELIG CLIN: HCPCS | Performed by: NURSE PRACTITIONER

## 2021-11-15 PROCEDURE — G8420 CALC BMI NORM PARAMETERS: HCPCS | Performed by: NURSE PRACTITIONER

## 2021-11-15 PROCEDURE — 1090F PRES/ABSN URINE INCON ASSESS: CPT | Performed by: NURSE PRACTITIONER

## 2021-11-15 PROCEDURE — 99214 OFFICE O/P EST MOD 30 MIN: CPT | Performed by: NURSE PRACTITIONER

## 2021-11-15 PROCEDURE — 1123F ACP DISCUSS/DSCN MKR DOCD: CPT | Performed by: NURSE PRACTITIONER

## 2021-11-15 PROCEDURE — 3017F COLORECTAL CA SCREEN DOC REV: CPT | Performed by: NURSE PRACTITIONER

## 2021-11-15 PROCEDURE — G0008 ADMIN INFLUENZA VIRUS VAC: HCPCS | Performed by: NURSE PRACTITIONER

## 2021-11-15 PROCEDURE — G8400 PT W/DXA NO RESULTS DOC: HCPCS | Performed by: NURSE PRACTITIONER

## 2021-11-15 PROCEDURE — 1036F TOBACCO NON-USER: CPT | Performed by: NURSE PRACTITIONER

## 2021-11-15 PROCEDURE — 90694 VACC AIIV4 NO PRSRV 0.5ML IM: CPT | Performed by: NURSE PRACTITIONER

## 2021-11-15 RX ORDER — HYDROXYZINE HYDROCHLORIDE 10 MG/1
10 TABLET, FILM COATED ORAL 3 TIMES DAILY PRN
Qty: 90 TABLET | Refills: 1 | Status: SHIPPED | OUTPATIENT
Start: 2021-11-15 | End: 2021-11-25

## 2021-11-15 RX ORDER — ANASTROZOLE 1 MG/1
1 TABLET ORAL
COMMUNITY
Start: 2021-11-08

## 2021-11-15 RX ORDER — LOPERAMIDE HYDROCHLORIDE 2 MG/1
1 CAPSULE ORAL
COMMUNITY
Start: 2021-09-07 | End: 2021-11-15 | Stop reason: ALTCHOICE

## 2021-11-15 RX ORDER — POLYETHYLENE GLYCOL 3350 17 G/17G
17 POWDER, FOR SOLUTION ORAL DAILY
Qty: 1530 G | Refills: 1 | Status: SHIPPED | OUTPATIENT
Start: 2021-11-15 | End: 2021-12-15

## 2021-11-15 ASSESSMENT — ENCOUNTER SYMPTOMS
WHEEZING: 0
GASTROINTESTINAL NEGATIVE: 1
SHORTNESS OF BREATH: 1
COUGH: 0

## 2021-11-15 NOTE — PROGRESS NOTES
Post-Discharge Transitional Care Management Services or Hospital Follow Up      Damaris Caballero   YOB: 1945    Date of Office Visit:  11/15/2021  Date of Hospital Admission: 11/3/21  Date of Hospital Discharge: 11/3/21  Readmission Risk Score(high >=14%.  Medium >=10%):Readmission Risk Score: 16.8      Care management risk score Rising risk (score 2-5) and Complex Care (Scores >=6): 1     Non face to face  following discharge, date last encounter closed (first attempt may have been earlier): *No documented post hospital discharge outreach found in the last 14 days *No documented post hospital discharge outreach found in the last 14 days    Call initiated 2 business days of discharge: *No response recorded in the last 14 days     Patient Active Problem List   Diagnosis    Perforation of sigmoid colon due to diverticulitis    Anemia    Pure hypercholesterolemia    Malignant neoplasm of right breast in female, estrogen receptor positive ([address]    Malignant neoplasm of upper-inner quadrant of left breast in female, estrogen receptor positive ([address]    Chemotherapy-induced neutropenia ([address]    Fatigue    Tachycardia    Neutropenia, drug-induced ([address]    Severe malnutrition ([address]    Neutropenia ([address]    Failure to thrive in adult    Recurrent falls    Bacteriuria    Acute pulmonary embolism without acute cor pulmonale ([address]    UTI (urinary tract infection)    CHF (congestive heart failure) ([address]    CHF (congestive heart failure), NYHA class I, acute on chronic, combined ([address]       Allergies   Allergen Reactions    Bactrim [Sulfamethoxazole-Trimethoprim]        Medications listed as ordered at the time of discharge from hospital       Medications marked \"taking\" at this time  Outpatient Medications Marked as Taking for the 11/15/21 encounter (Office Visit) with SOUTH Hanna CNP   Medication Sig Dispense Refill    calcium carbonate-vitamin D (CALTRATE) 600-400 MG-UNIT TABS per tab Take by mouth      anastrozole (ARIMIDEX) 1 MG tablet Take 1 mg by mouth      hydrOXYzine (ATARAX) 10 MG tablet Take 1 tablet by mouth 3 times daily as needed for Anxiety 90 tablet 1    polyethylene glycol (GLYCOLAX) 17 GM/SCOOP powder Take 17 g by mouth daily 1530 g 1    metoprolol succinate (TOPROL XL) 25 MG extended release tablet Take 1 tablet by mouth daily 90 tablet 1    furosemide (LASIX) 40 MG tablet Take 1 tablet by mouth daily 60 tablet 3    apixaban starter pack (ELIQUIS DVT/PE STARTER PACK) 5 MG TBPK tablet Take 1 tablet by mouth See Admin Instructions 74 tablet 0    lisinopril (PRINIVIL;ZESTRIL) 2.5 MG tablet Take 1 tablet by mouth daily 30 tablet 0    ondansetron (ZOFRAN) 8 MG tablet Take 1 tablet by mouth      melatonin (RA MELATONIN) 3 MG TABS tablet Take 1 tablet by mouth nightly as needed (sleep) 20 tablet 0    Cholecalciferol (VITAMIN D3) 5000 units TABS Take by mouth          Medications patient taking as of now reconciled against medications ordered at time of hospital discharge: Yes    Chief Complaint   Patient presents with    Follow-Up from Hospital       HPI   Patient was seen and admitted for CHF. She states that is stable but will need an MRI from cardiology. She needs blood work today to make sure kidney function is stable. She has been having some constipation. She states she will be starting her Miralax daily. She did have to do a fleets enema and that helped. She has also been having some anxiety. She was given hydroxyzine in the hospital and that helpe. She justed started Arimidex and she states she has been feeling ill since then. She follows with Dr. Mare Easton with Hematology. Had colonoscopy done at [address] [place] [address] October of 2018. Inpatient course: Discharge summary reviewed- see chart. Interval history/Current status: stable. Review of Systems   Constitutional: Positive for fatigue. HENT: Negative.     Respiratory: Positive for shortness of breath (short Medical Decision Making: moderate complexity

## 2021-11-15 NOTE — PATIENT INSTRUCTIONS
We typically use Gabapentin or Lyrica to help with the neuropathy. Talk with Dr. Chapito Johnson. Take Hydroxyzine as needed up to 3 times a day for anxiety. Can take prior to MRI. Can take 2 prior to MRI if needed.

## 2021-11-16 LAB
ANION GAP SERPL CALCULATED.3IONS-SCNC: 16 MMOL/L (ref 3–16)
BUN BLDV-MCNC: 18 MG/DL (ref 7–20)
CALCIUM SERPL-MCNC: 9.8 MG/DL (ref 8.3–10.6)
CHLORIDE BLD-SCNC: 96 MMOL/L (ref 99–110)
CO2: 26 MMOL/L (ref 21–32)
CREAT SERPL-MCNC: 0.7 MG/DL (ref 0.6–1.2)
GFR AFRICAN AMERICAN: >60
GFR NON-AFRICAN AMERICAN: >60
GLUCOSE BLD-MCNC: 93 MG/DL (ref 70–99)
POTASSIUM SERPL-SCNC: 4 MMOL/L (ref 3.5–5.1)
SODIUM BLD-SCNC: 138 MMOL/L (ref 136–145)

## 2021-11-17 ENCOUNTER — APPOINTMENT (OUTPATIENT)
Dept: GENERAL RADIOLOGY | Age: 76
DRG: 286 | End: 2021-11-17
Payer: MEDICARE

## 2021-11-17 ENCOUNTER — TELEPHONE (OUTPATIENT)
Dept: OTHER | Facility: CLINIC | Age: 76
End: 2021-11-17

## 2021-11-17 ENCOUNTER — HOSPITAL ENCOUNTER (INPATIENT)
Age: 76
LOS: 4 days | Discharge: HOME | DRG: 286 | End: 2021-11-21
Attending: INTERNAL MEDICINE | Admitting: INTERNAL MEDICINE
Payer: MEDICARE

## 2021-11-17 DIAGNOSIS — R07.9 CHEST PAIN, UNSPECIFIED TYPE: Primary | ICD-10-CM

## 2021-11-17 PROBLEM — I25.119 CHEST PAIN DUE TO CAD (HCC): Status: ACTIVE | Noted: 2021-11-17

## 2021-11-17 LAB
A/G RATIO: 1.1 (ref 1.1–2.2)
ALBUMIN SERPL-MCNC: 3.5 G/DL (ref 3.4–5)
ALP BLD-CCNC: 102 U/L (ref 40–129)
ALT SERPL-CCNC: 16 U/L (ref 10–40)
ANION GAP SERPL CALCULATED.3IONS-SCNC: 14 MMOL/L (ref 3–16)
AST SERPL-CCNC: 15 U/L (ref 15–37)
BASOPHILS ABSOLUTE: 0.1 K/UL (ref 0–0.2)
BASOPHILS RELATIVE PERCENT: 1.2 %
BILIRUB SERPL-MCNC: 0.4 MG/DL (ref 0–1)
BUN BLDV-MCNC: 17 MG/DL (ref 7–20)
CALCIUM SERPL-MCNC: 9.3 MG/DL (ref 8.3–10.6)
CHLORIDE BLD-SCNC: 102 MMOL/L (ref 99–110)
CO2: 24 MMOL/L (ref 21–32)
CREAT SERPL-MCNC: 0.7 MG/DL (ref 0.6–1.2)
EKG ATRIAL RATE: 83 BPM
EKG DIAGNOSIS: NORMAL
EKG P AXIS: 73 DEGREES
EKG P-R INTERVAL: 144 MS
EKG Q-T INTERVAL: 430 MS
EKG QRS DURATION: 74 MS
EKG QTC CALCULATION (BAZETT): 505 MS
EKG R AXIS: -19 DEGREES
EKG T AXIS: 52 DEGREES
EKG VENTRICULAR RATE: 83 BPM
EOSINOPHILS ABSOLUTE: 0.2 K/UL (ref 0–0.6)
EOSINOPHILS RELATIVE PERCENT: 3.7 %
GFR AFRICAN AMERICAN: >60
GFR NON-AFRICAN AMERICAN: >60
GLUCOSE BLD-MCNC: 94 MG/DL (ref 70–99)
HCT VFR BLD CALC: 29.6 % (ref 36–48)
HEMOGLOBIN: 9.9 G/DL (ref 12–16)
LYMPHOCYTES ABSOLUTE: 1 K/UL (ref 1–5.1)
LYMPHOCYTES RELATIVE PERCENT: 21.4 %
MCH RBC QN AUTO: 30.4 PG (ref 26–34)
MCHC RBC AUTO-ENTMCNC: 33.5 G/DL (ref 31–36)
MCV RBC AUTO: 90.8 FL (ref 80–100)
MONOCYTES ABSOLUTE: 0.5 K/UL (ref 0–1.3)
MONOCYTES RELATIVE PERCENT: 10.7 %
NEUTROPHILS ABSOLUTE: 2.8 K/UL (ref 1.7–7.7)
NEUTROPHILS RELATIVE PERCENT: 63 %
PDW BLD-RTO: 17.1 % (ref 12.4–15.4)
PLATELET # BLD: 323 K/UL (ref 135–450)
PMV BLD AUTO: 7 FL (ref 5–10.5)
POTASSIUM REFLEX MAGNESIUM: 3.6 MMOL/L (ref 3.5–5.1)
RBC # BLD: 3.26 M/UL (ref 4–5.2)
SODIUM BLD-SCNC: 140 MMOL/L (ref 136–145)
TOTAL PROTEIN: 6.7 G/DL (ref 6.4–8.2)
TROPONIN: <0.01 NG/ML
TROPONIN: <0.01 NG/ML
WBC # BLD: 4.5 K/UL (ref 4–11)

## 2021-11-17 PROCEDURE — 6370000000 HC RX 637 (ALT 250 FOR IP): Performed by: INTERNAL MEDICINE

## 2021-11-17 PROCEDURE — 85025 COMPLETE CBC W/AUTO DIFF WBC: CPT

## 2021-11-17 PROCEDURE — 84484 ASSAY OF TROPONIN QUANT: CPT

## 2021-11-17 PROCEDURE — 2580000003 HC RX 258: Performed by: INTERNAL MEDICINE

## 2021-11-17 PROCEDURE — 1200000000 HC SEMI PRIVATE

## 2021-11-17 PROCEDURE — 93010 ELECTROCARDIOGRAM REPORT: CPT | Performed by: INTERNAL MEDICINE

## 2021-11-17 PROCEDURE — 80053 COMPREHEN METABOLIC PANEL: CPT

## 2021-11-17 PROCEDURE — 6360000002 HC RX W HCPCS: Performed by: EMERGENCY MEDICINE

## 2021-11-17 PROCEDURE — 96374 THER/PROPH/DIAG INJ IV PUSH: CPT

## 2021-11-17 PROCEDURE — 71045 X-RAY EXAM CHEST 1 VIEW: CPT

## 2021-11-17 PROCEDURE — 99285 EMERGENCY DEPT VISIT HI MDM: CPT

## 2021-11-17 PROCEDURE — 93005 ELECTROCARDIOGRAM TRACING: CPT | Performed by: INTERNAL MEDICINE

## 2021-11-17 PROCEDURE — 6370000000 HC RX 637 (ALT 250 FOR IP): Performed by: PHYSICIAN ASSISTANT

## 2021-11-17 RX ORDER — HYDROXYZINE HYDROCHLORIDE 10 MG/1
10 TABLET, FILM COATED ORAL 3 TIMES DAILY PRN
Status: DISCONTINUED | OUTPATIENT
Start: 2021-11-17 | End: 2021-11-21 | Stop reason: HOSPADM

## 2021-11-17 RX ORDER — POLYETHYLENE GLYCOL 3350 17 G/17G
17 POWDER, FOR SOLUTION ORAL DAILY
Status: DISCONTINUED | OUTPATIENT
Start: 2021-11-17 | End: 2021-11-20

## 2021-11-17 RX ORDER — LORAZEPAM 2 MG/ML
0.5 INJECTION INTRAMUSCULAR ONCE
Status: COMPLETED | OUTPATIENT
Start: 2021-11-17 | End: 2021-11-17

## 2021-11-17 RX ORDER — LANOLIN ALCOHOL/MO/W.PET/CERES
3 CREAM (GRAM) TOPICAL NIGHTLY PRN
Status: DISCONTINUED | OUTPATIENT
Start: 2021-11-17 | End: 2021-11-21 | Stop reason: HOSPADM

## 2021-11-17 RX ORDER — POLYETHYLENE GLYCOL 3350 17 G/17G
17 POWDER, FOR SOLUTION ORAL DAILY
Status: DISCONTINUED | OUTPATIENT
Start: 2021-11-17 | End: 2021-11-21 | Stop reason: HOSPADM

## 2021-11-17 RX ORDER — ANASTROZOLE 1 MG/1
1 TABLET ORAL DAILY
Status: DISCONTINUED | OUTPATIENT
Start: 2021-11-17 | End: 2021-11-21 | Stop reason: HOSPADM

## 2021-11-17 RX ORDER — FUROSEMIDE 40 MG/1
40 TABLET ORAL DAILY
Status: DISCONTINUED | OUTPATIENT
Start: 2021-11-17 | End: 2021-11-18

## 2021-11-17 RX ORDER — HYDROXYZINE PAMOATE 25 MG/1
25 CAPSULE ORAL ONCE
Status: COMPLETED | OUTPATIENT
Start: 2021-11-17 | End: 2021-11-17

## 2021-11-17 RX ORDER — METOPROLOL SUCCINATE 50 MG/1
25 TABLET, EXTENDED RELEASE ORAL DAILY
Status: DISCONTINUED | OUTPATIENT
Start: 2021-11-17 | End: 2021-11-18

## 2021-11-17 RX ORDER — SODIUM CHLORIDE 9 MG/ML
25 INJECTION, SOLUTION INTRAVENOUS PRN
Status: DISCONTINUED | OUTPATIENT
Start: 2021-11-17 | End: 2021-11-21 | Stop reason: HOSPADM

## 2021-11-17 RX ORDER — PROMETHAZINE HYDROCHLORIDE 25 MG/1
25 TABLET ORAL EVERY 6 HOURS PRN
Status: DISCONTINUED | OUTPATIENT
Start: 2021-11-17 | End: 2021-11-21 | Stop reason: HOSPADM

## 2021-11-17 RX ORDER — ACETAMINOPHEN 325 MG/1
650 TABLET ORAL EVERY 6 HOURS PRN
Status: DISCONTINUED | OUTPATIENT
Start: 2021-11-17 | End: 2021-11-21 | Stop reason: HOSPADM

## 2021-11-17 RX ORDER — SODIUM CHLORIDE 0.9 % (FLUSH) 0.9 %
5-40 SYRINGE (ML) INJECTION EVERY 12 HOURS SCHEDULED
Status: DISCONTINUED | OUTPATIENT
Start: 2021-11-17 | End: 2021-11-20

## 2021-11-17 RX ORDER — CALCIUM CARBONATE-CHOLECALCIFEROL TAB 250 MG-125 UNIT 250-125 MG-UNIT
500 TAB ORAL DAILY
Status: DISCONTINUED | OUTPATIENT
Start: 2021-11-17 | End: 2021-11-21 | Stop reason: HOSPADM

## 2021-11-17 RX ORDER — NITROGLYCERIN 0.4 MG/1
0.4 TABLET SUBLINGUAL EVERY 5 MIN PRN
Status: DISCONTINUED | OUTPATIENT
Start: 2021-11-17 | End: 2021-11-21 | Stop reason: HOSPADM

## 2021-11-17 RX ORDER — SODIUM CHLORIDE 0.9 % (FLUSH) 0.9 %
5-40 SYRINGE (ML) INJECTION PRN
Status: DISCONTINUED | OUTPATIENT
Start: 2021-11-17 | End: 2021-11-21 | Stop reason: HOSPADM

## 2021-11-17 RX ORDER — LISINOPRIL 5 MG/1
2.5 TABLET ORAL DAILY
Status: DISCONTINUED | OUTPATIENT
Start: 2021-11-17 | End: 2021-11-18

## 2021-11-17 RX ORDER — ACETAMINOPHEN 650 MG/1
650 SUPPOSITORY RECTAL EVERY 6 HOURS PRN
Status: DISCONTINUED | OUTPATIENT
Start: 2021-11-17 | End: 2021-11-21 | Stop reason: HOSPADM

## 2021-11-17 RX ORDER — POLYETHYLENE GLYCOL 3350 17 G/17G
17 POWDER, FOR SOLUTION ORAL DAILY PRN
Status: DISCONTINUED | OUTPATIENT
Start: 2021-11-17 | End: 2021-11-21 | Stop reason: HOSPADM

## 2021-11-17 RX ADMIN — METOPROLOL SUCCINATE 25 MG: 50 TABLET, EXTENDED RELEASE ORAL at 16:11

## 2021-11-17 RX ADMIN — LORAZEPAM 0.5 MG: 2 INJECTION INTRAMUSCULAR; INTRAVENOUS at 10:54

## 2021-11-17 RX ADMIN — HYDROXYZINE PAMOATE 25 MG: 25 CAPSULE ORAL at 09:16

## 2021-11-17 RX ADMIN — ANASTROZOLE 1 MG: 1 TABLET ORAL at 16:11

## 2021-11-17 RX ADMIN — HYDROXYZINE HYDROCHLORIDE 10 MG: 10 TABLET ORAL at 23:12

## 2021-11-17 RX ADMIN — LISINOPRIL 2.5 MG: 5 TABLET ORAL at 16:11

## 2021-11-17 RX ADMIN — APIXABAN 10 MG: 5 TABLET, FILM COATED ORAL at 20:50

## 2021-11-17 RX ADMIN — FUROSEMIDE 40 MG: 40 TABLET ORAL at 16:11

## 2021-11-17 RX ADMIN — Medication 10 ML: at 20:51

## 2021-11-17 RX ADMIN — POLYETHYLENE GLYCOL 3350 17 G: 17 POWDER, FOR SOLUTION ORAL at 16:11

## 2021-11-17 RX ADMIN — Medication 3 MG: at 20:50

## 2021-11-17 SDOH — ECONOMIC STABILITY: FOOD INSECURITY: WITHIN THE PAST 12 MONTHS, THE FOOD YOU BOUGHT JUST DIDN'T LAST AND YOU DIDN'T HAVE MONEY TO GET MORE.: NEVER TRUE

## 2021-11-17 SDOH — SOCIAL STABILITY: SOCIAL NETWORK: HOW OFTEN DO YOU GET TOGETHER WITH FRIENDS OR RELATIVES?: MORE THAN THREE TIMES A WEEK

## 2021-11-17 SDOH — ECONOMIC STABILITY: HOUSING INSECURITY: IN THE LAST 12 MONTHS, HOW MANY PLACES HAVE YOU LIVED?: 1

## 2021-11-17 SDOH — SOCIAL STABILITY: SOCIAL INSECURITY
WITHIN THE LAST YEAR, HAVE YOU BEEN KICKED, HIT, SLAPPED, OR OTHERWISE PHYSICALLY HURT BY YOUR PARTNER OR EX-PARTNER?: NO

## 2021-11-17 SDOH — HEALTH STABILITY: PHYSICAL HEALTH: ON AVERAGE, HOW MANY MINUTES DO YOU ENGAGE IN EXERCISE AT THIS LEVEL?: 10 MIN

## 2021-11-17 SDOH — SOCIAL STABILITY: SOCIAL NETWORK: HOW OFTEN DO YOU ATTENT MEETINGS OF THE CLUB OR ORGANIZATION YOU BELONG TO?: NEVER

## 2021-11-17 SDOH — SOCIAL STABILITY: SOCIAL NETWORK: ARE YOU MARRIED, WIDOWED, DIVORCED, SEPARATED, NEVER MARRIED, OR LIVING WITH A PARTNER?: WIDOWED

## 2021-11-17 SDOH — ECONOMIC STABILITY: INCOME INSECURITY: IN THE LAST 12 MONTHS, WAS THERE A TIME WHEN YOU WERE NOT ABLE TO PAY THE MORTGAGE OR RENT ON TIME?: NO

## 2021-11-17 SDOH — ECONOMIC STABILITY: HOUSING INSECURITY
IN THE LAST 12 MONTHS, WAS THERE A TIME WHEN YOU DID NOT HAVE A STEADY PLACE TO SLEEP OR SLEPT IN A SHELTER (INCLUDING NOW)?: NO

## 2021-11-17 SDOH — ECONOMIC STABILITY: INCOME INSECURITY: HOW HARD IS IT FOR YOU TO PAY FOR THE VERY BASICS LIKE FOOD, HOUSING, MEDICAL CARE, AND HEATING?: NOT VERY HARD

## 2021-11-17 SDOH — ECONOMIC STABILITY: TRANSPORTATION INSECURITY
IN THE PAST 12 MONTHS, HAS LACK OF TRANSPORTATION KEPT YOU FROM MEETINGS, WORK, OR FROM GETTING THINGS NEEDED FOR DAILY LIVING?: NO

## 2021-11-17 SDOH — SOCIAL STABILITY: SOCIAL INSECURITY
WITHIN THE LAST YEAR, HAVE TO BEEN RAPED OR FORCED TO HAVE ANY KIND OF SEXUAL ACTIVITY BY YOUR PARTNER OR EX-PARTNER?: NO

## 2021-11-17 SDOH — HEALTH STABILITY: MENTAL HEALTH: HOW OFTEN DO YOU HAVE A DRINK CONTAINING ALCOHOL?: NEVER

## 2021-11-17 SDOH — SOCIAL STABILITY: SOCIAL INSECURITY: WITHIN THE LAST YEAR, HAVE YOU BEEN AFRAID OF YOUR PARTNER OR EX-PARTNER?: NO

## 2021-11-17 SDOH — SOCIAL STABILITY: SOCIAL NETWORK
IN A TYPICAL WEEK, HOW MANY TIMES DO YOU TALK ON THE PHONE WITH FAMILY, FRIENDS, OR NEIGHBORS?: MORE THAN THREE TIMES A WEEK

## 2021-11-17 SDOH — SOCIAL STABILITY: SOCIAL INSECURITY: WITHIN THE LAST YEAR, HAVE YOU BEEN HUMILIATED OR EMOTIONALLY ABUSED IN OTHER WAYS BY YOUR PARTNER OR EX-PARTNER?: NO

## 2021-11-17 SDOH — HEALTH STABILITY: MENTAL HEALTH
STRESS IS WHEN SOMEONE FEELS TENSE, NERVOUS, ANXIOUS, OR CAN'T SLEEP AT NIGHT BECAUSE THEIR MIND IS TROUBLED. HOW STRESSED ARE YOU?: RATHER MUCH

## 2021-11-17 SDOH — ECONOMIC STABILITY: FOOD INSECURITY: WITHIN THE PAST 12 MONTHS, YOU WORRIED THAT YOUR FOOD WOULD RUN OUT BEFORE YOU GOT MONEY TO BUY MORE.: NEVER TRUE

## 2021-11-17 SDOH — HEALTH STABILITY: MENTAL HEALTH: HOW MANY STANDARD DRINKS CONTAINING ALCOHOL DO YOU HAVE ON A TYPICAL DAY?: PATIENT DECLINED

## 2021-11-17 SDOH — SOCIAL STABILITY: SOCIAL NETWORK
DO YOU BELONG TO ANY CLUBS OR ORGANIZATIONS SUCH AS CHURCH GROUPS UNIONS, FRATERNAL OR ATHLETIC GROUPS, OR SCHOOL GROUPS?: NO

## 2021-11-17 SDOH — SOCIAL STABILITY: SOCIAL NETWORK: HOW OFTEN DO YOU ATTEND CHURCH OR RELIGIOUS SERVICES?: 1 TO 4 TIMES PER YEAR

## 2021-11-17 SDOH — ECONOMIC STABILITY: TRANSPORTATION INSECURITY
IN THE PAST 12 MONTHS, HAS THE LACK OF TRANSPORTATION KEPT YOU FROM MEDICAL APPOINTMENTS OR FROM GETTING MEDICATIONS?: NO

## 2021-11-17 ASSESSMENT — ENCOUNTER SYMPTOMS
CHEST TIGHTNESS: 1
SHORTNESS OF BREATH: 0
VOMITING: 0
COUGH: 0
NAUSEA: 0
ABDOMINAL PAIN: 0
DIARRHEA: 0
BACK PAIN: 0

## 2021-11-17 ASSESSMENT — PAIN SCALES - GENERAL
PAINLEVEL_OUTOF10: 5
PAINLEVEL_OUTOF10: 5
PAINLEVEL_OUTOF10: 0
PAINLEVEL_OUTOF10: 6

## 2021-11-17 ASSESSMENT — PAIN DESCRIPTION - LOCATION
LOCATION: HAND;LEG
LOCATION: CHEST

## 2021-11-17 ASSESSMENT — PAIN DESCRIPTION - PAIN TYPE
TYPE: OTHER (COMMENT)
TYPE: ACUTE PAIN

## 2021-11-17 ASSESSMENT — HEART SCORE: ECG: 0

## 2021-11-17 ASSESSMENT — PAIN DESCRIPTION - DESCRIPTORS: DESCRIPTORS: PRESSURE

## 2021-11-17 NOTE — DISCHARGE INSTR - COC
Continuity of Care Form    Patient Name: Lamin Palma   :  1945  MRN:  [id number]    Admit date:  2021  Discharge date:  21    Code Status Order: Prior   Advance Directives:      Admitting Physician:  Selina Acevedo MD  PCP: SOU[facility] Smith - CNP    Discharging Nurse: Red Dodson Unit/Room#:   Discharging Unit Phone Number: [phone]    Emergency Contact:   Extended Emergency Contact Information  Primary Emergency Contact: [place] [address] Phone: [phone]  Relation: Parent  Secondary Emergency Contact: Abhishek [place]  [place] Phone: [phone]  Relation: Child    Past Surgical History:  Past Surgical History:   Procedure Laterality Date    COLONOSCOPY      MRI BREAST BX USING DEVICE RIGHT Right 2021    MRI BREAST BX USING DEVICE RIGHT 2021 [address] [facility] MRI    MRI NEEDLE BIOPSY EACH ADDL RIGHT Right 2021    MRI NEEDLE BIOPSY EACH ADDL RIGHT [facility]    PORT SURGERY N/A 2021    RIGHT PORT A CATH PLACEMENT performed by Stephany Lord DO at [address] [place] Right 2021    US BREAST BIOPSY NEEDLE ADDITIONAL RIGHT 2021 Ryan García MD [address] LEFT Left 2021    US BREAST NEEDLE BIOPSY LEFT 2021 Ryan García MD [address] [place] [address] NEEDLE BIOPSY RIGHT Right 2021    US BREAST NEEDLE BIOPSY RIGHT 2021 Ryan García MD [facility]       Immunization History:   Immunization History   Administered Date(s) Administered    COVID-19, Pfizer, PF, 30mcg/0.3mL 2021    Influenza Virus Vaccine 10/31/2010, 10/16/2017, 2019    Influenza, MDCK Quadv, IM, PF (Flucelvax 2 yrs and older) 10/01/2020    Influenza, Quadv, IM, PF (6 mo and older Fluzone, Flulaval, Fluarix, and 3 yrs and older Afluria) 10/15/2018, 2019    Influenza, Quadv, adjuvanted, 65 yrs +, IM, PF (Fluad) 11/15/2021       Active Problems:  Patient Active Problem List   Diagnosis Code    Perforation of sigmoid colon due to diverticulitis K57.20    Anemia D64.9    Pure hypercholesterolemia E78.00    Malignant neoplasm of right breast in female, estrogen receptor positive ([facility] C50.911, Z17.0    Malignant neoplasm of upper-inner quadrant of left breast in female, estrogen receptor positive ([facility] C50.212, Z17.0    Chemotherapy-induced neutropenia (HCC) D70.1, T45.1X5A    Fatigue R53.83    Tachycardia R00.0    Neutropenia, drug-induced (HCC) D70.2    Severe malnutrition (HCC) E43    Neutropenia (HCC) D70.9    Failure to thrive in adult R62.7    Recurrent falls R29.6    Bacteriuria R82.71    Acute pulmonary embolism without acute cor pulmonale ([facility]) I26.99    UTI (urinary tract infection) N39.0    CHF (congestive heart failure) ([facility]) I50.9    CHF (congestive heart failure), NYHA class I, acute on chronic, combined ([facility] I50.43    Chest pain due to CAD ([facility] I25.119       Isolation/Infection:   Isolation            No Isolation          Patient Infection Status       Infection Onset Added Last Indicated Last Indicated By Review Planned Expiration Resolved Resolved By    None active    Resolved    COVID-19 (Rule Out) 10/27/21 10/27/21 10/27/21 COVID-19, Rapid (Ordered)   10/27/21 Rule-Out Test Resulted    COVID-19 (Rule Out) 10/22/21 10/22/21 10/22/21 COVID-19, Rapid (Ordered)   10/22/21 Rule-Out Test Resulted            Nurse Assessment:  Last Vital Signs: /61   Pulse 90   Temp 98.7 °F (37.1 °C) (Oral)   Resp 22   Ht 5' 7.5\" (1.715 m)   Wt 118 lb (53.5 kg)   LMP  (LMP Unknown)   SpO2 99%   BMI 18.21 kg/m²     Last documented pain score (0-10 scale): Pain Level: 5  Last Weight:   Wt Readings from Last 1 Encounters:   11/17/21 118 lb (53.5 kg)     Mental Status:  oriented, alert, coherent, logical, thought processes intact, and able to concentrate and follow conversation    IV Access:  - None    Nursing Mobility/ADLs:  Walking   Assisted  Transfer  Assisted  Bathing  Independent  Dressing  Independent  Toileting  Assisted  Feeding  [address]  Independent  Med Delivery   whole    Wound Care Documentation and Therapy:        Elimination:  Continence: Bowel: Yes  Bladder: Yes  Urinary Catheter: None   Colostomy/Ileostomy/Ileal Conduit: No       Date of Last BM: unknown  No intake or output data in the 24 hours ending 11/17/21 1324  No intake/output data recorded. Safety Concerns:     None    Impairments/Disabilities:      None    Nutrition Therapy:  Current Nutrition Therapy:   - Oral Diet:  General    Routes of Feeding: Oral  Liquids: Thin Liquids  Daily Fluid Restriction: no  Last Modified Barium Swallow with Video (Video Swallowing Test): not done    Treatments at the Time of Hospital Discharge:   Respiratory Treatments: none  Oxygen Therapy:  is not on home oxygen therapy. Ventilator:    - No ventilator support    Rehab Therapies: none  Weight Bearing Status/Restrictions: No weight bearing restirctions  Other Medical Equipment (for information only, NOT a DME order):  none  Other Treatments: none    Patient's personal belongings (please select all that are sent with patient):  None    RN SIGNATURE:  Electronically signed by Memo Umanzor RN on 11/21/21 at 11:02 AM EST    CASE MANAGEMENT/SOCIAL WORK SECTION    Inpatient Status Date: 11/17/21    Readmission Risk Assessment Score:  Readmission Risk              Risk of Unplanned Readmission:  23           Discharging to Facility/ Agency   Name: Aliyah Das  Phone: [phone]  Fax: [phone]    / signature: Electronically signed by Jose Kinsey RN on 11/21/21 at 10:04 AM EST    PHYSICIAN SECTION    Prognosis: Fair    Condition at Discharge: Stable    Rehab Potential (if transferring to Rehab): Fair    Recommended Labs or Other Treatments After Discharge:   1.  Encourage follow-up appointment compliance. 2. Educate further on fluid restriction 48 oz - 64 oz during inpatient stay so he can understand how to measure intake at home. 3. Review sodium restrictions. Encourage patient to not add table salt to her foods and avoid foods that are high in sodium. 4. Continue to educate on S/S. Stress the importance of calling the MD with the earliest signs of an acute exacerbation. 5. Emphasize daily weights - instruct patient to call the MD if she gains 2-3 lb in a day or 5 lb in a week. 6. Provided patient with CHF Resource Line for questions and concerns. 7. Obtain outpatient cardiac MRI to further evaluate right atrial echodensity seen on TTE and for other potential non-ischemic etiologies of cardiomyopathy. 8. Follow-up with [facility] in 2 weeks. Follow up with SOU[facility] Mejia CNP 2 - 3 weeks  Follow up with Oncology as scheduled    Return to hospital with worsening symptoms. Physician Certification: I certify the above information and transfer of Neymar Feldman  is necessary for the continuing treatment of the diagnosis listed and that she requires Home Care for greater 30 days. Update Admission H&P: Changes in H&P as follows - Admitted for chest pain. Cardiac cath results :  2.  Coronary anatomy:  A. Left main artery: The left main artery bifurcates into the left anterior descending artery and left circumflex artery. The left main artery has no angiographically significant disease. B. Left anterior descending artery: Transapical vessel which gives rise to 3 diagonal arteries. The LAD has a calcified, eccentric 60-70% stenosis at the junction of the proxima/mid-vessel that extends through the trifurcation of the first diagonal artery and first septal . There is another 20% stenosis in the distal LAD. The first diagonal artery is a small-medium caliber vessel with a 60-70% ostial stenosis.   The second and third diagonal arteries are very small and have minor luminal irregularities. C. Left circumflex artery: Non-dominant vessel that gives rise to one large branching obtuse marginal artery. The LCx has minor luminal irregularities. The obtuse marginal artery has minor luminal irregularities. D. Right coronary artery: Dominant vessel that gives rise to the posterior descending artery and 2 posterolateral branches. The RCA has minor luminal irregularities. The posterior descending artery has minor luminal irregularities. The right posterolateral branches have minor luminal irregularities.     PHYSICIAN SIGNATURE:  Electronically signed by Rob Jacinto MD on 11/21/21 at 9:34 AM EST

## 2021-11-17 NOTE — CARE COORDINATION
CASE MANAGEMENT INITIAL ASSESSMENT      Reviewed chart and completed assessment with patient:yes  Explained Case Management role/services. yes    Primary contact information:mother, [facility] Decision Maker :   Primary Decision Maker: Mayra Coulter - Parent - [phone]    Secondary Decision Maker: Chris Paez - Child - [phone]    Supplemental (Other) Decision Maker: Ney German - Child - [phone]          Can this person be reached and be able to respond quickly, such as within a few minutes or hours? Yes      Admit date/status:11/17/21  Diagnosis:chest pain  Is this a Readmission?:  Yes      Insurance:Medicare   Precert required for SNF: No       3 night stay required: No    Living arrangements, Adls, care needs, prior to admission:home, mother is currently staying with her, [place]. PTA, current with [facility]    Transportation:car     Durable Medical Equipment at home:  Walker_x_Cane__RTS__ BSC__Shower Chair__  02__ HHN__ CPAP__  BiPap__  Hospital Bed__ W/C___ Other__________    Services in the home and/or outpatient, prior to [facility] (if applicable)   · Name:  · Address:  · Dialysis Schedule:  · Phone:  · Fax:    PT/OT recs:    Hospital Exemption Notification (HEN):    Barriers to discharge:medical complications    Plan/comments:Referred to patient for d/c planning. Spoke to patient. Patient usually lives at home alone. Patient's mother currently staying with her. Patient reports she is independent in ADLs. Patient reports she is current with [facility]. Patient denies other d/c needs at this time. Electronically signed by ELIO Buckner LISW-S on 11/17/2021 at 1:29 PM      ECOC on chart for MD signature

## 2021-11-17 NOTE — ED PROVIDER NOTES
**ADVANCED PRACTICE PROVIDER, I HAVE EVALUATED THIS [facility]  ED  EMERGENCY DEPARTMENT ENCOUNTER      Pt Name: Geno Carrasquillo  BVD:[id number]  Alissagfrichelle 1945  Date of evaluation: 11/17/2021  Provider: HARESH Eubanks      Chief Complaint:    Chief Complaint   Patient presents with    Chest Pain     Feels like someone is sitting on her chest; recent hx of blood clots         Nursing Notes, Past Medical Hx, Past Surgical Hx, Social Hx, Allergies, and Family Hx were all reviewed and agreed with or any disagreements were addressed in the HPI.    HPI: (Location, Duration, Timing, Severity, Quality, Assoc Sx, Context, Modifying factors)    Chief Complaint of chest pain. This is a  78 y.o. female who presents with past medical history of breast cancer (currently on chemo), recently hospitalized for bilateral pulmonary embolism and on Eliquis (discharged October 25), and CHF presenting to the emergency department today for chest pressure. The patient states she has had chest pressure intermittently since discharge from the hospital, however chest pressure became constant upon waking this morning she continued to have chest pressure. She denies any distinct pain, she states that feels like someone is sitting on her chest.  She states she does see Dr. Joao Gold with cardiology and she is scheduled for a cardiac MRI in December and they have discussed a cardiac cath however the patient is hesitant to undergo the procedure. She has been taking her Eliquis as prescribed. She is on Lasix and was recently started on metoprolol and lisinopril due to persistent tachycardia. She denies any recent fevers, chills, lightheadedness, dizziness. No abdominal pain, nausea, vomiting.     PastMedical/Surgical History:      Diagnosis Date    Breast cancer ([address]     [facility] [address]     Kidney stone          Procedure Laterality Date    COLONOSCOPY      MRI BREAST BX USING DEVICE RIGHT Right 5/28/2021    MRI BREAST BX USING DEVICE RIGHT 5/28/2021 [facility] [place]    MRI NEEDLE BIOPSY EACH ADDL RIGHT Right 5/28/2021    MRI NEEDLE BIOPSY EACH ADDL RIGHT [facility]20[facility] [place]    PORT SURGERY N/A 5/25/2021    RIGHT PORT A CATH PLACEMENT performed by Gardenia Baker DO at [address] Right 5/7/2021    US BREAST BIOPSY NEEDLE ADDITIONAL RIGHT 5/7/2021 Diego Irizarry  [place] [facility] BREAST NEEDLE BIOPSY LEFT Left 5/7/2021    US BREAST NEEDLE BIOPSY LEFT 5/7/2021 Diego Irizarry MD [facility] EG [address] BREAST NEEDLE BIOPSY RIGHT Right 5/7/2021    US BREAST NEEDLE BIOPSY RIGHT 5/7/2021 Diego Irizarry MD [facility]       Medications:  Previous Medications    ANASTROZOLE (ARIMIDEX) 1 MG TABLET    Take 1 mg by mouth    APIXABAN STARTER PACK (ELIQUIS DVT/PE STARTER PACK) 5 MG TBPK TABLET    Take 1 tablet by mouth See Admin Instructions    CALCIUM CARBONATE-VITAMIN D (CALTRATE) 600-400 MG-UNIT TABS PER TAB    Take by mouth    CHOLECALCIFEROL (VITAMIN D3) 5000 UNITS TABS    Take by mouth    FUROSEMIDE (LASIX) 40 MG TABLET    Take 1 tablet by mouth daily    HYDROXYZINE (ATARAX) 10 MG TABLET    Take 1 tablet by mouth 3 times daily as needed for Anxiety    LISINOPRIL (PRINIVIL;ZESTRIL) 2.5 MG TABLET    Take 1 tablet by mouth daily    MELATONIN (RA MELATONIN) 3 MG TABS TABLET    Take 1 tablet by mouth nightly as needed (sleep)    METOPROLOL SUCCINATE (TOPROL XL) 25 MG EXTENDED RELEASE TABLET    Take 1 tablet by mouth daily    ONDANSETRON (ZOFRAN) 8 MG TABLET    Take 1 tablet by mouth    POLYETHYLENE GLYCOL (GLYCOLAX) 17 GM/SCOOP POWDER    Take 17 g by mouth daily         Review of Systems:  (2-9 systems needed)  Review of Systems   Constitutional: Negative for chills and fever. HENT: Negative for congestion. Eyes: Negative for visual disturbance. Respiratory: Positive for chest tightness. Negative for cough and shortness of breath. Cardiovascular: Positive for chest pain. Negative for palpitations and leg swelling. Gastrointestinal: Negative for abdominal pain, diarrhea, nausea and vomiting. Genitourinary: Negative for dysuria, frequency, hematuria and urgency. Musculoskeletal: Negative for back pain. Skin: Negative for rash. Neurological: Negative for dizziness, weakness, light-headedness and headaches. Physical Exam:  Physical Exam  Vitals and nursing note reviewed. Constitutional:       Appearance: Normal appearance. She is underweight. She is not ill-appearing or diaphoretic. HENT:      Head: Normocephalic and atraumatic. Nose: Nose normal.      Mouth/Throat:      Mouth: Mucous membranes are moist.   Eyes:      General:         Right eye: No discharge. Left eye: No discharge. Extraocular Movements: Extraocular movements intact. Pupils: Pupils are equal, round, and reactive to light. Cardiovascular:      Rate and Rhythm: Normal rate and regular rhythm. Pulses: Normal pulses. Heart sounds: Normal heart sounds. No murmur heard. No friction rub. No gallop. Pulmonary:      Effort: Pulmonary effort is normal. No respiratory distress. Breath sounds: Normal breath sounds. No stridor. No wheezing, rhonchi or rales. Chest:      Chest wall: No tenderness. Abdominal:      General: Abdomen is flat. There is no distension. Palpations: Abdomen is soft. Tenderness: There is no abdominal tenderness. There is no right CVA tenderness, left CVA tenderness, guarding or rebound. Musculoskeletal:         General: Normal range of motion. Cervical back: Normal range of motion and neck supple. Right lower leg: No edema. Left lower leg: No edema. Skin:     General: Skin is warm and dry. Coloration: Skin is not pale. Neurological:      Mental Status: She is alert and oriented to person, place, and time.    Psychiatric:         Mood and Affect: Mood normal. Behavior: Behavior normal.         MEDICAL DECISION MAKING    Vitals:    Vitals:    11/17/21 1015 11/17/21 1045 11/17/21 1119 11/17/21 1120   BP: (!) 112/49 (!) 116/51  (!) 110/55   Pulse: 83 79 78 78   Resp:       Temp:       TempSrc:       SpO2: 100% 97% 96% 97%   Weight:       Height:           LABS:  Labs Reviewed   CBC WITH AUTO DIFFERENTIAL - Abnormal; Notable for the following components:       Result Value    RBC 3.26 (*)     Hemoglobin 9.9 (*)     Hematocrit 29.6 (*)     RDW 17.1 (*)     All other components within normal limits    Narrative:     Performed at:  [address], [facility] [employer]   Phone [phone]   COMPREHENSIVE METABOLIC PANEL W/ REFLEX TO MG FOR LOW K    Narrative:     Performed at:  [address], [facility] [employer]   Phone [phone]   TROPONIN    Narrative:     Performed at:  [address], [facility] [employer]   Phone  of labs reviewed and were negative at this time or not returned at the time of this note. RADIOLOGY:   Non-plain film images such as CT, Ultrasound and MRI are read by the radiologist. HARESH Sullivan have directly visualized the radiologic plain film image(s) with the below findings:      Interpretation per the Radiologist below, if available at the time of this note:    XR CHEST PORTABLE   Final Result   No acute pulmonary process.               ECHO Complete 2D W Doppler W Color    Result Date: 10/23/2021  Transthoracic Echocardiography Report (TTE)  Demographics   Patient Name       Emma Hines   Date of Study      10/23/2021         Gender              Female   Patient Number     [id number]         Date of Birth       1945   Visit Number       [id number]          Age                 78 year(s)   Accession Number   [id number]         Room Number         [employer] ID       [id number]           Sonographer         Adry Paulino [facility]   Ordering Physician Kev Interiano., MD             Physician           Chicho So MD  Procedure Type of Study   TTE procedure:ECHOCARDIOGRAM COMPLETE 2D W DOPPLER W COLOR. Procedure Date Date: 10/23/2021 Start: 10:17 AM Study Location: [address] [place] [address] - Echo Lab Technical Quality: Adequate visualization Indications:Pulmonary embolus. Patient Status: Routine Height: 67 inches Weight: 135 pounds BSA: 1.71 m2 BMI: 21.14 kg/m2 BP: 142/74 mmHg  Conclusions   Summary  Normal left ventricle size and wall thickness. The left ventricular systolic function is severely reduced with an ejection  fraction of 25-30 %. EF by Fay's method estimated at 27%. Severe global hypokinesis with regional variation. Left ventricular diastolic filling pressure are indeterminate. The right ventricle is normal in size and function. There is protruding echodensity from anterior RA wall seen in multiple  views, possibly artifact, but given patient's clinical history,  mass/thrombus cannot be excluded. Mild to moderate mitral regurgitation. Mild pulmonic and tricuspid regurgitation. Systolic pulmonary artery pressure (SPAP) is normal and estimated at 35 mmHg  (right atrial pressure 3 mmHg). There is a large left pleural effusion. Compared to last echo on 8/17/2021 (EF 55%), left ventricle systolic  function has significantly declined while RV size and function appears  normal. RIght atrial findings as above - not seen on review of images prior,  unclear significance. Signature   ------------------------------------------------------------------  Electronically signed by Chicho So MD (Interpreting  physician) on 10/23/2021 at 02:18 PM  ------------------------------------------------------------------   Findings   Left Ventricle  Normal left ventricle size and wall thickness. The left ventricular systolic function is severely reduced with an ejection  fraction of 25-30 %. EF by Fay's method estimated at 27%. Severe global hypokinesis with regional variation. Left ventricular diastolic filling pressure are indeterminate. Compared to last echo on 8/17/2021 (EF 55%), left ventricle systolic  function has decreased. Mitral Valve  The mitral valve is normal in structure. Mild to moderate mitral regurgitation. Left Atrium  The left atrium is normal in size. Aortic Valve  Trileaflet aortic valve is normal in structure and function. Trace aortic regurgitation. Aorta  The aortic root is normal in size. Right Ventricle  The right ventricle is normal in size and function. TAPSE is measured at 18 mm. S'' prime velocity is measured at 15.6 cm/s. Tricuspid Valve  The tricuspid valve is normal in structure. Mild tricuspid regurgitation. Systolic pulmonary artery pressure (SPAP) is normal and estimated at 35 mmHg  (right atrial pressure 3 mmHg). Right Atrium  The right atrium is normal in size. There is protruding echodensity from anterior RA wall seen in multiple  views, possibly artifact, but given patient's clinical history,  mass/thrombus cannot be excluded. Right atrial area is 11.4 cm2. Pulmonic Valve  The pulmonic valve is normal in structure. Mild pulmonic regurgitation. Pericardial Effusion  Physiologic pericardial effusion with large pericardial fat pad located  anteriorly. Pleural Effusion  There is a large left pleural effusion. Miscellaneous  No obvious masses, thrombi or vegetations are noted. IVC is normal in size (< 2.1 cm) and collapses > 50% with respiration  consistent with normal right atrial pressure (3 mmHg).   M-Mode/2D Measurements (cm)   LV Diastolic Dimension: 1.97 cm LV Systolic Dimension: 4.2 cm  LV Septum Diastolic: 2.47 cm  LV PW Diastolic: 3.41 cm        AO Root Dimension: 2.7 cm                                  AV Cusp Separation: 1.8 cm                                  LA Dimension: 3.1 cm                                  LA Area: 11.4 cm2                                  LA volume/Index: 42.25 ml /25 ml/m2  Doppler Measurements   AV Peak Velocity: 129 cm/s  AV Peak Gradient: 6.66 mmHg  LVOT Peak Velocity: 71.6 cm/s   TR Velocity:284 cm/s  TR Gradient:32.26 mmHg  Estimated RAP:3 mmHg  Estimated RVSP: 35 mmHg  E' Septal Velocity: 8.77 cm/s  E' Lateral Velocity: 8.66 cm/s   Aortic Valve   Peak Velocity: 129 cm/s  Peak Gradient: 6.66 mmHg   Cusp Separation: 1.8 cm  Aorta   Aortic Root: 2.7 cm      XR CHEST PORTABLE    Result Date: 10/27/2021  EXAMINATION: ONE XRAY VIEW OF THE CHEST 10/27/2021 7:29 am COMPARISON: Chest x-ray and CT pulmonary angiogram dated 10/22/2021 HISTORY: ORDERING SYSTEM PROVIDED HISTORY: sob TECHNOLOGIST PROVIDED HISTORY: Reason for exam:->sob Reason for Exam: SOB Acuity: Acute Type of Exam: Initial FINDINGS: LINES/TUBES/OTHER: Right IJ port is noted with its tip in satisfactory position. HEART/MEDIASTINUM: The cardiomediastinal silhouette is within normal limits. PLEURA/LUNGS: There is diffuse bilateral airspace disease worse in the bilateral upper lungs. There are small bilateral pleural effusions. There is no appreciable pneumothorax. BONES/SOFT TISSUE: No acute abnormality. Worsening diffuse bilateral airspace disease predominantly within the bilateral upper lobes. XR CHEST PORTABLE    Result Date: 10/22/2021  EXAMINATION: ONE XRAY VIEW OF THE CHEST 10/22/2021 1:26 pm COMPARISON: Chest x-ray dated 08/16/2021. HISTORY: ORDERING SYSTEM PROVIDED HISTORY: SOB TECHNOLOGIST PROVIDED HISTORY: Reason for exam:->SOB FINDINGS: LINES/TUBES/OTHER: Right IJ port is noted with its tip in satisfactory position. HEART/MEDIASTINUM: The cardiomediastinal silhouette is within normal limits.  PLEURA/LUNGS: Hazy opacities noted in the bilateral upper lungs likely reflecting airspace disease, however malignant process cannot be excluded. There are no pleural effusions. There is no appreciable pneumothorax. BONES/SOFT TISSUE: No acute abnormality. Hazy opacities noted in the bilateral upper lungs likely reflecting airspace disease, however malignant process cannot be excluded. Further evaluation with chest CT is recommended. CT CHEST PULMONARY EMBOLISM W CONTRAST    Result Date: 10/27/2021  EXAMINATION: CTA OF THE CHEST 10/27/2021 9:50 am TECHNIQUE: CTA of the chest was performed after the administration of intravenous contrast.  Multiplanar reformatted images are provided for review. MIP images are provided for review. Dose modulation, iterative reconstruction, and/or weight based adjustment of the mA/kV was utilized to reduce the radiation dose to as low as reasonably achievable. COMPARISON: Chest x-rays 10/27/2021 and 10/22/2021. CTA chest 10/22/2021. HISTORY: ORDERING SYSTEM PROVIDED HISTORY: shortness of breath TECHNOLOGIST PROVIDED HISTORY: Reason for exam:->shortness of breath Decision Support Exception - unselect if not a suspected or confirmed emergency medical condition->Emergency Medical Condition (MA) Reason for Exam: known PE's, sob, drop in O2 Acuity: Acute Type of Exam: Initial FINDINGS: Pulmonary Arteries: Pulmonary arteries are adequately opacified for evaluation. Patient respiratory motion artifact somewhat limits evaluation. Previously noted pulmonary artery to bilateral segmental/subsegmental upper lobe pulmonary arteries are redemonstrated, similar. Previously noted pulmonary emboli to right middle and lower lobes are not definitely demonstrated within the limitations of the exam.  No new or worsening pulmonary emboli are seen within the limitations of the exam.  Findings for right heart strain. Main pulmonary artery is normal in caliber. Mediastinum: No evidence of mediastinal lymphadenopathy. The heart and pericardium demonstrate no acute abnormality.   There is no acute abnormality of the thoracic aorta. Atherosclerotic changes of the thoracic aorta. No evidence for thoracic aortic aneurysm. Lungs/pleura: Moderate to large right pleural effusion, worsened. Small to moderate left pleural effusion, worsened. Areas of consolidation and ground-glass opacity to bilateral upper lobes, right worse than left, and to a much lesser extent involving bilateral lower lobes, also right worse than left. There is also involvement of right middle lobe. Findings are significantly worsened from prior CTA 10/22/2021. No pneumothoraces. Central airways appear patent. Upper Abdomen: Limited images of the upper abdomen are unremarkable. Soft Tissues/Bones: No acute bone or soft tissue abnormality. Within the limitations of the exam, no new or worsening pulmonary emboli identified. Redemonstration of pulmonary emboli involving bilateral upper lobes, segmental/subsegmental pulmonary arteries. Previously noted pulmonary emboli involving right middle and right lower lobe subsegmental pulmonary arteries are not definitely evident within the limitations. Significantly worsened areas of consolidation and ground-glass opacity involving lungs bilaterally, right worse than left with bilateral upper lobe predominance, concerning for worsening pneumonia. Bilateral pleural effusions, right worse than left, worsened. CT CHEST PULMONARY EMBOLISM W CONTRAST    Result Date: 10/22/2021  EXAMINATION: CTA OF THE CHEST 10/22/2021 2:55 pm TECHNIQUE: CTA of the chest was performed after the administration of intravenous contrast.  Multiplanar reformatted images are provided for review. MIP images are provided for review. Dose modulation, iterative reconstruction, and/or weight based adjustment of the mA/kV was utilized to reduce the radiation dose to as low as reasonably achievable. COMPARISON: 05/30/2021.  HISTORY: ORDERING SYSTEM PROVIDED HISTORY: SOB, tachy, cancer TECHNOLOGIST PROVIDED HISTORY: Reason for exam:->SOB, tachy, cancer Decision Support Exception - unselect if not a suspected or confirmed emergency medical condition->Emergency Medical Condition (MA) Reason for Exam: SOB, tachycardic, pt said it started after chemo tx this past Monday, breast cancer dx 4-5 months Acuity: Acute Type of Exam: Initial Additional signs and symptoms: no prev surgery, non smoker FINDINGS: Pulmonary Arteries: Pulmonary arteries are adequately opacified for evaluation. There are several partially occlusive endoluminal filling defects within the bilateral upper lobe, right lower lobe and right middle lobe subsegmental pulmonary arterial vessels. The main pulmonary artery is normal in caliber. There is no evidence of right ventricular strain. Main pulmonary artery is normal in caliber. Mediastinum: No evidence of mediastinal lymphadenopathy. The heart and pericardium demonstrate no acute abnormality. There is no acute abnormality of the thoracic aorta. Lungs/pleura: There are multifocal bilateral ground-glass opacities scattered throughout each lung. There is no pneumothorax. Small bilateral pleural effusions are noted. Upper Abdomen: Limited images of the upper abdomen are unremarkable. Soft Tissues/Bones: No acute bone or soft tissue abnormality. 1. multiple acute bilateral pulmonary emboli. 2. Nonspecific multifocal bilateral ground-glass opacification. In the setting of pulmonary emboli, the differential includes pulmonary infarct with or without superimposed atypical infectious versus inflammatory pneumonitis. 3. Mild CHF. Findings were discussed with LAURA Izaguirre at 3:28 pm on 10/22/2021.      VL Extremity Venous Bilateral    Result Date: 10/25/2021  Lower Extremities DVT Study  Demographics   Patient Name       Jermaine Kessler   Date of Study      10/25/2021         Gender              Female   Patient Number     [id number]         Date of Birth       1945   Visit Number       [id number]          Age                 78 year(s) Accession Number   [id number]         Room Number         2931   Corporate ID       [id number]           Sonographer         Rohit Christopher                                                            [facility]   Ordering Physician Quynh Edwards Interpreting        Latrell Celaya MD             Physician           Readers                                                            Tono Vincent MD  Procedure Type of Study:   Veins:Lower Extremities DVT Study, VASC EXTREMITY VENOUS DUPLEX BILATERAL. Vascular Sonographer Report  Indications for Study:Suspected pulmonary embolism. Additional Indications:Pulmonary embolism seen on CT 10/22/21; patient complains of shortness of breath for unknown duration, denies symptoms in legs; patient was on a heparin drip, now is on eliquis. Venous Duplex Scan: B-mode imaging of the deep and superficial veins, with compression maneuvers, including color and Doppler spectral waveform analysis. Impressions Right Impression No evidence of deep vein or superficial vein thrombosis involving the right lower extremity. Left Impression Acute totally occluding deep vein thrombosis involving the left peroneal veins. No other evidence of deep vein or superficial vein thrombosis involving the left lower extremity. Conclusions   Summary   Acute totally occluding deep vein thrombosis involving the left peroneal  veins. No other evidence of deep vein or superficial vein thrombosis involving the  left lower extremity and the right common femoral vein. Signature   ------------------------------------------------------------------  Electronically signed by Kelvin Lowe MD (Interpreting  physician) on 10/25/2021 at 06:40 PM  ------------------------------------------------------------------  Patient Status:Routine. Study [address] - Vascular Lab.  Technical Quality:Adequate visualization.   - Results were reported to:REENA Blackmonh Flavio at 08:55 on 10/25/21. Velocities are measured in cm/s ; Diameters are measured in mm Right Lower Extremities DVT Study Measurements Right 2D Measurements +------------------------+----------+---------------+----------+ ! Location                ! Visualized! Compressibility! Thrombosis! +------------------------+----------+---------------+----------+ ! Sapheno Femoral Junction! Yes       ! Yes            ! None      ! +------------------------+----------+---------------+----------+ ! GSV Thigh               ! Yes       ! Yes            ! None      ! +------------------------+----------+---------------+----------+ ! Common Femoral          !Yes       ! Yes            ! None      ! +------------------------+----------+---------------+----------+ ! Femoral                 !Yes       ! Yes            ! None      ! +------------------------+----------+---------------+----------+ ! Prox Femoral            !Yes       ! Yes            ! None      ! +------------------------+----------+---------------+----------+ ! Mid Femoral             !Yes       ! Yes            ! None      ! +------------------------+----------+---------------+----------+ ! Dist Femoral            !Yes       ! Yes            ! None      ! +------------------------+----------+---------------+----------+ ! Deep Femoral            !Yes       ! Yes            ! None      ! +------------------------+----------+---------------+----------+ ! Popliteal               !Yes       ! Yes            ! None      ! +------------------------+----------+---------------+----------+ ! GSV Below Knee          ! Yes       ! Yes            ! None      ! +------------------------+----------+---------------+----------+ ! Gastroc                 ! Yes       ! Yes            ! None      ! +------------------------+----------+---------------+----------+ ! Soleal                  !Yes       ! Yes            ! None      ! +------------------------+----------+---------------+----------+ ! PTV                     ! Yes       ! Yes            ! None      ! +------------------------+----------+---------------+----------+ ! Peroneal                !Yes       ! Yes            ! None      ! +------------------------+----------+---------------+----------+ ! GSV Calf                ! Yes       ! Yes            ! None      ! +------------------------+----------+---------------+----------+ ! SSV                     ! Yes       ! Yes            ! None      ! +------------------------+----------+---------------+----------+ Right Doppler Measurements +------------------------+------+------+------------+ ! Location                ! Signal!Reflux! Reflux (sec)! +------------------------+------+------+------------+ ! Sapheno Femoral Junction! Phasic! No    !            ! +------------------------+------+------+------------+ ! Common Femoral          !Phasic! No    !            ! +------------------------+------+------+------------+ ! Femoral                 !Phasic! No    !            ! +------------------------+------+------+------------+ ! Deep Femoral            !Phasic! No    !            ! +------------------------+------+------+------------+ ! Popliteal               !Phasic! No    !            ! +------------------------+------+------+------------+ Left Lower Extremities DVT Study Measurements Left 2D Measurements +------------------------+----------+---------------+----------+ ! Location                ! Visualized! Compressibility! Thrombosis! +------------------------+----------+---------------+----------+ ! Sapheno Femoral Junction! Yes       ! Yes            ! None      ! +------------------------+----------+---------------+----------+ ! GSV Thigh               ! Yes       ! Yes            ! None      ! +------------------------+----------+---------------+----------+ ! Common Femoral          !Yes       ! Yes            ! None      ! +------------------------+----------+---------------+----------+ ! Femoral                 !Yes       ! Yes            ! None      ! +------------------------+----------+---------------+----------+ ! Prox Femoral            !Yes       ! Yes            ! None      ! +------------------------+----------+---------------+----------+ ! Mid Femoral             !Yes       ! Yes            ! None      ! +------------------------+----------+---------------+----------+ ! Dist Femoral            !Yes       ! Yes            ! None      ! +------------------------+----------+---------------+----------+ ! Deep Femoral            !Yes       ! Yes            ! None      ! +------------------------+----------+---------------+----------+ ! Popliteal               !Yes       ! Yes            ! None      ! +------------------------+----------+---------------+----------+ ! GSV Below Knee          ! Yes       ! Yes            ! None      ! +------------------------+----------+---------------+----------+ ! Gastroc                 ! Yes       ! Yes            ! None      ! +------------------------+----------+---------------+----------+ ! Soleal                  !Yes       ! Yes            ! None      ! +------------------------+----------+---------------+----------+ ! PTV                     ! Yes       ! Yes            ! None      ! +------------------------+----------+---------------+----------+ ! Peroneal                !Yes       ! No             !Acute     ! +------------------------+----------+---------------+----------+ ! GSV Calf                ! Yes       ! Yes            ! None      ! +------------------------+----------+---------------+----------+ ! SSV                     ! Yes       ! Yes            ! None      ! +------------------------+----------+---------------+----------+ Left Doppler Measurements +------------------------+------+------+------------+ ! Location                ! Signal!Reflux! Reflux (sec)!  +------------------------+------+------+------------+ !Sapheno Femoral Junction! Phasic! No    !            ! +------------------------+------+------+------------+ ! Common Femoral          !Phasic! No    !            ! +------------------------+------+------+------------+ ! Femoral                 !Phasic! No    !            ! +------------------------+------+------+------------+ ! Deep Femoral            !Phasic! No    !            ! +------------------------+------+------+------------+ ! Popliteal               !Phasic! No    !            ! +------------------------+------+------+------------+         MEDICAL DECISION MAKING / ED COURSE:      Patient seen and evaluated in the emergency department today by myself and attending physician. All diagnostic, treatment, disposition decisions were made in conjunction with attending physician. Patient was given:  Medications   hydrOXYzine (VISTARIL) capsule 25 mg (25 mg Oral Given 11/17/21 1481)   LORazepam (ATIVAN) injection 0.5 mg (0.5 mg IntraVENous Given 11/17/21 0802)     Patient was seen and evaluated in the emergency department today for chest heaviness and pain. 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 She is hemodynamically stable at triage, not tachycardic and is afebrile. Pain is not reproducible on exam.  CBC without evidence of leukocytosis, hemoglobin stable at 9.9 hematocrit 29.6. CMP without acute electrolyte abnormality, maintain renal function  Initial troponin is negative  Chest x-ray interpreted by radiologist shows no acute pulmonary process. She does have a right-sided port in place. EKG interpreted by attending physician found to have normal sinus rhythm. A discussion was had with the patient regarding previous cardiac work-up, she states she was scheduled to have an MRI in December however they have discussed performing a cardiac cath and the patient has not been interested in a cardiac cath previously. Her story is very concerning given history, heart score of 4.   Therefore I am recommending admission for further evaluation and treatment of chest pain. Discussion was had with patient regarding all lab and imaging results. All questions are answered and at this time hospitalist is paged for admission. The patient tolerated their visit well. I evaluated the patient. The physician was available for consultation as needed. The patient and / or the family were informed of the results of any tests, a time was given to answer questions, a plan was proposed and they agreed with plan. CLINICAL IMPRESSION:  1.  Chest pain, unspecified type      Results for orders placed or performed during the hospital encounter of 11/17/21   CBC Auto Differential   Result Value Ref Range    WBC 4.5 4.0 - 11.0 K/uL    RBC 3.26 (L) 4.00 - 5.20 M/uL    Hemoglobin 9.9 (L) 12.0 - 16.0 g/dL    Hematocrit 29.6 (L) 36.0 - 48.0 %    MCV 90.8 80.0 - 100.0 fL    MCH 30.4 26.0 - 34.0 pg    MCHC 33.5 31.0 - 36.0 g/dL    RDW 17.1 (H) 12.4 - 15.4 %    Platelets 453 496 - 766 K/uL    MPV 7.0 5.0 - 10.5 fL    Neutrophils % 63.0 %    Lymphocytes % 21.4 %    Monocytes % 10.7 %    Eosinophils % 3.7 %    Basophils % 1.2 %    Neutrophils Absolute 2.8 1.7 - 7.7 K/uL    Lymphocytes Absolute 1.0 1.0 - 5.1 K/uL    Monocytes Absolute 0.5 0.0 - 1.3 K/uL    Eosinophils Absolute 0.2 0.0 - 0.6 K/uL    Basophils Absolute 0.1 0.0 - 0.2 K/uL   Comprehensive Metabolic Panel w/ Reflex to MG   Result Value Ref Range    Sodium 140 136 - 145 mmol/L    Potassium reflex Magnesium 3.6 3.5 - 5.1 mmol/L    Chloride 102 99 - 110 mmol/L    CO2 24 21 - 32 mmol/L    Anion Gap 14 3 - 16    Glucose 94 70 - 99 mg/dL    BUN 17 7 - 20 mg/dL    CREATININE 0.7 0.6 - 1.2 mg/dL    GFR Non-African American >60 >60    GFR African American >60 >60    Calcium 9.3 8.3 - 10.6 mg/dL    Total Protein 6.7 6.4 - 8.2 g/dL    Albumin 3.5 3.4 - 5.0 g/dL    Albumin/Globulin Ratio 1.1 1.1 - 2.2    Total Bilirubin 0.4 0.0 - 1.0 mg/dL    Alkaline Phosphatase 102 40 - 129 U/L

## 2021-11-17 NOTE — ED NOTES
Bed: 09  Expected date:   Expected time:   Means of arrival:   Comments:  [facility], [address] [facility]  11/17/21 7157

## 2021-11-17 NOTE — CARE COORDINATION
[facility]  Patient is active with [facility], Will follow for discharge to home with orders to resume care.     Elizabet Estrada RN, BSN CTN  [facility] [phone]

## 2021-11-17 NOTE — PROGRESS NOTES
Patient arrived to unit via hospital bed in stable condition with all belongings. VSS. Admission assessment completed as charted. Patient oriented to room, call light, bed, phone, lights, bathroom, and nurse. Patient educated on the daily schedule including vitals, lab draws, assessments, possible tests, schedule of MD rounding, daily weights and I's & O's. Patient instructed on current diet, how to use 8MENU, and TV. Patient verbalizes pain as 5 on 0-10 scale. Patient denies any other needs at this time. Bed is in lowest locked position, call light is within reach, and side rails up 2/4. Will continue to monitor patient for pain, discomfort or any other needs.

## 2021-11-17 NOTE — ED NOTES
Patient identified as a positive fall risk on the ED triage fall screening. Patient placed in fall precautions which includes:  yellow fall risk bracelet on wrist and yellow socks on feet. Patient instructed on importance of not getting out of bed or ambulating without assistance for safety. Pt verbalized understanding.        Dean Acevedo RN  11/17/21 3921

## 2021-11-17 NOTE — ACP (ADVANCE CARE PLANNING)
ADVANCED CARE PLANNING    Name:Keyona He       :  1945              MRN:  [id number]  Admission Date: 2021  8:06 AM  Date of Discussion: 2021 and 21    Purpose of Encounter: Advanced care planning in light of breast cancer and cardiomyopathy. Parties in attendance: :Inocente Ceron MD, Family members:   Decisional Capacity:Yes      Objective/Medical Story:    78 y. o. female who presented to [facility] with chest pain. Patient with history of breast cancer. Patient states she had pressure on like an elephant sitting on chest. No fever or chills. No emesis. Pain radiated to shoulder. Pain was 10 out of 10. Currently 5 out of 10 and tolerable. Improved with rest. No dizziness. Patient recently admitted to hospital and discharged on . She recently had flu shot yesterday. Felt worse after shot. History of CHF. Previously treated with adriamycin. She is scheduled for cardiac MRI within the month.      Active Diagnoses: Active Hospital Problems    Diagnosis Date Noted    Chest pain due to CAD [facility]) [I25.119] 2021       These active diagnoses are of sufficient risk that focused discussion on advance care planning is indicated in order to allow the patient to thoughtfully consider personal goals of care; and if situations arise that prevent the ability to personally give input, to ensure appropriate representation of their personal desires for different levels and agressiveness of care. Goals of Care Determinations: Patient wishes to focus on improvement in health and good quality of life. Patient understands the seriousness of her diagnosis. Code Status: At this time patient wishes to be full code. Time Spent on Advanced Planning Documents: 16 mins. The following items were considered in medical decision making:  Independent review of images , Review / order clinical lab tests.  Review / order radiology tests, Decision to obtain old records. Advanced Care Planning Documents:  Completed advances directives, advanced directives in chart. Electronically signed by Isabelle Luther MD on 11/17/2021 at 3:54 PM    Thank you SOUTH Max CNP for the opportunity to be involved in this patient's care. If you have any questions or concerns please feel free to contact me at [phone].

## 2021-11-17 NOTE — ED PROVIDER NOTES
I independently performed a history and physical on [employer]. All diagnostic, treatment, and disposition decisions were made by myself in conjunction with the mid-level provider. For further details of Yolanda He's emergency department encounter, please see Lashawn Ramos [facility] documentation. Shemar Vidales is a 78year old female who presents to the ED with chest pain. She reports that she got her flu shot yesterday and has felt badly since around midnight. On exam she is awake and alert, no acute distress. Lungs are CTA. Heart tones normal S1S2 without murmur. Abdomen soft non-tender. Patient reports feeling very anxious. /60   Pulse 82   Temp 98.7 °F (37.1 °C) (Oral)   Resp 16   Ht 5' 7.5\" (1.715 m)   Wt 118 lb (53.5 kg)   LMP  (LMP Unknown)   SpO2 100%   BMI 18.21 kg/m²     The Ekg interpreted by me in the absence of a cardiologist shows. normal sinus rhythm with a rate of 83  Axis is   Normal  QTc is  505 ms  Intervals and Durations are unremarkable. No specific ST-T wave changes appreciated. No evidence of acute ischemia. No significant change from prior EKG dated 16 August 2021. XR CHEST PORTABLE    Result Date: 11/17/2021  No acute pulmonary process.        Results for orders placed or performed during the hospital encounter of 11/17/21   CBC Auto Differential   Result Value Ref Range    WBC 4.5 4.0 - 11.0 K/uL    RBC 3.26 (L) 4.00 - 5.20 M/uL    Hemoglobin 9.9 (L) 12.0 - 16.0 g/dL    Hematocrit 29.6 (L) 36.0 - 48.0 %    MCV 90.8 80.0 - 100.0 fL    MCH 30.4 26.0 - 34.0 pg    MCHC 33.5 31.0 - 36.0 g/dL    RDW 17.1 (H) 12.4 - 15.4 %    Platelets 243 002 - 908 K/uL    MPV 7.0 5.0 - 10.5 fL    Neutrophils % 63.0 %    Lymphocytes % 21.4 %    Monocytes % 10.7 %    Eosinophils % 3.7 %    Basophils % 1.2 %    Neutrophils Absolute 2.8 1.7 - 7.7 K/uL    Lymphocytes Absolute 1.0 1.0 - 5.1 K/uL    Monocytes Absolute 0.5 0.0 - 1.3 K/uL    Eosinophils Absolute 0.2 0.0 - 0.6 K/uL    Basophils Absolute 0.1 0.0 - 0.2 K/uL   Comprehensive Metabolic Panel w/ Reflex to MG   Result Value Ref Range    Sodium 140 136 - 145 mmol/L    Potassium reflex Magnesium 3.6 3.5 - 5.1 mmol/L    Chloride 102 99 - 110 mmol/L    CO2 24 21 - 32 mmol/L    Anion Gap 14 3 - 16    Glucose 94 70 - 99 mg/dL    BUN 17 7 - 20 mg/dL    CREATININE 0.7 0.6 - 1.2 mg/dL    GFR Non-African American >60 >60    GFR African American >60 >60    Calcium 9.3 8.3 - 10.6 mg/dL    Total Protein 6.7 6.4 - 8.2 g/dL    Albumin 3.5 3.4 - 5.0 g/dL    Albumin/Globulin Ratio 1.1 1.1 - 2.2    Total Bilirubin 0.4 0.0 - 1.0 mg/dL    Alkaline Phosphatase 102 40 - 129 U/L    ALT 16 10 - 40 U/L    AST 15 15 - 37 U/L   Troponin   Result Value Ref Range    Troponin <0.01 <0.01 ng/mL   EKG 12 Lead   Result Value Ref Range    Ventricular Rate 83 BPM    Atrial Rate 83 BPM    P-R Interval 144 ms    QRS Duration 74 ms    Q-T Interval 430 ms    QTc Calculation (Bazett) 505 ms    P Axis 73 degrees    R Axis -19 degrees    T Axis 52 degrees    Diagnosis       Normal sinus rhythmProlonged QTAbnormal ECGWhen compared with ECG of 27-OCT-2021 07:19,Criteria for Septal infarct are no longer Present       We've decided to admit Rubin Pa for further observation and evaluation of Saran He's chest pain. As I have deemed necessary from their history, physical, and studies, I have considered and evaluated Rubin Pa for the following diagnoses:  ACUTE CORONARY SYNDROME, PERICARDIAL TAMPONADE, PNEUMOTHORAX, PULMONARY EMBOLISM, and THORACIC DISSECTION. FINAL IMPRESSION  1. Chest pain, unspecified type        Vitals:  Blood pressure (!) 102/56, pulse 73, temperature 98.7 °F (37.1 °C), temperature source Oral, resp. rate 24, height 5' 7.5\" (1.715 m), weight 118 lb (53.5 kg), SpO2 98 %, not currently breastfeeding.             Luisito Thompson MD  11/17/21 1058

## 2021-11-18 LAB
A/G RATIO: 1 (ref 1.1–2.2)
ALBUMIN SERPL-MCNC: 3.4 G/DL (ref 3.4–5)
ALP BLD-CCNC: 103 U/L (ref 40–129)
ALT SERPL-CCNC: 16 U/L (ref 10–40)
AMORPHOUS: ABNORMAL /HPF
ANION GAP SERPL CALCULATED.3IONS-SCNC: 11 MMOL/L (ref 3–16)
ANION GAP SERPL CALCULATED.3IONS-SCNC: 11 MMOL/L (ref 3–16)
APTT: 32.2 SEC (ref 26.2–38.6)
AST SERPL-CCNC: 17 U/L (ref 15–37)
BACTERIA: ABNORMAL /HPF
BILIRUB SERPL-MCNC: 0.3 MG/DL (ref 0–1)
BILIRUBIN URINE: NEGATIVE
BLOOD, URINE: NEGATIVE
BUN BLDV-MCNC: 20 MG/DL (ref 7–20)
BUN BLDV-MCNC: 21 MG/DL (ref 7–20)
CALCIUM SERPL-MCNC: 9.3 MG/DL (ref 8.3–10.6)
CALCIUM SERPL-MCNC: 9.6 MG/DL (ref 8.3–10.6)
CHLORIDE BLD-SCNC: 102 MMOL/L (ref 99–110)
CHLORIDE BLD-SCNC: 102 MMOL/L (ref 99–110)
CHOLESTEROL, TOTAL: 180 MG/DL (ref 0–199)
CLARITY: ABNORMAL
CO2: 25 MMOL/L (ref 21–32)
CO2: 25 MMOL/L (ref 21–32)
COLOR: YELLOW
CREAT SERPL-MCNC: 0.7 MG/DL (ref 0.6–1.2)
CREAT SERPL-MCNC: 0.7 MG/DL (ref 0.6–1.2)
EKG ATRIAL RATE: 73 BPM
EKG DIAGNOSIS: NORMAL
EKG P AXIS: 68 DEGREES
EKG P-R INTERVAL: 152 MS
EKG Q-T INTERVAL: 442 MS
EKG QRS DURATION: 76 MS
EKG QTC CALCULATION (BAZETT): 486 MS
EKG R AXIS: -22 DEGREES
EKG T AXIS: 2 DEGREES
EKG VENTRICULAR RATE: 73 BPM
EPITHELIAL CELLS, UA: ABNORMAL /HPF (ref 0–5)
GFR AFRICAN AMERICAN: >60
GFR AFRICAN AMERICAN: >60
GFR NON-AFRICAN AMERICAN: >60
GFR NON-AFRICAN AMERICAN: >60
GLUCOSE BLD-MCNC: 87 MG/DL (ref 70–99)
GLUCOSE BLD-MCNC: 88 MG/DL (ref 70–99)
GLUCOSE URINE: NEGATIVE MG/DL
HCT VFR BLD CALC: 28 % (ref 36–48)
HCT VFR BLD CALC: 30.1 % (ref 36–48)
HDLC SERPL-MCNC: 39 MG/DL (ref 40–60)
HEMOGLOBIN: 10.1 G/DL (ref 12–16)
HEMOGLOBIN: 9.3 G/DL (ref 12–16)
HYALINE CASTS: ABNORMAL /LPF (ref 0–2)
INR BLD: 1.31 (ref 0.88–1.12)
KETONES, URINE: NEGATIVE MG/DL
LACTIC ACID: 0.6 MMOL/L (ref 0.4–2)
LDL CHOLESTEROL CALCULATED: 114 MG/DL
LEUKOCYTE ESTERASE, URINE: ABNORMAL
MAGNESIUM: 2.3 MG/DL (ref 1.8–2.4)
MCH RBC QN AUTO: 30.8 PG (ref 26–34)
MCH RBC QN AUTO: 30.9 PG (ref 26–34)
MCHC RBC AUTO-ENTMCNC: 33.2 G/DL (ref 31–36)
MCHC RBC AUTO-ENTMCNC: 33.5 G/DL (ref 31–36)
MCV RBC AUTO: 92.2 FL (ref 80–100)
MCV RBC AUTO: 92.8 FL (ref 80–100)
MICROSCOPIC EXAMINATION: YES
MUCUS: ABNORMAL /LPF
NITRITE, URINE: NEGATIVE
PDW BLD-RTO: 17.1 % (ref 12.4–15.4)
PDW BLD-RTO: 17.1 % (ref 12.4–15.4)
PH UA: 7 (ref 5–8)
PLATELET # BLD: 293 K/UL (ref 135–450)
PLATELET # BLD: 318 K/UL (ref 135–450)
PMV BLD AUTO: 6.7 FL (ref 5–10.5)
PMV BLD AUTO: 6.7 FL (ref 5–10.5)
POTASSIUM REFLEX MAGNESIUM: 3.9 MMOL/L (ref 3.5–5.1)
POTASSIUM SERPL-SCNC: 4.2 MMOL/L (ref 3.5–5.1)
PROCALCITONIN: 0.06 NG/ML (ref 0–0.15)
PROTEIN UA: NEGATIVE MG/DL
PROTHROMBIN TIME: 15 SEC (ref 9.9–12.7)
RBC # BLD: 3.02 M/UL (ref 4–5.2)
RBC # BLD: 3.27 M/UL (ref 4–5.2)
RBC UA: ABNORMAL /HPF (ref 0–4)
SODIUM BLD-SCNC: 138 MMOL/L (ref 136–145)
SODIUM BLD-SCNC: 138 MMOL/L (ref 136–145)
SPECIFIC GRAVITY UA: 1.02 (ref 1–1.03)
TOTAL PROTEIN: 6.7 G/DL (ref 6.4–8.2)
TRIGL SERPL-MCNC: 135 MG/DL (ref 0–150)
TROPONIN: <0.01 NG/ML
TROPONIN: <0.01 NG/ML
URINE TYPE: ABNORMAL
UROBILINOGEN, URINE: 0.2 E.U./DL
VLDLC SERPL CALC-MCNC: 27 MG/DL
WBC # BLD: 4.3 K/UL (ref 4–11)
WBC # BLD: 4.6 K/UL (ref 4–11)
WBC UA: ABNORMAL /HPF (ref 0–5)

## 2021-11-18 PROCEDURE — 36591 DRAW BLOOD OFF VENOUS DEVICE: CPT

## 2021-11-18 PROCEDURE — 93010 ELECTROCARDIOGRAM REPORT: CPT | Performed by: INTERNAL MEDICINE

## 2021-11-18 PROCEDURE — 1200000000 HC SEMI PRIVATE

## 2021-11-18 PROCEDURE — 99223 1ST HOSP IP/OBS HIGH 75: CPT | Performed by: INTERNAL MEDICINE

## 2021-11-18 PROCEDURE — 83550 IRON BINDING TEST: CPT

## 2021-11-18 PROCEDURE — 81001 URINALYSIS AUTO W/SCOPE: CPT

## 2021-11-18 PROCEDURE — 2580000003 HC RX 258: Performed by: INTERNAL MEDICINE

## 2021-11-18 PROCEDURE — 84145 PROCALCITONIN (PCT): CPT

## 2021-11-18 PROCEDURE — 80061 LIPID PANEL: CPT

## 2021-11-18 PROCEDURE — 6360000002 HC RX W HCPCS: Performed by: INTERNAL MEDICINE

## 2021-11-18 PROCEDURE — 85730 THROMBOPLASTIN TIME PARTIAL: CPT

## 2021-11-18 PROCEDURE — 85610 PROTHROMBIN TIME: CPT

## 2021-11-18 PROCEDURE — 83540 ASSAY OF IRON: CPT

## 2021-11-18 PROCEDURE — 85027 COMPLETE CBC AUTOMATED: CPT

## 2021-11-18 PROCEDURE — 6370000000 HC RX 637 (ALT 250 FOR IP): Performed by: INTERNAL MEDICINE

## 2021-11-18 PROCEDURE — 83605 ASSAY OF LACTIC ACID: CPT

## 2021-11-18 PROCEDURE — 93005 ELECTROCARDIOGRAM TRACING: CPT | Performed by: INTERNAL MEDICINE

## 2021-11-18 PROCEDURE — 83735 ASSAY OF MAGNESIUM: CPT

## 2021-11-18 PROCEDURE — 84484 ASSAY OF TROPONIN QUANT: CPT

## 2021-11-18 PROCEDURE — 87040 BLOOD CULTURE FOR BACTERIA: CPT

## 2021-11-18 PROCEDURE — 80053 COMPREHEN METABOLIC PANEL: CPT

## 2021-11-18 RX ORDER — HEPARIN SODIUM 10000 [USP'U]/100ML
960 INJECTION, SOLUTION INTRAVENOUS CONTINUOUS
Status: DISCONTINUED | OUTPATIENT
Start: 2021-11-18 | End: 2021-11-19

## 2021-11-18 RX ORDER — 0.9 % SODIUM CHLORIDE 0.9 %
1000 INTRAVENOUS SOLUTION INTRAVENOUS ONCE
Status: COMPLETED | OUTPATIENT
Start: 2021-11-18 | End: 2021-11-18

## 2021-11-18 RX ORDER — ATORVASTATIN CALCIUM 40 MG/1
40 TABLET, FILM COATED ORAL NIGHTLY
Status: DISCONTINUED | OUTPATIENT
Start: 2021-11-18 | End: 2021-11-20

## 2021-11-18 RX ORDER — HEPARIN SODIUM 1000 [USP'U]/ML
80 INJECTION, SOLUTION INTRAVENOUS; SUBCUTANEOUS ONCE
Status: DISCONTINUED | OUTPATIENT
Start: 2021-11-18 | End: 2021-11-18

## 2021-11-18 RX ORDER — HEPARIN SODIUM 1000 [USP'U]/ML
4300 INJECTION, SOLUTION INTRAVENOUS; SUBCUTANEOUS PRN
Status: DISCONTINUED | OUTPATIENT
Start: 2021-11-18 | End: 2021-11-19

## 2021-11-18 RX ORDER — ASPIRIN 81 MG/1
81 TABLET, CHEWABLE ORAL DAILY
Status: DISCONTINUED | OUTPATIENT
Start: 2021-11-18 | End: 2021-11-21 | Stop reason: HOSPADM

## 2021-11-18 RX ORDER — METOPROLOL SUCCINATE 25 MG/1
12.5 TABLET, EXTENDED RELEASE ORAL DAILY
Status: DISCONTINUED | OUTPATIENT
Start: 2021-11-19 | End: 2021-11-19

## 2021-11-18 RX ORDER — HEPARIN SODIUM 1000 [USP'U]/ML
2100 INJECTION, SOLUTION INTRAVENOUS; SUBCUTANEOUS PRN
Status: DISCONTINUED | OUTPATIENT
Start: 2021-11-18 | End: 2021-11-19

## 2021-11-18 RX ADMIN — Medication 3 MG: at 21:38

## 2021-11-18 RX ADMIN — ANASTROZOLE 1 MG: 1 TABLET ORAL at 08:44

## 2021-11-18 RX ADMIN — SODIUM CHLORIDE 1000 ML: 9 INJECTION, SOLUTION INTRAVENOUS at 08:35

## 2021-11-18 RX ADMIN — Medication 10 ML: at 08:46

## 2021-11-18 RX ADMIN — ACETAMINOPHEN 650 MG: 325 TABLET ORAL at 03:20

## 2021-11-18 RX ADMIN — ACETAMINOPHEN 650 MG: 325 TABLET ORAL at 23:46

## 2021-11-18 RX ADMIN — POLYETHYLENE GLYCOL 3350 17 G: 17 POWDER, FOR SOLUTION ORAL at 08:43

## 2021-11-18 RX ADMIN — HYDROXYZINE HYDROCHLORIDE 10 MG: 10 TABLET ORAL at 21:29

## 2021-11-18 RX ADMIN — Medication 10 ML: at 21:29

## 2021-11-18 RX ADMIN — HEPARIN SODIUM 960 UNITS/HR: 10000 INJECTION, SOLUTION INTRAVENOUS at 21:37

## 2021-11-18 RX ADMIN — HYDROXYZINE HYDROCHLORIDE 10 MG: 10 TABLET ORAL at 14:14

## 2021-11-18 RX ADMIN — POLYETHYLENE GLYCOL 3350 17 G: 17 POWDER, FOR SOLUTION ORAL at 16:38

## 2021-11-18 RX ADMIN — Medication 500 MG: at 08:43

## 2021-11-18 RX ADMIN — APIXABAN 10 MG: 5 TABLET, FILM COATED ORAL at 08:43

## 2021-11-18 ASSESSMENT — PAIN SCALES - GENERAL
PAINLEVEL_OUTOF10: 6
PAINLEVEL_OUTOF10: 0
PAINLEVEL_OUTOF10: 0
PAINLEVEL_OUTOF10: 10

## 2021-11-18 ASSESSMENT — ENCOUNTER SYMPTOMS
DIARRHEA: 0
NAUSEA: 0
RHINORRHEA: 0
BLOOD IN STOOL: 0
SHORTNESS OF BREATH: 1
VOMITING: 0
SORE THROAT: 0
CONSTIPATION: 0
ABDOMINAL PAIN: 0
EYE PAIN: 0
COUGH: 0
PHOTOPHOBIA: 0

## 2021-11-18 NOTE — CONSULTS
[address] [facility]  [phone]      Attending Physician: Jerald Ceja MD  Reason for Consultation/Chief Complaint: Chest pain    Subjective   History of Present Illness:  Shante Morrison is a 78 y.o. female with a history of breast cancer and anemia of chronic disease who has had multiple recent admissions for bilateral pulmonary emboli and acute heart failure. She has been taking Eliquis, but has continued to have chest pain on and off. A TTE from 10/22/21 showed an LVEF of 25-30% with global hypokinesis and regional variation, normal RV size and systolic function, mild-moderate mitral regurgitation, and mild pulmonic regurgitation. There was also a right atrial echodensity noted that may have been due to artifact, but given her recent PE and known underlying malignancy thrombus or mass could not be definitely excluded and there were plans to obtain an outpatient cardiac MRI for additional evaluation. It had previously been recommended that further ischemic evaluation be delayed until she had had additional time to recover from her acute pulmonary emboli. The patient reports that yesterday, while lying down to go to bed, she developed substernal chest pressure and became short of breath. Her symptoms persisted all night so she decided to go back to the ED. On arrival to the ED, the patient was afebrile with BP of 113/60 and oxygen saturation of 100% on room air. Her ECG showed normal sinus rhythm with prolonged QTc, but otherwise no ischemic changes. Troponins have been negative x3. Pro-BNP was mildly elevated at 738, but below previous values. Chest x-ray was negative for any acute pulmonary process. She had some mild hypotension earlier today and was given IVF and BP now appears to be stabilizing.   She currently reports that her shortness of breath and chest pain have resolved.              Past Medical History:   has a past medical history of Breast cancer [facility]), Cancer ([facility] [address] and Kidney stone. Surgical History:   has a past surgical history that includes [address] LOC DEVICE 1ST LESION RIGHT (Right, 5/7/2021); US BREAST BIOPSY W LOC DEVICE EACH ADDL LESION RIGHT (Right, 5/7/2021); US BREAST BIOPSY W LOC DEVICE 1ST LESION LEFT (Left, 5/7/2021); Colonoscopy; Port Surgery (N/A, 5/25/2021); MRI BIOPSY BREAST W LOC DEVICE RIGHT 1ST LESION (Right, 5/28/2021); and MRI BIOPSY BREAST W LOC DEVICE RIGHT EACH ADDL (Right, 5/28/2021). Social History:   reports that she is a non-smoker but has been exposed to tobacco smoke. She has never used smokeless tobacco. She reports that she does not drink alcohol and does not use drugs. Family History:  family history includes Cancer (age of onset: 76) in her paternal grandmother; Diabetes in her father; High Cholesterol in her mother. Home Medications:  Were reviewed and are listed in nursing record and/or below  Prior to Admission medications    Medication Sig Start Date End Date Taking?  Authorizing Provider   anastrozole (ARIMIDEX) 1 MG tablet Take 1 mg by mouth 11/8/21  Yes Historical Provider, MD   polyethylene glycol (GLYCOLAX) 17 GM/SCOOP powder Take 17 g by mouth daily 11/15/21 12/15/21 Yes SOUTH Santiago CNP   metoprolol succinate (TOPROL XL) 25 MG extended release tablet Take 1 tablet by mouth daily 11/9/21  Yes Yomi Martinez,    apixaban starter pack (ELIQUIS DVT/PE STARTER PACK) 5 MG TBPK tablet Take 1 tablet by mouth See Admin Instructions 10/25/21  Yes David Gutierrez MD   lisinopril (PRINIVIL;ZESTRIL) 2.5 MG tablet Take 1 tablet by mouth daily 10/26/21  Yes David Gutierrez MD   ondansetron [facility]) 8 MG tablet Take 1 tablet by mouth 6/21/21  Yes Historical Provider, MD   melatonin (RA MELATONIN) 3 MG TABS tablet Take 1 tablet by mouth nightly as needed (sleep) 5/30/21  Yes Sherly E Roetting, APRN - CNP   Cholecalciferol (VITAMIN D3) 5000 units TABS Take by mouth   Yes Historical Provider, MD calcium carbonate-vitamin D (CALTRATE) 600-400 MG-UNIT TABS per tab Take by mouth 11/8/21   Historical Provider, MD   hydrOXYzine (ATARAX) 10 MG tablet Take 1 tablet by mouth 3 times daily as needed for Anxiety 11/15/21 11/25/21  Cory Pickens, APRN - CNP   furosemide (LASIX) 40 MG tablet Take 1 tablet by mouth daily 11/3/21   Janee Brown MD        CURRENT Medications:  sodium chloride flush 0.9 % injection 5-40 mL, 2 times per day  sodium chloride flush 0.9 % injection 5-40 mL, PRN  0.9 % sodium chloride infusion, PRN  acetaminophen (TYLENOL) tablet 650 mg, Q6H PRN   Or  acetaminophen (TYLENOL) suppository 650 mg, Q6H PRN  polyethylene glycol (GLYCOLAX) packet 17 g, Daily PRN  anastrozole (ARIMIDEX) tablet 1 mg, Daily  Calcium Carb-Cholecalciferol 250-125 MG-UNIT TABS 500 mg, Daily  apixaban (ELIQUIS) tablet 10 mg, BID  [Held by provider] furosemide (LASIX) tablet 40 mg, Daily  hydrOXYzine (ATARAX) tablet 10 mg, TID PRN  lisinopril (PRINIVIL;ZESTRIL) tablet 2.5 mg, Daily  melatonin tablet 3 mg, Nightly PRN  metoprolol succinate (TOPROL XL) extended release tablet 25 mg, Daily  promethazine (PHENERGAN) tablet 25 mg, Q6H PRN  polyethylene glycol (GLYCOLAX) powder 17 g, Daily  nitroGLYCERIN (NITROSTAT) SL tablet 0.4 mg, Q5 Min PRN  polyethylene glycol (GLYCOLAX) packet 17 g, Daily        Allergies:  Bactrim [sulfamethoxazole-trimethoprim]     Review of Systems:   Review of Systems   Constitutional: Positive for appetite change and fatigue. Negative for chills and fever. HENT: Negative for congestion, rhinorrhea and sore throat. Eyes: Negative for photophobia, pain and visual disturbance. Respiratory: Positive for shortness of breath. Negative for cough. Cardiovascular: Positive for chest pain. Negative for leg swelling. Gastrointestinal: Negative for abdominal pain, blood in stool, constipation, diarrhea, nausea and vomiting. Endocrine: Negative for cold intolerance and heat intolerance. Genitourinary: Negative for difficulty urinating, dysuria and hematuria. Musculoskeletal: Negative for arthralgias, joint swelling and myalgias. Skin: Negative for rash and wound. Allergic/Immunologic: Negative for environmental allergies and food allergies. Neurological: Negative for dizziness, syncope and light-headedness. Hematological: Negative for adenopathy. Does not bruise/bleed easily. Psychiatric/Behavioral: Negative for dysphoric mood. The patient is not nervous/anxious. Objective   PHYSICAL EXAM:    Vitals:    11/18/21 1548   BP: (!) 98/58   Pulse: 90   Resp: 16   Temp: 97.7 °F (36.5 °C)   SpO2: 97%    Weight: 118 lb (53.5 kg)       General: Anxious, cachectic adult female lying in bed in no acute distress. Pleasant and interactive on exam.  HEENT: Normocephalic, atraumatic, non-icteric, hearing intact, nares normal, mucous membranes moist.  Neck: Supple, trachea midline. No adenopathy. No thyromegaly. No JVD. Heart: Regular rate and rhythm. Normal S1 and S2. No murmurs, gallops or rubs. Chest: Port noted on right. Lungs: Normal respiratory effort. Clear to auscultation bilaterally. No wheezes, rales, or rhonchi. Abdomen: Soft, non-tender. Normoactive bowel sounds. No masses or organomegaly. Skin: No rashes, wounds, or lesions. Pulses: 2+ and symmetric. Extremities: No clubbing, cyanosis, or edema. Musculoskeletal: Spontaneously moves all four extremities. Psych: Anxious. Normal effect. Neuro: Alert and oriented to person, place, and time. No focal deficits noted.       Labs   CBC:   Lab Results   Component Value Date    WBC 4.3 11/18/2021    RBC 3.27 11/18/2021    HGB 10.1 11/18/2021    HCT 30.1 11/18/2021    MCV 92.2 11/18/2021    RDW 17.1 11/18/2021     11/18/2021     CMP:  Lab Results   Component Value Date     11/18/2021    K 3.9 11/18/2021    K 4.2 11/18/2021     11/18/2021    CO2 25 11/18/2021    BUN 20 11/18/2021    CREATININE 0.7 11/18/2021 GFRAA >60 11/18/2021    GFRAA >60 07/10/2012    AGRATIO 1.0 11/18/2021    LABGLOM >60 11/18/2021    GLUCOSE 88 11/18/2021    PROT 6.7 11/18/2021    PROT 7.8 07/10/2012    CALCIUM 9.6 11/18/2021    BILITOT 0.3 11/18/2021    ALKPHOS 103 11/18/2021    AST 17 11/18/2021    ALT 16 11/18/2021     PT/INR:  No results found for: PTINR  HgBA1c:  Lab Results   Component Value Date    LABA1C 5.6 09/13/2018     Lab Results   Component Value Date    TROPONINI <0.01 11/18/2021         Cardiac Data     Last EKG: Normal sinus rhythm with prolonged QTc, no ischemic changes. Echo:  TTE 8/17/21:  Conclusions   Summary   LV systolic function is normal with EF estimated at 55%.   No regional wall motion abnormalities.   Grade I diastolic dysfunction with normal LV filling pressure.   Mild mitral regurgitation.   Inadequate tricuspid regurgitation jet to estimate systolic artery pressure   (SPAP).      TTE 10/23/21:  Conclusions   Summary   Normal left ventricle size and wall thickness.   The left ventricular systolic function is severely reduced with an ejection   fraction of 25-30 %.   EF by Fay's method estimated at 27%.   Severe global hypokinesis with regional variation.   Left ventricular diastolic filling pressure are indeterminate.   The right ventricle is normal in size and function.   There is protruding echodensity from anterior RA wall seen in multiple   views, possibly artifact, but given patient's clinical history,   mass/thrombus cannot be excluded.   Mild to moderate mitral regurgitation.   Mild pulmonic and tricuspid regurgitation.   Systolic pulmonary artery pressure (SPAP) is normal and estimated at 35 mmHg   (right atrial pressure 3 mmHg).   There is a large left pleural effusion.      Compared to last echo on 8/17/2021 (EF 55%), left ventricle systolic   function has significantly declined while RV size and function appears   normal. RIght atrial findings as above - not seen on review of images prior,   unclear significance.     Stress Test:  Treadmill GXT 3/30/10:  Negative for evidence of ischemia.     Cath: N/A     Other Studies:   Chest X-ray 10/27/21: Worsening diffuse bilateral airspace disease predominantly within the   bilateral upper lobes.      CTPA 10/27/21: Within the limitations of the exam, no new or worsening pulmonary emboli   identified.  Redemonstration of pulmonary emboli involving bilateral upper   lobes, segmental/subsegmental pulmonary arteries.  Previously noted pulmonary   emboli involving right middle and right lower lobe subsegmental pulmonary   arteries are not definitely evident within the limitations.       Significantly worsened areas of consolidation and ground-glass opacity   involving lungs bilaterally, right worse than left with bilateral upper lobe   predominance, concerning for worsening pneumonia.       Bilateral pleural effusions, right worse than left, worsened. Chest X-ray 10/17/21:  No acute pulmonary process.             Assessment and Plan      1. Chest pain/dyspnea - Possible that symptoms are due to recent bilateral pulmonary emboli and further exacerbated by poorly controlled anxiety. However, given her recently diagnosed cardiomyopathy and recurrent admissions for chest pain, would recommend proceeding with invasive coronary angiography for definitive assessment of patient's coronary. She has ruled out for acute coronary syndrome and is currently asymptomatic.  -Risks/benefits of invasive coronary angiography with possible percutaneous coronary intervention were thoroughly reviewed with patient and she agrees to proceed  -Please keep NPO after midnight and will plan for invasive angiography tomorrow  -Hold Eliquis and bridge with IV heparin in preparation for procedure  -Start aspirin 81 mg daily and atorvastatin 40 mg daily  -Continue to monitor on telemetry and repeat ECG for any rhythm changes    2.  LV systolic heart failure/cardiomyopathy - Newly diagnosed with cardiomyopathy during recent admission for bilateral PEs. At that time, TTE showed LVEF of 25-30% from 55% on previous TTE From 8/17/21. May be secondary to cardiotoxicity from adriamycin or Herceptin bio similar, but has not had recent formal ischemic evaluation as above. She currently appears clinically euvolemic.  -Given recent mild hypotension, will hold lasix and lisinopril  -Decrease metoprolol succinate to 12.5 mg daily  -Monitor strict I/Os, obtain daily standing weights  -Low sodium diet  -Maintain K>4 and Mg>2  -Planning for formal ischemic evaluation as above  -Adriamycin and Herceptin bio similar have been discontinued by oncology and patient was recently started on anastrazole    3. Mild hypotension  -No evidence of underlying infection  -May have been slightly dehydrated given recent poor PO intake and further exacerbated by initiation of afterload reducing agent  -BP seems to be stabilizing following administration of IVF  -Holding lasix and ACEI and reducing metoprolol succinate dose as above     4. Bilateral pulmonary emboli  -Holding Eliquis and bridging with heparin in preparation for procedure above  -RV size and systolic function normal on TTE     5. Right atrial echodensity  -May represent artifact, but given recent PE and known underlying malignancy cannot definitively exclude thrombus or mass  -Plan to obtain cardiac MRI when able     6. H/o breast cancer  -S/p 4 cycles of Adriamycin/Cytoxan and 7 of 12 planned cycles of Taxol with Herceptin bio similar and pertuzumab  -Recently switched to anastrazole    7. Prolonged QTc  -On beta-blocker  -Avoid QT prolonging agents as able  -Maintain K>4 and Mg>2      Thank you for allowing us to participate in the care of Shemar Vidales. Please call me with any questions [phone].       Fatimah Humphrey, [facility]  [phone] [facility]  [phone] [address] [place] [address]  11/18/2021 6:11 PM      I will address the patient's cardiac risk factors and adjusted pharmacologic treatment as needed. In addition, I have reinforced the need for patient directed risk factor modification. All questions and concerns were addressed to the patient/family. Alternatives to my treatment were discussed. The note was completed using EMR. Every effort was made to ensure accuracy; however, inadvertent computerized transcription errors may be present.

## 2021-11-18 NOTE — PROGRESS NOTES
4 Eyes Skin Assessment     The patient is being assess for  Admission    I agree that 2 RN's have performed a thorough Head to Toe Skin Assessment on the patient. ALL assessment sites listed below have been assessed. Areas assessed by both nurses: Mack Olivera RN  [x]   Head, Face, and Ears   [x]   Shoulders, Back, and Chest  [x]   Arms, Elbows, and Hands   [x]   Coccyx, Sacrum, and Ischum  [x]   Legs, Feet, and Heels        Does the Patient have Skin Breakdown?   Yes a wound was noted on the Admission Assessment and an WOUND LDA was Initiated documentation include the Joann-wound, Wound Assessment, Measurements, Dressing Treatment, Drainage, and Color\",         Nacho Prevention initiated:  Yes   Wound Care Orders initiated:  No      WOC nurse consulted for Pressure Injury (Stage 3,4, Unstageable, DTI, NWPT, and Complex wounds):  No      Nurse 1 eSignature: Electronically signed by Akanksha Malik RN on 11/17/21 at 7:25 PM EST    **SHARE this note so that the co-signing nurse is able to place an eSignature**    Nurse 2 eSignature: Electronically signed by Prabhu Mark RN on 11/17/21 at 7:26 PM EST

## 2021-11-18 NOTE — PROGRESS NOTES
Hospitalist Progress Note      PCP: SOUTH Vidal - CNP    Date of Admission: 11/17/2021    Chief Complaint: Chest pain    Hospital Course:    78 y.o. female who presented to [facility] with chest pain. Patient with history of breast cancer. Patient states she had pressure on like an elephant sitting on chest. No fever or chills. No emesis. Pain radiated to shoulder. Pain was 10 out of 10. Currently 5 out of 10 and tolerable. Improved with rest. No dizziness. Patient recently admitted to hospital and discharged on 11/2. She recently had flu shot yesterday. Felt worse after shot. History of CHF. Previously treated with adriamycin. She is scheduled for cardiac MRI within the month. Subjective:   Patient feeling tired. No nausea or vomiting. No chest pain. No increase in shortness of breath. BP low today. Receiving fluids. Complaining of weakness in hands and feet.     Medications:  Reviewed    Infusion Medications    sodium chloride       Scheduled Medications    sodium chloride flush  5-40 mL IntraVENous 2 times per day    anastrozole  1 mg Oral Daily    Calcium Carb-Cholecalciferol  500 mg Oral Daily    apixaban  10 mg Oral BID    [Held by provider] furosemide  40 mg Oral Daily    lisinopril  2.5 mg Oral Daily    metoprolol succinate  25 mg Oral Daily    polyethylene glycol  17 g Oral Daily    polyethylene glycol  17 g Oral Daily     PRN Meds: sodium chloride flush, sodium chloride, acetaminophen **OR** acetaminophen, polyethylene glycol, hydrOXYzine, melatonin, promethazine, nitroGLYCERIN      Intake/Output Summary (Last 24 hours) at 11/18/2021 1004  Last data filed at 11/18/2021 1000  Gross per 24 hour   Intake 325 ml   Output    Net 325 ml       Physical Exam Performed:    BP (!) 85/52   Pulse 78   Temp 97.4 °F (36.3 °C) (Oral)   Resp 18   Ht 5' 7.5\" (1.715 m)   Wt 118 lb (53.5 kg)   LMP  (LMP Unknown)   SpO2 96%   BMI 18.21 kg/m²     General appearance:  No apparent distress, appears stated age and cooperative. Pleasant. HEENT:  Normal cephalic, atraumatic without obvious deformity. Pupils equal, round, and reactive to light. Extra ocular muscles intact. Conjunctivae/corneas clear. Neck: Supple, with full range of motion. No jugular venous distention. Trachea midline. Respiratory:  Normal respiratory effort. Clear to auscultation, bilaterally without Rales/Wheezes/Rhonchi. Cardiovascular:  Regular rate and rhythm with normal S1/S2 without murmurs, rubs or gallops. Abdomen: Soft, non-tender, non-distended with normal bowel sounds. Musculoskeletal:  No clubbing, cyanosis or edema bilaterally. Full range of motion without deformity. Skin: Skin color, texture, turgor normal.  No rashes or lesions. Neurologic:  Neurovascularly intact without any focal sensory/motor deficits. Cranial nerves: II-XII intact, grossly non-focal.  Psychiatric:  Alert and oriented, thought content appropriate, normal insight  Capillary Refill: Brisk,3 seconds, normal  Peripheral Pulses: +2 palpable, equal bilaterally        Labs:   Recent Labs     11/17/21  0855 11/18/21  0622   WBC 4.5 4.3   HGB 9.9* 10.1*   HCT 29.6* 30.1*    318     Recent Labs     11/17/21  0855 11/18/21  0622 11/18/21  0839    138 138   K 3.6 4.2 3.9    102 102   CO2 24 25 25   BUN 17 21* 20   CREATININE 0.7 0.7 0.7   CALCIUM 9.3 9.3 9.6     Recent Labs     11/17/21  0855 11/18/21  0839   AST 15 17   ALT 16 16   BILITOT 0.4 0.3   ALKPHOS 102 103     No results for input(s): INR in the last 72 hours.   Recent Labs     11/17/21  0855 11/17/21  1530 11/18/21  0839   TROPONINI <0.01 <0.01 <0.01       Urinalysis:      Lab Results   Component Value Date    NITRU Negative 10/27/2021    WBCUA  10/27/2021    BACTERIA 2+ 10/27/2021    RBCUA 0-2 10/27/2021    BLOODU Negative 10/27/2021    SPECGRAV >=1.030 10/27/2021    GLUCOSEU Negative 10/27/2021       Radiology:  XR CHEST PORTABLE   Final Result   No acute pulmonary process. Assessment/Plan:    Active Hospital Problems    Diagnosis     Chest pain due to CAD ([facility] [address] [I25.119]      1. Chest pain - Cardiology consulted. Troponins negative. EKG reviewed. 2. Hypotension - Place hold parameters on antihypertensive medications. Continue IVF. Await cardio eval.  3. HFrEF - Cardiomyopathy from chemo? Lasix on hold due to hypotension. 4. Pulmonary emboli - Continue Eliquis. Hemoglobin stable. 5. Breast cancer - Continue Arimidex. Follow with oncology. DVT Prophylaxis: Eliquis  Diet: ADULT DIET; Regular;  No Caffeine  Code Status: Full Code Discussed with patient    PT/OT Eval Status: Consulted    Dispo - Home when stable    Quang Guzmán MD

## 2021-11-18 NOTE — PLAN OF CARE
Problem: Falls - Risk of:  Goal: Will remain free from falls  Description: Will remain free from falls  11/18/2021 0308 by Elvia Steel RN  Outcome: Ongoing     Problem: Safety:  Goal: Free from accidental physical injury  Description: Free from accidental physical injury  Outcome: Ongoing

## 2021-11-18 NOTE — PLAN OF CARE
Problem: OXYGENATION/RESPIRATORY FUNCTION  Goal: Patient will maintain patent airway  Outcome: Ongoing     Patient's EF (Ejection Fraction) is less than 40%    Heart Failure Medications:  Diuretics[de-identified] Furosemide    (One of the following REQUIRED for EF <40%/SYSTOLIC FAILURE but MAY be used in EF% >40%/DIASTOLIC FAILURE)        ACE[de-identified] Lisinopril        ARB[de-identified] None         ARNI[de-identified] None    (Beta Blockers)  NON- Evidenced Based Beta Blocker (for EF% >40%/DIASTOLIC FAILURE): None    Evidenced Based Beta Blocker::(REQUIRED for EF% <40%/SYSTOLIC FAILURE) Metoprolol SUCCinate- Toprol XL  . .................................................................................................................................................. Patient's weights and intake/output reviewed: Yes    Patient's Last Weight: 218 lbs obtained by standing scale. Difference of 0 lbs than last documented weight. Intake/Output Summary (Last 24 hours) at 11/18/2021 0444  Last data filed at 11/17/2021 2051  Gross per 24 hour   Intake 205 ml   Output    Net 205 ml       Comorbidities Reviewed Yes    Patient has a past medical history of Breast cancer ([facility] [address] Cancer ([facility] [address] and Kidney stone. >>For CHF and Comorbidity documentation on Education Time and Topics, please see Education Tab    Progressive Mobility Assessment:  What is this patient's Current Level of Mobility?: Ambulatory-Up Ad Bailey  How was this patient Mobilized today?: Edge of Bed, Up to Chair,  Up to Toilet/Shower, and Up in Room                 With Whom? Self                 Level of Difficulty/Assistance: Independent     Pt resting in bed at this time on room air. Pt denies shortness of breath. Pt without lower extremity edema.      Patient and/or Family's stated Goal of Care this Admission: reduce shortness of breath, increase activity tolerance, better understand heart failure and disease management, be more comfortable, and reduce lower extremity edema prior to

## 2021-11-18 NOTE — CONSULTS
[address] 1945    History:  Past Medical History:   Diagnosis Date    [facility] ([facility] [address]     Cancer ([facility] [address]     Kidney stone        ECHO:  10/23/21 EF 25-30%  Iron Saturation:  10/28/21  14    ACE/ARB: Lisinopril 2.5 mg daily    BB: Toprol XL  25mg daily     Last Hospital Admission:  10/27/21  CHF  Discharge plans: To return home    Family Present: none  Advanced Directives: patient full code  Patient's stated goal of care: be more comfortable prior to discharge; long term goals include symptom management  Lifestyle goal discussed: daily weights, diet/fluid restrictions, symptom managment    Patient sitting in bed at this time on room air. Pt denies shortness of breath; no complaints of chest pain. Pt states she lives at home. Mentioned her diet largely consists of whatever she can eat. States she is still having difficulty with her appetite due to chemo. Pt states she drinks \"not enough\" of fluid per day. States she takes all her home medications as prescribed. Patient has a scale at home. Patient denies concerns paying for medications. Spoke to patient for CHF education. Pt able to verbalize dietary/fluid restrictions. Verbalizing much concern with the IV Fluids she is receiving for Hypotension. Writer was able to offer reassurance with monitoring. Provided patient with CHF book, fluid management cup, and symptom tracker magnet.      Patient recent weights and intake/output reviewed:    Patient Vitals for the past 96 hrs (Last 3 readings):   Weight   11/18/21 0315 118 lb (53.5 kg)   11/17/21 1530 118 lb (53.5 kg)   11/17/21 0816 118 lb (53.5 kg)         Intake/Output Summary (Last 24 hours) at 11/18/2021 1036  Last data filed at 11/18/2021 1000  Gross per 24 hour   Intake 325 ml   Output    Net 325 ml       Notified patient to call the doctor post discharge if she experiences shortness of breath, chest pain, swelling, cough, or weight gain of 2-3 pounds in a day / 5 pounds in a week. Also notified patient to call the doctor if she feels dizzy, increased fatigue, decreased or difficulty urinating. Reviewed the red, yellow, green zones of heart failure self management. Encouraged patient to call the MD with early signs of an acute heart failure exacerbation or when in the yellow zone. Patient provided with both written and verbal education on CHF signs/symptoms, causes, discharge medications, daily weights, low sodium diet, activity, fluid restriction, and follow-up. Pt verbalized understanding. No additional questions at this time. Stated she will alert her nurse with any questions. PATIENT/CAREGIVER TEACHING:    Level of patient/caregiver understanding able to:   Krishna ] Verbalize understanding [ ] Demonstrate understanding [ ] Teach back   [ ] Needs reinforcement [ ] Other:     Education Time: 20 min    Recommendations:   1. Encourage follow-up appointment compliance. 2. Educate further on fluid restriction 48 oz - 64 oz during inpatient stay so he can understand how to measure intake at home. 3. Review sodium restrictions. Encourage patient to not add table salt to her foods and avoid foods that are high in sodium. 4. Continue to educate on S/S. Stress the importance of calling the MD with the earliest signs of an acute exacerbation. 5. Emphasize daily weights - instruct patient to call the MD if she gains 2-3 lb in a day or 5 lb in a week. 6. Provided patient with CHF Resource Line for questions and concerns.        Anna Villarreal RN   11/18/2021 10:36 AM

## 2021-11-18 NOTE — PROGRESS NOTES
Physician Progress Note      PATIENTTrudy Broom  CSN #:                  [id number]  :                       1945  ADMIT DATE:       2021 8:06 AM  DISCH DATE:  RESPONDING  PROVIDER #:        Jeffery May          QUERY TEXT:    Pt admitted with chest pain. Noted documentation severe malnutrition on   dietician consult note. If possible, please document in progress notes and   discharge summary:    The medical record reflects the following:  Risk Factors: Breast cancer, CHF, PE  Clinical Indicators: Per dietician consult note \"Malnutrition Status:  Severe   malnutrition. Pt nutritionally compromised AEB weight loss of 22% over the   past 6mo and severe muscle mass loss (orbital, buccal) and body fat loss   (clavicles). BMI 18.21  Treatment: Dietician consult, nutritional supplements, I&O's, serial labs,   supportive care. Options provided:  -- Severe malnutrition confirmed present on admission  -- Severe malnutrition ruled out  -- Other - I will add my own diagnosis  -- Disagree - Not applicable / Not valid  -- Disagree - Clinically unable to determine / Unknown  -- Refer to Clinical Documentation Reviewer    PROVIDER RESPONSE TEXT:    The diagnosis of severe malnutrition was confirmed as present on admission.     Query created by: Mack Cortez on 2021 2:17 PM      Electronically signed by:  Jeffery May 2021 2:46 PM

## 2021-11-18 NOTE — PLAN OF CARE
Problem: Nutrition  Goal: Optimal nutrition therapy  Outcome: Ongoing  Note: Nutrition Problem #1: Severe malnutrition  Intervention: Food and/or Nutrient Delivery: Continue Current Diet, Start Oral Nutrition Supplement  Nutritional Goals: PO intakes greater than 50% of meals and ONS w/o further weight loss

## 2021-11-18 NOTE — PLAN OF CARE
Problem: Falls - Risk of:  Goal: Will remain free from falls  Description: Will remain free from falls  Outcome: Ongoing     Problem: Infection:  Goal: Will remain free from infection  Description: Will remain free from infection  Outcome: Ongoing     Problem: OXYGENATION/RESPIRATORY FUNCTION  Goal: Patient will maintain patent airway  11/18/2021 1729 by Zaida Thayer RN  Outcome: Ongoing    Patient's EF (Ejection Fraction) is less than 40%    Heart Failure Medications:  Diuretics[de-identified] Furosemide    (One of the following REQUIRED for EF </= 40%/SYSTOLIC FAILURE but MAY be used in EF% >40%/DIASTOLIC FAILURE)        ACE[de-identified] Lisinopril        ARB[de-identified] None         ARNI[de-identified] None    (Beta Blockers)  NON- Evidenced Based Beta Blocker (for EF% >40%/DIASTOLIC FAILURE): None    Evidenced Based Beta Blocker::(REQUIRED for EF% <40%/SYSTOLIC FAILURE) Metoprolol SUCCinate- Toprol XL  . .................................................................................................................................................. Patient's weights and intake/output reviewed: Yes    Patient's Last Weight: 118 lbs obtained by standing scale. Difference of 0 lbs more than last documented weight. Intake/Output Summary (Last 24 hours) at 11/18/2021 1730  Last data filed at 11/18/2021 1339  Gross per 24 hour   Intake 565 ml   Output 200 ml   Net 365 ml       Comorbidities Reviewed Yes    Patient has a past medical history of Breast cancer ([address] Cancer ([facility] [address] and Kidney stone. >>For CHF and Comorbidity documentation on Education Time and Topics, please see Education Tab    Progressive Mobility Assessment:  What is this patient's Current Level of Mobility?: Ambulatory- with Assistance  How was this patient Mobilized today?: Edge of Bed, Up to Chair, Bedside Commode, and  Up to Toilet/Shower, ambulated  ft                 With Whom?  Nurse, PCA, PT, OT, and Self                 Level of Difficulty/Assistance: 1x Assist     Pt resting in bed at this time on room air. Pt denies shortness of breath. Pt without lower extremity edema.      Patient and/or Family's stated Goal of Care this Admission: reduce shortness of breath, increase activity tolerance, better understand heart failure and disease management, be more comfortable, and reduce lower extremity edema prior to discharge        :      Problem: Nutrition  Goal: Optimal nutrition therapy  Outcome: Ongoing  Note: Nutrition Problem #1: Severe malnutrition  Intervention: Food and/or Nutrient Delivery: Continue Current Diet, Start Oral Nutrition Supplement  Nutritional Goals: PO intakes greater than 50% of meals and ONS w/o further weight loss

## 2021-11-18 NOTE — CONSULTS
Related Findings:  Appears frail      Wounds:  None       Current Nutrition Therapies:    ADULT DIET; Regular;  No Caffeine  ADULT ORAL NUTRITION SUPPLEMENT; Breakfast, Lunch, Dinner; Standard 4 oz Oral Supplement  ADULT ORAL NUTRITION SUPPLEMENT; Lunch, Dinner; Fortified Pudding Oral Supplement    Anthropometric Measures:  · Height: 5' 7.5\" (171.5 cm)  · Current Body Weight: 118 lb (53.5 kg)   · Usual Body Weight: 160 lb (72.6 kg)     · Ideal Body Weight: 138 lbs; % Ideal Body Weight 85.5 %   · BMI: 18.2  · BMI Categories: Underweight (BMI less than 18.5)       Nutrition Diagnosis:   · Severe malnutrition related to increase demand for energy/nutrients, inadequate protein-energy intake as evidenced by weight loss greater than or equal to 10% in 6 months, poor intake prior to admission, severe muscle loss, severe loss of subcutaneous fat    Nutrition Interventions:   Food and/or Nutrient Delivery:  Continue Current Diet, Start Oral Nutrition Supplement  Nutrition Education/Counseling:  Education not appropriate   Coordination of Nutrition Care:  Continue to monitor while inpatient    Goals:  PO intakes greater than 50% of meals and ONS w/o further weight loss       Nutrition Monitoring and Evaluation:   Behavioral-Environmental Outcomes:  None Identified   Food/Nutrient Intake Outcomes:  Food and Nutrient Intake, Supplement Intake  Physical Signs/Symptoms Outcomes:  Biochemical Data, Nutrition Focused Physical Findings, Weight     Discharge Planning:    Continue current diet     Electronically signed by Carole Araiza RD on 11/18/21 at 10:47 AM EST    Contact: [phone]

## 2021-11-18 NOTE — PROGRESS NOTES
Physical Therapy/Occupational Therapy  Orders received, chart reviewed. Discussed case with RN. Pt currently with low BP therefore will hold therapy. Thank you.    Johny Canseco, PT, DPT  Naveed Snow, OTR/L

## 2021-11-18 NOTE — PROGRESS NOTES
Secure message sent to Annabel Howell MD: Patient's BP was soft this AM and she was symptomatic. Dr. Annabel Howell ordered labs and fluids for the pt.

## 2021-11-19 ENCOUNTER — APPOINTMENT (OUTPATIENT)
Dept: CARDIAC CATH/INVASIVE PROCEDURES | Age: 76
DRG: 286 | End: 2021-11-19
Payer: MEDICARE

## 2021-11-19 LAB
ANION GAP SERPL CALCULATED.3IONS-SCNC: 11 MMOL/L (ref 3–16)
APTT: 106.8 SEC (ref 26.2–38.6)
BUN BLDV-MCNC: 18 MG/DL (ref 7–20)
CALCIUM SERPL-MCNC: 9.2 MG/DL (ref 8.3–10.6)
CHLORIDE BLD-SCNC: 104 MMOL/L (ref 99–110)
CO2: 23 MMOL/L (ref 21–32)
CREAT SERPL-MCNC: <0.5 MG/DL (ref 0.6–1.2)
GFR AFRICAN AMERICAN: >60
GFR NON-AFRICAN AMERICAN: >60
GLUCOSE BLD-MCNC: 92 MG/DL (ref 70–99)
IRON SATURATION: 12 % (ref 15–50)
IRON: 28 UG/DL (ref 37–145)
MAGNESIUM: 2 MG/DL (ref 1.8–2.4)
POTASSIUM SERPL-SCNC: 3.6 MMOL/L (ref 3.5–5.1)
SARS-COV-2, NAAT: NOT DETECTED
SODIUM BLD-SCNC: 138 MMOL/L (ref 136–145)
TOTAL IRON BINDING CAPACITY: 233 UG/DL (ref 260–445)

## 2021-11-19 PROCEDURE — 80048 BASIC METABOLIC PNL TOTAL CA: CPT

## 2021-11-19 PROCEDURE — C1894 INTRO/SHEATH, NON-LASER: HCPCS

## 2021-11-19 PROCEDURE — 97530 THERAPEUTIC ACTIVITIES: CPT

## 2021-11-19 PROCEDURE — 97166 OT EVAL MOD COMPLEX 45 MIN: CPT

## 2021-11-19 PROCEDURE — 6360000002 HC RX W HCPCS: Performed by: INTERNAL MEDICINE

## 2021-11-19 PROCEDURE — 85730 THROMBOPLASTIN TIME PARTIAL: CPT

## 2021-11-19 PROCEDURE — 93458 L HRT ARTERY/VENTRICLE ANGIO: CPT | Performed by: INTERNAL MEDICINE

## 2021-11-19 PROCEDURE — 85347 COAGULATION TIME ACTIVATED: CPT

## 2021-11-19 PROCEDURE — 97162 PT EVAL MOD COMPLEX 30 MIN: CPT

## 2021-11-19 PROCEDURE — 93571 IV DOP VEL&/PRESS C FLO 1ST: CPT | Performed by: INTERNAL MEDICINE

## 2021-11-19 PROCEDURE — 5A2204Z RESTORATION OF CARDIAC RHYTHM, SINGLE: ICD-10-PCS | Performed by: INTERNAL MEDICINE

## 2021-11-19 PROCEDURE — 6370000000 HC RX 637 (ALT 250 FOR IP): Performed by: NURSE PRACTITIONER

## 2021-11-19 PROCEDURE — 2580000003 HC RX 258

## 2021-11-19 PROCEDURE — 6370000000 HC RX 637 (ALT 250 FOR IP): Performed by: INTERNAL MEDICINE

## 2021-11-19 PROCEDURE — 36415 COLL VENOUS BLD VENIPUNCTURE: CPT

## 2021-11-19 PROCEDURE — 2500000003 HC RX 250 WO HCPCS

## 2021-11-19 PROCEDURE — 2709999900 HC NON-CHARGEABLE SUPPLY

## 2021-11-19 PROCEDURE — 1200000000 HC SEMI PRIVATE

## 2021-11-19 PROCEDURE — 2580000003 HC RX 258: Performed by: INTERNAL MEDICINE

## 2021-11-19 PROCEDURE — 6360000002 HC RX W HCPCS

## 2021-11-19 PROCEDURE — B2111ZZ FLUOROSCOPY OF MULTIPLE CORONARY ARTERIES USING LOW OSMOLAR CONTRAST: ICD-10-PCS | Performed by: INTERNAL MEDICINE

## 2021-11-19 PROCEDURE — 93458 L HRT ARTERY/VENTRICLE ANGIO: CPT

## 2021-11-19 PROCEDURE — C1769 GUIDE WIRE: HCPCS

## 2021-11-19 PROCEDURE — 93571 IV DOP VEL&/PRESS C FLO 1ST: CPT

## 2021-11-19 PROCEDURE — 4A023N7 MEASUREMENT OF CARDIAC SAMPLING AND PRESSURE, LEFT HEART, PERCUTANEOUS APPROACH: ICD-10-PCS | Performed by: INTERNAL MEDICINE

## 2021-11-19 PROCEDURE — B2151ZZ FLUOROSCOPY OF LEFT HEART USING LOW OSMOLAR CONTRAST: ICD-10-PCS | Performed by: INTERNAL MEDICINE

## 2021-11-19 PROCEDURE — 83735 ASSAY OF MAGNESIUM: CPT

## 2021-11-19 PROCEDURE — 87635 SARS-COV-2 COVID-19 AMP PRB: CPT

## 2021-11-19 PROCEDURE — 6360000004 HC RX CONTRAST MEDICATION

## 2021-11-19 PROCEDURE — C1887 CATHETER, GUIDING: HCPCS

## 2021-11-19 RX ORDER — SODIUM CHLORIDE 9 MG/ML
INJECTION, SOLUTION INTRAVENOUS CONTINUOUS
Status: ACTIVE | OUTPATIENT
Start: 2021-11-19 | End: 2021-11-19

## 2021-11-19 RX ORDER — METOPROLOL SUCCINATE 25 MG/1
25 TABLET, EXTENDED RELEASE ORAL DAILY
Status: DISCONTINUED | OUTPATIENT
Start: 2021-11-20 | End: 2021-11-21 | Stop reason: HOSPADM

## 2021-11-19 RX ORDER — ACETAMINOPHEN 325 MG/1
650 TABLET ORAL EVERY 4 HOURS PRN
Status: DISCONTINUED | OUTPATIENT
Start: 2021-11-19 | End: 2021-11-21 | Stop reason: HOSPADM

## 2021-11-19 RX ORDER — FENTANYL CITRATE 50 UG/ML
INJECTION, SOLUTION INTRAMUSCULAR; INTRAVENOUS
Status: COMPLETED | OUTPATIENT
Start: 2021-11-19 | End: 2021-11-19

## 2021-11-19 RX ORDER — SODIUM CHLORIDE 9 MG/ML
25 INJECTION, SOLUTION INTRAVENOUS PRN
Status: DISCONTINUED | OUTPATIENT
Start: 2021-11-19 | End: 2021-11-21 | Stop reason: HOSPADM

## 2021-11-19 RX ORDER — HEPARIN SODIUM 1000 [USP'U]/ML
INJECTION, SOLUTION INTRAVENOUS; SUBCUTANEOUS
Status: COMPLETED | OUTPATIENT
Start: 2021-11-19 | End: 2021-11-19

## 2021-11-19 RX ORDER — MIDAZOLAM HYDROCHLORIDE 5 MG/ML
INJECTION INTRAMUSCULAR; INTRAVENOUS
Status: COMPLETED | OUTPATIENT
Start: 2021-11-19 | End: 2021-11-19

## 2021-11-19 RX ORDER — SODIUM CHLORIDE 0.9 % (FLUSH) 0.9 %
5-40 SYRINGE (ML) INJECTION EVERY 12 HOURS SCHEDULED
Status: DISCONTINUED | OUTPATIENT
Start: 2021-11-19 | End: 2021-11-21 | Stop reason: HOSPADM

## 2021-11-19 RX ORDER — HEPARIN SODIUM 10000 [USP'U]/100ML
800 INJECTION, SOLUTION INTRAVENOUS CONTINUOUS
Status: DISCONTINUED | OUTPATIENT
Start: 2021-11-19 | End: 2021-11-19

## 2021-11-19 RX ORDER — LISINOPRIL 2.5 MG/1
2.5 TABLET ORAL DAILY
Status: DISCONTINUED | OUTPATIENT
Start: 2021-11-20 | End: 2021-11-20

## 2021-11-19 RX ORDER — SODIUM CHLORIDE 0.9 % (FLUSH) 0.9 %
5-40 SYRINGE (ML) INJECTION PRN
Status: DISCONTINUED | OUTPATIENT
Start: 2021-11-19 | End: 2021-11-21 | Stop reason: HOSPADM

## 2021-11-19 RX ORDER — TRAMADOL HYDROCHLORIDE 50 MG/1
50 TABLET ORAL EVERY 6 HOURS PRN
Status: DISCONTINUED | OUTPATIENT
Start: 2021-11-19 | End: 2021-11-21 | Stop reason: HOSPADM

## 2021-11-19 RX ADMIN — MIDAZOLAM HYDROCHLORIDE 1 MG: 5 INJECTION INTRAMUSCULAR; INTRAVENOUS at 11:50

## 2021-11-19 RX ADMIN — APIXABAN 5 MG: 5 TABLET, FILM COATED ORAL at 20:25

## 2021-11-19 RX ADMIN — Medication 500 MG: at 15:17

## 2021-11-19 RX ADMIN — ASPIRIN 81 MG: 81 TABLET, CHEWABLE ORAL at 09:54

## 2021-11-19 RX ADMIN — ATORVASTATIN CALCIUM 40 MG: 40 TABLET, FILM COATED ORAL at 20:25

## 2021-11-19 RX ADMIN — HYDROXYZINE HYDROCHLORIDE 10 MG: 10 TABLET ORAL at 22:15

## 2021-11-19 RX ADMIN — SODIUM CHLORIDE: 9 INJECTION, SOLUTION INTRAVENOUS at 15:22

## 2021-11-19 RX ADMIN — HEPARIN SODIUM 800 UNITS/HR: 10000 INJECTION, SOLUTION INTRAVENOUS at 05:46

## 2021-11-19 RX ADMIN — HYDROXYZINE HYDROCHLORIDE 10 MG: 10 TABLET ORAL at 04:52

## 2021-11-19 RX ADMIN — TRAMADOL HYDROCHLORIDE 50 MG: 50 TABLET ORAL at 01:50

## 2021-11-19 RX ADMIN — Medication 10 ML: at 20:26

## 2021-11-19 RX ADMIN — HEPARIN SODIUM 3000 UNITS: 1000 INJECTION, SOLUTION INTRAVENOUS; SUBCUTANEOUS at 11:54

## 2021-11-19 RX ADMIN — ANASTROZOLE 1 MG: 1 TABLET ORAL at 15:17

## 2021-11-19 RX ADMIN — HEPARIN SODIUM 3000 UNITS: 1000 INJECTION, SOLUTION INTRAVENOUS; SUBCUTANEOUS at 12:13

## 2021-11-19 RX ADMIN — FENTANYL CITRATE 25 MCG: 50 INJECTION, SOLUTION INTRAMUSCULAR; INTRAVENOUS at 11:50

## 2021-11-19 RX ADMIN — Medication 3 MG: at 22:15

## 2021-11-19 ASSESSMENT — PAIN DESCRIPTION - LOCATION: LOCATION: HIP;KNEE

## 2021-11-19 ASSESSMENT — PAIN SCALES - GENERAL
PAINLEVEL_OUTOF10: 9
PAINLEVEL_OUTOF10: 10
PAINLEVEL_OUTOF10: 0
PAINLEVEL_OUTOF10: 0

## 2021-11-19 ASSESSMENT — PAIN DESCRIPTION - PAIN TYPE: TYPE: ACUTE PAIN

## 2021-11-19 ASSESSMENT — PAIN DESCRIPTION - PROGRESSION: CLINICAL_PROGRESSION: GRADUALLY WORSENING

## 2021-11-19 ASSESSMENT — PAIN DESCRIPTION - DESCRIPTORS: DESCRIPTORS: ACHING

## 2021-11-19 ASSESSMENT — PAIN DESCRIPTION - FREQUENCY: FREQUENCY: CONTINUOUS

## 2021-11-19 ASSESSMENT — PAIN DESCRIPTION - ONSET: ONSET: AWAKENED FROM SLEEP

## 2021-11-19 NOTE — PLAN OF CARE
Problem: Falls - Risk of:  Goal: Will remain free from falls  Description: Will remain free from falls  11/19/2021 0222 by Sharda Carrillo RN  Outcome: Met This Shift     Problem: Falls - Risk of:  Goal: Absence of physical injury  Description: Absence of physical injury  Outcome: Met This Shift     Problem: Daily Care:  Goal: Daily care needs are met  Description: Daily care needs are met  Outcome: Met This Shift     Problem: OXYGENATION/RESPIRATORY FUNCTION  Goal: Patient will maintain patent airway  11/19/2021 0222 by Sharda Carrillo RN  Outcome: Met This Shift     Problem: OXYGENATION/RESPIRATORY FUNCTION  Goal: Patient will achieve/maintain normal respiratory rate/effort  Description: Respiratory rate and effort will be within normal limits for the patient  Outcome: Met This Shift     Problem: HEMODYNAMIC STATUS  Goal: Patient has stable vital signs and fluid balance  Outcome: Met This Shift     Problem: Pain:  Goal: Patient's pain/discomfort is manageable  Description: Patient's pain/discomfort is manageable  Outcome: Ongoing     Problem: FLUID AND ELECTROLYTE IMBALANCE  Goal: Fluid and electrolyte balance are achieved/maintained  Outcome: Ongoing     Problem: ACTIVITY INTOLERANCE/IMPAIRED MOBILITY  Goal: Mobility/activity is maintained at optimum level for patient  Outcome: Ongoing      Patient's EF (Ejection Fraction) is less than 40%    Heart Failure Medications:  Diuretics[de-identified] None    (One of the following REQUIRED for EF </= 40%/SYSTOLIC FAILURE but MAY be used in EF% >40%/DIASTOLIC FAILURE)        ACE[de-identified] None        ARB[de-identified] None         ARNI[de-identified] None    (Beta Blockers)  NON- Evidenced Based Beta Blocker (for EF% >40%/DIASTOLIC FAILURE): None    Evidenced Based Beta Blocker::(REQUIRED for EF% <40%/SYSTOLIC FAILURE) Metoprolol SUCCinate- Toprol XL  . .................................................................................................................................................. Patient's weights and intake/output reviewed: Yes    Patient's Last Weight: 118 lbs obtained by standing scale. Difference of 0 lbs more than last documented weight. Intake/Output Summary (Last 24 hours) at 11/19/2021 0222  Last data filed at 11/18/2021 2345  Gross per 24 hour   Intake 600 ml   Output 600 ml   Net 0 ml       Comorbidities Reviewed Yes    Patient has a past medical history of Breast cancer ([facility] [address] Cancer ([facility] [address] and Kidney stone. >>For CHF and Comorbidity documentation on Education Time and Topics, please see Education Tab    Progressive Mobility Assessment:  What is this patient's Current Level of Mobility?: Ambulatory- with Assistance  How was this patient Mobilized today?: Edge of Bed, Up to Chair, Bedside Commode,  Up to Toilet/Shower, and Up in Room, ambulated 10 ft                 With Whom? Nurse, PCA, and Self                 Level of Difficulty/Assistance: 1x Assist     Pt resting in bed at this time on room air. Pt denies shortness of breath. Pt without lower extremity edema.      Patient and/or Family's stated Goal of Care this Admission: increase activity tolerance, better understand heart failure and disease management, and be more comfortable prior to discharge        :

## 2021-11-19 NOTE — PRE SEDATION
Brief Pre-Op Note/Sedation Assessment      Jhoan Kruger  1945  [id number]  1:15 PM    Planned Procedure: Cardiac Catheterization Procedure  Post Procedure Plan: Return to same level of care  Consent: I have discussed with the patient and/or the patient representative the indication, alternatives, and the possible risks and/or complications of the planned procedure and the anesthesia methods. The patient and/or patient representative appear to understand and agree to proceed. Chief Complaint:   Chest pain, cardiomyopathy    Indications for Cath Procedure:  1. Presentation:  Worsening Angina, Cardiomyopathy and LV Dysfunction  2. Anginal Classification within 2 weeks:  CCS III - Symptoms with everyday living activities, i.e., moderate limitation  3. Angina Symptoms Assessment:  Atypical Chest Pain  4. Heart Failure Class within last 2 weeks:  No symptoms  5. Cardiovascular Instability:  No    Prior Ischemic Workup/Eval:  1. Pre-Procedural Medications: Yes: ACE/ARB/ARNI, Aspirin, Beta Blockers and STATIN  2. Stress Test Completed? No    Does Patient need surgery? Cath Valve Surgery:  No    Pre-Procedure Medical History:  Vital Signs:  /60   Pulse 88   Temp 97.8 °F (36.6 °C) (Oral)   Resp 18   Ht 5' 7.5\" (1.715 m)   Wt 120 lb 3.2 oz (54.5 kg)   LMP  (LMP Unknown)   SpO2 99%   BMI 18.55 kg/m²     Allergies:     Allergies   Allergen Reactions    Bactrim [Sulfamethoxazole-Trimethoprim]      Medications:    Current Facility-Administered Medications   Medication Dose Route Frequency Provider Last Rate Last Admin    traMADol (ULTRAM) tablet 50 mg  50 mg Oral Q6H PRN Rubio [place], APRN - CNP   50 mg at 11/19/21 0150    sodium chloride flush 0.9 % injection 5-40 mL  5-40 mL IntraVENous 2 times per day Yomi Haddox, DO        sodium chloride flush 0.9 % injection 5-40 mL  5-40 mL IntraVENous PRN Yomi Haddox, DO        0.9 % sodium chloride infusion  25 mL IntraVENous PRN Yomi Haddox, DO  acetaminophen (TYLENOL) tablet 650 mg  650 mg Oral Q4H PRN Yomi Haddox, DO        [START ON 11/20/2021] metoprolol succinate (TOPROL XL) extended release tablet 25 mg  25 mg Oral Daily Yomi Haddox, DO        [START ON 11/20/2021] lisinopril (PRINIVIL;ZESTRIL) tablet 2.5 mg  2.5 mg Oral Daily Yomi Kendalldox, DO        0.9 % sodium chloride infusion   IntraVENous Continuous Yomi Kendalldox, DO        apixaban (ELIQUIS) tablet 5 mg  5 mg Oral BID Yomi Kendalldox, DO        heparin (porcine) injection 4,300 Units  4,300 Units IntraVENous PRN Yomi Kendalldox, DO        heparin (porcine) injection 2,100 Units  2,100 Units IntraVENous PRN Yomi Kendalldox, DO        aspirin chewable tablet 81 mg  81 mg Oral Daily Yomi Kendalldox, DO   81 mg at 11/19/21 0954    atorvastatin (LIPITOR) tablet 40 mg  40 mg Oral Nightly Yomi Lynx, DO        sodium chloride flush 0.9 % injection 5-40 mL  5-40 mL IntraVENous 2 times per day Marie Tena MD   10 mL at 11/18/21 2129    sodium chloride flush 0.9 % injection 5-40 mL  5-40 mL IntraVENous PRN Marie Tena MD        0.9 % sodium chloride infusion  25 mL IntraVENous PRN Marie Tena MD        acetaminophen (TYLENOL) tablet 650 mg  650 mg Oral Q6H PRN Marie Tena MD   650 mg at 11/18/21 2346    Or    acetaminophen (TYLENOL) suppository 650 mg  650 mg Rectal Q6H PRN Marie Tena MD        polyethylene glycol [facility]) packet 17 g  17 g Oral Daily PRN Marie Tena MD   17 g at 11/18/21 1638    anastrozole (ARIMIDEX) tablet 1 mg  1 mg Oral Daily Marie Tena MD   1 mg at 11/18/21 0844    Calcium Carb-Cholecalciferol 250-125 MG-UNIT TABS 500 mg  500 mg Oral Daily Marie Tena MD   500 mg at 11/18/21 2908    hydrOXYzine (ATARAX) tablet 10 mg  10 mg Oral TID PRN Marie Tena MD   10 mg at 11/19/21 0452    melatonin tablet 3 mg  3 mg Oral Nightly PRN Marie Tena MD   3 mg at 11/18/21 2134    promethazine (PHENERGAN) tablet 25 mg  25 mg Oral Q6H PRN Dilma King MD        polyethylene glycol (GLYCOLAX) powder 17 g  17 g Oral Daily Dilma King MD        nitroGLYCERIN (NITROSTAT) SL tablet 0.4 mg  0.4 mg SubLINGual Q5 Min PRN Dilma King MD        polyethylene glycol [facility]) packet 17 g  17 g Oral Daily Dilma King MD   17 g at 11/18/21 1451       Past Medical History:    Past Medical History:   Diagnosis Date    [facility] ([facility] [address]     Cancer ([facility] [address]     Kidney stone        Surgical History:    Past Surgical History:   Procedure Laterality Date    COLONOSCOPY      MRI BREAST BX USING DEVICE RIGHT Right 5/28/2021    MRI BREAST BX USING DEVICE RIGHT 5/28/2021 [facility]    MRI NEEDLE BIOPSY EACH ADDL RIGHT Right 5/28/2021    MRI NEEDLE BIOPSY EACH ADDL RIGHT 5/28/2021 [facility]    PORT SURGERY N/A 5/25/2021    RIGHT PORT A CATH PLACEMENT performed by Yareli De Jesus DO at [address] Right 5/7/2021    US BREAST BIOPSY NEEDLE ADDITIONAL RIGHT 5/7/2021 Nela Fountain MD [facility] LEFT Left 5/7/2021    US BREAST NEEDLE BIOPSY LEFT 5/7/2021 Nela Fountain MD [address] [place] [address]    US BREAST NEEDLE BIOPSY RIGHT Right 5/7/2021    US BREAST NEEDLE BIOPSY RIGHT 5/7/2021 Nela Fountain MD [facility]             Pre-Sedation:  Pre-Sedation Documentation and Exam:  I have personally completed a history, physical exam & review of systems for this patient (see notes). Prior History of Anesthesia Complications:   none    Modified Mallampati:  I (soft palate, uvula, fauces, tonsillar pillars visible)    ASA Classification:  Class 3 - A patient with severe systemic disease that limits activity but is not incapacitating    Yeison Scale:   Activity:  2 - Able to move 4 extremities voluntarily on command  Respiration:  2 - Able to breathe deeply and cough freely  Circulation:  2 - BP+/- 20mmHg of normal  Consciousness:  2 - Fully awake  Oxygen Saturation (color):  2 - Able to maintain oxygen saturation >92% on room air    Sedation/Anesthesia Plan:  Guard the patient's safety and welfare. Minimize physical discomfort and pain. Minimize negative psychological responses to treatment by providing sedation and analgesia and maximize the potential amnesia. Patient to meet pre-procedure discharge plan.     Medication Planned:  midazolam intravenously and fentanyl intravenously    Patient is an appropriate candidate for plan of sedation:   yes      Electronically signed by Donna Martinez DO on 11/19/2021 at 1:15 PM

## 2021-11-19 NOTE — PROGRESS NOTES
Occupational Therapy   Occupational Therapy Initial Assessment and treatment    Date: 2021   Patient Name: Tonie Epstein  MRN: [id number]     : 1945    Date of Service: 2021    Discharge Recommendations:  Home with Home health OT, 24 hour supervision or assist       Assessment   Performance deficits / Impairments: Decreased functional mobility ; Decreased ADL status; Decreased strength; Decreased safe awareness; Decreased cognition; Decreased endurance; Decreased balance  Assessment: Pt admitted with chest pain, dyspnea. Pt currently SPV for transfers, ADLs and mobility w/o AD, limited assessment d/t pt anxiety re: upcoming cardiac cath. Anticipate pt will be able to discharge home with HHOT, initial 24 hr and prior family assist.  Prognosis: Fair  OT Education: OT Role; Plan of Care; ADL Adaptive Strategies; Transfer Training  Patient Education: disease specific: ADls, transfers, safety  REQUIRES OT FOLLOW UP: Yes  Activity Tolerance  Activity Tolerance: Patient Tolerated treatment well  Activity Tolerance: 122/74 HR 99 O2 97% on RA, pt anxious re: upcoming procedure  Safety Devices  Safety Devices in place: Yes  Type of devices: Gait belt; Call light within reach; Left in bed (no alarms on upon entry)           Patient Diagnosis(es): The encounter diagnosis was Chest pain, unspecified type. has a past medical history of Breast cancer ([address] Cancer ([address] and Kidney stone. has a past surgical history that includes [address] RIGHT (Right, 2021); US BREAST BIOPSY W LOC DEVICE EACH ADDL LESION RIGHT (Right, 2021); US BREAST BIOPSY W LOC DEVICE 1ST LESION LEFT (Left, 2021); Colonoscopy; Port Surgery (N/A, 2021); MRI BIOPSY BREAST W LOC DEVICE RIGHT 1ST LESION (Right, 2021); and MRI BIOPSY BREAST W LOC DEVICE RIGHT EACH ADDL (Right, 2021).            Restrictions  Restrictions/Precautions  Restrictions/Precautions: Up as Tolerated    Subjective   General  Chart Reviewed: Yes  Patient assessed for rehabilitation services?: Yes  Family / Caregiver Present: No  Referring Practitioner: Carlos Bermeo MD  Diagnosis: chest pain, dyspnea  Subjective  Subjective: Pt supine, agreeable with encouragement  General Comment  Comments: RN clears for therapy  Patient Currently in Pain: No  Vital Signs  Temp: 97.2 °F (36.2 °C)  Temp Source: Oral  Pulse: 105  Heart Rate Source: Monitor  Resp: 16  BP: (!) 145/84  BP Location: Right upper arm  Patient Position: Semi fowlers  Level of Consciousness: Alert (0)  MEWS Score: 2  Patient Currently in Pain: No  Oxygen Therapy  SpO2: 99 %  Pulse Oximeter Device Mode: Intermittent  Pulse Oximeter Device Location: Finger  O2 Device: None (Room air)  Patient Observation  Observations: anxious  Social/Functional History  Social/Functional History  Lives With: Alone  Type of Home: House  Home Layout: Two level, Bed/Bath upstairs  Home Access: Stairs to enter with rails  Entrance Stairs - Number of Steps: 2-3  Bathroom Shower/Tub: Tub/Shower unit, Shower chair with back  Bathroom Toilet: Standard  Bathroom Equipment: Grab bars in shower, Hand-held shower, Toilet raiser  Bathroom Accessibility: Accessible  Home Equipment: Rolling walker  Receives Help From: Family, Home health (has had home PT since D/C home from [facility] 10/25)  ADL Assistance: Independent  Homemaking Assistance: Needs assistance  Ambulation Assistance: Independent (with RW)  Transfer Assistance: Independent (with RW)  Active : Yes (has not driven for awhile)       Objective        Orientation  Overall Orientation Status: Within Functional Limits     Balance  Sitting Balance: Independent  Standing Balance: Supervision  Standing Balance  Time: 1-2 min  Activity: functional mobility in room  Functional Mobility  Functional - Mobility Device: No device  Activity: Other  Assist Level: Supervision     Tone LIONEL FLOWERS Tone: Normotonic  Tone SOFIA BLACK Tone: Normotonic  Coordination  Movements Are Fluid And Coordinated: Yes        Transfers  Sit to stand: Supervision  Stand to sit: Supervision     Cognition  Overall Cognitive Status: [facility]  Cognition Comment: pt anxious and needs redirection        Sensation  Overall Sensation Status: WFL        LUE AROM (degrees)  LUE AROM : WFL  Left Hand AROM (degrees)  Left Hand AROM: WFL  RUE AROM (degrees)  RUE AROM : WFL  Right Hand AROM (degrees)  Right Hand AROM: WFL  LUE Strength  Gross LUE Strength: WFL  RUE Strength  Gross RUE Strength: WFL       Plan   Plan  Times per week: 2-3x/wk    AM-PAC Score        AM-PAC Inpatient Daily Activity Raw Score: 20 (11/19/21 2377)  AM-PAC Inpatient ADL T-Scale Score : 42.03 (11/19/21 0752)  ADL Inpatient CMS 0-100% Score: 38.32 (11/19/21 8661)  ADL Inpatient CMS G-Code Modifier : CJ (11/19/21 7537)    Goals  Short term goals  Time Frame for Short term goals: 1 week by 11/26  Short term goal 1: Pt will complete LB ADLs independently  Short term goal 2: Pt will complete toileting independently  Short term goal 3: Pt will tolerate standing at sink for 2-3 grooming tasks w/o fatigue  Short term goal 4: Pt will tolerate B UE ex x 20 reps w/o fatigue  Patient Goals   Patient goals : \"go home\"       Therapy Time   Individual Concurrent Group Co-treatment   Time In 0800         Time Out 0820         Minutes 20         Timed Code Treatment Minutes: 10 Minutes (10 min eval)     If pt discharges prior to next session, this note will serve as discharge summary. See case management note for discharge disposition.         Alyssa Argueta OT

## 2021-11-19 NOTE — PROCEDURES
CARDIAC CATHETERIZATION REPORT    Date of Procedure: 11/19/2021  : Shayy Lamb DO  Primary Indication: Chest pain, newly diagnosed cardiomyopathy    Procedures Performed:  1. Coronary angiography  2. Left heart catheterization  3. DFR of proximal/mid-LAD lesion  4. FFR of proximal/mid-LAD lesion  5. Electrocardioversion x2 for V-fib  6. Moderate conscious sedation     Procedural Details:  1. Access: Local anesthetic was given and access was obtained in the right radial artery using a micropuncture technique and a 6F Terumo Slender Sheath was placed without difficulty. 2. Diagnostic: A 5F TIG catheter was used to perform selective right and left coronary angiography. The 5F TIG catheter was also used to perform the left heart catheterization. No significant gradient was observed on pull-back of the catheter across the aortic valve. 3. DFR/FFR of proximal/mid-LAD: Therapeutic ACT was achieved with the administration of IV heparin. Using a 6F XB3.0 guide catheter, a COMET II pressure wire was equalized in the aorta and then advanced across the proximal/mid-LAD lesion and into the distal vessel. Resting Pd/Pa was 0.96. DFR was 0.92. After maximal hyperemia with IV adenosine at 140 mcg/kg/min the FFR was 0.90. No drift was observed on wire pull-back into the guide catheter. While initially advancing the COMET wire into the distal vessel, the patient developed ventricular fibrillation and received 2 shocks, after which she converted to sinus rhythm. Repeat angiography showed no evidence of vessel trauma or dissection and AMERICO 3 flow throughout. 4. Hemostasis: At the end of the procedure, the radial sheath was removed and a hemoband was placed over the arteriotomy site and filled with air to maintain hemostasis. 5.   Findings:  1. Hemodynamics:     A. Opening arterial pressure: 122/55 (85) mmHg      B. LVEDP: 3 mmHg    2. Coronary anatomy:  A.  Left main artery: The left main artery bifurcates into the left anterior descending artery and left circumflex artery. The left main artery has no angiographically significant disease. B. Left anterior descending artery: Transapical vessel which gives rise to 3 diagonal arteries. The LAD has a calcified, eccentric 60-70% stenosis at the junction of the proxima/mid-vessel that extends through the trifurcation of the first diagonal artery and first septal . There is another 20% stenosis in the distal LAD. The first diagonal artery is a small-medium caliber vessel with a 60-70% ostial stenosis. The second and third diagonal arteries are very small and have minor luminal irregularities. C. Left circumflex artery: Non-dominant vessel that gives rise to one large branching obtuse marginal artery. The LCx has minor luminal irregularities. The obtuse marginal artery has minor luminal irregularities. D. Right coronary artery: Dominant vessel that gives rise to the posterior descending artery and 2 posterolateral branches. The RCA has minor luminal irregularities. The posterior descending artery has minor luminal irregularities. The right posterolateral branches have minor luminal irregularities. DFR of proximal/mid-LAD:  DFR - 0.92    FFR of proximal/mid-LAD:  Baseline Pd/Pa - 0.96  Hyperemic FFR - 0.90    Technical Factors:  Complications: While initially advancing the COMET II pressure wire into the distal LAD, the patient developed ventricular fibrillation and received 2 shocks, after which she converted to sinus rhythm. Repeat angiography showed no evidence of vessel trauma or dissection and AMERICO 3 flow throughout.      Estimated blood loss: Minimal.  Radiation: Air kerma 254 mGy and 8.1 minutes of fluoroscopy  Sedation: Moderate conscious sedation was administered by qualified nursing personnel under continuous hemodynamic monitoring, starting at 11:49 AM and ending at 12:34 PM.  Medications: 2.5 mg IA verapamil, 1 mg IV Versed, 25 mcg IV Fentanyl, 6,000 units IV heparin, IV adenosine at 140 mcg/kg/min   Contrast: 100 cc of Isovue     Impression:  1. Single-vessel coronary artery disease with calcified, eccentric 60-70% proximal/mid-LAD lesion that was not physiologically significant by DFR (0.92) or FFR (0.90). 2. Non-ischemic cardiomyopathy possibly secondary to cardiotoxicity from adriamycin or Herceptin bio similar. 3. Low normal LVEDP. 4. Electrocardioversion x2 for V-fib. While initially advancing the COMET II pressure wire into the distal LAD, the patient developed ventricular fibrillation and received 2 shocks, after which she converted to sinus rhythm. Repeat angiography showed no evidence of vessel trauma or dissection and AMERICO 3 flow throughout. Plan:  1. Monitor overnight. 2. Hydrate gently with IVF. 3. Discontinue IV heparin infusion and restart Eliquis 5 mg BID this evening. 4. Continue aspirin 81 mg daily and atorvastatin 40 mg daily. 5. BP appears to have improved and suspect mild hypotensive from earlier in admission was likely secondary to hypovolemia. Will therefore recommend increasing metoprolol succinate to 25 mg BID and restarting lisinopril 2.5 mg daily. 6. Obtain outpatient cardiac MRI to further evaluate right atrial echodensity seen on TTE and for other potential non-ischemic etiologies of cardiomyopathy. 7. Follow-up with [facility] in 2 weeks.       reQall, 5 [facility]

## 2021-11-19 NOTE — PROGRESS NOTES
Physical Therapy    Facility/Department: [place] [address] REMOTE TELEMETRY  Initial Assessment & Treatment    NAME: Shante Morrison  : 1945  MRN: [id number]    Date of Service: 2021    Discharge Recommendations:  Home with Home health PT, 24 hour supervision or assist   PT Equipment Recommendations  Equipment Needed: No     Shante Morrison scored a 22/24 on the AM-PAC short mobility form. Current research shows that an AM-PAC score of 18 or greater is typically associated with a discharge to the patient's home setting. Based on the patient's AM-PAC score and their current functional mobility deficits, it is recommended that the patient have 2-3 sessions per week of Physical Therapy at d/c to increase the patient's independence. At this time, this patient demonstrates the endurance and safety to discharge home with home health PT and a follow up treatment frequency of 2-3x/wk. Please see assessment section for further patient specific details. If patient discharges prior to next session this note will serve as a discharge summary. Please see below for the latest assessment towards goals. Assessment   Body structures, Functions, Activity limitations: Decreased functional mobility ; Decreased strength; Decreased endurance; Decreased balance  Assessment: Pt is a  78 y.o. female who presents with past medical history of breast cancer (currently on chemo), recently hospitalized for bilateral pulmonary embolism and on Eliquis (discharged ), and CHF presenting to the emergency department today for chest pressure. Pt is functioning close to baseline level, however would benefit from continued home PT at D/C. Recommending initial 24hr supervision and HHPT.   Treatment Diagnosis: weakness  Prognosis: Good  Decision Making: Medium Complexity  Clinical Presentation: evolving  Barriers to Learning: none  REQUIRES PT FOLLOW UP: Yes  Activity Tolerance  Activity Tolerance: Patient Tolerated treatment well  Activity Tolerance: BP: 122/74, HR: 98bpm, SpO2: 97% on RA       Patient Diagnosis(es): The encounter diagnosis was Chest pain, unspecified type. has a past medical history of Breast cancer ([address] Cancer ([address] and Kidney stone. has a past surgical history that includes [address] RIGHT (Right, 5/7/2021); US BREAST BIOPSY W LOC DEVICE EACH ADDL LESION RIGHT (Right, 5/7/2021); US BREAST BIOPSY W LOC DEVICE 1ST LESION LEFT (Left, 5/7/2021); Colonoscopy; Port Surgery (N/A, 5/25/2021); MRI BIOPSY BREAST W LOC DEVICE RIGHT 1ST LESION (Right, 5/28/2021); and MRI BIOPSY BREAST W LOC DEVICE RIGHT EACH ADDL (Right, 5/28/2021). Restrictions  Restrictions/Precautions  Restrictions/Precautions: Up as Tolerated  Vision/Hearing  Vision: Within Functional Limits  Hearing: Within functional limits     Subjective  General  Chart Reviewed: Yes  Patient assessed for rehabilitation services?: Yes  Additional Pertinent Hx: This is a  78 y.o. female who presents with past medical history of breast cancer (currently on chemo), recently hospitalized for bilateral pulmonary embolism and on Eliquis (discharged October 25), and CHF presenting to the emergency department today for chest pressure.   Response To Previous Treatment: Not applicable  Family / Caregiver Present: No  Referring Practitioner: Jeffery Koo MD  Referral Date : 11/18/21  Diagnosis: Chest pain  Follows Commands: Within Functional Limits  Subjective  Subjective: Pt supine in bed, anxious about upcoming test, agreeable to PT eval  Pain Screening  Patient Currently in Pain: No  Vital Signs  Patient Currently in Pain: No       Orientation  Orientation  Overall Orientation Status: Within Functional Limits  Social/Functional History  Social/Functional History  Lives With: Alone  Type of Home: House  Home Layout: Two level, Bed/Bath upstairs  Home Access: Stairs to enter with rails  Entrance Stairs - Number of Steps: AM-PAC Score  AM-PAC Inpatient Mobility Raw Score : 22 (11/19/21 0853)  AM-PAC Inpatient T-Scale Score : 53.28 (11/19/21 5028)  Mobility Inpatient CMS 0-100% Score: 20.91 (11/19/21 0853)  Mobility Inpatient CMS G-Code Modifier : CJ (11/19/21 3333)          Goals  Short term goals  Time Frame for Short term goals: Until D/C  Short term goal 1: Supine to/from sit with independence  Short term goal 2: Sit to/from stand with mod I  Short term goal 3: Ambulate 150 ft with RW and mod I  Short term goal 4: Ascend/descend one flight of stairs with handrail and supervision  Long term goals  Time Frame for Long term goals : STGs=LTGs  Patient Goals   Patient goals : \"stop feeling miserable\"       Therapy Time   Individual Concurrent Group Co-treatment   Time In 0800         Time Out 0820         Minutes 20         Timed Code Treatment Minutes: JAREN Martin   Electronically signed by Briana Multani PT on 11/19/2021 at 8:53 AM

## 2021-11-19 NOTE — CONSULTS
Pharmacy to Manage Heparin Infusion per [facility]    Dx: Hx of PE (on 10/22/21)  Pt wt = 53.5 kg  Baseline anti-Xa and/or aPTT = ordered    Oral factor Xa-inhibitors may alter and elevate anti-Xa levels used for unfractionated heparin monitoring. As a result, anti-Xa monitoring is not accurate while Xa-inhibitor activity is detectable. Utilize aPTT monitoring when patient received an oral factor Xa-inhibitor (apixaban, betrixaban, edoxaban or rivaroxaban) within 72 hours prior to admission (please document last administration time). The goal is to allow a washout of oral factor Xa-inhibitors by using aPTT for 72 hours, then change to ant-Xa levels for UFH. High Dose Heparin Infusion  Heparin 80 units/kg IVP bolus followed by Heparin infusion at 18 units/kg/hr (recommended initial max dose 2100 units/hr). Recheck aPTT in 6 hours. Goal anti-Xa 0.3-0.7 IU/mL  Goal aPTT = 60-90 seconds. aPTT < 45 Heparin 80 units/kg bolus  Increase infusion by 4 units/kg/hr  aPTT 45 - 59.9   Heparin 40 units/kg bolus Increase infusion by 2 units/kg/hr  aPTT 60 - 90 No bolus No change   aPTT 90.1 - 97.5   No bolus Decrease infusion by 1 units/kg/hr  aPTT 97.6-105    No bolus Decrease infusion by 2 units/kg/hr  aPTT > 105  Hold heparin for 1 hour Decrease infusion by 3 units/kg/hr    Obtain aPTT 6 hours after bolus and 6 hours after any dose change until two consecutive therapeutic aPTT are achieved- then daily. Phuc Alvarado, KoriD 11/18/2021  7:17 PM    11/19/2021 at 0421  aPTT 106.8  sec   - Hold Heparin for 1 hour then resume at a decreased rate of _800_ units/hr. - Recheck Anti-Xa in 6 hours after restarting drip, next level at 1145.    Monae Duncan, KoriD    11/19/2021 5:03 AM

## 2021-11-20 LAB
ANION GAP SERPL CALCULATED.3IONS-SCNC: 9 MMOL/L (ref 3–16)
BUN BLDV-MCNC: 9 MG/DL (ref 7–20)
CALCIUM SERPL-MCNC: 9.1 MG/DL (ref 8.3–10.6)
CHLORIDE BLD-SCNC: 108 MMOL/L (ref 99–110)
CO2: 23 MMOL/L (ref 21–32)
CREAT SERPL-MCNC: <0.5 MG/DL (ref 0.6–1.2)
GFR AFRICAN AMERICAN: >60
GFR NON-AFRICAN AMERICAN: >60
GLUCOSE BLD-MCNC: 90 MG/DL (ref 70–99)
HCT VFR BLD CALC: 27.3 % (ref 36–48)
HEMOGLOBIN: 8.9 G/DL (ref 12–16)
MAGNESIUM: 2 MG/DL (ref 1.8–2.4)
MCH RBC QN AUTO: 29.8 PG (ref 26–34)
MCHC RBC AUTO-ENTMCNC: 32.7 G/DL (ref 31–36)
MCV RBC AUTO: 91.2 FL (ref 80–100)
PDW BLD-RTO: 16.5 % (ref 12.4–15.4)
PLATELET # BLD: 301 K/UL (ref 135–450)
PMV BLD AUTO: 6.9 FL (ref 5–10.5)
POTASSIUM SERPL-SCNC: 3.5 MMOL/L (ref 3.5–5.1)
RBC # BLD: 2.99 M/UL (ref 4–5.2)
SODIUM BLD-SCNC: 140 MMOL/L (ref 136–145)
WBC # BLD: 4.5 K/UL (ref 4–11)

## 2021-11-20 PROCEDURE — 99232 SBSQ HOSP IP/OBS MODERATE 35: CPT | Performed by: NURSE PRACTITIONER

## 2021-11-20 PROCEDURE — 6370000000 HC RX 637 (ALT 250 FOR IP): Performed by: NURSE PRACTITIONER

## 2021-11-20 PROCEDURE — 80048 BASIC METABOLIC PNL TOTAL CA: CPT

## 2021-11-20 PROCEDURE — 2580000003 HC RX 258: Performed by: INTERNAL MEDICINE

## 2021-11-20 PROCEDURE — 1200000000 HC SEMI PRIVATE

## 2021-11-20 PROCEDURE — 83735 ASSAY OF MAGNESIUM: CPT

## 2021-11-20 PROCEDURE — 6370000000 HC RX 637 (ALT 250 FOR IP): Performed by: INTERNAL MEDICINE

## 2021-11-20 PROCEDURE — 85027 COMPLETE CBC AUTOMATED: CPT

## 2021-11-20 PROCEDURE — 6370000000 HC RX 637 (ALT 250 FOR IP)

## 2021-11-20 RX ORDER — LISINOPRIL 2.5 MG/1
1.25 TABLET ORAL DAILY
Status: DISCONTINUED | OUTPATIENT
Start: 2021-11-21 | End: 2021-11-21 | Stop reason: HOSPADM

## 2021-11-20 RX ORDER — ATORVASTATIN CALCIUM 10 MG/1
20 TABLET, FILM COATED ORAL NIGHTLY
Status: DISCONTINUED | OUTPATIENT
Start: 2021-11-20 | End: 2021-11-21 | Stop reason: HOSPADM

## 2021-11-20 RX ADMIN — ASPIRIN 81 MG: 81 TABLET, CHEWABLE ORAL at 08:38

## 2021-11-20 RX ADMIN — SODIUM CHLORIDE, PRESERVATIVE FREE 10 ML: 5 INJECTION INTRAVENOUS at 21:46

## 2021-11-20 RX ADMIN — HYDROXYZINE HYDROCHLORIDE 10 MG: 10 TABLET ORAL at 04:46

## 2021-11-20 RX ADMIN — POLYETHYLENE GLYCOL 3350 17 G: 17 POWDER, FOR SOLUTION ORAL at 08:37

## 2021-11-20 RX ADMIN — ATORVASTATIN CALCIUM 20 MG: 10 TABLET, FILM COATED ORAL at 21:45

## 2021-11-20 RX ADMIN — PROMETHAZINE HYDROCHLORIDE 25 MG: 25 TABLET ORAL at 01:56

## 2021-11-20 RX ADMIN — Medication: at 09:33

## 2021-11-20 RX ADMIN — LISINOPRIL 2.5 MG: 2.5 TABLET ORAL at 08:37

## 2021-11-20 RX ADMIN — Medication 3 MG: at 21:45

## 2021-11-20 RX ADMIN — ANASTROZOLE 1 MG: 1 TABLET ORAL at 08:42

## 2021-11-20 RX ADMIN — SODIUM CHLORIDE, PRESERVATIVE FREE 10 ML: 5 INJECTION INTRAVENOUS at 08:39

## 2021-11-20 RX ADMIN — HYDROXYZINE HYDROCHLORIDE 10 MG: 10 TABLET ORAL at 21:45

## 2021-11-20 RX ADMIN — METOPROLOL SUCCINATE 25 MG: 25 TABLET, EXTENDED RELEASE ORAL at 08:38

## 2021-11-20 RX ADMIN — APIXABAN 5 MG: 5 TABLET, FILM COATED ORAL at 21:45

## 2021-11-20 RX ADMIN — Medication 500 MG: at 08:38

## 2021-11-20 RX ADMIN — APIXABAN 5 MG: 5 TABLET, FILM COATED ORAL at 08:37

## 2021-11-20 ASSESSMENT — PAIN SCALES - GENERAL
PAINLEVEL_OUTOF10: 0
PAINLEVEL_OUTOF10: 4
PAINLEVEL_OUTOF10: 0

## 2021-11-20 ASSESSMENT — PAIN DESCRIPTION - PROGRESSION
CLINICAL_PROGRESSION: GRADUALLY WORSENING

## 2021-11-20 NOTE — PROGRESS NOTES
Hospitalist Progress Note      PCP: Nerissa Hough, SOUTH - CNP    Date of Admission: 11/17/2021    Chief Complaint: Chest pain    Hospital Course:    78 y.o. female who presented to [facility] with chest pain. Patient with history of breast cancer. Patient states she had pressure on like an elephant sitting on chest. No fever or chills. No emesis. Pain radiated to shoulder. Pain was 10 out of 10. Currently 5 out of 10 and tolerable. Improved with rest. No dizziness. Patient recently admitted to hospital and discharged on 11/2. She recently had flu shot yesterday. Felt worse after shot. History of CHF. Previously treated with adriamycin. She is scheduled for cardiac MRI within the month. Subjective:     No chest pain. Feeling nauseated. No emesis. No shortness of breath. Feeling tired.     Medications:  Reviewed    Infusion Medications    sodium chloride      sodium chloride       Scheduled Medications    Lip Balm   Topical Daily    atorvastatin  20 mg Oral Nightly    [START ON 11/21/2021] lisinopril  1.25 mg Oral Daily    sodium chloride flush  5-40 mL IntraVENous 2 times per day    metoprolol succinate  25 mg Oral Daily    apixaban  5 mg Oral BID    aspirin  81 mg Oral Daily    anastrozole  1 mg Oral Daily    Calcium Carb-Cholecalciferol  500 mg Oral Daily    polyethylene glycol  17 g Oral Daily     PRN Meds: traMADol, sodium chloride flush, sodium chloride, acetaminophen, sodium chloride flush, sodium chloride, acetaminophen **OR** acetaminophen, polyethylene glycol, hydrOXYzine, melatonin, promethazine, nitroGLYCERIN      Intake/Output Summary (Last 24 hours) at 11/20/2021 1255  Last data filed at 11/20/2021 1033  Gross per 24 hour   Intake 170 ml   Output 500 ml   Net -330 ml       Physical Exam Performed:    BP (!) 105/59   Pulse 85   Temp 97.7 °F (36.5 °C) (Oral)   Resp 18   Ht 5' 7.5\" (1.715 m)   Wt 120 lb 4.8 oz (54.6 kg)   LMP  (LMP Unknown)   SpO2 95%   BMI 18.56 kg/m² Weekly Progress Note  Morales Velásquez  1980  [id number]      D) Pt attended 1 groups  this week with 1 absences. A) Staff facilitated groups and reviewed tx progress. Assessed for [facility]. R) No VAP needed at this time. Pt working on the following dimensions:  Dimension #1 - Withdrawal Potential - No concern. Level 0 Client reports weekly use of alcohol which resulted in a DUI charge. Client reports last date of use being 1/22/17. The patient is encouraged to maintain abstinence (TXPlan 1) The patient's UA results on 5/18/17 were positive for alcohol.  The patient did not report any withdrawal symptoms nor did he appear intoxicated.    Dimension #2 - Biomedical - No concern.-Level 0 Client denies current medical issues. Client denies a current primary care clinic or physician. Client has health insurance and access to healthcare services.The patient is encouraged to develop a healthy lifestyle by implementing proper nutrition, exercise, and healthy sleeping habits (TXPlan 2). Prior to treatment the patient had a metal plate put in his jaw, he reports that the both of the screws have come out and he is still having concerns about this. The patient reports he has not taken care of the screws that have fallen out of his jaw.   Dimension #3 - Emotional , Behavioral and Cognitive - No concern. Level 0  Client denies mental health diagnosis or provider. Client denies history or current psychological significant stressors. Client denies history or abuse. Client denies history or current SI/SIB. The patient did not report any emergent emotional or behavioral concerns this week. The patient did report some significant stressors due to his car and life issues at this time.  The patient reports that this played a factor into his relapse.    Dimension #4 - Treatment Resistance - minor. Level 1 Client has external motivation for treatment services through probation. Client has been using alcohol and received a DUI while on  "probation. Client refers to self as a \"social\" alcohol user however can see a pattern of how his use has caused significant problems in his life. Client reports willingness to participate in treatment services. The patient has been given his assignment packet with his use history (Complete) and written assignments(TXPlan 4).  Over the last week the patient has been late or absent to groups due to over sleeping, the patient reports a want to be in treatment however there are behaviors that are not congruent with that.    Dimension #5 - Relapse Potential - Serious concern. Level 3 Client reports longest period of abstinence from substance use has been 3.5 years due to incarceration. Client has been through treatment in the past due to marijuana use. Client had continued to use alcohol despite probation. At this time the patient is at a high vulnerability for use as he has recently used and has not been attending programming as scheduled.   Dimension #6 - Recovery Environment - Serious concern. Level 3 Client is unemployed with no form of income. Client has minimal daily structure. Client is currently residing with sister. The patient reports that he has support from his family and mainly his children.  He will be encouraged to develop daily structure that will include building sober support outside of his family, attending meetings (TXPlan 6). The patient is encouraged to attend at minimum 2 recovery meetings per week.  The patient reports that he would like to take care of his student loan debt, the patient was told that he needed to call the company and find out to get the loans into deferment rather than default.    T) Client educated on mental health. Client has completed 85 of 84 hours of program at this time. Projected discharge date is 6/13/17. Current discharge plan is relapse prevention group.     Gina Galindo [facility]        Psycho-Educational Curriculum  Date Attended  Psycho-Educational Curriculum  " General appearance: Mild discomfort, appears stated age and cooperative. HEENT:  Normal cephalic, atraumatic without obvious deformity. Pupils equal, round, and reactive to light. Extra ocular muscles intact. Conjunctivae/corneas clear. Neck: Supple, with full range of motion. No jugular venous distention. Trachea midline. Respiratory:  Normal respiratory effort. Clear to auscultation, bilaterally without Rales/Wheezes/Rhonchi. Cardiovascular:  Regular rate and rhythm with normal S1/S2 without murmurs, rubs or gallops. Abdomen: Soft, non-tender, non-distended with normal bowel sounds. Musculoskeletal:  No clubbing, cyanosis or edema bilaterally. Full range of motion without deformity. Skin: Skin color, texture, turgor normal.  No rashes or lesions. Neurologic:  Neurovascularly intact without any focal sensory/motor deficits.  Cranial nerves: II-XII intact, grossly non-focal.  Psychiatric:  Alert and oriented, thought content appropriate, normal insight  Capillary Refill: Brisk,3 seconds, normal  Peripheral Pulses: +2 palpable, equal bilaterally        Labs:   Recent Labs     11/18/21  0622 11/18/21 2030 11/20/21  0452   WBC 4.3 4.6 4.5   HGB 10.1* 9.3* 8.9*   HCT 30.1* 28.0* 27.3*    293 301     Recent Labs     11/18/21  0839 11/19/21  0421 11/20/21  0452    138 140   K 3.9 3.6 3.5    104 108   CO2 25 23 23   BUN 20 18 9   CREATININE 0.7 <0.5* <0.5*   CALCIUM 9.6 9.2 9.1     Recent Labs     11/18/21  0839   AST 17   ALT 16   BILITOT 0.3   ALKPHOS 103     Recent Labs     11/18/21  2030   INR 1.31*     Recent Labs     11/17/21  1530 11/18/21  0839 11/18/21 2030   TROPONINI <0.01 <0.01 <0.01       Urinalysis:      Lab Results   Component Value Date    NITRU Negative 11/18/2021    WBCUA 21-50 11/18/2021    BACTERIA 3+ 11/18/2021    RBCUA 0-2 11/18/2021    BLOODU Negative 11/18/2021    SPECGRAV 1.020 11/18/2021    GLUCOSEU Negative 11/18/2021       Radiology:  XR CHEST PORTABLE   Final "Date Attended    Acceptance  4/20 Shame/Guilt     1st Step  4/18 Anger/Rage     Defense Mechanisms and Self Acceptance  4/17     DSM Criteria 4/19     Spirituality 4/19     Affirmations   Mental Health       Stages of grief 5/22     Emotional intelligence  5/23     You Be The Teacher 5/24     Mental Health Jeopardy  5/25   Automatic Negative Thoughts   Anxiety     Cross Addiction   Co-Occurring Disorders     Stages of Change   Yany/Bipolar     Relapse   Trauma      Addictive Thoughts  4/26 Victim Identity     Relapse warning signs  4/24 Pleasure Unwoven  4/3   Reasons people fail in early recovery  4/25 Mental Health Research Project 4/4     No Kidding Me 2  4/5     Mental Health Jeopardy  4/6   Coping Skills   Sober Structure     Relapse Prevention   Continuum of Care     Medical Aspects  5/4 Non-12 Step Support     ACES Score 5/2 Balance 4/10     Maslow 4/12     Goals 4/13   Brain/Neurotransmitters  3/15 &5/1 Priorities  4/11   Medication Compliance   Spirituality  5/9   Alcohol/Drug Research project 3/14 Weekend Planner     Physical stress symptoms 3/13 Educational Videos     Domenic's story 3/13 1st Step     What drugs do in your body 3/16 2nd Step     Sexual Health & AIDS 5/3 Assertive Communication     Short-Term/Long-Term Effects   My name is J Carlos BERMUDEZ    Relationships  3/20 & 5/8 Cross Addiction     Assertive Communication  5/10 God As We Understood Him     Attitude 5/9     Healthy Relationships  5/11     Boundaries  3/22 HBO Relapse     Codependence    HBO What Is Addiction     Defense Mechanisms    Medical Aspects 1     Family Roles  3/22 Medical Aspects 2     Goodbye Letter   [employer]: Stress     Listening Skills  3/21     Intimacy   PBS Depression Out of the Shadows     Needs/Dealbreakers in Relationships  3/23 The Anonymous People 4/27   Socialization Skills   [place]     Feelings  3/28 Andriy Loco \"Highjacked Brain\"    Anger 3/27     Emotional Regualtion 3/29     What is in your bag 3/30   " "  ABC Model of Emotion   Pritesh Harvey Humor in [place]    Grief and Loss   The Mindfulness Movie    Healthy vs. Unhealthy Feelings   J Carlos BERMUDEZ documentary     Meditation/Mindfulness       Overconfidence/Complacency       Resentments       Primary Emotions 5/15     ABC's of CBT 5/17     \"Be This\" 5/18     Stress  5/16       " prn zofran and monitor closely. Monitor PO intake. DVT Prophylaxis: Eliquis  Diet: ADULT DIET; Regular  ADULT ORAL NUTRITION SUPPLEMENT; Breakfast, Lunch, Dinner; Standard 4 oz Oral Supplement  Code Status: Full Code Discussed with patient    PT/OT Eval Status: Consulted    Dispo - Plan to discharge tomorrow if labs ok.      Juan Carlos Tavera MD

## 2021-11-20 NOTE — PROGRESS NOTES
Alexsandra 83   Daily Progress Note    Admit Date:  11/17/2021  HPI:    Chief Complaint   Patient presents with    Chest Pain     Feels like someone is sitting on her chest; recent hx of blood clots        Interval history: Neymar Fearing in follow for chest pain. History of breast cancer, anemia, bilateral PE, CHF  Underwent cardiac cath yesterday on 11/19/2021, results below    Subjective:  Ms. Nimo Navarro not have a very well night. Complains of nausea overnight, does not feel well this morning. Denies any further chest pain. Breathing is stable.   Dizziness this morning after a.m. medications    Objective:   /70   Pulse 98   Temp 98 °F (36.7 °C) (Oral)   Resp 18   Ht 5' 7.5\" (1.715 m)   Wt 120 lb 4.8 oz (54.6 kg)   LMP  (LMP Unknown)   SpO2 97%   BMI 18.56 kg/m²       Intake/Output Summary (Last 24 hours) at 11/20/2021 1012  Last data filed at 11/19/2021 2026  Gross per 24 hour   Intake 110 ml   Output 500 ml   Net -390 ml       NYHA: III    Physical Exam:  General:  Awake, alert, NAD, frail  Skin:  Warm and dry  Neck:  JVD<8  Chest:  Clear to auscultation, no wheezes/rhonchi/rales  Telemetry: Normal sinus rate 90s no ectopy noted  Cardiovascular:  RRR S1S2, no m/r/g   Abdomen:  Soft, nontender, +bowel sounds  Extremities: No bilateral lower extremity edema    Medications:    Lip Balm   Topical Daily    sodium chloride flush  5-40 mL IntraVENous 2 times per day    metoprolol succinate  25 mg Oral Daily    lisinopril  2.5 mg Oral Daily    apixaban  5 mg Oral BID    aspirin  81 mg Oral Daily    atorvastatin  40 mg Oral Nightly    anastrozole  1 mg Oral Daily    Calcium Carb-Cholecalciferol  500 mg Oral Daily    polyethylene glycol  17 g Oral Daily      sodium chloride      sodium chloride         Lab Data:  CBC:   Recent Labs     11/18/21  0622 11/18/21  2030 11/20/21  0452   WBC 4.3 4.6 4.5   HGB 10.1* 9.3* 8.9*    293 301     BMP:    Recent Labs     11/18/21  0839 11/19/21  0421 11/20/21  0452    138 140   K 3.9 3.6 3.5   CO2 25 23 23   BUN 20 18 9   CREATININE 0.7 <0.5* <0.5*     INR:    Recent Labs     11/18/21 2030   INR 1.31*     BNP:  No results for input(s): PROBNP in the last 72 hours. Lab Results   Component Value Date    LVEF 28 10/23/2021       Testing:      TTE 8/17/21:  Conclusions   Summary   LV systolic function is normal with EF estimated at 55%.   No regional wall motion abnormalities.   Grade I diastolic dysfunction with normal LV filling pressure.   Mild mitral regurgitation.   Inadequate tricuspid regurgitation jet to estimate systolic artery pressure   (SPAP).    TTE 10/23/21:  Conclusions   Summary   Normal left ventricle size and wall thickness.   The left ventricular systolic function is severely reduced with an ejection   fraction of 25-30 %.   EF by Fay's method estimated at 27%.   Severe global hypokinesis with regional variation.   Left ventricular diastolic filling pressure are indeterminate.   The right ventricle is normal in size and function.   There is protruding echodensity from anterior RA wall seen in multiple   views, possibly artifact, but given patient's clinical history,   mass/thrombus cannot be excluded.   Mild to moderate mitral regurgitation.   Mild pulmonic and tricuspid regurgitation.   Systolic pulmonary artery pressure (SPAP) is normal and estimated at 35 mmHg   (right atrial pressure 3 mmHg).   There is a large left pleural effusion.      Compared to last echo on 8/17/2021 (EF 55%), left ventricle systolic   function has significantly declined while RV size and function appears   normal. RIght atrial findings as above - not seen on review of images prior,   unclear significance. Procedures Performed: 11/19/2021  1. Coronary angiography  2. Left heart catheterization  3. DFR of proximal/mid-LAD lesion  4. FFR of proximal/mid-LAD lesion  5. Electrocardioversion x2 for V-fib  6.  Moderate conscious sedation    Procedural Details:  1. Access: Local anesthetic was given and access was obtained in the right radial artery using a micropuncture technique and a 6F Terumo Slender Sheath was placed without difficulty. 2. Diagnostic: A 5F TIG catheter was used to perform selective right and left coronary angiography. The 5F TIG catheter was also used to perform the left heart catheterization. No significant gradient was observed on pull-back of the catheter across the aortic valve. 3. DFR/FFR of proximal/mid-LAD: Therapeutic ACT was achieved with the administration of IV heparin. Using a 6F XB3.0 guide catheter, a COMET II pressure wire was equalized in the aorta and then advanced across the proximal/mid-LAD lesion and into the distal vessel. Resting Pd/Pa was 0.96. DFR was 0.92. After maximal hyperemia with IV adenosine at 140 mcg/kg/min the FFR was 0.90. No drift was observed on wire pull-back into the guide catheter. While initially advancing the COMET wire into the distal vessel, the patient developed ventricular fibrillation and received 2 shocks, after which she converted to sinus rhythm. Repeat angiography showed no evidence of vessel trauma or dissection and AMERICO 3 flow throughout. 4. Hemostasis: At the end of the procedure, the radial sheath was removed and a hemoband was placed over the arteriotomy site and filled with air to maintain hemostasis. 5.    Findings:  1. Hemodynamics:              A. Opening arterial pressure: 122/55 (85) mmHg              B. LVEDP: 3 mmHg     2. Coronary anatomy:  A. Left main artery: The left main artery bifurcates into the left anterior descending artery and left circumflex artery. The left main artery has no angiographically significant disease. B. Left anterior descending artery: Transapical vessel which gives rise to 3 diagonal arteries.   The LAD has a calcified, eccentric 60-70% stenosis at the junction of the proxima/mid-vessel that extends through the trifurcation of the first diagonal artery and first septal . There is another 20% stenosis in the distal LAD. The first diagonal artery is a small-medium caliber vessel with a 60-70% ostial stenosis. The second and third diagonal arteries are very small and have minor luminal irregularities. C. Left circumflex artery: Non-dominant vessel that gives rise to one large branching obtuse marginal artery. The LCx has minor luminal irregularities. The obtuse marginal artery has minor luminal irregularities. D. Right coronary artery: Dominant vessel that gives rise to the posterior descending artery and 2 posterolateral branches. The RCA has minor luminal irregularities. The posterior descending artery has minor luminal irregularities. The right posterolateral branches have minor luminal irregularities.     DFR of proximal/mid-LAD:  DFR - 0.92     FFR of proximal/mid-LAD:  Baseline Pd/Pa - 0.96  Hyperemic FFR - 0.90     Technical Factors:  Complications: While initially advancing the COMET II pressure wire into the distal LAD, the patient developed ventricular fibrillation and received 2 shocks, after which she converted to sinus rhythm. Repeat angiography showed no evidence of vessel trauma or dissection and AMERICO 3 flow throughout. Estimated blood loss: Minimal.  Radiation: Air kerma 254 mGy and 8.1 minutes of fluoroscopy  Sedation: Moderate conscious sedation was administered by qualified nursing personnel under continuous hemodynamic monitoring, starting at 11:49 AM and ending at 12:34 PM.  Medications: 2.5 mg IA verapamil, 1 mg IV Versed, 25 mcg IV Fentanyl, 6,000 units IV heparin, IV adenosine at 140 mcg/kg/min   Contrast: 100 cc of Isovue      Impression:  1. Single-vessel coronary artery disease with calcified, eccentric 60-70% proximal/mid-LAD lesion that was not physiologically significant by DFR (0.92) or FFR (0.90).   2. Non-ischemic cardiomyopathy possibly secondary to cardiotoxicity from adriamycin or Herceptin bio similar. 3. Low normal LVEDP. 4. Electrocardioversion x2 for V-fib. While initially advancing the COMET II pressure wire into the distal LAD, the patient developed ventricular fibrillation and received 2 shocks, after which she converted to sinus rhythm. Repeat angiography showed no evidence of vessel trauma or dissection and AMERICO 3 flow throughout.          Plan:  1. Monitor overnight. 2. Hydrate gently with IVF. 3. Discontinue IV heparin infusion and restart Eliquis 5 mg BID this evening. 4. Continue aspirin 81 mg daily and atorvastatin 40 mg daily. 5. BP appears to have improved and suspect mild hypotensive from earlier in admission was likely secondary to hypovolemia. Will therefore recommend increasing metoprolol succinate to 25 mg BID and restarting lisinopril 2.5 mg daily. 6. Obtain outpatient cardiac MRI to further evaluate right atrial echodensity seen on TTE and for other potential non-ischemic etiologies of cardiomyopathy. 7. Follow-up with Alexsandra Angela in 2 weeks. Active Problems:    Chest pain due to CAD [facility])    Chest pain    Cardiomyopathy ([facility] [address]  Resolved Problems:    * No resolved hospital problems.  *      Assessment:  Chest pain/dyspnea  Single vessel CAD, not physiologically significant by FFR  Nonischemic cardiomyopathy  Recent bilateral pulmonary embolism  Right atrial echodensity  History of breast cancer, s/p 4 cycles Adriamycin/Cytoxan; and 7 of 12 planned cycles of Taxol with Herceptin bio similar and pertuzumab  -Recently switched to anastrazole  Anemia    Plan:  Continue aspirin statin  Eliquis  Adjust the lisinopril to 1/2 tab of the 2.5mg daily due to lower b/p and dizziness  Toprol 25 mg daily  No spironolactone due to hypotension  Adjust the lipitor 20mg daily     Core measures for HFrEF (EF <40%):  EF: 25-30%   ICD: Titrating GDMT  ACEi/ARB/ARNI: Lisinopril  BB: Toprol  Maximus: Contraindicated due to hypotension, reevaluate as outpatient  SGLT2: Evaluate as outpatient once stable on lisinopril    Okay for discharge cardiology perspective when okay with others,   Cardiology will sign off, patient has follow-up scheduled 12/8/2021    SOUTH De Los Santos - JANIYA,  11/20/2021, 10:12 AM

## 2021-11-21 VITALS
SYSTOLIC BLOOD PRESSURE: 115 MMHG | RESPIRATION RATE: 20 BRPM | HEART RATE: 79 BPM | OXYGEN SATURATION: 95 % | TEMPERATURE: 97.7 F | WEIGHT: 117.9 LBS | DIASTOLIC BLOOD PRESSURE: 68 MMHG | BODY MASS INDEX: 17.87 KG/M2 | HEIGHT: 68 IN

## 2021-11-21 LAB
ANION GAP SERPL CALCULATED.3IONS-SCNC: 10 MMOL/L (ref 3–16)
BUN BLDV-MCNC: 11 MG/DL (ref 7–20)
CALCIUM SERPL-MCNC: 9.1 MG/DL (ref 8.3–10.6)
CHLORIDE BLD-SCNC: 107 MMOL/L (ref 99–110)
CO2: 24 MMOL/L (ref 21–32)
CREAT SERPL-MCNC: <0.5 MG/DL (ref 0.6–1.2)
GFR AFRICAN AMERICAN: >60
GFR NON-AFRICAN AMERICAN: >60
GLUCOSE BLD-MCNC: 105 MG/DL (ref 70–99)
HCT VFR BLD CALC: 28.7 % (ref 36–48)
HEMOGLOBIN: 9.5 G/DL (ref 12–16)
MAGNESIUM: 2.1 MG/DL (ref 1.8–2.4)
MCH RBC QN AUTO: 30 PG (ref 26–34)
MCHC RBC AUTO-ENTMCNC: 33 G/DL (ref 31–36)
MCV RBC AUTO: 91.1 FL (ref 80–100)
PDW BLD-RTO: 16.4 % (ref 12.4–15.4)
PLATELET # BLD: 313 K/UL (ref 135–450)
PMV BLD AUTO: 7.3 FL (ref 5–10.5)
POTASSIUM SERPL-SCNC: 3.8 MMOL/L (ref 3.5–5.1)
PRO-BNP: 2298 PG/ML (ref 0–449)
RBC # BLD: 3.15 M/UL (ref 4–5.2)
SODIUM BLD-SCNC: 141 MMOL/L (ref 136–145)
WBC # BLD: 5.2 K/UL (ref 4–11)

## 2021-11-21 PROCEDURE — 6370000000 HC RX 637 (ALT 250 FOR IP): Performed by: INTERNAL MEDICINE

## 2021-11-21 PROCEDURE — 6370000000 HC RX 637 (ALT 250 FOR IP): Performed by: NURSE PRACTITIONER

## 2021-11-21 PROCEDURE — 85027 COMPLETE CBC AUTOMATED: CPT

## 2021-11-21 PROCEDURE — 80048 BASIC METABOLIC PNL TOTAL CA: CPT

## 2021-11-21 PROCEDURE — 2580000003 HC RX 258: Performed by: INTERNAL MEDICINE

## 2021-11-21 PROCEDURE — 83735 ASSAY OF MAGNESIUM: CPT

## 2021-11-21 PROCEDURE — 83880 ASSAY OF NATRIURETIC PEPTIDE: CPT

## 2021-11-21 RX ORDER — ASPIRIN 81 MG/1
81 TABLET, CHEWABLE ORAL DAILY
Qty: 30 TABLET | Refills: 3 | Status: SHIPPED | OUTPATIENT
Start: 2021-11-22

## 2021-11-21 RX ORDER — ATORVASTATIN CALCIUM 20 MG/1
20 TABLET, FILM COATED ORAL NIGHTLY
Qty: 30 TABLET | Refills: 3 | Status: SHIPPED | OUTPATIENT
Start: 2021-11-21 | End: 2021-12-06

## 2021-11-21 RX ORDER — NITROGLYCERIN 0.4 MG/1
TABLET SUBLINGUAL
Qty: 25 TABLET | Refills: 3 | Status: SHIPPED | OUTPATIENT
Start: 2021-11-21

## 2021-11-21 RX ORDER — LISINOPRIL 2.5 MG/1
1.25 TABLET ORAL DAILY
Qty: 30 TABLET | Refills: 3 | Status: SHIPPED | OUTPATIENT
Start: 2021-11-22 | End: 2021-12-28

## 2021-11-21 RX ADMIN — PROMETHAZINE HYDROCHLORIDE 25 MG: 25 TABLET ORAL at 04:20

## 2021-11-21 RX ADMIN — Medication: at 08:15

## 2021-11-21 RX ADMIN — APIXABAN 5 MG: 5 TABLET, FILM COATED ORAL at 08:12

## 2021-11-21 RX ADMIN — LISINOPRIL 1.25 MG: 2.5 TABLET ORAL at 08:12

## 2021-11-21 RX ADMIN — HYDROXYZINE HYDROCHLORIDE 10 MG: 10 TABLET ORAL at 06:28

## 2021-11-21 RX ADMIN — POLYETHYLENE GLYCOL 3350 17 G: 17 POWDER, FOR SOLUTION ORAL at 08:12

## 2021-11-21 RX ADMIN — SODIUM CHLORIDE, PRESERVATIVE FREE 10 ML: 5 INJECTION INTRAVENOUS at 08:14

## 2021-11-21 RX ADMIN — METOPROLOL SUCCINATE 25 MG: 25 TABLET, EXTENDED RELEASE ORAL at 08:12

## 2021-11-21 RX ADMIN — Medication 500 MG: at 08:12

## 2021-11-21 RX ADMIN — ASPIRIN 81 MG: 81 TABLET, CHEWABLE ORAL at 08:12

## 2021-11-21 RX ADMIN — ANASTROZOLE 1 MG: 1 TABLET ORAL at 08:13

## 2021-11-21 ASSESSMENT — PAIN SCALES - GENERAL
PAINLEVEL_OUTOF10: 6
PAINLEVEL_OUTOF10: 0

## 2021-11-21 ASSESSMENT — PAIN SCALES - WONG BAKER: WONGBAKER_NUMERICALRESPONSE: 0

## 2021-11-21 ASSESSMENT — PAIN DESCRIPTION - PROGRESSION
CLINICAL_PROGRESSION: GRADUALLY WORSENING
CLINICAL_PROGRESSION: GRADUALLY WORSENING

## 2021-11-21 ASSESSMENT — PAIN DESCRIPTION - PAIN TYPE: TYPE: CHRONIC PAIN

## 2021-11-21 ASSESSMENT — PAIN DESCRIPTION - LOCATION: LOCATION: GENERALIZED

## 2021-11-21 NOTE — DISCHARGE SUMMARY
artery: Non-dominant vessel that gives rise to one large branching obtuse marginal artery. The LCx has minor luminal irregularities. The obtuse marginal artery has minor luminal irregularities. D. Right coronary artery: Dominant vessel that gives rise to the posterior descending artery and 2 posterolateral branches. The RCA has minor luminal irregularities. The posterior descending artery has minor luminal irregularities. The right posterolateral branches have minor luminal irregularities. Chest pain improved. No shortness of breath. Nausea improved. BP improved after hydration. Patient seen by cardiology. Physical Exam Performed:     /68   Pulse 79   Temp 97.7 °F (36.5 °C) (Oral)   Resp 20   Ht 5' 7.5\" (1.715 m)   Wt 117 lb 14.4 oz (53.5 kg)   LMP  (LMP Unknown)   SpO2 95%   BMI 18.19 kg/m²       General appearance: No discomfort, appears stated age and cooperative. Dysthymic. HEENT:  Normal cephalic, atraumatic without obvious deformity. Pupils equal, round, and reactive to light.  Extra ocular muscles intact. Conjunctivae/corneas clear. Neck: Supple, with full range of motion. No jugular venous distention. Trachea midline. Respiratory:  Normal respiratory effort. Clear to auscultation, bilaterally without Rales/Wheezes/Rhonchi. Cardiovascular:  Regular rate and rhythm with normal S1/S2 without murmurs, rubs or gallops. Abdomen: Soft, non-tender, non-distended with normal bowel sounds. Musculoskeletal:  No clubbing, cyanosis or edema bilaterally.  Full range of motion without deformity. Skin: Skin color, texture, turgor normal.  No rashes or lesions. Neurologic:  Neurovascularly intact without any focal sensory/motor deficits. Cranial nerves: II-XII intact, grossly non-focal.  Psychiatric:  Alert and oriented, thought content appropriate, normal insight  Capillary Refill: Brisk,3 seconds, normal  Peripheral Pulses: +2 palpable, equal bilaterally        Labs:  For convenience and continuity at follow-up the following most recent labs are provided:      CBC:    Lab Results   Component Value Date    WBC 5.2 11/21/2021    HGB 9.5 11/21/2021    HCT 28.7 11/21/2021     11/21/2021       Renal:    Lab Results   Component Value Date     11/21/2021    K 3.8 11/21/2021    K 3.9 11/18/2021     11/21/2021    CO2 24 11/21/2021    BUN 11 11/21/2021    CREATININE <0.5 11/21/2021    CALCIUM 9.1 11/21/2021    PHOS 3.5 10/29/2021         Significant Diagnostic Studies    Radiology:   XR CHEST PORTABLE   Final Result   No acute pulmonary process. Consults:     IP CONSULT TO HOSPITALIST  IP CONSULT TO CARDIOLOGY  IP CONSULT TO HEART FAILURE NURSE/COORDINATOR  IP CONSULT TO DIETITIAN    Disposition:  Home with home health care     Condition at Discharge: Stable    Discharge Instructions/Follow-up:      Recommended Labs or Other Treatments After Discharge:   1. Encourage follow-up appointment compliance. 2. Educate further on fluid restriction 48 oz - 64 oz during inpatient stay so he can understand how to measure intake at home. 3. Review sodium restrictions. Encourage patient to not add table salt to her foods and avoid foods that are high in sodium. 4. Continue to educate on S/S. Stress the importance of calling the MD with the earliest signs of an acute exacerbation. 5. Emphasize daily weights - instruct patient to call the MD if she gains 2-3 lb in a day or 5 lb in a week. 6. Provided patient with CHF Resource Line for questions and concerns. 7. Obtain outpatient cardiac MRI to further evaluate right atrial echodensity seen on TTE and for other potential non-ischemic etiologies of cardiomyopathy. 8. Follow-up [facility] in 2 weeks. Follow up with SOUTH Lowe CNP 2 - 3 weeks  Follow up with Oncology as scheduled    Return to hospital with worsening symptoms.      Code Status:  Full Code     Activity: activity as tolerated    Diet: cardiac diet      Discharge Medications:     Current Discharge Medication List           Details   aspirin 81 MG chewable tablet Take 1 tablet by mouth daily  Qty: 30 tablet, Refills: 3      nitroGLYCERIN (NITROSTAT) 0.4 MG SL tablet up to max of 3 total doses. If no relief after 1 dose, call 911. Qty: 25 tablet, Refills: 3      apixaban (ELIQUIS) 5 MG TABS tablet Take 1 tablet by mouth 2 times daily  Qty: 60 tablet, Refills: 0      atorvastatin (LIPITOR) 20 MG tablet Take 1 tablet by mouth nightly  Qty: 30 tablet, Refills: 3              Details   lisinopril (PRINIVIL;ZESTRIL) 2.5 MG tablet Take 0.5 tablets by mouth daily  Qty: 30 tablet, Refills: 3              Details   anastrozole (ARIMIDEX) 1 MG tablet Take 1 mg by mouth      polyethylene glycol (GLYCOLAX) 17 GM/SCOOP powder Take 17 g by mouth daily  Qty: 1530 g, Refills: 1    Associated Diagnoses: Constipation, unspecified constipation type      metoprolol succinate (TOPROL XL) 25 MG extended release tablet Take 1 tablet by mouth daily  Qty: 90 tablet, Refills: 1      ondansetron (ZOFRAN) 8 MG tablet Take 1 tablet by mouth      melatonin (RA MELATONIN) 3 MG TABS tablet Take 1 tablet by mouth nightly as needed (sleep)  Qty: 20 tablet, Refills: 0      Cholecalciferol (VITAMIN D3) 5000 units TABS Take by mouth      calcium carbonate-vitamin D (CALTRATE) 600-400 MG-UNIT TABS per tab Take by mouth      hydrOXYzine (ATARAX) 10 MG tablet Take 1 tablet by mouth 3 times daily as needed for Anxiety  Qty: 90 tablet, Refills: 1    Associated Diagnoses: Anxiety             Time Spent on discharge is more than 45 minutes in the examination, evaluation, counseling and review of medications and discharge plan. Signed:    Mora Dobbs MD   11/21/2021      Thank you SOUTH Collins CNP for the opportunity to be involved in this patient's care. If you have any questions or concerns please feel free to contact me at [phone].

## 2021-11-21 NOTE — PROGRESS NOTES
Physical Therapy  Attempted to see pt this am for PT tx, per RN and pt she is going to d/c this date. Pt pleasantly declines participation d/t fatigue. Will re-attempt as schedule permits as pt is available. Thanks.   Rachelle Wilson PT, DPT

## 2021-11-21 NOTE — PROGRESS NOTES
Patient discharged. IV removed, telemetry box and leads removed and returned. Lockbox emptied. All belongings gathered and returned to patient. Discharge instructions reviewed with patient, all questions answered by RN. Prescriptions sent with patient. No further needs.

## 2021-11-21 NOTE — PROGRESS NOTES
Hospitalist Progress Note      PCP: Nerissa Hough, APRN - CNP    Date of Admission: 11/17/2021    Chief Complaint: Nausea    Hospital Course:    78 y.o. female who presented to [facility] with chest pain. Patient with history of breast cancer. Patient states she had pressure on like an elephant sitting on chest. No fever or chills. No emesis. Pain radiated to shoulder. Pain was 10 out of 10. Currently 5 out of 10 and tolerable. Improved with rest. No dizziness. Patient recently admitted to hospital and discharged on 11/2. She recently had flu shot yesterday. Felt worse after shot. History of CHF. Previously treated with adriamycin. She is scheduled for cardiac MRI within the month. Subjective:     Nausea and weakness this AM. NO emesis. No fever or chills.      Medications:  Reviewed    Infusion Medications    sodium chloride      sodium chloride       Scheduled Medications    Lip Balm   Topical Daily    atorvastatin  20 mg Oral Nightly    lisinopril  1.25 mg Oral Daily    sodium chloride flush  5-40 mL IntraVENous 2 times per day    metoprolol succinate  25 mg Oral Daily    apixaban  5 mg Oral BID    aspirin  81 mg Oral Daily    anastrozole  1 mg Oral Daily    Calcium Carb-Cholecalciferol  500 mg Oral Daily    polyethylene glycol  17 g Oral Daily     PRN Meds: traMADol, sodium chloride flush, sodium chloride, acetaminophen, sodium chloride flush, sodium chloride, acetaminophen **OR** acetaminophen, polyethylene glycol, hydrOXYzine, melatonin, promethazine, nitroGLYCERIN      Intake/Output Summary (Last 24 hours) at 11/21/2021 0928  Last data filed at 11/21/2021 0656  Gross per 24 hour   Intake 120 ml   Output 2100 ml   Net -1980 ml       Physical Exam Performed:    /68   Pulse 79   Temp 97.7 °F (36.5 °C) (Oral)   Resp 20   Ht 5' 7.5\" (1.715 m)   Wt 117 lb 14.4 oz (53.5 kg)   LMP  (LMP Unknown)   SpO2 95%   BMI 18.19 kg/m²     General appearance: No discomfort, appears stated age and cooperative. Dysthymic. HEENT:  Normal cephalic, atraumatic without obvious deformity. Pupils equal, round, and reactive to light. Extra ocular muscles intact. Conjunctivae/corneas clear. Neck: Supple, with full range of motion. No jugular venous distention. Trachea midline. Respiratory:  Normal respiratory effort. Clear to auscultation, bilaterally without Rales/Wheezes/Rhonchi. Cardiovascular:  Regular rate and rhythm with normal S1/S2 without murmurs, rubs or gallops. Abdomen: Soft, non-tender, non-distended with normal bowel sounds. Musculoskeletal:  No clubbing, cyanosis or edema bilaterally. Full range of motion without deformity. Skin: Skin color, texture, turgor normal.  No rashes or lesions. Neurologic:  Neurovascularly intact without any focal sensory/motor deficits. Cranial nerves: II-XII intact, grossly non-focal.  Psychiatric:  Alert and oriented, thought content appropriate, normal insight  Capillary Refill: Brisk,3 seconds, normal  Peripheral Pulses: +2 palpable, equal bilaterally        Labs:   Recent Labs     11/18/21 2030 11/20/21 0452 11/21/21 0450   WBC 4.6 4.5 5.2   HGB 9.3* 8.9* 9.5*   HCT 28.0* 27.3* 28.7*    301 313     Recent Labs     11/19/21 0421 11/20/21 0452 11/21/21 0450    140 141   K 3.6 3.5 3.8    108 107   CO2 23 23 24   BUN 18 9 11   CREATININE <0.5* <0.5* <0.5*   CALCIUM 9.2 9.1 9.1     No results for input(s): AST, ALT, BILIDIR, BILITOT, ALKPHOS in the last 72 hours. Recent Labs     11/18/21 2030   INR 1.31*     Recent Labs     11/18/21 2030   Loyde Paci <0.01       Urinalysis:      Lab Results   Component Value Date    NITRU Negative 11/18/2021    WBCUA 21-50 11/18/2021    BACTERIA 3+ 11/18/2021    RBCUA 0-2 11/18/2021    BLOODU Negative 11/18/2021    SPECGRAV 1.020 11/18/2021    GLUCOSEU Negative 11/18/2021       Radiology:  XR CHEST PORTABLE   Final Result   No acute pulmonary process.            Cardiology Cath Impression and Plan  1. Single-vessel coronary artery disease with calcified, eccentric 60-70% proximal/mid-LAD lesion that was not physiologically significant by DFR (0.92) or FFR (0.90). 2. Non-ischemic cardiomyopathy possibly secondary to cardiotoxicity from adriamycin or Herceptin bio similar. 3. Low normal LVEDP. 4. Electrocardioversion x2 for V-fib. While initially advancing the COMET II pressure wire into the distal LAD, the patient developed ventricular fibrillation and received 2 shocks, after which she converted to sinus rhythm. Repeat angiography showed no evidence of vessel trauma or dissection and AMERICO 3 flow throughout.          1. Monitor overnight. 2. Hydrate gently with IVF. 3. Discontinue IV heparin infusion and restart Eliquis 5 mg BID this evening. 4. Continue aspirin 81 mg daily and atorvastatin 40 mg daily. 5. BP appears to have improved and suspect mild hypotensive from earlier in admission was likely secondary to hypovolemia. Will therefore recommend increasing metoprolol succinate to 25 mg BID and restarting lisinopril 2.5 mg daily. 6. Obtain outpatient cardiac MRI to further evaluate right atrial echodensity seen on TTE and for other potential non-ischemic etiologies of cardiomyopathy. 7. Follow-up with [facility] in 2 weeks.     Assessment/Plan:    Active Hospital Problems    Diagnosis     Chest pain [R07.9]     Cardiomyopathy ([facility] [address] [I42.9]     Chest pain due to CAD ([facility] [address] [I25.119]      1. Chest pain - Cardiology consulted. S/P cath. Eliquis restarted. Cardiac MRI scheduled. 2. Hypotension - BP much improved. Med changes per cardio recommendation. 3. HFrEF - Cardiomyopathy from chemo? Lasix on hold. Restart if BP better. 4. Pulmonary emboli - Eliquis restarted. Hemoglobin improved. 5. Breast cancer - Continue Arimidex. Follow with oncology. 6. Nausea - Continue prn zofran and monitor closely. Monitor PO intake. 7. Plan for discharge if symptoms improved.      DVT Prophylaxis: Eliquis  Diet: ADULT DIET; Regular  ADULT ORAL NUTRITION SUPPLEMENT; Breakfast, Lunch, Dinner; Standard 4 oz Oral Supplement  Code Status: Full Code Discussed with patient    PT/OT Eval Status: Consulted    Dispo - Plan to discharge today if improved.      Maeve Naylor MD

## 2021-11-21 NOTE — CARE COORDINATION
CASE MANAGEMENT DISCHARGE SUMMARY      Discharge to: Home with Shaneka White      Transportation:    Family/car: yes    Confirmed discharge plan with:     Patient: yes per RN     Family:  No per pt     Facility/Agency, name: [facility] faxed                  RN, name: Lavonne Lowe RN    Note: Discharging nurse to complete CATHIE, reconcile AVS, and place final copy with patient's discharge packet. RN to ensure that written prescriptions for  Level II medications are sent with patient to the facility as per protocol.

## 2021-11-22 ENCOUNTER — FOLLOWUP TELEPHONE ENCOUNTER (OUTPATIENT)
Dept: TELEMETRY | Age: 76
End: 2021-11-22

## 2021-11-22 LAB
BLOOD CULTURE, ROUTINE: NORMAL
CULTURE, BLOOD 2: NORMAL
POC ACT LR: 215 SEC
POC ACT LR: 248 SEC
POC ACT LR: 325 SEC

## 2021-11-22 NOTE — TELEPHONE ENCOUNTER
1st Attempt; No Answer- Left HIPAA compliant voicemail with Non-Urgent Heart Failure Resource Line number for call back. Hospital follow up 11/30/21 with PCP. Delayed due to the holiday.  Tho Contreras RN

## 2021-11-24 ENCOUNTER — FOLLOWUP TELEPHONE ENCOUNTER (OUTPATIENT)
Dept: TELEMETRY | Age: 76
End: 2021-11-24

## 2021-11-24 NOTE — TELEPHONE ENCOUNTER
3rd Attempt; No Answer- Left HIPAA compliant voicemail with Non-Urgent Heart Failure Resource Line number for call back.         Tamanna Schaffer RN

## 2021-11-26 PROBLEM — N39.0 UTI (URINARY TRACT INFECTION): Status: RESOLVED | Noted: 2021-10-27 | Resolved: 2021-11-26

## 2021-11-29 NOTE — PROGRESS NOTES
Physician Progress Note      Bryant Muniz  NEGIN #:                  [id number]  :                       1945  ADMIT DATE:       2021 8:06 AM  DISCH DATE:        2021 2:30 PM  RESPONDING  PROVIDER #:        Yossi Bro          QUERY TEXT:    Pt admitted with chest pain. If possible, please document in progress notes   and discharge summary if you are evaluating and/or treating any of the   following: The medical record reflects the following:  Risk Factors: CAD, CHF, cardiomyopathy  Clinical Indicators: cards \"Single-vessel coronary artery disease with   calcified, eccentric 60-70% proximal/mid-LAD lesion that was not   physiologically significant by DFR (0.92) or FFR (0.90). 2. Non-ischemic   cardiomyopathy possibly secondary to cardiotoxicity from adriamycin or   Herceptin bio similar. \"  Treatment: cards c/s, cardiac cath, eliquis, medication management, CXR, labs    Thank you  Aaliyah Christianson RN, CRCR, CCDS  [email or web address]  Options provided:  -- Chest pain due to CAD  -- Chest pain due to non-ischemic cardiomyopathy  -- Chest pain due to recent b/l pulmonary emboli  -- Chest pain due to ***  -- Other - I will add my own diagnosis  -- Disagree - Not applicable / Not valid  -- Disagree - Clinically unable to determine / Unknown  -- Refer to Clinical Documentation Reviewer    PROVIDER RESPONSE TEXT:    This patient has CP d/t CAD.     Query created by: Zabrina Walsh on 2021 9:20 AM      Electronically signed by:  Yossi Bro 2021 3:39 PM

## 2021-11-30 ENCOUNTER — OFFICE VISIT (OUTPATIENT)
Dept: FAMILY MEDICINE CLINIC | Age: 76
End: 2021-11-30
Payer: MEDICARE

## 2021-11-30 VITALS
WEIGHT: 121 LBS | SYSTOLIC BLOOD PRESSURE: 118 MMHG | HEART RATE: 88 BPM | OXYGEN SATURATION: 98 % | DIASTOLIC BLOOD PRESSURE: 66 MMHG | HEIGHT: 68 IN | TEMPERATURE: 98 F | BODY MASS INDEX: 18.34 KG/M2

## 2021-11-30 DIAGNOSIS — G62.9 NEUROPATHY: ICD-10-CM

## 2021-11-30 DIAGNOSIS — F41.9 ANXIETY: ICD-10-CM

## 2021-11-30 DIAGNOSIS — I50.9 CONGESTIVE HEART FAILURE, UNSPECIFIED HF CHRONICITY, UNSPECIFIED HEART FAILURE TYPE (HCC): Primary | ICD-10-CM

## 2021-11-30 PROCEDURE — 1111F DSCHRG MED/CURRENT MED MERGE: CPT | Performed by: REGISTERED NURSE

## 2021-11-30 PROCEDURE — 99495 TRANSJ CARE MGMT MOD F2F 14D: CPT | Performed by: REGISTERED NURSE

## 2021-11-30 RX ORDER — GABAPENTIN 100 MG/1
100 CAPSULE ORAL 2 TIMES DAILY PRN
Qty: 60 CAPSULE | Refills: 2 | Status: SHIPPED | OUTPATIENT
Start: 2021-11-30 | End: 2022-10-04

## 2021-11-30 RX ORDER — HYDROXYZINE HYDROCHLORIDE 10 MG/1
10 TABLET, FILM COATED ORAL 3 TIMES DAILY PRN
COMMUNITY

## 2021-11-30 RX ORDER — HYDROXYZINE HYDROCHLORIDE 25 MG/1
25 TABLET, FILM COATED ORAL 3 TIMES DAILY PRN
COMMUNITY
End: 2021-11-30

## 2021-11-30 ASSESSMENT — ENCOUNTER SYMPTOMS
SHORTNESS OF BREATH: 1
GASTROINTESTINAL NEGATIVE: 1
COUGH: 0
WHEEZING: 0
CHEST TIGHTNESS: 1

## 2021-11-30 NOTE — PATIENT INSTRUCTIONS
[facility]    Start gabapentin and make appointment with [facility]. Attend appointments for ECHO and MRI as scheduled. Follow up with Dr. Yanna Vizcaino as planned.

## 2021-11-30 NOTE — PROGRESS NOTES
Post-Discharge Transitional Care Management Services or Hospital Follow Up      Luzmaria Chisholm   YOB: 1945    Date of Office Visit:  11/30/2021  Date of Hospital Admission: 11/17/21  Date of Hospital Discharge: 11/21/21  Readmission Risk Score(high >=14%.  Medium >=10%):Readmission Risk Score: 20 ( )      Care management risk score Rising risk (score 2-5) and Complex Care (Scores >=6): 1     Non face to face  following discharge, date last encounter closed (first attempt may have been earlier): *No documented post hospital discharge outreach found in the last 14 days *No documented post hospital discharge outreach found in the last 14 days    Call initiated 2 business days of discharge: *No response recorded in the last 14 days     Patient Active Problem List   Diagnosis    Perforation of sigmoid colon due to diverticulitis    Anemia    Pure hypercholesterolemia    Malignant neoplasm of right breast in female, estrogen receptor positive ([address]    Malignant neoplasm of upper-inner quadrant of left breast in female, estrogen receptor positive ([address]    Chemotherapy-induced neutropenia ([address]    Fatigue    Tachycardia    Neutropenia, drug-induced ([address]    Severe malnutrition ([address]    Neutropenia ([address]    Failure to thrive in adult    Recurrent falls    Bacteriuria    Acute pulmonary embolism without acute cor pulmonale ([address]    CHF (congestive heart failure) ([address]    CHF (congestive heart failure), NYHA class I, acute on chronic, combined ([address]    Chest pain due to CAD ([address]    Chest pain    Cardiomyopathy ([address]       Allergies   Allergen Reactions    Bactrim [Sulfamethoxazole-Trimethoprim]        Medications listed as ordered at the time of discharge from hospital  @DISCHARGEMEDSLIST(<NOROUTINE> error)@      Medications marked \"taking\" at this time  Outpatient Medications Marked as Taking for the 11/30/21 encounter (Office Visit) with SOUTH Yadav CNP   Medication Sig Dispense Refill    gabapentin (NEURONTIN) 100 MG capsule Take 1 capsule by mouth 2 times daily as needed (pain/numbness/tingling) for up to 90 days. 60 capsule 2    hydrOXYzine (ATARAX) 10 MG tablet Take 10 mg by mouth 3 times daily as needed      aspirin 81 MG chewable tablet Take 1 tablet by mouth daily 30 tablet 3    nitroGLYCERIN (NITROSTAT) 0.4 MG SL tablet up to max of 3 total doses. If no relief after 1 dose, call 911. 25 tablet 3    apixaban (ELIQUIS) 5 MG TABS tablet Take 1 tablet by mouth 2 times daily 60 tablet 0    atorvastatin (LIPITOR) 20 MG tablet Take 1 tablet by mouth nightly 30 tablet 3    lisinopril (PRINIVIL;ZESTRIL) 2.5 MG tablet Take 0.5 tablets by mouth daily 30 tablet 3    calcium carbonate-vitamin D (CALTRATE) 600-400 MG-UNIT TABS per tab Take by mouth      anastrozole (ARIMIDEX) 1 MG tablet Take 1 mg by mouth      polyethylene glycol (GLYCOLAX) 17 GM/SCOOP powder Take 17 g by mouth daily 1530 g 1    metoprolol succinate (TOPROL XL) 25 MG extended release tablet Take 1 tablet by mouth daily 90 tablet 1    ondansetron (ZOFRAN) 8 MG tablet Take 1 tablet by mouth      melatonin (RA MELATONIN) 3 MG TABS tablet Take 1 tablet by mouth nightly as needed (sleep) 20 tablet 0    Cholecalciferol (VITAMIN D3) 5000 units TABS Take by mouth          Medications patient taking as of now reconciled against medications ordered at time of hospital discharge: Yes    Chief Complaint   Patient presents with    Follow-Up from Hospital       HPI  Patient and her daughter are here for follow-up after hospitalization. She had been undergoing chemotherapy for breast cancer. She was most recently admitted for congestive heart failure and has been admitted recently for pulmonary embolism. Her main concern today is anxiety and pain in her hands and feet. She is also having a lot of anxiety. Her daughter's main concern is that her mother is not leaving the house and has been very inactive.   Patient is Coloration: Skin is not jaundiced. Findings: No erythema. Neurological:      Mental Status: She is alert and oriented to person, place, and time. Psychiatric:         Thought Content: Thought content normal.         Judgment: Judgment normal.      Comments: Anxious         Assessment/Plan:  1. Congestive heart failure, unspecified HF chronicity, unspecified heart failure type [facility])  Follow-up with Dr. Marline Cabezas as planned. Follow-up for ECHO and MRI as scheduled. - UT DISCHARGE MEDS RECONCILED W/ CURRENT OUTPATIENT MED LIST    2. Neuropathy    - gabapentin (NEURONTIN) 100 MG capsule; Take 1 capsule by mouth 2 times daily as needed (pain/numbness/tingling) for up to 90 days. Dispense: 60 capsule; Refill: 2    3. Anxiety  Hydroxyzine 10 mg p.o. as directed for anxiety. Recommend follow-up with cancer family care. Also referred to behavioral health consultants. Since of discussion with patient and her daughter regarding self-care and emotional support.         Medical Decision Making: moderate complexity

## 2021-12-02 ASSESSMENT — ENCOUNTER SYMPTOMS
ABDOMINAL PAIN: 0
VOMITING: 0
BLOOD IN STOOL: 0
CONSTIPATION: 0
COUGH: 0
SORE THROAT: 0
SHORTNESS OF BREATH: 1
RHINORRHEA: 0
NAUSEA: 0
DIARRHEA: 0
PHOTOPHOBIA: 0
EYE PAIN: 0

## 2021-12-03 ENCOUNTER — HOSPITAL ENCOUNTER (OUTPATIENT)
Dept: MRI IMAGING | Age: 76
Discharge: HOME | End: 2021-12-03
Payer: MEDICARE

## 2021-12-03 DIAGNOSIS — I50.22 CHRONIC SYSTOLIC HEART FAILURE (HCC): ICD-10-CM

## 2021-12-03 LAB
LV EF: 30 %
LVEF MODALITY: NORMAL

## 2021-12-03 PROCEDURE — 75565 CARD MRI VELOC FLOW MAPPING: CPT

## 2021-12-03 PROCEDURE — 75565 CARD MRI VELOC FLOW MAPPING: CPT | Performed by: INTERNAL MEDICINE

## 2021-12-03 PROCEDURE — 6360000004 HC RX CONTRAST MEDICATION: Performed by: INTERNAL MEDICINE

## 2021-12-03 PROCEDURE — 75561 CARDIAC MRI FOR MORPH W/DYE: CPT | Performed by: INTERNAL MEDICINE

## 2021-12-03 PROCEDURE — A9585 GADOBUTROL INJECTION: HCPCS | Performed by: INTERNAL MEDICINE

## 2021-12-03 RX ADMIN — GADOBUTROL 8 ML: 604.72 INJECTION INTRAVENOUS at 13:06

## 2021-12-06 ENCOUNTER — OFFICE VISIT (OUTPATIENT)
Dept: CARDIOLOGY CLINIC | Age: 76
End: 2021-12-06
Payer: MEDICARE

## 2021-12-06 VITALS
BODY MASS INDEX: 18.68 KG/M2 | DIASTOLIC BLOOD PRESSURE: 52 MMHG | HEART RATE: 95 BPM | WEIGHT: 119 LBS | OXYGEN SATURATION: 95 % | SYSTOLIC BLOOD PRESSURE: 108 MMHG | HEIGHT: 67 IN

## 2021-12-06 DIAGNOSIS — Z85.3 HISTORY OF BREAST CANCER: ICD-10-CM

## 2021-12-06 DIAGNOSIS — D63.8 ANEMIA OF CHRONIC DISEASE: ICD-10-CM

## 2021-12-06 DIAGNOSIS — I42.8 NON-ISCHEMIC CARDIOMYOPATHY (HCC): ICD-10-CM

## 2021-12-06 DIAGNOSIS — Z79.899 MEDICATION MANAGEMENT: ICD-10-CM

## 2021-12-06 DIAGNOSIS — I50.22 CHRONIC SYSTOLIC HEART FAILURE (HCC): ICD-10-CM

## 2021-12-06 DIAGNOSIS — I26.99 ACUTE PULMONARY EMBOLISM WITHOUT ACUTE COR PULMONALE, UNSPECIFIED PULMONARY EMBOLISM TYPE (HCC): ICD-10-CM

## 2021-12-06 DIAGNOSIS — E78.2 MIXED HYPERLIPIDEMIA: Primary | ICD-10-CM

## 2021-12-06 DIAGNOSIS — I82.452 ACUTE DEEP VEIN THROMBOSIS (DVT) OF LEFT PERONEAL VEIN (HCC): ICD-10-CM

## 2021-12-06 PROCEDURE — G8484 FLU IMMUNIZE NO ADMIN: HCPCS | Performed by: INTERNAL MEDICINE

## 2021-12-06 PROCEDURE — 1123F ACP DISCUSS/DSCN MKR DOCD: CPT | Performed by: INTERNAL MEDICINE

## 2021-12-06 PROCEDURE — G8427 DOCREV CUR MEDS BY ELIG CLIN: HCPCS | Performed by: INTERNAL MEDICINE

## 2021-12-06 PROCEDURE — 3017F COLORECTAL CA SCREEN DOC REV: CPT | Performed by: INTERNAL MEDICINE

## 2021-12-06 PROCEDURE — 1036F TOBACCO NON-USER: CPT | Performed by: INTERNAL MEDICINE

## 2021-12-06 PROCEDURE — 1111F DSCHRG MED/CURRENT MED MERGE: CPT | Performed by: INTERNAL MEDICINE

## 2021-12-06 PROCEDURE — 4040F PNEUMOC VAC/ADMIN/RCVD: CPT | Performed by: INTERNAL MEDICINE

## 2021-12-06 PROCEDURE — G8400 PT W/DXA NO RESULTS DOC: HCPCS | Performed by: INTERNAL MEDICINE

## 2021-12-06 PROCEDURE — 99214 OFFICE O/P EST MOD 30 MIN: CPT | Performed by: INTERNAL MEDICINE

## 2021-12-06 PROCEDURE — G8420 CALC BMI NORM PARAMETERS: HCPCS | Performed by: INTERNAL MEDICINE

## 2021-12-06 PROCEDURE — 1090F PRES/ABSN URINE INCON ASSESS: CPT | Performed by: INTERNAL MEDICINE

## 2021-12-06 RX ORDER — ATORVASTATIN CALCIUM 40 MG/1
40 TABLET, FILM COATED ORAL DAILY
Qty: 90 TABLET | Refills: 1 | Status: SHIPPED | OUTPATIENT
Start: 2021-12-06 | End: 2022-09-30

## 2021-12-06 RX ORDER — METOPROLOL SUCCINATE 25 MG/1
25 TABLET, EXTENDED RELEASE ORAL 2 TIMES DAILY
Qty: 60 TABLET | Refills: 3 | Status: SHIPPED | OUTPATIENT
Start: 2021-12-06 | End: 2022-10-31 | Stop reason: SDUPTHER

## 2021-12-06 ASSESSMENT — ENCOUNTER SYMPTOMS
VOMITING: 0
PHOTOPHOBIA: 0
SORE THROAT: 0
COUGH: 0
CONSTIPATION: 0
NAUSEA: 0
SHORTNESS OF BREATH: 1
RHINORRHEA: 0
ABDOMINAL PAIN: 0
DIARRHEA: 0
BLOOD IN STOOL: 0
EYE PAIN: 0

## 2021-12-06 NOTE — PROGRESS NOTES
was otherwise no additional evidence of a right atrial mass. Recently, the patient reports that her chest pain has improved. She still feels weak and has exertional dyspnea at times. She denies any lightheadedness, dizziness, palpitations, weight gain, lower extremity edema, or orthopnea. Past Medical History:   has a past medical history of Breast cancer ([facility] [address] Cancer ([facility] [address] and Kidney stone. Surgical History:   has a past surgical history that includes [address] LOC DEVICE 1ST LESION RIGHT (Right, 5/7/2021); US BREAST BIOPSY W LOC DEVICE EACH ADDL LESION RIGHT (Right, 5/7/2021); US BREAST BIOPSY W LOC DEVICE 1ST LESION LEFT (Left, 5/7/2021); Colonoscopy; Port Surgery (N/A, 5/25/2021); MRI BIOPSY BREAST W LOC DEVICE RIGHT 1ST LESION (Right, 5/28/2021); and MRI BIOPSY BREAST W LOC DEVICE RIGHT EACH ADDL (Right, 5/28/2021). Social History:   reports that she is a non-smoker but has been exposed to tobacco smoke. She has never used smokeless tobacco. She reports that she does not drink alcohol and does not use drugs. Family History:  family history includes Cancer (age of onset: 76) in her paternal grandmother; Diabetes in her father; High Cholesterol in her mother. Home Medications:  Were reviewed and are listed in nursing record and/or below  Prior to Admission medications    Medication Sig Start Date End Date Taking? Authorizing Provider   gabapentin (NEURONTIN) 100 MG capsule Take 1 capsule by mouth 2 times daily as needed (pain/numbness/tingling) for up to 90 days. 11/30/21 2/28/22  Anna Byers APRN - CNP   hydrOXYzine (ATARAX) 10 MG tablet Take 10 mg by mouth 3 times daily as needed    Historical Provider, MD   aspirin 81 MG chewable tablet Take 1 tablet by mouth daily 11/22/21   To Webb MD   nitroGLYCERIN (NITROSTAT) 0.4 MG SL tablet up to max of 3 total doses.  If no relief after 1 dose, call 911. 11/21/21   To Webb MD   apixaban [facility] 5 MG TABS tablet Take 1 tablet by mouth 2 times daily 11/21/21   Luca Quezada MD   atorvastatin (LIPITOR) 20 MG tablet Take 1 tablet by mouth nightly 11/21/21   Luca Quezada MD   lisinopril (PRINIVIL;ZESTRIL) 2.5 MG tablet Take 0.5 tablets by mouth daily 11/22/21   Luca Quezada MD   calcium carbonate-vitamin D (CALTRATE) 600-400 MG-UNIT TABS per tab Take by mouth 11/8/21   Historical Provider, MD   anastrozole (ARIMIDEX) 1 MG tablet Take 1 mg by mouth 11/8/21   Historical Provider, MD   polyethylene glycol (GLYCOLAX) 17 GM/SCOOP powder Take 17 g by mouth daily 11/15/21 12/15/21  SOUTH Calloway CNP   metoprolol succinate (TOPROL XL) 25 MG extended release tablet Take 1 tablet by mouth daily 11/9/21   Yomi Martinez DO   ondansetron (ZOFRAN) 8 MG tablet Take 1 tablet by mouth 6/21/21   Historical Provider, MD   melatonin (RA MELATONIN) 3 MG TABS tablet Take 1 tablet by mouth nightly as needed (sleep) 5/30/21   SOUTH Magallon CNP   Cholecalciferol (VITAMIN D3) 5000 units TABS Take by mouth    Historical Provider, MD        CURRENT Medications:  No current facility-administered medications for this visit. Allergies:  Bactrim [sulfamethoxazole-trimethoprim]     Review of Systems:   Review of Systems   Constitutional: Positive for fatigue. Negative for chills and fever. HENT: Negative for congestion, rhinorrhea and sore throat. Eyes: Negative for photophobia, pain and visual disturbance. Respiratory: Positive for shortness of breath. Negative for cough. Cardiovascular: Negative for chest pain and leg swelling. Gastrointestinal: Negative for abdominal pain, blood in stool, constipation, diarrhea, nausea and vomiting. Endocrine: Negative for cold intolerance and heat intolerance. Genitourinary: Negative for difficulty urinating, dysuria and hematuria. Musculoskeletal: Negative for arthralgias, joint swelling and myalgias. Skin: Negative for rash and wound. Allergic/Immunologic: Negative for environmental allergies and food allergies. Neurological: Positive for weakness. Negative for dizziness, syncope and light-headedness. Hematological: Negative for adenopathy. Does not bruise/bleed easily. Psychiatric/Behavioral: Negative for dysphoric mood. The patient is nervous/anxious. Objective   PHYSICAL EXAM:    Vitals:    12/06/21 1108   BP: (!) 108/52   Pulse: 95   SpO2: 95% on room air. General: Cachectic adult female in no acute distress. HEENT: Normocephalic, atraumatic, non-icteric, hearing intact, nares normal, mucous membranes moist.  Neck: No JVD. Heart: Regular rate and rhythm. Normal S1 and S2. No murmurs, gallops or rubs. Chest: Port noted on right. Lungs: Normal respiratory effort. Clear to auscultation bilaterally. No wheezes, rales, or rhonchi. Abdomen: Soft, non-tender. Normoactive bowel sounds. No masses or organomegaly. Skin: No rashes, wounds, or lesions. Pulses: 2+ and symmetric. Extremities: Right radial access site is soft, non-tender, and without evidence of hematoma. Right radial artery pulse is 2+. No clubbing, cyanosis, or edema. Psych: Normal mood and affect. Neuro: Alert and oriented to person, place, and time. No focal deficits noted.       Labs   CBC:   Lab Results   Component Value Date    WBC 5.2 11/21/2021    RBC 3.15 11/21/2021    HGB 9.5 11/21/2021    HCT 28.7 11/21/2021    MCV 91.1 11/21/2021    RDW 16.4 11/21/2021     11/21/2021     CMP:  Lab Results   Component Value Date     11/21/2021    K 3.8 11/21/2021    K 3.9 11/18/2021     11/21/2021    CO2 24 11/21/2021    BUN 11 11/21/2021    CREATININE <0.5 11/21/2021    GFRAA >60 11/21/2021    GFRAA >60 07/10/2012    AGRATIO 1.0 11/18/2021    LABGLOM >60 11/21/2021    GLUCOSE 105 11/21/2021    PROT 6.7 11/18/2021    PROT 7.8 07/10/2012    CALCIUM 9.1 11/21/2021    BILITOT 0.3 11/18/2021    ALKPHOS 103 11/18/2021    AST 17 11/18/2021    ALT 16 11/18/2021     PT/INR:  No results found for: PTINR  HgBA1c:  Lab Results   Component Value Date    LABA1C 5.6 09/13/2018     Lab Results   Component Value Date    TROPONINI <0.01 11/18/2021         Cardiac Data   EKG 11/18/21:  Normal sinus rhythm, non-specific T wave abnormality. ECHO:  TTE 5/26/21:  Summary   Limited echo for LVEF baseline prior to chemotherapy. Ectopy present. -- Left ventricular systolic function is low normal with a visually   estimated ejection fraction of 50%. EF estimated by Fay's method at 46   %. The left ventricle is normal in size with mild concentric hypertrophy. Mild apical hypokinesis. Indeterminate diastolic function. Systolic pulmonary artery pressure (SPAP) is normal and estimated at 19 mmHg   (right atrial pressure 3 mmHg). Frequent ectopy throughout exam.    TTE 8/16/21:  Conclusions   Summary   LV systolic function is normal with EF estimated at 55%. No regional wall motion abnormalities. Grade I diastolic dysfunction with normal LV filling pressure. Mild mitral regurgitation. Inadequate tricuspid regurgitation jet to estimate systolic artery pressure   (SPAP). TTE 10/23/21:  Conclusions   Summary   Normal left ventricle size and wall thickness. The left ventricular systolic function is severely reduced with an ejection   fraction of 25-30 %. EF by Fay's method estimated at 27%. Severe global hypokinesis with regional variation. Left ventricular diastolic filling pressure are indeterminate. The right ventricle is normal in size and function. There is protruding echodensity from anterior RA wall seen in multiple   views, possibly artifact, but given patient's clinical history,   mass/thrombus cannot be excluded. Mild to moderate mitral regurgitation. Mild pulmonic and tricuspid regurgitation. Systolic pulmonary artery pressure (SPAP) is normal and estimated at 35 mmHg   (right atrial pressure 3 mmHg).    There is a large left pleural effusion      Stress Test: N/A  Treadmill GXT 3/30/10:  Negative for evidence of ischemia. Cath:   [facility] 11/19/21:  Findings:  1. Hemodynamics:              A. Opening arterial pressure: 122/55 (85) mmHg              B. LVEDP: 3 mmHg     2. Coronary anatomy:  A. Left main artery: The left main artery bifurcates into the left anterior descending artery and left circumflex artery. The left main artery has no angiographically significant disease. B. Left anterior descending artery: Transapical vessel which gives rise to 3 diagonal arteries. The LAD has a calcified, eccentric 60-70% stenosis at the junction of the proxima/mid-vessel that extends through the trifurcation of the first diagonal artery and first septal . There is another 20% stenosis in the distal LAD. The first diagonal artery is a small-medium caliber vessel with a 60-70% ostial stenosis. The second and third diagonal arteries are very small and have minor luminal irregularities. C. Left circumflex artery: Non-dominant vessel that gives rise to one large branching obtuse marginal artery. The LCx has minor luminal irregularities. The obtuse marginal artery has minor luminal irregularities. D. Right coronary artery: Dominant vessel that gives rise to the posterior descending artery and 2 posterolateral branches. The RCA has minor luminal irregularities. The posterior descending artery has minor luminal irregularities. The right posterolateral branches have minor luminal irregularities.     DFR of proximal/mid-LAD:  DFR - 0.92     FFR of proximal/mid-LAD:  Baseline Pd/Pa - 0.96    Cardiac MRI w/ and w/o contrast 12/3/21:  CONCLUSIONS       Findings are consistent with a non ischemic/non infiltrative cardiomyopathy with severely reduced LV systolic function. No evidence of prior myocarditis. Mildly elevated native T1 values are suggestive of fibrosis.     No mass seen in the RA, there is a prominent eustachian valve.     -Normal left ventricular size and systolic function with a calculated ejection fraction of 30% by Fay's method.   -Abnormal LV global longitudinal strain (GLS)  -11%   -Abnormal and mildly elevated native T1 time.   -Normal right ventricular size and systolic function with a calculated ejection fraction of 54% by Fay's method.   -No myocardial edema by T2w imaging/mapping. (Normal myocardium/skeletal ratio - normal < 1.9). -No intracardiac shunt. Calculated Qp:Qs:0.97 (Phase contrast velocity encoding Ao/Pa)   -Right atrium: prominent eustachian leigh, no mass seen.    -Normal myocardial resting perfusion imaging.   -On early enhancement imaging, there is no evidence of LV thrombus.   -On delayed enhancement imaging, there is no abnormal hyperenhancement to suggest myocardial scar/inflammation/infiltration.       The MRI sequences and imaging planes in this study were tailored for cardiac imaging and are suboptimal for evaluation of non-cardiac structures. Other Studies:   CTPA 10/22/21:  1. multiple acute bilateral pulmonary emboli. 2. Nonspecific multifocal bilateral ground-glass opacification. In the   setting of pulmonary emboli, the differential includes pulmonary infarct with   or without superimposed atypical infectious versus inflammatory pneumonitis. 3. Mild CHF. Bilateral Venous duplex ultrasound 10/25/21:  Conclusions        Summary        Acute totally occluding deep vein thrombosis involving the left peroneal    veins.    No other evidence of deep vein or superficial vein thrombosis involving the    left lower extremity and the right common femoral vein. Chest X-ray 10/27/21: Worsening diffuse bilateral airspace disease predominantly within the   bilateral upper lobes. CTPA 10/27/21:   Within the limitations of the exam, no new or worsening pulmonary emboli   identified.  Redemonstration of pulmonary emboli involving bilateral upper   lobes, segmental/subsegmental pulmonary arteries.  Previously noted pulmonary   emboli involving right middle and right lower lobe subsegmental pulmonary   arteries are not definitely evident within the limitations.       Significantly worsened areas of consolidation and ground-glass opacity   involving lungs bilaterally, right worse than left with bilateral upper lobe   predominance, concerning for worsening pneumonia.       Bilateral pleural effusions, right worse than left, worsened. Assessment:     1. Chronic LV systolic heart failure/non-ischemic cardiomyopathy - Concern for cardiotoxicity from adriamycin or Herceptin bio similar. Recently underwent invasive coronary angiography demonstrated non-obstructive coronary artery disease. She additionally had a cardiac MRI performed on 12/3/21 that demonstrated a calculated LVEF of 30% with no evidence of myocardial scarring, infiltration, or inflammation. Currently appears clinically euvolemic. 2. DVT/Bilateral pulmonary emboli - Remains on Eliquis. RV size and systolic function are normal.  3. Right atrial echodensity - Most likely represents prominent eustachian valve better characterized on cardiac MRI. No other evidence of right atrial mass was seen on MRI. 4. Non-obstructive coronary artery disease - Underwent invasive coronary angiography on 11/19/21 was found to have a calcified, eccentric 60-70% mid-LAD stenosis that was no physiologically significant by DFR (0.92) or FFR (0.90). Will therefore continue to manage medically for now. 5. H/o breast cancer - S/p 4 cycles of Adriamycin/Cytoxan and 7 of 12 planned cycles of Taxol with Herceptin bio similar and pertuzumab. Previous regimen has been discontinued due to concern for cardiotoxicity. Follows with Dr. Alisha Muniz and was recently started on anastrozole. 6. Hyperlipidemia  7. Anemia of chronic disease      Plan:     1. Overall, patient appears to be stable from a cardiovascular standpoint.   2. Continue aspirin 81 mg daily and Eliquis 5 mg BID. 3. Increase metoprolol succinate to 25 mg BID. 4. Increase atorvastatin to 40 mg qHS. 5. Continue lisinopril 1.25 mg qHS and can attempt to gradually uptitrate further as BP allows. 6. Will plan to repeat TTE in March 2022 to re-assess cardiac function. If LVEF remains 35% or less, will refer to EP for ICD. 7. Will reach out to patient's oncologist, Dr. Albert Cooley to review overall prognosis from a breast cancer standpoint and discuss future treatment options. 8. Continue to encourage heart healthy, low sodium diet. 9. Follow-up with me in 3 months. Scribe's attestation: This note was scribed in the presence of Dr. Moshe Tolliver DO   by Dami Ramirez LPN      It is a pleasure to assist in the care of Brain Worden. Please call with any questions. The scribes documentation has been prepared under my direction and personally reviewed by me in its entirety. I confirm that the note above accurately reflects all work, treatment, procedures, and medical decision making performed by me. I, Dr. Moshe Tolliver, personally performed the services described in this documentation as scribed by Dami Ramirez LPN in my presence, and it is both accurate and complete to the best of our ability. Thank you for allowing us to participate in the care of Brain Worden. Please call me with any questions [phone]. Moshe Tolliver DO  [facility]  [phone] [facility]      I will address the patient's cardiac risk factors and adjusted pharmacologic treatment as needed. In addition, I have reinforced the need for patient directed risk factor modification. All questions and concerns were addressed to the patient/family. Alternatives to my treatment were discussed. The note was completed using EMR. Every effort was made to ensure accuracy; however, inadvertent computerized transcription errors may be present.

## 2021-12-06 NOTE — PROGRESS NOTES
[address] [facility]  [phone]      Attending Physician: TORIN Walters  Reason for Consultation/Chief Complaint:   Follow-up for LV systolic heart failure, cardiomyopathy    Subjective     Alfredo Patterson is a 78 y.o. female with a history of LV systolic heart failure, LVH, breast cancer, anemia of chronic disease, and recently diagnosed bilateral pulmonary emboli who presents for follow-up. The patient has had multiple recent admissions for bilateral pulmonary emboli and acute heart failure. A TTE from 10/22/21 showed an LVEF of 25-30% with global hypokinesis and regional variation, normal RV size and systolic function, mild-moderate mitral regurgitation, and mild pulmonic regurgitation. [place] was also a right atrial echodensity noted that may have been due to artifact, but given her recent PE and known underlying malignancy thrombus or mass could not be definitely excluded and there were plans to obtain an outpatient cardiac MRI for additional evaluation. Further ischemic evaluation had been deferred until she had had additional time to recover from her acute pulmonary emboli. 11/9/21, the patient reported that she continued to feel weak and still has shortness of breath at times. Her chest pain has improved. She denied any lower extremity edema or recent weight gain. She said that she had been compliant with her Eliquis and other medications. Today she reports that she has been having some SOB and still feeling weak. Her daughter is present with her today and she reports that she thinks her mother is doing better. She reports concern of cost for Eliquis and that she wants to come off Eliquis. Strongly told her that she is to stay on Eliquis. Most recent echo results discussed with her and her daughter today. She does have high anxiety and feels that this is contributing to some of her cardiac symptoms.  She denies chest pain, dizziness,  syncope, or edema. Past Medical History:   has a past medical history of Breast cancer ([facility] [address] Cancer ([facility] [address] and Kidney stone. Surgical History:   has a past surgical history that includes [address] LOC DEVICE 1ST LESION RIGHT (Right, 5/7/2021); US BREAST BIOPSY W LOC DEVICE EACH ADDL LESION RIGHT (Right, 5/7/2021); US BREAST BIOPSY W LOC DEVICE 1ST LESION LEFT (Left, 5/7/2021); Colonoscopy; Port Surgery (N/A, 5/25/2021); MRI BIOPSY BREAST W LOC DEVICE RIGHT 1ST LESION (Right, 5/28/2021); and MRI BIOPSY BREAST W LOC DEVICE RIGHT EACH ADDL (Right, 5/28/2021). Social History:   reports that she is a non-smoker but has been exposed to tobacco smoke. She has never used smokeless tobacco. She reports that she does not drink alcohol and does not use drugs. Family History:  family history includes Cancer (age of onset: 76) in her paternal grandmother; Diabetes in her father; High Cholesterol in her mother. Home Medications:  Were reviewed and are listed in nursing record and/or below  Prior to Admission medications    Medication Sig Start Date End Date Taking? Authorizing Provider   gabapentin (NEURONTIN) 100 MG capsule Take 1 capsule by mouth 2 times daily as needed (pain/numbness/tingling) for up to 90 days. 11/30/21 2/28/22 Yes Anna Warren [place], APRN - CNP   hydrOXYzine (ATARAX) 10 MG tablet Take 10 mg by mouth 3 times daily as needed   Yes Historical Provider, MD   aspirin 81 MG chewable tablet Take 1 tablet by mouth daily 11/22/21  Yes Will Adan MD   nitroGLYCERIN (NITROSTAT) 0.4 MG SL tablet up to max of 3 total doses.  If no relief after 1 dose, call 911. 11/21/21  Yes Will Adan MD   apixaban [place]ro[place] Stare) 5 MG TABS tablet Take 1 tablet by mouth 2 times daily 11/21/21  Yes Will Adan MD   atorvastatin (LIPITOR) 20 MG tablet Take 1 tablet by mouth nightly 11/21/21  Yes Will Adan MD   lisinopril (PRINIVIL;ZESTRIL) 2.5 MG tablet Take 0.5 tablets by mouth daily 11/22/21  Yes Marley Lefort, MD   calcium carbonate-vitamin D (CALTRATE) 600-400 MG-UNIT TABS per tab Take by mouth 11/8/21  Yes Historical Provider, MD   anastrozole (ARIMIDEX) 1 MG tablet Take 1 mg by mouth 11/8/21  Yes Historical Provider, MD   polyethylene glycol (GLYCOLAX) 17 GM/SCOOP powder Take 17 g by mouth daily 11/15/21 12/15/21 Yes SOUTH Covarrubias CNP   metoprolol succinate (TOPROL XL) 25 MG extended release tablet Take 1 tablet by mouth daily 11/9/21  Yes Yomi Kendalldox,    ondansetron (ZOFRAN) 8 MG tablet Take 1 tablet by mouth 6/21/21  Yes Historical Provider, MD   melatonin (RA MELATONIN) 3 MG TABS tablet Take 1 tablet by mouth nightly as needed (sleep) 5/30/21  Yes SOUTH Magallon CNP        CURRENT Medications:  No current facility-administered medications for this visit. Allergies:  Bactrim [sulfamethoxazole-trimethoprim]     Review of Systems:   Review of Systems   Constitutional: Positive for fatigue. Negative for chills and fever. HENT: Negative for congestion, rhinorrhea and sore throat. Eyes: Negative for photophobia, pain and visual disturbance. Respiratory: Positive for shortness of breath. Negative for cough. Cardiovascular: Negative for chest pain and leg swelling. Gastrointestinal: Negative for abdominal pain, blood in stool, constipation, diarrhea, nausea and vomiting. Endocrine: Negative for cold intolerance and heat intolerance. Genitourinary: Negative for difficulty urinating, dysuria and hematuria. Musculoskeletal: Negative for arthralgias, joint swelling and myalgias. Skin: Negative for rash and wound. Allergic/Immunologic: Negative for environmental allergies and food allergies. Neurological: Positive for weakness. Negative for dizziness, syncope and light-headedness. Hematological: Negative for adenopathy. Does not bruise/bleed easily. Psychiatric/Behavioral: Negative for dysphoric mood. The patient is nervous/anxious. estimated at 27%. Severe global hypokinesis with regional variation. Left ventricular diastolic filling pressure are indeterminate. The right ventricle is normal in size and function. There is protruding echodensity from anterior RA wall seen in multiple   views, possibly artifact, but given patient's clinical history,   mass/thrombus cannot be excluded. Mild to moderate mitral regurgitation. Mild pulmonic and tricuspid regurgitation. Systolic pulmonary artery pressure (SPAP) is normal and estimated at 35 mmHg   (right atrial pressure 3 mmHg). There is a large left pleural effusion. Compared to last echo on 8/17/2021 (EF 55%), left ventricle systolic   function has significantly declined while RV size and function appears   normal. RIght atrial findings as above - not seen on review of images prior,   unclear significance. TTE 5/26/21:  Summary   Limited echo for LVEF baseline prior to chemotherapy. Ectopy present. -- Left ventricular systolic function is low normal with a visually   estimated ejection fraction of 50%. EF estimated by Fay's method at 46   %. The left ventricle is normal in size with mild concentric hypertrophy. Mild apical hypokinesis. Indeterminate diastolic function. Systolic pulmonary artery pressure (SPAP) is normal and estimated at 19 mmHg   (right atrial pressure 3 mmHg). Frequent ectopy throughout exam.      TTE 8/16/21:  Conclusions   Summary   LV systolic function is normal with EF estimated at 55%. No regional wall motion abnormalities. Grade I diastolic dysfunction with normal LV filling pressure. Mild mitral regurgitation. Inadequate tricuspid regurgitation jet to estimate systolic artery pressure   (SPAP). TTE 10/23/21:  Conclusions   Summary   Normal left ventricle size and wall thickness. The left ventricular systolic function is severely reduced with an ejection   fraction of 25-30 %. EF by Fay's method estimated at 27%. Severe global hypokinesis with regional variation. Left ventricular diastolic filling pressure are indeterminate. The right ventricle is normal in size and function. There is protruding echodensity from anterior RA wall seen in multiple   views, possibly artifact, but given patient's clinical history,   mass/thrombus cannot be excluded. Mild to moderate mitral regurgitation. Mild pulmonic and tricuspid regurgitation. Systolic pulmonary artery pressure (SPAP) is normal and estimated at 35 mmHg   (right atrial pressure 3 mmHg). There is a large left pleural effusion      Stress Test: N/A  Treadmill GXT 3/30/10:  Negative for evidence of ischemia. Cath: N/A    Other Studies:   CTPA 10/22/21:  1. multiple acute bilateral pulmonary emboli. 2. Nonspecific multifocal bilateral ground-glass opacification. In the   setting of pulmonary emboli, the differential includes pulmonary infarct with   or without superimposed atypical infectious versus inflammatory pneumonitis. 3. Mild CHF. Bilateral Venous duplex ultrasound 10/25/21:  Conclusions        Summary        Acute totally occluding deep vein thrombosis involving the left peroneal    veins.    No other evidence of deep vein or superficial vein thrombosis involving the    left lower extremity and the right common femoral vein. Chest X-ray 10/27/21: Worsening diffuse bilateral airspace disease predominantly within the   bilateral upper lobes. CTPA 10/27/21:   Within the limitations of the exam, no new or worsening pulmonary emboli   identified.  Redemonstration of pulmonary emboli involving bilateral upper   lobes, segmental/subsegmental pulmonary arteries.  Previously noted pulmonary   emboli involving right middle and right lower lobe subsegmental pulmonary   arteries are not definitely evident within the limitations.       Significantly worsened areas of consolidation and ground-glass opacity   involving lungs bilaterally, right worse than left with bilateral upper lobe   predominance, concerning for worsening pneumonia.       Bilateral pleural effusions, right worse than left, worsened. Assessment:     1. LV systolic heart failure/cardiomyopathy - Diagnosed with new cardiomyopathy during recent admission for bilateral PEs.  At that time, TTE showed LVEF of 25-30% from 55% on previous echo from 8/17/21.  Concern for possible cardiotoxicity from adriamycin or Herceptin bio similar. Adelina Wheeler, has not had any recent formal ischemic evaluation. Currently appears clinically euvolemic. 2. DVT/Bilateral pulmonary emboli - On Eliquis. RV size and systolic function normal on TTE. 3. Right atrial echodensity - May represent artifact, but given recent PE and known underlying malignancy cannot definitively exclude thrombus or mass. Plan to obtain cardiac MRI for further evaluation, which may also help provide further insight into etiology of cardiomyopathy  4. H/o breast cancer - S/p 4 cycles of Adriamycin/Cytoxan and 7 of 12 planned cycles of Taxol with Herceptin bio similar and pertuzumab. Previous regimen has been discontinued due to concern for cardiotoxicity. Follows with Dr. Isauro Posada and was recently started on anastrozole. 5. Sinus tachycardia - Secondary to above. Plan:     1. Continue Eliquis 5 mg BID. Call our office for samples to help get through when you need this medication. Will check on samples for you today while you are. Patient assistant is available for cost of medication. 2. Will reach out to Dr. Isauro Posada to relay most recent echo results so that no new echo is needed. 3. Take metoprolol succinate 25 mg once in the morning and once in the evening  4. No new cardiac testing warranted at this time. 5. Continue taking your medications as prescribed. 5. Repeat echo in 3 months~March of 2022  6. Start taking atorvastatin 40 mg one tab in the evening. 7. Follow up with me in 3 months          Scribe's attestation:   This note was scribed in the presence of Dr. Fatimah Humphrey DO   by Kev Mccray LPN      It is a pleasure to assist in the care of Romana Perez. Please call with any questions. The scribes documentation has been prepared under my direction and personally reviewed by me in its entirety. I confirm that the note above accurately reflects all work, treatment, procedures, and medical decision making performed by me. I, Dr. Fatimah Humphrey, personally performed the services described in this documentation as scribed by Kev Mccray LPN in my presence, and it is both accurate and complete to the best of our ability. Thank you for allowing us to participate in the care of Romana Perez. Please call me with any questions [phone]. Fatimah Humphrey DO  [facility]  [phone] [facility]      I will address the patient's cardiac risk factors and adjusted pharmacologic treatment as needed. In addition, I have reinforced the need for patient directed risk factor modification. All questions and concerns were addressed to the patient/family. Alternatives to my treatment were discussed. The note was completed using EMR. Every effort was made to ensure accuracy; however, inadvertent computerized transcription errors may be present.

## 2021-12-06 NOTE — PATIENT INSTRUCTIONS
1. Continue Eliquis 5 mg BID. Call our office for samples to help get through when you need this medication. Will check on samples for you today while you are. Patient assistant is available for cost of medication. 2. Will reach out to Dr. Caroline King to relay most recent echo results so that no new echo is needed. 3. Take metoprolol succinate 25 mg once in the morning and once in the evening  4. No new cardiac testing warranted at this time. 5. Continue taking your medications as prescribed. 5. Repeat echo in 3 months~March of 2022  6. Start taking atorvastatin 40 mg one tab in the evening.    7. Follow up with me in 3 months

## 2021-12-20 DIAGNOSIS — C50.911 BILATERAL MALIGNANT NEOPLASM OF BREAST IN FEMALE, UNSPECIFIED ESTROGEN RECEPTOR STATUS, UNSPECIFIED SITE OF BREAST (HCC): Primary | ICD-10-CM

## 2021-12-20 DIAGNOSIS — C50.212 MALIGNANT NEOPLASM OF UPPER-INNER QUADRANT OF LEFT FEMALE BREAST, UNSPECIFIED ESTROGEN RECEPTOR STATUS (HCC): ICD-10-CM

## 2021-12-20 DIAGNOSIS — C50.511 MALIGNANT NEOPLASM OF LOWER-OUTER QUADRANT OF RIGHT FEMALE BREAST, UNSPECIFIED ESTROGEN RECEPTOR STATUS (HCC): ICD-10-CM

## 2021-12-20 DIAGNOSIS — C50.912 BILATERAL MALIGNANT NEOPLASM OF BREAST IN FEMALE, UNSPECIFIED ESTROGEN RECEPTOR STATUS, UNSPECIFIED SITE OF BREAST (HCC): Primary | ICD-10-CM

## 2021-12-21 ENCOUNTER — HOSPITAL ENCOUNTER (OUTPATIENT)
Dept: MRI IMAGING | Age: 76
Discharge: HOME | End: 2021-12-21
Payer: MEDICARE

## 2021-12-21 DIAGNOSIS — C50.912 BILATERAL MALIGNANT NEOPLASM OF BREAST IN FEMALE, UNSPECIFIED ESTROGEN RECEPTOR STATUS, UNSPECIFIED SITE OF BREAST (HCC): ICD-10-CM

## 2021-12-21 DIAGNOSIS — C50.912 BILATERAL MALIGNANT NEOPLASM OF BREAST IN FEMALE, UNSPECIFIED ESTROGEN RECEPTOR STATUS, UNSPECIFIED SITE OF BREAST (HCC): Primary | ICD-10-CM

## 2021-12-21 DIAGNOSIS — C50.911 BILATERAL MALIGNANT NEOPLASM OF BREAST IN FEMALE, UNSPECIFIED ESTROGEN RECEPTOR STATUS, UNSPECIFIED SITE OF BREAST (HCC): Primary | ICD-10-CM

## 2021-12-21 DIAGNOSIS — C50.911 BILATERAL MALIGNANT NEOPLASM OF BREAST IN FEMALE, UNSPECIFIED ESTROGEN RECEPTOR STATUS, UNSPECIFIED SITE OF BREAST (HCC): ICD-10-CM

## 2021-12-21 PROCEDURE — A9579 GAD-BASE MR CONTRAST NOS,1ML: HCPCS | Performed by: SURGERY

## 2021-12-21 PROCEDURE — 77049 MRI BREAST C-+ W/CAD BI: CPT

## 2021-12-21 PROCEDURE — 6360000004 HC RX CONTRAST MEDICATION: Performed by: SURGERY

## 2021-12-21 RX ORDER — LORAZEPAM 0.5 MG/1
0.5 TABLET ORAL ONCE
Qty: 1 TABLET | Refills: 0 | Status: SHIPPED | OUTPATIENT
Start: 2021-12-21 | End: 2021-12-21

## 2021-12-21 RX ADMIN — GADOTERIDOL 10 ML: 279.3 INJECTION, SOLUTION INTRAVENOUS at 12:31

## 2021-12-28 RX ORDER — LISINOPRIL 2.5 MG/1
1.25 TABLET ORAL DAILY
Qty: 30 TABLET | Refills: 5 | Status: SHIPPED | OUTPATIENT
Start: 2021-12-28 | End: 2022-02-02

## 2021-12-28 RX ORDER — LISINOPRIL 2.5 MG/1
2.5 TABLET ORAL DAILY
Qty: 30 TABLET | Refills: 5 | Status: SHIPPED | OUTPATIENT
Start: 2021-12-28 | End: 2021-12-28

## 2021-12-30 NOTE — PROGRESS NOTES
BREAST SURGICAL ONCOLOGY FOLLOW UP    01/03/22     Chief Complaint: Follow up for bilateral breast cancer    History of Present Illness  John Avalos is a 78 y.o. female here for follow up regarding her bilateral breast cancer. I initially saw her in 5/2021 after she was dx with b/l breast cancer.     - Left breast mass at 11:00 - Invasive ductal carcinoma, grade 3, ER/AK+, HER2- - Butterfly biopsy marker placed (up to 3.5 cm on US)  - Right breast mass at 8:00 - Invasive ductal carcinoma, grade 3, ER/AK+ and HER2+ and associated DCIS - Hydromark open coil marker placed (1.8 cm on US)  - Right axillary LN - Metastatic adenocarcinoma, ER/AK+, HER2+ - Butterfly biopsy marker placed (2.5 cm on US/MRI)    She was sent for breast MRI which revealed that the known left breast mass measures about 4 cm (no evidence of extension to nipple). Right breast revealed known mass at 8:00 which is 2.0 cm. In the right, there were also areas of NME measuring 1.7 cm in ant LOQ and 1.8 cm in post UOQ. Bx of these revealed:  - Right anterior LOQ: DCIS, G2, ER+ (Cork marker)  - Right posterior UOQ: FEA with focal ADH (hourglass marker -- migrated medially at least 3 cm)     She was treated with neoadjuvant chemotherapy. She had AC x 4 (6/1-8/2/21) and then THP x 8 (6/1 - 8/30/21). THP was stopped early 2/2 chemotherapy induced cardiotoxicity. Also, she was dx with multiple b/l PEs in Oct and is now on eliquis. EF 25-30% in Oct.  Had cardiac cath 11/17/21. She was started on AI in early November. Follow up MRI from 12/21/21 revealed:   - Previously biopsied metastatic lymph node in the right axilla now measures 1.2 cm (vs 2.5 cm) and cortical thickening is improved (4 mm, compared to 1.0 cm). The additional previously abnormal appearing nodes have all also decreased in size, however, some are still abnormally rounded.   - No suspicious left axillary nodes  - In right breast, known cancer at 8:00 is decreased in size now 1.4 x 1.4 x 1.2 (previously 2.5 x 1.5 x 1.7)  -In right breast, area of biopsy-proven DCIS in anterior LOQ has decreased in size, now 0.7 cm (previoulsy 1.7 cm)  -In right breast, area of biopsy-proven atypia in posterior UOQ had decreased, now 0.3 x 0.4 x 0.4 (previously 1.8 cm)  -In left breast, site of known cancer at 11:00 now has minimal residual enhancement measuring 0.4 cm (previously 4 cm) suggestive of a near complete response. F/u mammo and US were performed today. She does not feel the left breast mass anymore. Her left nipple is no longer inverted. She had a hard time with chemo. Went from 160 lbs to 115 lbs. Still has a poor appetite. Doing \"ok\" with anastrozole, some bone pain and also feels \"sick in general\" due to all of her meds. Has f/u with cardiology in March, and they will repeat her echo. Review of Systems   Constitutional: Negative for unexpected weight change. Eyes: Negative for visual disturbance. Respiratory: Negative for cough and shortness of breath. Cardiovascular: Negative for chest pain. Gastrointestinal: Negative for abdominal pain. Musculoskeletal: Positive for arthralgias (Bone pain from Anastrozole). Negative for myalgias. Neurological: Negative for headaches. Hematological: Negative for adenopathy. Psychiatric/Behavioral: Positive for dysphoric mood. The patient is nervous/anxious (Takes medication). States that she had blood clots in her lungs, was hospitalized in November, is on eliquis. PHYSICAL EXAMINATION:  VS: /77 (Site: Right Upper Arm, Position: Sitting, Cuff Size: Medium Adult)   Pulse 77   Temp 97.2 °F (36.2 °C) (Infrared)   Resp 16   Ht 5' 7\" (1.702 m)   Wt 115 lb (52.2 kg)   LMP  (LMP Unknown)   SpO2 97%   BMI 18.01 kg/m²     General: Well-developed, well-nourished, in no apparent distress. Lymphatics: The supraclavicular, submental, cervical and axillary regions are free of significant lymphadenopathy.   Right Breast: Examined with patient seated and supine. The skin, nipple, and areola appear normal, there is no skin dimpling with movement of the pectoralis, there is no nipple retraction, no obvious nipple discharge, the parenchyma is mildly nodular, there are no dominant masses and the axillary tail is normal.   Left Breast: Examined with patient seated and supine. The skin, nipple, and areola appear normal, there is no skin dimpling with movement of the pectoralis, there is no nipple retraction, no obvious nipple discharge, the parenchyma is mildly nodular, there are no dominant masses and the axillary tail is normal.     ----------------------------------------------    IMAGING: Imaging was performed here and read by our radiologists. I personally reviewed the breast imaging performed on 12/21/21 and today. Breast MRI results as above in HPI. B/l diagnostic mammogram today revealed the previously noted 3.5 cm mass in the LEFT upper inner breast has completely resolved. On the right, the previously noted architectural distortion is unchanged. Left US of area at 11:00 demonstrated the biopsy marker with no evidence of a mass. No abnormal left axillary nodes. Right US revealed the previously biopsied mass has decreased in size, now 0.8 x 1.5 cm (previoulsy 1 x 1.8 cm). The previously noted right axillary adenopathy has resolved. I reviewed these images with Dr. Glenny Clinton -- on MRI, 3 or 4 right axillay nodes still appear abnormal. On the US today, there is only one node seen (the previously biopsied one) which does appear normal.  Also, on the right, the cork clip is anterior to the mass and is likely an extension of her disease anteriorly -- would need to bracket this and the known mass (open coil).        PATHOLOGY:  I personally reviewed her prior pathology reports from 5/7/21 and 5/28/21.    - Left breast mass at 11:00 - Invasive ductal carcinoma, grade 3, ER/MA+, HER2-   - Right breast mass at 8:00 - Invasive ductal carcinoma, grade 3, ER/KY+ and HER2+   - Right axillary LN - Metastatic adenocarcinoma, ER/KY+, HER2+ -     MRI biopsies:    - Right anterior LOQ: DCIS, G2, ER+ (>95%)    - Right posterior UOQ: FEA with focal ADH -- hourglass marker migrated medially at least 3 cm    ----------------------------------------------    IMPRESSION:   78 y.o. female with:  1. Left breast IDC, ER/KY+, HER2-, with radiologic near complete/complete response to NACT based on recent imaging   2. Right breast IDC, triple positive, with partial response  3. Right breast DCIS  4. Right breast ADH     Clinical AJCC (8th Edition) Stage: Cancer Staging  Malignant neoplasm of right breast in female, estrogen receptor positive ([facility] [address]  Staging form: Breast, AJCC 8th Edition  - Clinical stage from 5/11/2021: Stage IB (cT1c, cN1(f), cM0, G3, ER+, KY+, HER2+) - Signed by Charlene Cunningham DO on 5/11/2021    Malignant neoplasm of upper-inner quadrant of left breast in female, estrogen receptor positive ([facility] [address]  Staging form: Breast, AJCC 8th Edition  - Clinical stage from 5/11/2021: Stage IIA (cT2, cN0, cM0, G3, ER+, KY+, HER2-) - Signed by Charlene Cunningham DO on 5/11/2021       PLAN:    I had a detailed discussion with Ms. Stephanie Shirley and her daughter regarding her diagnosis of bilateral breast cancer. Based upon clinical and imaging findings at this time, it appears that she has had a good response to her chemotherapy, especially on the LEFT side. On the left, I believe that she is a candidate for breast conservation therapy as it appears she had a near complete or complete response. We discussed the fact that breast conservation involves two components - both of which are required to complete treatment: the lumpectomy, or \"partial mastectomy\" as well as 3-6 weeks of whole-breast radiotherapy. We discussed the fact that the overall survival is identical to mastectomy.  We also discussed the significance of clear margins and the fact that a re-excision may be required at a subsequent procedure. Since this is no longer palpable, she would require preop RFID tag placement. On the right, I believe she is also potentially a candidate for breast conservation. She has the area of known IDC (open coil) and also there is an area of DCIS anterior to this (cork clip). This is likely an extension of her disease anteriorly, so we would need to bracket this area with 2 RFID tags. In addition to this, she has a third area in the right breast which was FEA with focal ADH. Due to the risk of possible upgrade to cancer (and her known site of invasive cancer in this same breast) I do recommend that we perform an excisional biopsy of this area (hourglass clip). The hourglass clip migrated 3 cm medially. This was placed by MRI. I reviewed this with radiology, and in order to localize this, we will need to repeat her MRI with clip placement (and then place RFID tag after that). Given the size of her breast and the fact that she will require 2 lumpectomies, she understands that the cosmetic result may be sub-optimal. She understands her breast may appear smaller. Her main concern is avoiding mastectomy if possible. We talked about the option of placing a biozorb which she is in agreement with. We talked about how she may be able to palpate this after surgery, but ultimately it will dissolve after a few years. We discussed the alternative of mastectomy. She is really not interested in this. She prefers to have a smaller surgery and recovery time. Next we discussed management of her axilla. On the LEFT, she is a candidate for sentinel lymph node biopsy. On the right, she does appear to have had a partial response so might potentially be a SLN candidate. However, she does still have some nodes which are abnormally rounded. If she has residual disease by imaging, based on NCCN guidelines, this would be an indication for ALND. I will present her case at tumor board.  If she is a SLN candidate on the right, then we discussed options of sending nodes for frozen and performing ALND if any are positive versus performing SLN and sending them for permanent, then having a final discussion about next steps once final pathology is back. Since she was treated with NACT, if any sentinel nodes are positive, the standard of care would be to proceed with a full axillary lymph node dissection. Regardless of if she had mastectomy or lumpectomy, due to her previously positive nodes, she will be recommended to have radiation on the right. We talked about how we try to avoid ALND + radiation when possible, as this is associated with the highest rates of lymphedema. I also recommend that we have an RFID tag placed into the previously biopsied right axillary node (if we perform SLN bx) in order to ensure that this previously positive node has responded to treatment. We also discussed the potential complications of SLN biopsy, including but not limited to blue discoloration of the skin and urine, a ~5% risk of lymphedema, and pain or numbness that can occur in the triceps area over the back of the arm. We also reviewed the increased risk of lymphedema (~30%) with ALND. After a thorough discussion of the treatment options, risks/benefits and alternatives, Ms. Torres Saycarolyn would like to undergo left RFID tag localized partial mastectomy, left SLN biopsy, right bracketed RFID tag localized partial mastectomy, right RFID tag localized excisional biopsy, right SLN biopsy, possible biozorb placement. Before agreeing to undergo the procedure, we discussed the possible risks including, but not limited to: reactions to medications or anesthesia, pain, infection, bleeding, injury to nerves and/or vessels, wound complications, seroma, lymphedema, numbness, poor cosmetic outcome, scars and need for additional procedures based on final pathology.     We discussed that the patient will need to see her PCP within 30 days of surgery to obtain pre-operative clearance. I will also discuss the case with Dr. Corine Glez to see about lovenox bridging prior to surgery. We also discussed adjuvant therapy. Radiation therapy is typically recommended as part of breast conservation. She will also follow up with Dr. Corine Glez after surgery. She previously declined genetic testing. I answered all of her and her family's questions thoroughly, and they do seem pleased with this plan of approach. I encouraged her to contact me in the interim if any new questions or concerns arise. IN SUMMARY:  - Will plan for left RFID tag localized partial mastectomy, left SLN biopsy, right bracketed RFID tag localized partial mastectomy, right RFID tag localized excisional biopsy, right SLN biopsy, possible biozorb placement.   - Repeat breast MRI (and clip placement) due to hourglass clip migration  - Will require 1 RFID tag on left, 3 RFID tags on right and 1 RFID tag in previously biopsied right LN  - Will discuss anticoagulation plan with Dr. Corine Glez   - Will present at tumor board on Jan 7 to confirm plan for axilla     Ayanna Zhou. Marti Hawthorne DO  Breast Surgical Oncology    [facility] Breast Surgery  Phone: [phone] (option 3)    Approximately >60 minutes were spent on today's encounter including the following non-patient facing activities: preparing to see the patient and reviewing records, individual interpretation of imaging results, discussion or coordination of care with other healthcare professionals and documentation within the EHR.

## 2022-01-03 ENCOUNTER — HOSPITAL ENCOUNTER (OUTPATIENT)
Dept: WOMENS IMAGING | Age: 77
Discharge: HOME | End: 2022-01-03
Payer: MEDICARE

## 2022-01-03 ENCOUNTER — OFFICE VISIT (OUTPATIENT)
Dept: SURGERY | Age: 77
End: 2022-01-03
Payer: MEDICARE

## 2022-01-03 VITALS
TEMPERATURE: 97.2 F | HEART RATE: 77 BPM | DIASTOLIC BLOOD PRESSURE: 77 MMHG | RESPIRATION RATE: 16 BRPM | SYSTOLIC BLOOD PRESSURE: 126 MMHG | WEIGHT: 115 LBS | OXYGEN SATURATION: 97 % | HEIGHT: 67 IN | BODY MASS INDEX: 18.05 KG/M2

## 2022-01-03 DIAGNOSIS — C50.911 MALIGNANT NEOPLASM OF RIGHT BREAST IN FEMALE, ESTROGEN RECEPTOR POSITIVE, UNSPECIFIED SITE OF BREAST (HCC): ICD-10-CM

## 2022-01-03 DIAGNOSIS — C50.912 BILATERAL MALIGNANT NEOPLASM OF BREAST IN FEMALE, UNSPECIFIED ESTROGEN RECEPTOR STATUS, UNSPECIFIED SITE OF BREAST (HCC): ICD-10-CM

## 2022-01-03 DIAGNOSIS — Z17.0 MALIGNANT NEOPLASM OF UPPER-INNER QUADRANT OF LEFT BREAST IN FEMALE, ESTROGEN RECEPTOR POSITIVE (HCC): Primary | ICD-10-CM

## 2022-01-03 DIAGNOSIS — N60.91 ATYPICAL HYPERPLASIA OF RIGHT BREAST: ICD-10-CM

## 2022-01-03 DIAGNOSIS — C50.911 BILATERAL MALIGNANT NEOPLASM OF BREAST IN FEMALE, UNSPECIFIED ESTROGEN RECEPTOR STATUS, UNSPECIFIED SITE OF BREAST (HCC): ICD-10-CM

## 2022-01-03 DIAGNOSIS — C50.511 MALIGNANT NEOPLASM OF LOWER-OUTER QUADRANT OF RIGHT FEMALE BREAST, UNSPECIFIED ESTROGEN RECEPTOR STATUS (HCC): ICD-10-CM

## 2022-01-03 DIAGNOSIS — Z17.0 MALIGNANT NEOPLASM OF RIGHT BREAST IN FEMALE, ESTROGEN RECEPTOR POSITIVE, UNSPECIFIED SITE OF BREAST (HCC): ICD-10-CM

## 2022-01-03 DIAGNOSIS — D05.11 BREAST NEOPLASM, TIS (DCIS), RIGHT: ICD-10-CM

## 2022-01-03 DIAGNOSIS — C50.212 MALIGNANT NEOPLASM OF UPPER-INNER QUADRANT OF LEFT FEMALE BREAST, UNSPECIFIED ESTROGEN RECEPTOR STATUS (HCC): ICD-10-CM

## 2022-01-03 DIAGNOSIS — C50.212 MALIGNANT NEOPLASM OF UPPER-INNER QUADRANT OF LEFT BREAST IN FEMALE, ESTROGEN RECEPTOR POSITIVE (HCC): Primary | ICD-10-CM

## 2022-01-03 PROCEDURE — 77066 DX MAMMO INCL CAD BI: CPT

## 2022-01-03 PROCEDURE — G8484 FLU IMMUNIZE NO ADMIN: HCPCS | Performed by: SURGERY

## 2022-01-03 PROCEDURE — 76642 ULTRASOUND BREAST LIMITED: CPT

## 2022-01-03 PROCEDURE — 1090F PRES/ABSN URINE INCON ASSESS: CPT | Performed by: SURGERY

## 2022-01-03 PROCEDURE — 3017F COLORECTAL CA SCREEN DOC REV: CPT | Performed by: SURGERY

## 2022-01-03 PROCEDURE — G8419 CALC BMI OUT NRM PARAM NOF/U: HCPCS | Performed by: SURGERY

## 2022-01-03 PROCEDURE — 1123F ACP DISCUSS/DSCN MKR DOCD: CPT | Performed by: SURGERY

## 2022-01-03 PROCEDURE — 4040F PNEUMOC VAC/ADMIN/RCVD: CPT | Performed by: SURGERY

## 2022-01-03 PROCEDURE — G8427 DOCREV CUR MEDS BY ELIG CLIN: HCPCS | Performed by: SURGERY

## 2022-01-03 PROCEDURE — 1036F TOBACCO NON-USER: CPT | Performed by: SURGERY

## 2022-01-03 PROCEDURE — 99215 OFFICE O/P EST HI 40 MIN: CPT | Performed by: SURGERY

## 2022-01-03 PROCEDURE — G8400 PT W/DXA NO RESULTS DOC: HCPCS | Performed by: SURGERY

## 2022-01-03 ASSESSMENT — ENCOUNTER SYMPTOMS
SHORTNESS OF BREATH: 0
ABDOMINAL PAIN: 0
COUGH: 0

## 2022-01-03 NOTE — LETTER
[facility] Breast Surgery  [address]  Phone: [phone]  Fax: [phone]    Marga Steele DO    January 3, 2022     [place] [facility] [id number]    Patient: Tanisha Coombs   MR Number: [id number]   YOB: 1945   Date of Visit: 1/3/2022       Dear Gera Talavera:    I saw Santiago Gonzalez today for evaluation/treatment. Below are the relevant portions of my assessment and plan of care. If you have questions, please do not hesitate to call me. I look forward to following Christianoi Snowball along with you.     Sincerely,      Marga Steele DO

## 2022-01-07 ENCOUNTER — TELEPHONE (OUTPATIENT)
Dept: SURGERY | Age: 77
End: 2022-01-07

## 2022-01-07 ENCOUNTER — TELEPHONE (OUTPATIENT)
Dept: WOMENS IMAGING | Age: 77
End: 2022-01-07

## 2022-01-07 DIAGNOSIS — C50.911 MALIGNANT NEOPLASM OF RIGHT FEMALE BREAST, UNSPECIFIED ESTROGEN RECEPTOR STATUS, UNSPECIFIED SITE OF BREAST (HCC): Primary | ICD-10-CM

## 2022-01-07 DIAGNOSIS — Z17.0 MALIGNANT NEOPLASM OF OVERLAPPING SITES OF RIGHT BREAST IN FEMALE, ESTROGEN RECEPTOR POSITIVE (HCC): ICD-10-CM

## 2022-01-07 DIAGNOSIS — Z91.89 AT RISK FOR LYMPHEDEMA: ICD-10-CM

## 2022-01-07 DIAGNOSIS — C50.811 MALIGNANT NEOPLASM OF OVERLAPPING SITES OF RIGHT BREAST IN FEMALE, ESTROGEN RECEPTOR POSITIVE (HCC): ICD-10-CM

## 2022-01-07 NOTE — TELEPHONE ENCOUNTER
Reviewed MRI right breast clip placement with patient. Reviewed RFID, mammo guided and ultrasound guided with patient. Patient scheduled for MRI, RFID x4 placement on 2/2/22, arriving at 0730.  Gladys Delcid RN

## 2022-01-07 NOTE — TELEPHONE ENCOUNTER
TELEPHONE NOTE    I presented Keyona's case at tumor board today. We reviewed her MRI and she does still have 3 or 4 right axillary lymph nodes that appear abnormal this is concerning for residual disease. Due to this the recommendation is for axillary lymph node dissection on the right. I called Aneta Paget and reviewed this recommendation with her. We reviewed the risks of the procedure including bleeding, infection, damage to surrounding blood vessels and/or nerves, numbness, and increased risk of lymphedema (up to 30%). We also talked about how a drain will be placed. I will also admit her overnight to the hospital for 1 night (potentially 2 nights if any concern for bleeding since she will need to resume her Eliquis POD 1). I have placed a referral for her to see PT/lymphedema pre-operatively for baseline arm measurements. She will no longer require RFID tag placement in right lymph node. We also reviewed the plan for her right breast.  Since one of her clips from a prior MRI guided biopsy migrated, we reviewed how she will need to have a MRI and new clip placement (followed by RFID tag placement). She will require a total of 3 RFID tags in the right breast, and one RF ID tag in the left breast.  She understands that since she will require multiple lumpectomies on the right side she may have a suboptimal cosmetic outcome. She will probably require 2 incisions on her right breast. She also understands the risk of positive margins and the potential need for more surgery. Her main goal is to avoid mastectomy if at all possible. I offered to see her back in the office with her daughter to review this again in person. She felt comfortable with the plan but asked me to call and review the plan with her daughter Haley Domínguez (and to coordinate surgery scheduling with her daughter). I called Haley Domínguez and we reviewed all of the above information.   Haley Domínguez also notes that her mom's main desire is to avoid mastectomy, she agrees that her mother is well-informed about the potential risk for more surgery. Reviewed w radiology and med onc. We will plan for MRI, clip placement, and RFID tag placement on February 2 (NN will call her with times). Surgery will be on February 8. Dr. Hollie Rivas will have her start her Lovenox bridge prior to Feb 2 -- his team will reach out to her with details of this. Of note, Cathy Walker asked if I could remove her port at the time of surgery - discussed with Dr. Hollie Rivas and preference is to keep it in for now. I confirmed with [facility] that surgery will be February 8 at [facility] [employer].  The arrival time is 6:30 AM, surgery time 7:30 AM.  The patient will need clearance from her PCP prior to surgery. I also asked her to try to move up her cardiology appointment to before surgery. Her mom will also require a Covid test 5 days prior to surgery. I encouraged Cathy Walker and her daughter to call me if they have any further questions. Karyn Escobar.  Francesca Joshua 132  Breast Surgical Oncology    [facility] Breast Surgery  Phone: [phone] (option 3)

## 2022-01-10 ENCOUNTER — TELEPHONE (OUTPATIENT)
Dept: SURGERY | Age: 77
End: 2022-01-10

## 2022-01-10 NOTE — TELEPHONE ENCOUNTER
Referral for Pt/lymphedema is in the chart. Please reach out to PT and have them call pt to get her scheduled. Thank you!

## 2022-01-10 NOTE — TELEPHONE ENCOUNTER
Pt's daughter called to ask for the name and phone number to the lymphologist that Dr. Isak Michelle referred her Mom to. They have not called yet to schedule an appt for her Mom. Rabia wants to call them and get her Mom scheduled. Rabia stated that Dr. Isak Michelle wanted to have an accurate measurement of lymph nodes (and arm?) before surgery. Rabia can be reached back at [phone].

## 2022-01-17 ENCOUNTER — OFFICE VISIT (OUTPATIENT)
Dept: FAMILY MEDICINE CLINIC | Age: 77
End: 2022-01-17
Payer: MEDICARE

## 2022-01-17 VITALS
SYSTOLIC BLOOD PRESSURE: 100 MMHG | WEIGHT: 115 LBS | OXYGEN SATURATION: 98 % | BODY MASS INDEX: 18.01 KG/M2 | DIASTOLIC BLOOD PRESSURE: 64 MMHG | HEART RATE: 75 BPM

## 2022-01-17 DIAGNOSIS — C50.212 MALIGNANT NEOPLASM OF UPPER-INNER QUADRANT OF LEFT BREAST IN FEMALE, ESTROGEN RECEPTOR POSITIVE (HCC): ICD-10-CM

## 2022-01-17 DIAGNOSIS — Z01.818 PREOP EXAMINATION: Primary | ICD-10-CM

## 2022-01-17 DIAGNOSIS — Z17.0 MALIGNANT NEOPLASM OF UPPER-INNER QUADRANT OF LEFT BREAST IN FEMALE, ESTROGEN RECEPTOR POSITIVE (HCC): ICD-10-CM

## 2022-01-17 PROCEDURE — 99212 OFFICE O/P EST SF 10 MIN: CPT | Performed by: NURSE PRACTITIONER

## 2022-01-17 NOTE — PROGRESS NOTES
Kenzie [place]  [phone]  Fax: [phone]   Pre-operative History and Physical    DIAGNOSIS: bilateral malignant neoplasm of upper-inner quadrant of left breast in female, estrogen receptor positive (HCC) +3 more             PROCEDURE:  LEFT RADIOFREQUEN CY IDENTIFICATION TAG LOCALIZED PARTIAL MASTECTOMY, LEFT SENTINEL LYMPH NODE BIOPSY, RIGHT BRACKETED RADIOFREQUENCY IDENTIFICATION TAG LOCALIZED PARTIAL MASTECTOMY, RIGHT RADIOFREQUENCY IDENTIFICATION TAG, LOCALIZED EXCISIONAL BIOPSY, RIGHT AXILLARY LYMPH NODE DISSECTION, POSSIBLE BIOZORB PLACEMENT      History Obtained From:  patient    HISTORY OF PRESENT ILLNESS:    The patient is a 78 y.o. female with significant past medical history of Malignant neoplasm of upper-inner quadrant of left breast in female, estrogen receptor positive ([address] +3 more.  I am seeing this patient for preop consultation for Dr. Lamonte Blankenship       Past Medical History:   Diagnosis Date    [facility]     [facility])     Kidney stone      Past Surgical History:   Procedure Laterality Date    COLONOSCOPY      MRI BREAST BX USING DEVICE RIGHT Right 5/28/2021    MRI BREAST BX USING DEVICE RIGHT 5/28/2021 [facility]    MRI NEEDLE BIOPSY EACH ADDL RIGHT Right 5/28/2021    MRI NEEDLE BIOPSY EACH ADDL RIGHT 5/28/2021 [facility]    PORT SURGERY N/A 5/25/2021    RIGHT PORT A CATH PLACEMENT performed by Lamonte Blankenship DO at [address] Right 5/7/2021    US BREAST BIOPSY NEEDLE ADDITIONAL RIGHT 5/7/2021 Chanelle Avila MD [facility] LEFT Left 5/7/2021    US BREAST NEEDLE BIOPSY LEFT 5/7/2021 Chanelle Avila MD [address] [place] [address] H BREAST NEEDLE BIOPSY RIGHT Right 5/7/2021    US BREAST NEEDLE BIOPSY RIGHT 5/7/2021 Chanelle Avila MD [facility]     Current Outpatient Medications   Medication Sig Dispense Refill    lisinopril (PRINIVIL;ZESTRIL) 2.5 MG tablet Take 0.5 tablets by mouth daily 30 tablet 5    metoprolol succinate (TOPROL XL) 25 MG extended release tablet Take 1 tablet by mouth 2 times daily 60 tablet 3    atorvastatin (LIPITOR) 40 MG tablet Take 1 tablet by mouth daily Take 1 tab by mouth in the evening. 90 tablet 1    gabapentin (NEURONTIN) 100 MG capsule Take 1 capsule by mouth 2 times daily as needed (pain/numbness/tingling) for up to 90 days. 60 capsule 2    hydrOXYzine (ATARAX) 10 MG tablet Take 10 mg by mouth 3 times daily as needed      aspirin 81 MG chewable tablet Take 1 tablet by mouth daily 30 tablet 3    nitroGLYCERIN (NITROSTAT) 0.4 MG SL tablet up to max of 3 total doses. If no relief after 1 dose, call 911. 25 tablet 3    apixaban (ELIQUIS) 5 MG TABS tablet Take 1 tablet by mouth 2 times daily 60 tablet 0    calcium carbonate-vitamin D (CALTRATE) 600-400 MG-UNIT TABS per tab Take by mouth      anastrozole (ARIMIDEX) 1 MG tablet Take 1 mg by mouth      ondansetron (ZOFRAN) 8 MG tablet Take 1 tablet by mouth      melatonin (RA MELATONIN) 3 MG TABS tablet Take 1 tablet by mouth nightly as needed (sleep) 20 tablet 0     No current facility-administered medications for this visit. Allergies:  Bactrim [sulfamethoxazole-trimethoprim]  History of allergic reaction to anesthesia:  No     Social History     Tobacco Use   Smoking Status Passive Smoke Exposure - Never Smoker   Smokeless Tobacco Never Used     The patient states she drinks 0 per week.     Family History   Problem Relation Age of Onset    High Cholesterol Mother     Diabetes Father     Cancer Paternal Grandmother 76        Bladder       REVIEW OF SYSTEMS:    CONSTITUTIONAL:  negative  EYES:  negative  HEENT:  negative  RESPIRATORY:  negative  CARDIOVASCULAR:  negative  GASTROINTESTINAL:  negative  GENITOURINARY:  negative  INTEGUMENT/BREAST:  negative  HEMATOLOGIC/LYMPHATIC:  positive for easy bruising and bleeding  ALLERGIC/IMMUNOLOGIC:  positive for drug reactions  ENDOCRINE: positive for cold intolerance- hands and feet get really cold  MUSCULOSKELETAL:  positive for  bone pain, hips and knees   NEUROLOGICAL:  negative    PHYSICAL EXAM:      /64   Pulse 75   Wt 115 lb (52.2 kg)   LMP  (LMP Unknown)   SpO2 98%   BMI 18.01 kg/m²     CONSTITUTIONAL:  awake, alert, cooperative, no apparent distress, and appears stated age, thin build    Eyes:  Lids and lashes normal, pupils equal, round and reactive to light, extra ocular muscles intact, sclera clear, conjunctiva normal    Head/ENT:  Normocephalic, without obvious abnormality, atramatic, sinuses nontender on palpation, external ears without lesions, oral pharynx with moist mucus membranes, tonsils without erythema or exudates, gums normal and good dentition. Neck:  Supple, symmetrical, trachea midline, no adenopathy, thyroid symmetric, not enlarged and no tenderness, skin normal    Heart:  Normal apical impulse, regular rate and rhythm, normal S1 and S2, no S3 or S4, and no murmur noted    Lungs:  No increased work of breathing, good air exchange, clear to auscultation bilaterally, no crackles or wheezing    Abdomen:  No scars, normal bowel sounds, soft, non-distended, non-tender, no masses palpated, no hepatosplenomegally    Extremities:  No clubbing, cyanosis, or edema    NEUROLOGIC:  Awake, alert, oriented to name, place and time. Cranial nerves II-XII are grossly intact. Motor is 5 out of 5 bilaterally. DATA:  EKG:  Date:  11/17/2021  I have reviewed EKG with the following interpretation: done with cardiology  Impression:  NSR      ASSESSMENT AND PLAN:    1. Patient is a 78 y.o. female with above specified procedure planned on 2/8/2022 with Dr. Abebe Damon at [facility] . 2. Stop ASA/NSAIDS medications 7-10 days prior to procedure. Will check with cardiology about when to stop eliquis  3. Patient is cleared for surgery- will get cardiac clearance  4. Preop has been faxed to Dr. Tawana Wells office    Darlene Otto, APRN - CNP    [address]  [address], [address] [place] [address]  [phone]

## 2022-01-25 ENCOUNTER — HOSPITAL ENCOUNTER (OUTPATIENT)
Dept: PHYSICAL THERAPY | Age: 77
Setting detail: THERAPIES SERIES
Discharge: HOME | End: 2022-01-25
Payer: MEDICARE

## 2022-01-25 PROCEDURE — 97110 THERAPEUTIC EXERCISES: CPT

## 2022-01-25 PROCEDURE — 97161 PT EVAL LOW COMPLEX 20 MIN: CPT

## 2022-01-25 NOTE — PROGRESS NOTES
tor    The following patient has been evaluated for physical therapy services. Please review the attached evaluation and/or summary of the patient's plan of care, and verify that you agree by signing below and sending it back to our office. Thank you for this referral.    Physician signature_______________________ Date________________    Fax to:   [facility] phone: [phone] Fax: [phone]      LYMPHEDEMA EVALUATION  Evaluation Date:  1/25/2022         Patient Name:  Cara Felton       YOB: 1945       Medical Diagnosis: Malignant neoplasm of right female breast, unspecified estrogen receptor status, unspecified site of breast (C50.911); At risk for lymphedema Z91.89. ICD 10:  See above    Treatment Diagnosis: decreased AROM and lack of knowledge of lymphedema       Onset Date: 2/2021     Referral Date:  1/7/2022     Referring Physician: Larrie Boast, DO        Visits Allowed/Insurance/Certification Information: BSBS does require authorization. Restrictions/Precautions: Pt has port on right; heart low ejection fraction; neuropathy and weakness in bilateral hands and feet     Pt Occupation/Job Duties:  Retired    Social support/Environment:  Pt has good family support     Health History reviewed with pt:   [x]   Yes   []   No        SUBJECTIVE FINDINGS        History of Present Illness: Pt was Dx with left breast cancer then right breast. Per pt she is to have a lumpectomy on 2/8/2022 with right axillary lymph node dissection. Pain       Patient describes pain to be none   Patient reports pain is  None  Worsened by  N/A   Improved by N/A      Current Functional Limitations:   [x]   Yes   []   No  Functional Complaints:  Reaching over head and use of hands    PLOF:   [x]   No functional limitations   []   Pt with previous functional limitations  Comment:  Pt's sleep is affected?    []   Yes   [x]   No    Chemotherapy:  [x] Yes [] No [] Current  [x] Completed Date:_pt had 3 months of chemo then had to stop and now is doing oral chemo (started in June 2021).      Radiation:  [] Yes [x] No [] Current  [] Completed Date:_________        Patient goal for therapy:  \"measurement prior to surgery\"    OBJECTIVE FINDINGS:  Vitals: Blood pressure______  O2 sat______  Pulse______ not formally assessed    Pitting  None  Color  Mottled slightly  Skin Texture  Dry bilaterally  Skin Temperature  Normal  Edema Rebound*:  Quick  *pressure applied x10 seconds    Signs of Constriction:  Condition of Nailbeds Normal     Skin Breakdown (indicate size & number-also note location on body drawings): none  Tinea (fungus):none  Papilloma-- benign tumor arising from an epithelial layer: none  Fibrotic areas:none  Warts-- a local growth of the outer layer of skin:none  Ulcers: none     SCARS:  STEMMER SIGN; (positive/negative)    Stage of Lymphedema (Golematis)  Mild:  (less than 2 cm difference  Moderate (2-4 cm difference  Moderate/Severe ( 4-5cm difference  Severe (greater than 5 cm difference)    Definitions:  Papilloma=benign tumor arising from an epithelial layer  Wart=a local growth of the outer layer of skin  Brawny=does not indent    UE Range of motion__shoulder flexion J415,Y48; abd L 133, R156; ER B T2; IR B mid toracic at braline    []  WNL  [facility]      Strength: 4+/5overall except shoulder flexion 4/5    LE Range of motion_____________________________________________________    []  WNL  [x] [facility]     GIRTH MEASUREMENT FLOW SHEET    Upper/ Extremity R L   Date 1/25 1/25        6   Inches above bend of elbow                           24.5 24.7        3   Inches above bend of elbow 21.9 21.9           Bend of elbow 21 21.7        3   Inches below bend of elbow 18.9 18.4        6   Inches below bend of elbow 14.5 14.4   Wrist-Styloids                                   14.1 14.1   Distal Palmar Crease 17.1 15.8   Thumbs Proximal Phalanx 5.3 5.4   Base of 3rd digit 5.3 5 TOTAL GIRTH               142.6                     141.4     GCODEs and Functional Outcome Measure:      UEFI  40/80    ASSESSMENT; Pt demonstrates increased flexed posture and decreased AROM bilateral UE. Pt was issued exercise to increase ROM and to perform prior to surgery. Problems          []   Decreased cervical ROM  [x]   Decreased UE ROM B UE  []   Increased pain  []   Decreased flexibility  []   Abnormality of gait  []   Increased swelling  []   Poor posture/alignment  []   Decreased functional status     Rehabilitation Potential:  No Goals set at this time secondary to only seen one visit for measurements and instruction on lymphedema    Prognosis: N/A at this time    GOALS  No Goals set at this time secondary to only seen one visit for measurements and instruction on lymphedema- pre-surgery     PLAN OF CARE  Only seen one visit for measurements and instruction on lymphedema- pre-surgery     Treatment Performed this visit :   20 minutes    Education on lymphedema- issued preventative sheet   Exercise cane flexion and prayer stretch demonstrated access code [email or web address]    Pt response to Tx: Pt and daughter verbalized understanding    Plan for Next Visit:   Only seen one visit for measurements and instruction on lymphedema- pre-surgery    Timed Code Treatment Minutes:  20   minutes   Total Treatment Time:  50  minutes    Plan of care sent to provider:      []Faxed  [x]Co-signature    (attempts: 1[x] 2 []3[])         Medicare Cap total YTD:    [x]N/A  Workers Comp Time Stamp  [x]N/A   Time In:   Time Out:    Thank you for the referral of this patient.       Devang Garsia PT MPT  License #[id number]

## 2022-02-02 ENCOUNTER — HOSPITAL ENCOUNTER (OUTPATIENT)
Dept: WOMENS IMAGING | Age: 77
Discharge: HOME | End: 2022-02-02
Payer: MEDICARE

## 2022-02-02 ENCOUNTER — TELEPHONE (OUTPATIENT)
Dept: CARDIOLOGY CLINIC | Age: 77
End: 2022-02-02

## 2022-02-02 ENCOUNTER — HOSPITAL ENCOUNTER (OUTPATIENT)
Dept: MRI IMAGING | Age: 77
Discharge: HOME | End: 2022-02-02
Payer: MEDICARE

## 2022-02-02 ENCOUNTER — OFFICE VISIT (OUTPATIENT)
Dept: CARDIOLOGY CLINIC | Age: 77
End: 2022-02-02
Payer: MEDICARE

## 2022-02-02 ENCOUNTER — HOSPITAL ENCOUNTER (OUTPATIENT)
Age: 77
Discharge: HOME | End: 2022-02-02
Payer: MEDICARE

## 2022-02-02 VITALS
SYSTOLIC BLOOD PRESSURE: 112 MMHG | HEIGHT: 67 IN | WEIGHT: 122 LBS | HEART RATE: 83 BPM | OXYGEN SATURATION: 96 % | BODY MASS INDEX: 19.15 KG/M2 | DIASTOLIC BLOOD PRESSURE: 58 MMHG

## 2022-02-02 DIAGNOSIS — C50.811 MALIGNANT NEOPLASM OF OVERLAPPING SITES OF RIGHT BREAST IN FEMALE, ESTROGEN RECEPTOR POSITIVE (HCC): ICD-10-CM

## 2022-02-02 DIAGNOSIS — I50.22 CHRONIC SYSTOLIC HEART FAILURE (HCC): ICD-10-CM

## 2022-02-02 DIAGNOSIS — C50.911 MALIGNANT NEOPLASM OF RIGHT FEMALE BREAST, UNSPECIFIED ESTROGEN RECEPTOR STATUS, UNSPECIFIED SITE OF BREAST (HCC): ICD-10-CM

## 2022-02-02 DIAGNOSIS — Z17.0 MALIGNANT NEOPLASM OF OVERLAPPING SITES OF RIGHT BREAST IN FEMALE, ESTROGEN RECEPTOR POSITIVE (HCC): ICD-10-CM

## 2022-02-02 DIAGNOSIS — Z85.3 HISTORY OF BREAST CANCER: ICD-10-CM

## 2022-02-02 DIAGNOSIS — E78.5 HYPERLIPIDEMIA, UNSPECIFIED HYPERLIPIDEMIA TYPE: ICD-10-CM

## 2022-02-02 DIAGNOSIS — I42.8 NON-ISCHEMIC CARDIOMYOPATHY (HCC): Primary | ICD-10-CM

## 2022-02-02 DIAGNOSIS — R92.8 ABNORMAL MAMMOGRAM: ICD-10-CM

## 2022-02-02 DIAGNOSIS — I25.10 CORONARY ARTERY DISEASE INVOLVING NATIVE CORONARY ARTERY OF NATIVE HEART WITHOUT ANGINA PECTORIS: ICD-10-CM

## 2022-02-02 DIAGNOSIS — Z01.810 PREOP CARDIOVASCULAR EXAM: ICD-10-CM

## 2022-02-02 DIAGNOSIS — I26.99 PULMONARY EMBOLISM WITHOUT ACUTE COR PULMONALE, UNSPECIFIED CHRONICITY, UNSPECIFIED PULMONARY EMBOLISM TYPE (HCC): ICD-10-CM

## 2022-02-02 PROCEDURE — 19281 PERQ DEVICE BREAST 1ST IMAG: CPT

## 2022-02-02 PROCEDURE — 19285 PERQ DEV BREAST 1ST US IMAG: CPT

## 2022-02-02 PROCEDURE — 2709999900 US PLACE BREAST LOC DEVICE 1ST LESION RIGHT

## 2022-02-02 PROCEDURE — 19085 BX BREAST 1ST LESION MR IMAG: CPT

## 2022-02-02 PROCEDURE — 82565 ASSAY OF CREATININE: CPT

## 2022-02-02 PROCEDURE — U0005 INFEC AGEN DETEC AMPLI PROBE: HCPCS

## 2022-02-02 PROCEDURE — 77066 DX MAMMO INCL CAD BI: CPT

## 2022-02-02 PROCEDURE — U0003 INFECTIOUS AGENT DETECTION BY NUCLEIC ACID (DNA OR RNA); SEVERE ACUTE RESPIRATORY SYNDROME CORONAVIRUS 2 (SARS-COV-2) (CORONAVIRUS DISEASE [COVID-19]), AMPLIFIED PROBE TECHNIQUE, MAKING USE OF HIGH THROUGHPUT TECHNOLOGIES AS DESCRIBED BY CMS-2020-01-R: HCPCS

## 2022-02-02 PROCEDURE — 6360000004 HC RX CONTRAST MEDICATION: Performed by: SURGERY

## 2022-02-02 PROCEDURE — A9579 GAD-BASE MR CONTRAST NOS,1ML: HCPCS | Performed by: SURGERY

## 2022-02-02 PROCEDURE — 99214 OFFICE O/P EST MOD 30 MIN: CPT | Performed by: INTERNAL MEDICINE

## 2022-02-02 RX ORDER — LISINOPRIL 2.5 MG/1
2.5 TABLET ORAL DAILY
Qty: 30 TABLET | Refills: 5 | Status: SHIPPED | OUTPATIENT
Start: 2022-02-02 | End: 2022-10-31 | Stop reason: SDUPTHER

## 2022-02-02 RX ADMIN — GADOTERIDOL 10 ML: 279.3 INJECTION, SOLUTION INTRAVENOUS at 08:55

## 2022-02-02 ASSESSMENT — ENCOUNTER SYMPTOMS
DIARRHEA: 0
RHINORRHEA: 0
NAUSEA: 0
CONSTIPATION: 0
VOMITING: 0
COUGH: 0
BLOOD IN STOOL: 0
SORE THROAT: 0
SHORTNESS OF BREATH: 1
PHOTOPHOBIA: 0
EYE PAIN: 0
ABDOMINAL PAIN: 0

## 2022-02-02 NOTE — PROGRESS NOTES
__________________ __________  (__) TRANSFERRIN  ___________  (__)  __________________ __________  (__) LIVER PROFILE  ___________  (__)  __________________ __________  (__) CARBOXY HGB  ___________  (__) URINE PREG DOS __________  (__) NICOTINE & MET.  ___________  (__) BLOOD SUGAR DOS __________  (__) PREALBUMIN  ___________    (__) MRSA NASAL SWAB ___________  (__) BLOOD THINNERS __________  (__) ACE/ ARBS: _____________________    (__) BETABLOCKERS ___________________

## 2022-02-02 NOTE — PROGRESS NOTES
[address] [place] [address]  [phone]      Attending Physician: TORIN Mcdonough  Reason for Consultation/Chief Complaint:   Follow-up for LV systolic heart failure, non-cardiomyopathy, pre-operative cardiac risk assessment    Subjective     Phyllis Vasquez is a 78 y.o. female with a history of chronic LV systolic heart failure secondary to non-ischemic cardiomyopathy, non-obstructive coronary artery disease, LVH, breast cancer, anemia of chronic disease, hyperlipidemia, and bilateral pulmonary emboli who presents for follow-up. The patient has had multiple admissions over the course of the last years for bilateral pulmonary emboli, chest pain, and acute heart failure. A TTE from 10/22/21 showed an LVEF of 25-30% with global hypokinesis and regional variation, normal RV size and systolic function, mild-moderate mitral regurgitation, and mild pulmonic regurgitation. Tiat Dequan was also a right atrial echodensity noted that which was ultimately determined to represent a prominent eustachian valve. It was initially recommended that additional ischemic evaluation be deferred until she had had more time to recover from her acute pulmonary emboli. She was then re-admitted from 11/17-11/21/21 with recurrent chest pain and ultimately underwent invasive coronary angiography at that time which demonstrated a calcified, eccentric 60-70% mid-LAD stenosis that was not physiologically significant by DFR (0.92) or FFR (0.90). During the procedure, the patient developed ventricular fibrillation while advancing the pressure wire in the LAD and was cardioverted x2. A cardiac MRI obtained on 12/3/21 showed findings consistent with a non-ischemic cardiomyopathy with calculated LVEF of 30%. There was no evidence of myocardial scarring, infiltration, or inflammation. A prominent eustachian valve was noted which likely represented the right atrial echodensity seen on her previous TTE.   There was otherwise mouth in the evening. 12/6/21  Yes Yomi Martinez DO   gabapentin (NEURONTIN) 100 MG capsule Take 1 capsule by mouth 2 times daily as needed (pain/numbness/tingling) for up to 90 days. 11/30/21 2/28/22 Yes SOUTH Taylor CNP   hydrOXYzine (ATARAX) 10 MG tablet Take 10 mg by mouth 3 times daily as needed   Yes Historical Provider, MD   aspirin 81 MG chewable tablet Take 1 tablet by mouth daily 11/22/21  Yes Viviane Garza MD   nitroGLYCERIN (NITROSTAT) 0.4 MG SL tablet up to max of 3 total doses. If no relief after 1 dose, call 911. 11/21/21  Yes Viviane Garza MD   apixaban Marilin Le) 5 MG TABS tablet Take 1 tablet by mouth 2 times daily 11/21/21  Yes Viviane Garza MD   calcium carbonate-vitamin D (CALTRATE) 600-400 MG-UNIT TABS per tab Take by mouth 11/8/21  Yes Historical Provider, MD   anastrozole (ARIMIDEX) 1 MG tablet Take 1 mg by mouth 11/8/21  Yes Historical Provider, MD   ondansetron (ZOFRAN) 8 MG tablet Take 1 tablet by mouth 6/21/21  Yes Historical Provider, MD   melatonin (RA MELATONIN) 3 MG TABS tablet Take 1 tablet by mouth nightly as needed (sleep) 5/30/21  Yes SOUTH Magallon CNP        CURRENT Medications:  No current facility-administered medications for this visit. Allergies:  Bactrim [sulfamethoxazole-trimethoprim]     Review of Systems:   Review of Systems   Constitutional: Negative for chills and fever. HENT: Negative for congestion, rhinorrhea and sore throat. Eyes: Negative for photophobia, pain and visual disturbance. Respiratory: Positive for shortness of breath. Negative for cough. Cardiovascular: Negative for chest pain and leg swelling. Gastrointestinal: Negative for abdominal pain, blood in stool, constipation, diarrhea, nausea and vomiting. Endocrine: Negative for cold intolerance and heat intolerance. Genitourinary: Negative for difficulty urinating, dysuria and hematuria. Musculoskeletal: Positive for myalgias.  Negative for arthralgias and joint swelling. Skin: Negative for rash and wound. Allergic/Immunologic: Negative for environmental allergies and food allergies. Neurological: Negative for dizziness, syncope and light-headedness. Hematological: Negative for adenopathy. Does not bruise/bleed easily. Psychiatric/Behavioral: Negative for dysphoric mood. The patient is not nervous/anxious. Objective   PHYSICAL EXAM:    Vitals:    02/02/22 1508   BP: (!) 112/58   Pulse: 83   SpO2: 96%     General: Thin adult female in no acute distress. HEENT: Normocephalic, atraumatic, non-icteric, hearing intact, nares normal, mucous membranes moist.  Neck: No JVD. Heart: Regular rate and rhythm. Normal S1 and S2. No murmurs, gallops or rubs. Lungs: Normal respiratory effort. Clear to auscultation bilaterally. No wheezes, rales, or rhonchi. Abdomen: Soft, non-tender. Normoactive bowel sounds. No masses or organomegaly. Skin: No rashes, wounds, or lesions. Pulses: 2+ and symmetric. Extremities: No clubbing, cyanosis, or edema. Psych: Normal mood and affect. Neuro: Alert and oriented to person, place, and time. No focal deficits noted.       Labs   CBC:   Lab Results   Component Value Date    WBC 5.2 11/21/2021    RBC 3.15 11/21/2021    HGB 9.5 11/21/2021    HCT 28.7 11/21/2021    MCV 91.1 11/21/2021    RDW 16.4 11/21/2021     11/21/2021     CMP:  Lab Results   Component Value Date     11/21/2021    K 3.8 11/21/2021    K 3.9 11/18/2021     11/21/2021    CO2 24 11/21/2021    BUN 11 11/21/2021    CREATININE 0.7 02/02/2022    CREATININE <0.5 11/21/2021    GFRAA >60 02/02/2022    GFRAA >60 07/10/2012    AGRATIO 1.0 11/18/2021    LABGLOM >60 02/02/2022    GLUCOSE 105 11/21/2021    PROT 6.7 11/18/2021    PROT 7.8 07/10/2012    CALCIUM 9.1 11/21/2021    BILITOT 0.3 11/18/2021    ALKPHOS 103 11/18/2021    AST 17 11/18/2021    ALT 16 11/18/2021     PT/INR:  No results found for: PTINR  HgBA1c:  Lab Results   Component Value Date    LABA1C 5.6 09/13/2018     Lab Results   Component Value Date    TROPONINI <0.01 11/18/2021         Cardiac Data   EKG 11/18/21:  Normal sinus rhythm, non-specific T wave abnormality. ECHO:  TTE 5/26/21:  Summary   Limited echo for LVEF baseline prior to chemotherapy. Ectopy present. -- Left ventricular systolic function is low normal with a visually   estimated ejection fraction of 50%. EF estimated by Fay's method at 46   %. The left ventricle is normal in size with mild concentric hypertrophy. Mild apical hypokinesis. Indeterminate diastolic function. Systolic pulmonary artery pressure (SPAP) is normal and estimated at 19 mmHg   (right atrial pressure 3 mmHg). Frequent ectopy throughout exam.    TTE 8/16/21:  Conclusions   Summary   LV systolic function is normal with EF estimated at 55%. No regional wall motion abnormalities. Grade I diastolic dysfunction with normal LV filling pressure. Mild mitral regurgitation. Inadequate tricuspid regurgitation jet to estimate systolic artery pressure   (SPAP). TTE 10/23/21:  Conclusions   Summary   Normal left ventricle size and wall thickness. The left ventricular systolic function is severely reduced with an ejection   fraction of 25-30 %. EF by Fay's method estimated at 27%. Severe global hypokinesis with regional variation. Left ventricular diastolic filling pressure are indeterminate. The right ventricle is normal in size and function. There is protruding echodensity from anterior RA wall seen in multiple   views, possibly artifact, but given patient's clinical history,   mass/thrombus cannot be excluded. Mild to moderate mitral regurgitation. Mild pulmonic and tricuspid regurgitation. Systolic pulmonary artery pressure (SPAP) is normal and estimated at 35 mmHg   (right atrial pressure 3 mmHg).    There is a large left pleural effusion      Stress Test: N/A  Treadmill GXT 3/30/10:  Negative for evidence of ischemia. Cath:   [facility] 11/19/21:  Findings:  1. Hemodynamics:              A. Opening arterial pressure: 122/55 (85) mmHg              B. LVEDP: 3 mmHg     2. Coronary anatomy:  A. Left main artery: The left main artery bifurcates into the left anterior descending artery and left circumflex artery. The left main artery has no angiographically significant disease. B. Left anterior descending artery: Transapical vessel which gives rise to 3 diagonal arteries. The LAD has a calcified, eccentric 60-70% stenosis at the junction of the proxima/mid-vessel that extends through the trifurcation of the first diagonal artery and first septal . There is another 20% stenosis in the distal LAD. The first diagonal artery is a small-medium caliber vessel with a 60-70% ostial stenosis. The second and third diagonal arteries are very small and have minor luminal irregularities. C. Left circumflex artery: Non-dominant vessel that gives rise to one large branching obtuse marginal artery. The LCx has minor luminal irregularities. The obtuse marginal artery has minor luminal irregularities. D. Right coronary artery: Dominant vessel that gives rise to the posterior descending artery and 2 posterolateral branches. The RCA has minor luminal irregularities. The posterior descending artery has minor luminal irregularities. The right posterolateral branches have minor luminal irregularities.     DFR of proximal/mid-LAD:  DFR - 0.92     FFR of proximal/mid-LAD:  Baseline Pd/Pa - 0.96    Cardiac MRI w/ and w/o contrast 12/3/21:  CONCLUSIONS       Findings are consistent with a non ischemic/non infiltrative cardiomyopathy with severely reduced LV systolic function. No evidence of prior myocarditis. Mildly elevated native T1 values are suggestive of fibrosis.     No mass seen in the RA, there is a prominent eustachian valve.       -Normal left ventricular size and systolic function with a calculated ejection fraction of 30% by Fay's method.   -Abnormal LV global longitudinal strain (GLS)  -11%   -Abnormal and mildly elevated native T1 time.   -Normal right ventricular size and systolic function with a calculated ejection fraction of 54% by Fay's method.   -No myocardial edema by T2w imaging/mapping. (Normal myocardium/skeletal ratio - normal < 1.9). -No intracardiac shunt. Calculated Qp:Qs:0.97 (Phase contrast velocity encoding Ao/Pa)   -Right atrium: prominent eustachian leigh, no mass seen.    -Normal myocardial resting perfusion imaging.   -On early enhancement imaging, there is no evidence of LV thrombus.   -On delayed enhancement imaging, there is no abnormal hyperenhancement to suggest myocardial scar/inflammation/infiltration.       The MRI sequences and imaging planes in this study were tailored for cardiac imaging and are suboptimal for evaluation of non-cardiac structures. Other Studies:   CTPA 10/22/21:  1. multiple acute bilateral pulmonary emboli. 2. Nonspecific multifocal bilateral ground-glass opacification. In the   setting of pulmonary emboli, the differential includes pulmonary infarct with   or without superimposed atypical infectious versus inflammatory pneumonitis. 3. Mild CHF. Bilateral Venous duplex ultrasound 10/25/21:  Conclusions        Summary        Acute totally occluding deep vein thrombosis involving the left peroneal    veins.    No other evidence of deep vein or superficial vein thrombosis involving the    left lower extremity and the right common femoral vein. Chest X-ray 10/27/21: Worsening diffuse bilateral airspace disease predominantly within the   bilateral upper lobes. CTPA 10/27/21:   Within the limitations of the exam, no new or worsening pulmonary emboli   identified.  Redemonstration of pulmonary emboli involving bilateral upper   lobes, segmental/subsegmental pulmonary arteries.  Previously noted pulmonary   emboli involving right middle and right lower lobe subsegmental pulmonary   arteries are not definitely evident within the limitations.       Significantly worsened areas of consolidation and ground-glass opacity   involving lungs bilaterally, right worse than left with bilateral upper lobe   predominance, concerning for worsening pneumonia.       Bilateral pleural effusions, right worse than left, worsened. Assessment:     1. Pre-operative cardiac risk assessment - Overall, patient remains stable from a cardiovascular perspective, denies recent angina, and appears well compensated from a heart failure standpoint. She is acceptable cardiac risk for proceeding with the planned bilateral partial mastectomies. 2. Chronic LV systolic heart failure/non-ischemic cardiomyopathy - Concern for cardiotoxicity from adriamycin or Herceptin bio similar. Invasive coronary angiography on 11/19/21 showed non-obstructive coronary artery disease. She additionally had a cardiac MRI performed on 12/3/21 that demonstrated a calculated LVEF of 30% with no evidence of myocardial scarring, infiltration, or inflammation. She remains clinically euvolemic. 3. DVT/Bilateral pulmonary emboli - Has been on Eilquis and will plan to bridge perioperatively with SC Lovenox which has already been ordered by her oncologist.  RV size and systolic function are normal.  4. Right atrial echodensity - Most likely represents prominent eustachian valve better characterized on cardiac MRI. No other evidence of right atrial mass was seen on MRI. 5. Non-obstructive coronary artery disease - Underwent invasive coronary angiography on 11/19/21 and was found to have a calcified, eccentric 60-70% mid-LAD stenosis that was not physiologically significant by DFR (0.92) or FFR (0.90). Denies recent angina and will continue with medical management. 6. H/o breast cancer - S/p 4 cycles of Adriamycin/Cytoxan and 7 of 12 planned cycles of Taxol with Herceptin bio similar and pertuzumab.

## 2022-02-02 NOTE — TELEPHONE ENCOUNTER
Eliquis samples set aside for patient per OV with [facility] on 2/2/2022. LVM for patient to make her aware.

## 2022-02-02 NOTE — PATIENT INSTRUCTIONS
1. Continue with current medications as prescribed. 2. Start back on eliquis after your surgery, as long as surgeon agrees. 3. Increase lisinopril to 2.5 mg daily. 4. Repeat echo (heart ultrasound) in March, 2022.  5. Follow up in 3 months. Your provider has ordered testing for further evaluation. An order/prescription has been included in your paper work.  To schedule outpatient testing, contact Central Scheduling by calling [place] ([phone]).

## 2022-02-03 LAB — SARS-COV-2: NOT DETECTED

## 2022-02-04 ENCOUNTER — ANESTHESIA EVENT (OUTPATIENT)
Dept: OPERATING ROOM | Age: 77
End: 2022-02-04
Payer: MEDICARE

## 2022-02-08 ENCOUNTER — HOSPITAL ENCOUNTER (OUTPATIENT)
Age: 77
Setting detail: OBSERVATION
Discharge: HOME | End: 2022-02-09
Attending: SURGERY | Admitting: SURGERY
Payer: MEDICARE

## 2022-02-08 ENCOUNTER — HOSPITAL ENCOUNTER (OUTPATIENT)
Dept: NUCLEAR MEDICINE | Age: 77
Discharge: HOME | End: 2022-02-08
Payer: MEDICARE

## 2022-02-08 ENCOUNTER — HOSPITAL ENCOUNTER (OUTPATIENT)
Dept: WOMENS IMAGING | Age: 77
Discharge: HOME | End: 2022-02-08
Payer: MEDICARE

## 2022-02-08 ENCOUNTER — ANESTHESIA (OUTPATIENT)
Dept: OPERATING ROOM | Age: 77
End: 2022-02-08
Payer: MEDICARE

## 2022-02-08 VITALS
TEMPERATURE: 96.6 F | RESPIRATION RATE: 21 BRPM | SYSTOLIC BLOOD PRESSURE: 116 MMHG | DIASTOLIC BLOOD PRESSURE: 56 MMHG | OXYGEN SATURATION: 99 %

## 2022-02-08 DIAGNOSIS — C50.911 MALIGNANT NEOPLASM OF RIGHT FEMALE BREAST, UNSPECIFIED ESTROGEN RECEPTOR STATUS, UNSPECIFIED SITE OF BREAST (HCC): ICD-10-CM

## 2022-02-08 DIAGNOSIS — C50.812 MALIGNANT NEOPLASM OF OVERLAPPING SITES OF LEFT BREAST IN FEMALE, ESTROGEN RECEPTOR POSITIVE (HCC): Primary | ICD-10-CM

## 2022-02-08 DIAGNOSIS — N60.91 BENIGN MAMMARY DYSPLASIA OF RIGHT BREAST: ICD-10-CM

## 2022-02-08 DIAGNOSIS — C50.212 MALIGNANT NEOPLASM OF UPPER-INNER QUADRANT OF LEFT FEMALE BREAST, UNSPECIFIED ESTROGEN RECEPTOR STATUS (HCC): ICD-10-CM

## 2022-02-08 DIAGNOSIS — Z17.0 MALIGNANT NEOPLASM OF OVERLAPPING SITES OF LEFT BREAST IN FEMALE, ESTROGEN RECEPTOR POSITIVE (HCC): Primary | ICD-10-CM

## 2022-02-08 LAB
ANION GAP SERPL CALCULATED.3IONS-SCNC: 13 MMOL/L (ref 3–16)
BUN BLDV-MCNC: 16 MG/DL (ref 7–20)
CALCIUM SERPL-MCNC: 9.7 MG/DL (ref 8.3–10.6)
CHLORIDE BLD-SCNC: 106 MMOL/L (ref 99–110)
CO2: 22 MMOL/L (ref 21–32)
CREAT SERPL-MCNC: <0.5 MG/DL (ref 0.6–1.2)
GFR AFRICAN AMERICAN: >60
GFR NON-AFRICAN AMERICAN: >60
GLUCOSE BLD-MCNC: 131 MG/DL (ref 70–99)
HCT VFR BLD CALC: 36.1 % (ref 36–48)
HEMOGLOBIN: 11.6 G/DL (ref 12–16)
MCH RBC QN AUTO: 28.6 PG (ref 26–34)
MCHC RBC AUTO-ENTMCNC: 32.1 G/DL (ref 31–36)
MCV RBC AUTO: 89.1 FL (ref 80–100)
PDW BLD-RTO: 16.2 % (ref 12.4–15.4)
PLATELET # BLD: 264 K/UL (ref 135–450)
PMV BLD AUTO: 6.7 FL (ref 5–10.5)
POTASSIUM REFLEX MAGNESIUM: 4.2 MMOL/L (ref 3.5–5.1)
RBC # BLD: 4.05 M/UL (ref 4–5.2)
SODIUM BLD-SCNC: 141 MMOL/L (ref 136–145)
WBC # BLD: 5.1 K/UL (ref 4–11)

## 2022-02-08 PROCEDURE — 88307 TISSUE EXAM BY PATHOLOGIST: CPT

## 2022-02-08 PROCEDURE — 2500000003 HC RX 250 WO HCPCS: Performed by: REGISTERED NURSE

## 2022-02-08 PROCEDURE — 2580000003 HC RX 258: Performed by: ANESTHESIOLOGY

## 2022-02-08 PROCEDURE — 2580000003 HC RX 258: Performed by: SURGERY

## 2022-02-08 PROCEDURE — 96375 TX/PRO/DX INJ NEW DRUG ADDON: CPT

## 2022-02-08 PROCEDURE — 2709999900 HC NON-CHARGEABLE SUPPLY: Performed by: SURGERY

## 2022-02-08 PROCEDURE — 3430000000 HC RX DIAGNOSTIC RADIOPHARMACEUTICAL: Performed by: SURGERY

## 2022-02-08 PROCEDURE — 7100000000 HC PACU RECOVERY - FIRST 15 MIN: Performed by: SURGERY

## 2022-02-08 PROCEDURE — 3700000000 HC ANESTHESIA ATTENDED CARE: Performed by: SURGERY

## 2022-02-08 PROCEDURE — 2720000010 HC SURG SUPPLY STERILE: Performed by: SURGERY

## 2022-02-08 PROCEDURE — 6360000002 HC RX W HCPCS: Performed by: REGISTERED NURSE

## 2022-02-08 PROCEDURE — 2500000003 HC RX 250 WO HCPCS: Performed by: ANESTHESIOLOGY

## 2022-02-08 PROCEDURE — 6360000002 HC RX W HCPCS: Performed by: ANESTHESIOLOGY

## 2022-02-08 PROCEDURE — A4217 STERILE WATER/SALINE, 500 ML: HCPCS | Performed by: SURGERY

## 2022-02-08 PROCEDURE — 7100000001 HC PACU RECOVERY - ADDTL 15 MIN: Performed by: SURGERY

## 2022-02-08 PROCEDURE — G0378 HOSPITAL OBSERVATION PER HR: HCPCS

## 2022-02-08 PROCEDURE — 96374 THER/PROPH/DIAG INJ IV PUSH: CPT

## 2022-02-08 PROCEDURE — 6370000000 HC RX 637 (ALT 250 FOR IP): Performed by: ANESTHESIOLOGY

## 2022-02-08 PROCEDURE — 3600000004 HC SURGERY LEVEL 4 BASE: Performed by: SURGERY

## 2022-02-08 PROCEDURE — 6360000002 HC RX W HCPCS: Performed by: SURGERY

## 2022-02-08 PROCEDURE — 3600000014 HC SURGERY LEVEL 4 ADDTL 15MIN: Performed by: SURGERY

## 2022-02-08 PROCEDURE — 3700000001 HC ADD 15 MINUTES (ANESTHESIA): Performed by: SURGERY

## 2022-02-08 PROCEDURE — 38792 RA TRACER ID OF SENTINL NODE: CPT

## 2022-02-08 PROCEDURE — A9520 TC99 TILMANOCEPT DIAG 0.5MCI: HCPCS | Performed by: SURGERY

## 2022-02-08 PROCEDURE — 85027 COMPLETE CBC AUTOMATED: CPT

## 2022-02-08 PROCEDURE — 2580000003 HC RX 258: Performed by: REGISTERED NURSE

## 2022-02-08 PROCEDURE — A9520 TC99 TILMANOCEPT DIAG 0.5MCI: HCPCS

## 2022-02-08 PROCEDURE — 2500000003 HC RX 250 WO HCPCS: Performed by: SURGERY

## 2022-02-08 PROCEDURE — 6370000000 HC RX 637 (ALT 250 FOR IP): Performed by: SURGERY

## 2022-02-08 PROCEDURE — 3430000000 HC RX DIAGNOSTIC RADIOPHARMACEUTICAL

## 2022-02-08 PROCEDURE — 76098 X-RAY EXAM SURGICAL SPECIMEN: CPT

## 2022-02-08 PROCEDURE — 80048 BASIC METABOLIC PNL TOTAL CA: CPT

## 2022-02-08 PROCEDURE — A4648 IMPLANTABLE TISSUE MARKER: HCPCS | Performed by: SURGERY

## 2022-02-08 PROCEDURE — 88305 TISSUE EXAM BY PATHOLOGIST: CPT

## 2022-02-08 DEVICE — THE MARKER IS A RADIOGRAPHIC IMPLANTABLE MARKER USED TO MARK SOFT TISSUE.IT IS COMPRISED OF A BIOABSORBABLE SPACER THAT HOLDS RADIOPAQUE MARKER CLIPS.
Type: IMPLANTABLE DEVICE | Site: BREAST | Status: FUNCTIONAL
Brand: BIOZORB MARKER

## 2022-02-08 RX ORDER — SODIUM CHLORIDE 9 MG/ML
INJECTION, SOLUTION INTRAVENOUS CONTINUOUS
Status: DISCONTINUED | OUTPATIENT
Start: 2022-02-08 | End: 2022-02-08

## 2022-02-08 RX ORDER — ROCURONIUM BROMIDE 10 MG/ML
INJECTION, SOLUTION INTRAVENOUS PRN
Status: DISCONTINUED | OUTPATIENT
Start: 2022-02-08 | End: 2022-02-08 | Stop reason: SDUPTHER

## 2022-02-08 RX ORDER — ASPIRIN 81 MG/1
81 TABLET, CHEWABLE ORAL DAILY
Status: DISCONTINUED | OUTPATIENT
Start: 2022-02-09 | End: 2022-02-09 | Stop reason: HOSPADM

## 2022-02-08 RX ORDER — SODIUM CHLORIDE, SODIUM LACTATE, POTASSIUM CHLORIDE, CALCIUM CHLORIDE 600; 310; 30; 20 MG/100ML; MG/100ML; MG/100ML; MG/100ML
INJECTION, SOLUTION INTRAVENOUS CONTINUOUS
Status: DISCONTINUED | OUTPATIENT
Start: 2022-02-08 | End: 2022-02-08

## 2022-02-08 RX ORDER — SODIUM CHLORIDE 0.9 % (FLUSH) 0.9 %
10 SYRINGE (ML) INJECTION PRN
Status: DISCONTINUED | OUTPATIENT
Start: 2022-02-08 | End: 2022-02-08 | Stop reason: HOSPADM

## 2022-02-08 RX ORDER — TETRACAINE HYDROCHLORIDE 5 MG/ML
1 SOLUTION OPHTHALMIC ONCE
Status: COMPLETED | OUTPATIENT
Start: 2022-02-08 | End: 2022-02-08

## 2022-02-08 RX ORDER — SODIUM CHLORIDE 0.9 % (FLUSH) 0.9 %
10 SYRINGE (ML) INJECTION EVERY 12 HOURS SCHEDULED
Status: DISCONTINUED | OUTPATIENT
Start: 2022-02-08 | End: 2022-02-08 | Stop reason: HOSPADM

## 2022-02-08 RX ORDER — FENTANYL CITRATE 50 UG/ML
INJECTION, SOLUTION INTRAMUSCULAR; INTRAVENOUS PRN
Status: DISCONTINUED | OUTPATIENT
Start: 2022-02-08 | End: 2022-02-08 | Stop reason: SDUPTHER

## 2022-02-08 RX ORDER — ONDANSETRON 4 MG/1
4 TABLET, ORALLY DISINTEGRATING ORAL EVERY 8 HOURS PRN
Status: DISCONTINUED | OUTPATIENT
Start: 2022-02-08 | End: 2022-02-09 | Stop reason: HOSPADM

## 2022-02-08 RX ORDER — BUPIVACAINE HYDROCHLORIDE 5 MG/ML
INJECTION, SOLUTION EPIDURAL; INTRACAUDAL PRN
Status: DISCONTINUED | OUTPATIENT
Start: 2022-02-08 | End: 2022-02-08 | Stop reason: HOSPADM

## 2022-02-08 RX ORDER — SODIUM CHLORIDE 9 MG/ML
25 INJECTION, SOLUTION INTRAVENOUS PRN
Status: DISCONTINUED | OUTPATIENT
Start: 2022-02-08 | End: 2022-02-09 | Stop reason: HOSPADM

## 2022-02-08 RX ORDER — MAGNESIUM HYDROXIDE 1200 MG/15ML
LIQUID ORAL CONTINUOUS PRN
Status: COMPLETED | OUTPATIENT
Start: 2022-02-08 | End: 2022-02-08

## 2022-02-08 RX ORDER — MORPHINE SULFATE 4 MG/ML
2.5 INJECTION, SOLUTION INTRAMUSCULAR; INTRAVENOUS EVERY 4 HOURS PRN
Status: DISCONTINUED | OUTPATIENT
Start: 2022-02-08 | End: 2022-02-09 | Stop reason: HOSPADM

## 2022-02-08 RX ORDER — ONDANSETRON 2 MG/ML
4 INJECTION INTRAMUSCULAR; INTRAVENOUS EVERY 6 HOURS PRN
Status: DISCONTINUED | OUTPATIENT
Start: 2022-02-08 | End: 2022-02-09 | Stop reason: HOSPADM

## 2022-02-08 RX ORDER — SODIUM CHLORIDE 0.9 % (FLUSH) 0.9 %
5-40 SYRINGE (ML) INJECTION EVERY 12 HOURS SCHEDULED
Status: DISCONTINUED | OUTPATIENT
Start: 2022-02-08 | End: 2022-02-09 | Stop reason: HOSPADM

## 2022-02-08 RX ORDER — CIPROFLOXACIN HYDROCHLORIDE 3.5 MG/ML
1 SOLUTION/ DROPS TOPICAL EVERY 6 HOURS SCHEDULED
Status: DISCONTINUED | OUTPATIENT
Start: 2022-02-08 | End: 2022-02-09 | Stop reason: HOSPADM

## 2022-02-08 RX ORDER — MEPERIDINE HYDROCHLORIDE 50 MG/ML
12.5 INJECTION INTRAMUSCULAR; INTRAVENOUS; SUBCUTANEOUS EVERY 5 MIN PRN
Status: DISCONTINUED | OUTPATIENT
Start: 2022-02-08 | End: 2022-02-08 | Stop reason: HOSPADM

## 2022-02-08 RX ORDER — ANASTROZOLE 1 MG/1
1 TABLET ORAL DAILY
Status: DISCONTINUED | OUTPATIENT
Start: 2022-02-09 | End: 2022-02-09 | Stop reason: HOSPADM

## 2022-02-08 RX ORDER — OXYCODONE HYDROCHLORIDE AND ACETAMINOPHEN 5; 325 MG/1; MG/1
2 TABLET ORAL PRN
Status: DISCONTINUED | OUTPATIENT
Start: 2022-02-08 | End: 2022-02-08 | Stop reason: HOSPADM

## 2022-02-08 RX ORDER — ISOSULFAN BLUE 50 MG/5ML
INJECTION, SOLUTION SUBCUTANEOUS PRN
Status: DISCONTINUED | OUTPATIENT
Start: 2022-02-08 | End: 2022-02-08 | Stop reason: HOSPADM

## 2022-02-08 RX ORDER — SODIUM CHLORIDE 9 MG/ML
25 INJECTION, SOLUTION INTRAVENOUS PRN
Status: DISCONTINUED | OUTPATIENT
Start: 2022-02-08 | End: 2022-02-08 | Stop reason: HOSPADM

## 2022-02-08 RX ORDER — OXYCODONE HYDROCHLORIDE AND ACETAMINOPHEN 5; 325 MG/1; MG/1
1 TABLET ORAL EVERY 6 HOURS PRN
Qty: 16 TABLET | Refills: 0 | Status: SHIPPED | OUTPATIENT
Start: 2022-02-08 | End: 2022-02-12

## 2022-02-08 RX ORDER — MORPHINE SULFATE 2 MG/ML
2 INJECTION, SOLUTION INTRAMUSCULAR; INTRAVENOUS EVERY 5 MIN PRN
Status: DISCONTINUED | OUTPATIENT
Start: 2022-02-08 | End: 2022-02-08 | Stop reason: HOSPADM

## 2022-02-08 RX ORDER — MORPHINE SULFATE 2 MG/ML
1 INJECTION, SOLUTION INTRAMUSCULAR; INTRAVENOUS EVERY 5 MIN PRN
Status: DISCONTINUED | OUTPATIENT
Start: 2022-02-08 | End: 2022-02-08 | Stop reason: HOSPADM

## 2022-02-08 RX ORDER — ACETAMINOPHEN 325 MG/1
650 TABLET ORAL EVERY 6 HOURS
Status: DISCONTINUED | OUTPATIENT
Start: 2022-02-08 | End: 2022-02-09 | Stop reason: HOSPADM

## 2022-02-08 RX ORDER — PROPOFOL 10 MG/ML
INJECTION, EMULSION INTRAVENOUS PRN
Status: DISCONTINUED | OUTPATIENT
Start: 2022-02-08 | End: 2022-02-08 | Stop reason: SDUPTHER

## 2022-02-08 RX ORDER — OXYCODONE HYDROCHLORIDE AND ACETAMINOPHEN 5; 325 MG/1; MG/1
1 TABLET ORAL PRN
Status: DISCONTINUED | OUTPATIENT
Start: 2022-02-08 | End: 2022-02-08 | Stop reason: HOSPADM

## 2022-02-08 RX ORDER — METOPROLOL SUCCINATE 25 MG/1
25 TABLET, EXTENDED RELEASE ORAL DAILY
Status: DISCONTINUED | OUTPATIENT
Start: 2022-02-08 | End: 2022-02-09 | Stop reason: HOSPADM

## 2022-02-08 RX ORDER — LIDOCAINE HYDROCHLORIDE 20 MG/ML
INJECTION, SOLUTION EPIDURAL; INFILTRATION; INTRACAUDAL; PERINEURAL PRN
Status: DISCONTINUED | OUTPATIENT
Start: 2022-02-08 | End: 2022-02-08 | Stop reason: SDUPTHER

## 2022-02-08 RX ORDER — ONDANSETRON 2 MG/ML
4 INJECTION INTRAMUSCULAR; INTRAVENOUS
Status: COMPLETED | OUTPATIENT
Start: 2022-02-08 | End: 2022-02-08

## 2022-02-08 RX ORDER — OXYCODONE HYDROCHLORIDE 5 MG/1
5 TABLET ORAL EVERY 4 HOURS PRN
Status: DISCONTINUED | OUTPATIENT
Start: 2022-02-08 | End: 2022-02-09 | Stop reason: HOSPADM

## 2022-02-08 RX ORDER — SENNA PLUS 8.6 MG/1
1 TABLET ORAL DAILY PRN
Status: DISCONTINUED | OUTPATIENT
Start: 2022-02-08 | End: 2022-02-09 | Stop reason: HOSPADM

## 2022-02-08 RX ORDER — GABAPENTIN 100 MG/1
100 CAPSULE ORAL 2 TIMES DAILY PRN
Status: DISCONTINUED | OUTPATIENT
Start: 2022-02-08 | End: 2022-02-09 | Stop reason: HOSPADM

## 2022-02-08 RX ORDER — DEXAMETHASONE SODIUM PHOSPHATE 4 MG/ML
INJECTION, SOLUTION INTRA-ARTICULAR; INTRALESIONAL; INTRAMUSCULAR; INTRAVENOUS; SOFT TISSUE PRN
Status: DISCONTINUED | OUTPATIENT
Start: 2022-02-08 | End: 2022-02-08 | Stop reason: SDUPTHER

## 2022-02-08 RX ORDER — CIPROFLOXACIN HYDROCHLORIDE 3.5 MG/ML
1 SOLUTION/ DROPS TOPICAL EVERY 6 HOURS SCHEDULED
Status: DISCONTINUED | OUTPATIENT
Start: 2022-02-08 | End: 2022-02-08 | Stop reason: HOSPADM

## 2022-02-08 RX ORDER — SODIUM CHLORIDE 0.9 % (FLUSH) 0.9 %
5-40 SYRINGE (ML) INJECTION PRN
Status: DISCONTINUED | OUTPATIENT
Start: 2022-02-08 | End: 2022-02-09 | Stop reason: HOSPADM

## 2022-02-08 RX ORDER — LISINOPRIL 2.5 MG/1
2.5 TABLET ORAL DAILY
Status: DISCONTINUED | OUTPATIENT
Start: 2022-02-09 | End: 2022-02-09 | Stop reason: HOSPADM

## 2022-02-08 RX ORDER — ATORVASTATIN CALCIUM 40 MG/1
40 TABLET, FILM COATED ORAL NIGHTLY
Status: DISCONTINUED | OUTPATIENT
Start: 2022-02-08 | End: 2022-02-09 | Stop reason: HOSPADM

## 2022-02-08 RX ADMIN — FENTANYL CITRATE 50 MCG: 50 INJECTION INTRAMUSCULAR; INTRAVENOUS at 09:31

## 2022-02-08 RX ADMIN — TETRACAINE HYDROCHLORIDE 1 DROP: 5 SOLUTION OPHTHALMIC at 15:13

## 2022-02-08 RX ADMIN — CEFAZOLIN 2 G: 10 INJECTION, POWDER, FOR SOLUTION INTRAVENOUS at 09:40

## 2022-02-08 RX ADMIN — DEXAMETHASONE SODIUM PHOSPHATE 12 MG: 4 INJECTION, SOLUTION INTRAMUSCULAR; INTRAVENOUS at 09:49

## 2022-02-08 RX ADMIN — FAMOTIDINE 20 MG: 10 INJECTION, SOLUTION INTRAVENOUS at 09:04

## 2022-02-08 RX ADMIN — FAMOTIDINE 20 MG: 10 INJECTION, SOLUTION INTRAVENOUS at 08:00

## 2022-02-08 RX ADMIN — OXYCODONE 5 MG: 5 TABLET ORAL at 22:12

## 2022-02-08 RX ADMIN — OXYCODONE 5 MG: 5 TABLET ORAL at 17:41

## 2022-02-08 RX ADMIN — ACETAMINOPHEN 650 MG: 325 TABLET ORAL at 17:41

## 2022-02-08 RX ADMIN — PHENYLEPHRINE HYDROCHLORIDE 25 MCG/MIN: 10 INJECTION INTRAVENOUS at 09:38

## 2022-02-08 RX ADMIN — TILMANOCEPT 1.1 MILLICURIE: KIT at 10:05

## 2022-02-08 RX ADMIN — PROPOFOL 100 MG: 10 INJECTION, EMULSION INTRAVENOUS at 09:38

## 2022-02-08 RX ADMIN — SODIUM CHLORIDE: 9 INJECTION, SOLUTION INTRAVENOUS at 17:40

## 2022-02-08 RX ADMIN — LIDOCAINE HYDROCHLORIDE 100 MG: 20 INJECTION, SOLUTION EPIDURAL; INFILTRATION; INTRACAUDAL; PERINEURAL at 09:38

## 2022-02-08 RX ADMIN — CEFAZOLIN 1 G: 10 INJECTION, POWDER, FOR SOLUTION INTRAVENOUS at 13:49

## 2022-02-08 RX ADMIN — ATORVASTATIN CALCIUM 40 MG: 40 TABLET, FILM COATED ORAL at 20:16

## 2022-02-08 RX ADMIN — HYDROMORPHONE HYDROCHLORIDE 0.5 MG: 1 INJECTION, SOLUTION INTRAMUSCULAR; INTRAVENOUS; SUBCUTANEOUS at 15:14

## 2022-02-08 RX ADMIN — CIPROFLOXACIN 1 DROP: 3 SOLUTION OPHTHALMIC at 20:16

## 2022-02-08 RX ADMIN — ONDANSETRON 4 MG: 2 INJECTION INTRAMUSCULAR; INTRAVENOUS at 09:04

## 2022-02-08 RX ADMIN — HYDROMORPHONE HYDROCHLORIDE 0.5 MG: 1 INJECTION, SOLUTION INTRAMUSCULAR; INTRAVENOUS; SUBCUTANEOUS at 14:40

## 2022-02-08 RX ADMIN — Medication 10 ML: at 20:20

## 2022-02-08 RX ADMIN — SODIUM CHLORIDE, SODIUM LACTATE, POTASSIUM CHLORIDE, AND CALCIUM CHLORIDE: .6; .31; .03; .02 INJECTION, SOLUTION INTRAVENOUS at 09:24

## 2022-02-08 RX ADMIN — ACETAMINOPHEN 650 MG: 325 TABLET ORAL at 22:11

## 2022-02-08 RX ADMIN — ROCURONIUM BROMIDE 50 MG: 10 SOLUTION INTRAVENOUS at 09:38

## 2022-02-08 RX ADMIN — FENTANYL CITRATE 50 MCG: 50 INJECTION INTRAMUSCULAR; INTRAVENOUS at 09:35

## 2022-02-08 ASSESSMENT — PULMONARY FUNCTION TESTS
PIF_VALUE: 1
PIF_VALUE: 17
PIF_VALUE: 1
PIF_VALUE: 17
PIF_VALUE: 17
PIF_VALUE: 15
PIF_VALUE: 17
PIF_VALUE: 1
PIF_VALUE: 17
PIF_VALUE: 17
PIF_VALUE: 19
PIF_VALUE: 17
PIF_VALUE: 16
PIF_VALUE: 17
PIF_VALUE: 1
PIF_VALUE: 17
PIF_VALUE: 18
PIF_VALUE: 2
PIF_VALUE: 17
PIF_VALUE: 18
PIF_VALUE: 15
PIF_VALUE: 17
PIF_VALUE: 17
PIF_VALUE: 18
PIF_VALUE: 17
PIF_VALUE: 16
PIF_VALUE: 17
PIF_VALUE: 18
PIF_VALUE: 17
PIF_VALUE: 18
PIF_VALUE: 2
PIF_VALUE: 18
PIF_VALUE: 17
PIF_VALUE: 2
PIF_VALUE: 17
PIF_VALUE: 16
PIF_VALUE: 17
PIF_VALUE: 2
PIF_VALUE: 17
PIF_VALUE: 19
PIF_VALUE: 17
PIF_VALUE: 18
PIF_VALUE: 17
PIF_VALUE: 2
PIF_VALUE: 17
PIF_VALUE: 17
PIF_VALUE: 4
PIF_VALUE: 18
PIF_VALUE: 17
PIF_VALUE: 17
PIF_VALUE: 16
PIF_VALUE: 17
PIF_VALUE: 20
PIF_VALUE: 16
PIF_VALUE: 2
PIF_VALUE: 17
PIF_VALUE: 1
PIF_VALUE: 1
PIF_VALUE: 17
PIF_VALUE: 16
PIF_VALUE: 16
PIF_VALUE: 17
PIF_VALUE: 1
PIF_VALUE: 17
PIF_VALUE: 18
PIF_VALUE: 17
PIF_VALUE: 16
PIF_VALUE: 17
PIF_VALUE: 20
PIF_VALUE: 17
PIF_VALUE: 2
PIF_VALUE: 17
PIF_VALUE: 16
PIF_VALUE: 17
PIF_VALUE: 1
PIF_VALUE: 1
PIF_VALUE: 17
PIF_VALUE: 16
PIF_VALUE: 17
PIF_VALUE: 1
PIF_VALUE: 17
PIF_VALUE: 18
PIF_VALUE: 17
PIF_VALUE: 1
PIF_VALUE: 17
PIF_VALUE: 17
PIF_VALUE: 18
PIF_VALUE: 17
PIF_VALUE: 16
PIF_VALUE: 17
PIF_VALUE: 17
PIF_VALUE: 2
PIF_VALUE: 5
PIF_VALUE: 17
PIF_VALUE: 16
PIF_VALUE: 17
PIF_VALUE: 17
PIF_VALUE: 2
PIF_VALUE: 17
PIF_VALUE: 16
PIF_VALUE: 18
PIF_VALUE: 17
PIF_VALUE: 1
PIF_VALUE: 18
PIF_VALUE: 17
PIF_VALUE: 1
PIF_VALUE: 17
PIF_VALUE: 17
PIF_VALUE: 15
PIF_VALUE: 18
PIF_VALUE: 1
PIF_VALUE: 17
PIF_VALUE: 2
PIF_VALUE: 18
PIF_VALUE: 17
PIF_VALUE: 17
PIF_VALUE: 18
PIF_VALUE: 17
PIF_VALUE: 17
PIF_VALUE: 7
PIF_VALUE: 17
PIF_VALUE: 18
PIF_VALUE: 17
PIF_VALUE: 15
PIF_VALUE: 17
PIF_VALUE: 1
PIF_VALUE: 17
PIF_VALUE: 1
PIF_VALUE: 3
PIF_VALUE: 17
PIF_VALUE: 16
PIF_VALUE: 17
PIF_VALUE: 16
PIF_VALUE: 17
PIF_VALUE: 17
PIF_VALUE: 16
PIF_VALUE: 17
PIF_VALUE: 1
PIF_VALUE: 16
PIF_VALUE: 17
PIF_VALUE: 2
PIF_VALUE: 17

## 2022-02-08 ASSESSMENT — PAIN SCALES - GENERAL
PAINLEVEL_OUTOF10: 10
PAINLEVEL_OUTOF10: 9
PAINLEVEL_OUTOF10: 10
PAINLEVEL_OUTOF10: 0
PAINLEVEL_OUTOF10: 5
PAINLEVEL_OUTOF10: 8

## 2022-02-08 ASSESSMENT — LIFESTYLE VARIABLES: SMOKING_STATUS: 0

## 2022-02-08 ASSESSMENT — ENCOUNTER SYMPTOMS: SHORTNESS OF BREATH: 1

## 2022-02-08 ASSESSMENT — PAIN - FUNCTIONAL ASSESSMENT: PAIN_FUNCTIONAL_ASSESSMENT: 0-10

## 2022-02-08 NOTE — ANESTHESIA PRE PROCEDURE
Department of Anesthesiology  Preprocedure Note       Name:  Vincent Scott   Age:  78 y.o.  :  1945                                          MRN:  [id number]         Date:  2022      Surgeon: Allison Pena):  Tiago Fowler DO    Procedure: Procedure(s):  LEFT RADIOFREQUENCY IDENTIFICATION TAG LOCALIZED PARTIAL MASTECTOMY, LEFT SENTINEL LYMPH NODE BIOPSY, RIGHT BRACKETED RADIOFREQUENCY IDENTIFICATION TAG LOCALIZED PARTIAL MASTECTOMY, RIGHT RADIOFREQUENCY IDENTIFICATION TAG, LOCALIZED EXCISIONAL BIOPSY, RIGHT AXILLARY LYMPH NODE DISSECTION, POSSIBLE BIOZORB PLACEMENT    Medications prior to admission:   Prior to Admission medications    Medication Sig Start Date End Date Taking? Authorizing Provider   lisinopril (PRINIVIL;ZESTRIL) 2.5 MG tablet Take 1 tablet by mouth daily 22  Yes Yomi Martinez DO   metoprolol succinate (TOPROL XL) 25 MG extended release tablet Take 1 tablet by mouth 2 times daily  Patient taking differently: Take 25 mg by mouth daily  21  Yes Yomi Martinez DO   atorvastatin (LIPITOR) 40 MG tablet Take 1 tablet by mouth daily Take 1 tab by mouth in the evening. 21  Yes Ymoi Martinez DO   gabapentin (NEURONTIN) 100 MG capsule Take 1 capsule by mouth 2 times daily as needed (pain/numbness/tingling) for up to 90 days.  21 Yes SOUTH Ware - CNP   hydrOXYzine (ATARAX) 10 MG tablet Take 10 mg by mouth 3 times daily as needed   Yes Historical Provider, MD   aspirin 81 MG chewable tablet Take 1 tablet by mouth daily 21  Yes Trinity Mckenzie MD   apixaban Wellington Efraín) 5 MG TABS tablet Take 1 tablet by mouth 2 times daily 21  Yes Trinity Mckenzie MD   calcium carbonate-vitamin D (CALTRATE) 600-400 MG-UNIT TABS per tab Take by mouth 21  Yes Historical Provider, MD   anastrozole (ARIMIDEX) 1 MG tablet Take 1 mg by mouth 21  Yes Historical Provider, MD   ondansetron (ZOFRAN) 8 MG tablet Take 1 tablet by mouth 21  Yes Historical Provider, MD   melatonin (RA MELATONIN) 3 MG TABS tablet Take 1 tablet by mouth nightly as needed (sleep) 5/30/21  Yes SOUTH Magallon - CNP   nitroGLYCERIN (NITROSTAT) 0.4 MG SL tablet up to max of 3 total doses. If no relief after 1 dose, call 911. 11/21/21   Trinity Mckenzie MD       Current medications:    Current Facility-Administered Medications   Medication Dose Route Frequency Provider Last Rate Last Admin    lactated ringers infusion   IntraVENous Continuous Jess Perry MD        sodium chloride flush 0.9 % injection 10 mL  10 mL IntraVENous 2 times per day Jess Perry MD        sodium chloride flush 0.9 % injection 10 mL  10 mL IntraVENous PRN Jess Perry MD        0.9 % sodium chloride infusion  25 mL IntraVENous PRN Jess Perry MD           Allergies:     Allergies   Allergen Reactions    Bactrim [Sulfamethoxazole-Trimethoprim] Nausea Only       Problem List:    Patient Active Problem List   Diagnosis Code    Perforation of sigmoid colon due to diverticulitis K57.20    Anemia D64.9    Pure hypercholesterolemia E78.00    Malignant neoplasm of right breast in female, estrogen receptor positive ([facility] [address] C50.911, Z17.0    Malignant neoplasm of upper-inner quadrant of left breast in female, estrogen receptor positive ([address] C50.212, Z17.0    Chemotherapy-induced neutropenia (HCC) D70.1, T45.1X5A    Fatigue R53.83    Tachycardia R00.0    Neutropenia, drug-induced (HCC) D70.2    Severe malnutrition (HCC) E43    Neutropenia (HCC) D70.9    Failure to thrive in adult R62.7    Recurrent falls R29.6    Bacteriuria R82.71    Acute pulmonary embolism without acute cor pulmonale ([facility]) I26.99    CHF (congestive heart failure) ([facility]) I50.9    CHF (congestive heart failure), NYHA class I, acute on chronic, combined ([facility]) I50.43    Chest pain due to CAD ([facility] I25.119    Chest pain R07.9    Cardiomyopathy ([facility]) I42.9    Chronic systolic heart failure ([facility] [address] I50.22       Past Medical History:        Diagnosis Date    Breast cancer ([facility]     CAD (coronary artery disease)     Cancer ([facility] [facility]     Cardiomyopathy ([facility] [address]     SECONDARY TO CHEMO    CHF (congestive heart failure) ([facility] [address] [facility]     Hx of blood clots     PE    Hyperlipidemia     Hypertension     Kidney stone        Past Surgical History:        Procedure Laterality Date    COLONOSCOPY      MRI BREAST BX USING DEVICE RIGHT Right 5/28/2021    MRI BREAST BX USING DEVICE RIGHT [facility]20[facility]    MRI BREAST BX USING DEVICE RIGHT Right 2/2/2022    MRI BREAST BX USING DEVICE RIGHT 2/2/2022 [facility]    MRI NEEDLE BIOPSY EACH ADDL RIGHT Right 5/28/2021    MRI NEEDLE BIOPSY EACH ADDL RIGHT 5/28/2021 [facility] EG [facility]    PORT SURGERY N/A 5/25/2021    RIGHT PORT A CATH PLACEMENT performed by Tiago Fowler DO at [address] Right 5/7/2021    US BREAST BIOPSY NEEDLE ADDITIONAL RIGHT 5/7/2021 Ban Proctor MD [facility] BREAST NEEDLE BIOPSY LEFT Left 5/7/2021    US BREAST NEEDLE BIOPSY LEFT 5/7/2021 Ban Proctor MD [facility] BREAST NEEDLE BIOPSY RIGHT Right 5/7/2021    US BREAST NEEDLE BIOPSY RIGHT 5/7/2021 Ban Proctor MD [facility] NEEDLE LOC OF LEFT BREAST Left 2/2/2022    US GUIDED NEEDLE LOC OF LEFT BREAST 2/2/2022 Ban Proctor MD [facility]    US GUIDED NEEDLE LOC OF RIGHT BREAST Right 2/2/2022    US GUIDED NEEDLE LOC OF RIGHT BREAST 2/2/2022 Ban Proctor MD [facility]       Social History:    Social History     Tobacco Use    Smoking status: Passive Smoke Exposure - Never Smoker    Smokeless tobacco: Never Used   Substance Use Topics    Alcohol use:  No                                Counseling given: Not Answered      Vital Signs (Current):   Vitals:    02/03/22 0900 02/08/22 0747   BP:  122/76   Pulse:  92   Resp:  16   Temp:  97.8 °F (36.6 °C) TempSrc:  Temporal   SpO2:  97%   Weight: 119 lb (54 kg)    Height: 5' 7.5\" (1.715 m)                                               BP Readings from Last 3 Encounters:   02/08/22 122/76   02/02/22 (!) 112/58   01/17/22 100/64       NPO Status: Time of last liquid consumption: 2000                        Time of last solid consumption: 2000                        Date of last liquid consumption: 02/07/22                        Date of last solid food consumption: 02/07/22    BMI:   Wt Readings from Last 3 Encounters:   02/03/22 119 lb (54 kg)   02/02/22 122 lb (55.3 kg)   01/17/22 115 lb (52.2 kg)     Body mass index is 18.36 kg/m². CBC:   Lab Results   Component Value Date    WBC 5.1 02/08/2022    RBC 4.05 02/08/2022    HGB 11.6 02/08/2022    HCT 36.1 02/08/2022    MCV 89.1 02/08/2022    RDW 16.2 02/08/2022     02/08/2022       CMP:   Lab Results   Component Value Date     11/21/2021    K 3.8 11/21/2021    K 3.9 11/18/2021     11/21/2021    CO2 24 11/21/2021    BUN 11 11/21/2021    CREATININE 0.7 02/02/2022    CREATININE <0.5 11/21/2021    GFRAA >60 02/02/2022    GFRAA >60 07/10/2012    AGRATIO 1.0 11/18/2021    LABGLOM >60 02/02/2022    GLUCOSE 105 11/21/2021    PROT 6.7 11/18/2021    PROT 7.8 07/10/2012    CALCIUM 9.1 11/21/2021    BILITOT 0.3 11/18/2021    ALKPHOS 103 11/18/2021    AST 17 11/18/2021    ALT 16 11/18/2021       POC Tests: No results for input(s): POCGLU, POCNA, POCK, POCCL, POCBUN, POCHEMO, POCHCT in the last 72 hours.     Coags:   Lab Results   Component Value Date    PROTIME 15.0 11/18/2021    INR 1.31 11/18/2021    APTT 106.8 11/19/2021       HCG (If Applicable): No results found for: PREGTESTUR, PREGSERUM, HCG, HCGQUANT     ABGs: No results found for: PHART, PO2ART, LBC3KZU, NCC3CEE, BEART, U0VWVSIP     Type & Screen (If Applicable):  No results found for: LABABO, LABRH    Drug/Infectious Status (If Applicable):  No results found for: HIV, HEPCAB    COVID-19 Screening (If allergy history). No interval changes to history and physical examination. Anesthetic plan, risks, benefits, alternatives, and personnel involved discussed with patient. Patient verbalized an understanding and agrees to proceed.       Harris Zheng MD  February 8, 2022  8:19 AM      Harris Zheng MD   2/8/2022

## 2022-02-08 NOTE — PROGRESS NOTES
Bedside report given to Irvin RN on the floor. Patient's daughter, Mariano Gonzalez, called and updated on patient condition and plan of care. Room number on the floor given.

## 2022-02-08 NOTE — ANESTHESIA POSTPROCEDURE EVALUATION
Department of Anesthesiology  Postprocedure Note    Patient: Radha Burns  MRN: [id number]  YOB: 1945  Date of evaluation: 2/8/2022  Time:  3:15 PM     Procedure Summary     Date: 02/08/22 Room / Location: [facility]    Anesthesia Start: 4182 Anesthesia Stop: 3012    Procedure: LEFT RADIOFREQUENCY IDENTIFICATION TAG LOCALIZED PARTIAL MASTECTOMY, LEFT SENTINEL LYMPH NODE BIOPSY, RIGHT BRACKETED RADIOFREQUENCY IDENTIFICATION TAG LOCALIZED PARTIAL MASTECTOMY, RIGHT RADIOFREQUENCY IDENTIFICATION TAG, LOCALIZED EXCISIONAL BIOPSY, RIGHT AXILLARY LYMPH NODE DISSECTION, RIGHT BREAST BIOZORB PLACEMENT (N/A Breast) Diagnosis:       Malignant neoplasm of upper-inner quadrant of left female breast, unspecified estrogen receptor status ([address]      Malignant neoplasm of right female breast, unspecified estrogen receptor status, unspecified site of breast ([address]      Benign mammary dysplasia of right breast      (LEFT BREAST CANCER, RIGHT BREAST CANCER, RIGHT ATYPICAL DUCTAL HYPERPLASIA)    Surgeons: Mazin Lorenz DO Responsible Provider: Miki Odonnell MD    Anesthesia Type: general ASA Status: 3          Anesthesia Type: general    Yeison Phase I: Yeison Score: 10    Yeison Phase II:      Last vitals: Reviewed and per EMR flowsheets. Vitals:    02/03/22 0900 02/08/22 0747 02/08/22 1432   BP:  122/76    Pulse:  92    Resp:  16    Temp:  97.8 °F (36.6 °C) 97.3 °F (36.3 °C)   TempSrc:  Temporal Temporal   SpO2:  97%    Weight: 119 lb (54 kg)     Height: 5' 7.5\" (1.715 m)       Anesthesia Post Evaluation    Patient location during evaluation: bedside  Patient participation: complete - patient participated  Level of consciousness: awake and alert  Airway patency: patent  Nausea & Vomiting: no nausea  Complications: yes (R EYE BURNING W/ INJECTION. SUSPECTED ABRASION. TETRACAINE 1 DROP, CIPRO PER PHARM WHILE INPT. IF PERSISTENT, WILL F/U W/ EYE CONSULT.  PT AGREEABLE.)  Specific complications: possible eye injury  Cardiovascular status: hemodynamically stable  Respiratory status: acceptable  Hydration status: euvolemic

## 2022-02-08 NOTE — OP NOTE
Breast Surgery Operative Note    Vincent Scott  YOB: 1945  MRN: [id number]    DATE OF PROCEDURE  02/08/22     PREOPERATIVE DIAGNOSIS  1. Left breast cancer  2. Right breast cancer  3. Right breast atypical ductal hyperplasia     POSTOPERATIVE DIAGNOSIS  Same    PROCEDURE  1. Injection of blue dye to the left breast  2. Left radiofrequency identification tag (RFID) tag localized partial mastectomy   3. Left sentinel lymph node biopsy  4. Right bracketed RFID tag localized partial mastectomy  5. Right RFID tag localized excisional biopsy  6. Placement of biozorb, right breast  7. Right axillary lymph node dissection    ANESTHESIA  General    SURGEON  Tiago Fowler DO     ASSISTANT  Kashif Morrison    ESTIMATED BLOOD LOSS  less than 50  ml    COMPLICATIONS  None apparent by completion of procedure    SPECIMENS REMOVED  1. Left breast tissue, main segment, short suture superior, and long suture lateral  2. Left axillary sentinel lymph nodes  3. Right breast tissue with tag #[id number] and tag #[id number], short superior, long lateral  4. Right breast tissue, medial margin, area of tag #[id number], stitch marks new margin  5. Right breast tissue, upper outer quadrant, area of tag #[id number]  6. Right axillary contents    IMPLANTS  2 x 3 cm round Biozorb     FINDINGS  The left breast tissue was excised using RFID tag localization. Specimen radiograph demonstrated that the clip and RFID tag were located within the specimen. The left sentinel lymph nodes were identified (blue and hot) and were grossly normal.     The right breast tissue was also excised using RFID tag localization. There was a small palpable mass present at the site of tag #[id number] (which was the prior site of invasive ductal carcinoma). Right axillary dissection was performed and there were at least 5 palpable nodes in the specimen. POST-OP CONDITION  Stable    DISPOSITION  To PACU    INDICATION FOR PROCEDURE  Vincent Scott is a 78 y. o. woman who was found to have an abnormality in her bilateral breasts on imaging. She underwent multiple biopsies which demonstrated left breast cancer, right breast cancer with a positive right axillary node, and right breast atypical ductal hyperplasia. She underwent neoadjuvant chemotherapy. Following chemotherapy, she had a very good response in her left breast and a partial response in her right breast. She strongly desired breast conservation. I also recommended left sentinel lymph node biopsy for axillary staging. She did have evidence of residual disease so axillary lymph node dissection was recommended on the right. She presents today for her planned procedure. The indications for the planned procedure, along with the potential benefits and risks which include but are not limited to: reactions to medications/anesthesia, pain, infection, bleeding, injury to nerves and/or vessels, wound complications, seroma, lymphedema, numbness, poor cosmetic outcome, scars and need for additional procedures based on final pathology. All questions were answered, and she agreed to proceed. The surgical site was confirmed today. Consent was obtained. DESCRIPTION OF PROCEDURE  Cathy Bailon previously underwent multiple image guided RFID tag localization procedures in the radiology department. She was brought to the operating room and placed supine on the operating room table with her arms extended on boards. She was appropriately positioned and padded. Bilateral sequential compression devices were applied to both lower extremities and appropriate perioperative antibiotics (ancef) were administered. A manning was placed. After induction of anesthesia, a timeout was called confirming the correct patient and procedure and all were in agreement. Radioactive colloid was injected into the left breast by the nuclear medicine technician.  I then injected 3 mL of isosulfan blue dye in the upper outer quadrant, and a five minute breast massage was performed. The patient was then prepped and draped in the standard surgical fashion. Attention was first directed to the left breast.  A periareolar incision was planned based on the location of the greatest LOCalizer signal. The incision was made and carried down through the dermis with electrocautery. The LOCalizer signal was then used to guide creation of flaps and excise tissue anterior, superior, inferior, medial, and lateral to it. The tissue was then transected from the posterior attachments. Dissection was not carried to the chest wall. The specimen was then marked with a short suture on the superior margin, and a long suture on the lateral margin and specimen xray was obtained. Specimen radiograph demonstrated that the clip and RFID tag were located within the specimen with adequate radiographic margins. Attention was then turned to the wound. Hemostasis was obtained with electrocautery. The wound was irrigated. 0.5% marcaine was infiltrated into the soft tissues surrounding the cavity and hemostasis was again confirmed. Clips were placed in the lumpectomy cavity. The tissues were then reapproximated in a superior/inferior direction using 3-0 vicryl. Hemostasis was again confirmed. Attention was then turned to the left axilla. A curvilinear incision was made at the lower edge of the axillary hairline and carried down through the dermis with electrocautery. The subcutaneous tissues were opened and the clavipectoral fascia was incised. Upon entering the axilla, the gamma probe and blue dye were utilized to guide localization of the sentinel nodes. What appeared to be one node (possibly two, versus large blue lymphatics) was identified and excised. This was blue and hot. Ex vivo counts were 4027. These were then passed off the field.   The residual gamma probe activity within the axillary region was minimal.  The axilla was then thoroughly assessed for other blue channels/nodes and palpably abnormal lymph nodes, of which there were none. Attention was then turned to the wound. Aggressive hemostasis was obtained with electrocautery. The wound was irrigated with copious amounts of sterile saline. Local was infiltrated into the soft tissues surrounding the cavity and hemostasis was again confirmed. We then turned attention to the right breast. A periareolar incision was planned based on the location of the greatest LOCalizer signals. First, we focused on the bracketed area with 2 tags ([id number] and [id number]). The incision was made and carried down through the dermis with electrocautery. The LOCalizer signal was then used to guide creation of flaps and excise tissue anterior, superior, inferior, medial, and lateral to both tags. Of note, near tag #[id number] there was a small palpable mass. The tissue was then transected from the posterior attachments. Dissection was carried to the chest wall so there is no additional posterior margin available for resection. Additionally, the anterior margin is extremely close to the skin so there is no additional anterior tissue available for resection. Laterally, the margin is also skin, so there is no additional lateral tissue available for resection. A shave margin was then taken medially in the area of the palpable mass near tag #[id number]. Next, via the same incision, the area with tag #[id number] was localized. Flaps were created around this area, and the specimen was transected and passed off to pathology. Local was infiltrated. The cavity was irrigated, and hemostasis was confirmed. A clip was placed in the area of tag #[id number]. Following this, flaps were created, and a 3x2 cm round BioZorb implant was placed in the tumor bed (in the area bracketed with 2 tags) for improved cosmesis, to facilitate potential radiation treatment and for future follow-up surveillance. This was secured in place with multiple 3-0 vicryls.  The previously mobilized tissue was then reapproximated to enclose the implant and re-create the natural breast cosmesis. Lastly, attention was turned to the right axilla. An incision was made below the hair-bearing portion of the axilla. The subcutaneous tissues were opened and the clavipectoral fascia was incised and the axillary fat pad was exposed. Using a combination of blunt dissection and electrocautery, anatomic landmarks were identified. The medial pectoral neurovascular bundle was identified and preserved. The axillary vein was then identified (and was bifid). The long thoracic nerve was identified; care was taken not to breach the fascial layer on the serratus anterior holding the long thoracic nerve. Two intercostobrachial nerves were also identified and were directly entering the specimen, thus these were ultimately clipped and divided. The thoracodorsal neurovascular bundle was also identified posteriorly and was preserved. Following identification of these landmarks, the axillary fat pad was mobilized. All level I and level II lymph nodes were included in the dissection. Approximately 5 lymph nodes were able to be palpated in the specimen. The specimen was removed and sent to pathology. Attention was then turned to the wound. Aggressive hemostasis was obtained with electrocautery. The wound was irrigated with sterile saline. Local was infiltrated. Hemostasis was again confirmed. A 15 Fr drain was inserted through a separate stab incision and was secured in place with 2-0 nylon sutures. A total of 30 ml was used throughout the procedure. The deep dermal layer was then reapproximated with interrupted 3-0 Vicryl stitches for all incisions. The skin of all incisions was reapproximated with running subcuticular using 4-0 Monocryl. Dermabond and a surgical bra were applied. The patient tolerated this procedure well, was awakened and transferred to the recovery room in stable condition.   All instrument, sponge, and needle counts were correct. Her family was notified of intraoperative findings.     ELECTRONICALLY SIGNED BY Larrie Boast, DO  on 02/08/22 at 2:03 PM EST

## 2022-02-08 NOTE — PROGRESS NOTES
Patient arrived in PACU at this time and placed on monitor. Report received from [id number] and [address] CRNA. Will continue to monitor.

## 2022-02-08 NOTE — PROGRESS NOTES
Admitted pt to room c323 in stable condition. Admission and assessment complete and charted. Family at bedside. Drain empty at this time. Surgical incisions c/d/i. Surgical bra in place. Admin PRN analgesic as ordered and requested. Spoke with MD thru PS and got abx eye drops re-ordered. BS active and pt ordered clears dinner tray. Pt stable and denied needs when writer left room.

## 2022-02-08 NOTE — PROGRESS NOTES
PS Dr. Maylin Chaney, \"Pt still c/o R eye pain and irritation. Tetracaine administered in pacu at 1513 but she is requesting something else. Thank you. \"

## 2022-02-08 NOTE — ANESTHESIA PROCEDURE NOTES
Airway  Date/Time: 2/8/2022 9:42 AM  Urgency: elective      General Information and Staff    Patient location during procedure: OR  Resident/CRNA: SOUTH Rao - CRNA  Performed: resident/CRNA     Indications and Patient Condition  Indications for airway management: anesthesia  Spontaneous Ventilation: absent  Sedation level: deep  Preoxygenated: yes  Patient position: sniffing  Mask difficulty assessment: vent by bag mask    Final Airway Details  Final airway type: endotracheal airway      Successful airway: ETT  Cuffed: yes   Successful intubation technique: direct laryngoscopy  Facilitating devices/methods: intubating stylet  Endotracheal tube insertion site: oral  Blade: Wallace  Blade size: #2  ETT size (mm): 7.5  Cormack-Lehane Classification: grade I - full view of glottis  Placement verified by: chest auscultation and capnometry   Inital cuff pressure (cm H2O): 5  Measured from: lips  ETT to lips (cm): 21  Number of attempts at approach: 1  Ventilation between attempts: bag mask

## 2022-02-09 VITALS
TEMPERATURE: 98.1 F | BODY MASS INDEX: 18.04 KG/M2 | WEIGHT: 119 LBS | HEART RATE: 73 BPM | OXYGEN SATURATION: 99 % | SYSTOLIC BLOOD PRESSURE: 137 MMHG | DIASTOLIC BLOOD PRESSURE: 73 MMHG | HEIGHT: 68 IN | RESPIRATION RATE: 18 BRPM

## 2022-02-09 PROCEDURE — G0378 HOSPITAL OBSERVATION PER HR: HCPCS

## 2022-02-09 PROCEDURE — 2580000003 HC RX 258: Performed by: SURGERY

## 2022-02-09 PROCEDURE — 6370000000 HC RX 637 (ALT 250 FOR IP): Performed by: SURGERY

## 2022-02-09 RX ADMIN — ANASTROZOLE 1 MG: 1 TABLET ORAL at 08:43

## 2022-02-09 RX ADMIN — Medication 10 ML: at 08:43

## 2022-02-09 RX ADMIN — OXYCODONE 5 MG: 5 TABLET ORAL at 05:21

## 2022-02-09 RX ADMIN — LISINOPRIL 2.5 MG: 2.5 TABLET ORAL at 08:43

## 2022-02-09 RX ADMIN — OXYCODONE 5 MG: 5 TABLET ORAL at 09:36

## 2022-02-09 RX ADMIN — CIPROFLOXACIN 1 DROP: 3 SOLUTION OPHTHALMIC at 05:22

## 2022-02-09 RX ADMIN — METOPROLOL SUCCINATE 25 MG: 25 TABLET, EXTENDED RELEASE ORAL at 08:43

## 2022-02-09 RX ADMIN — ACETAMINOPHEN 650 MG: 325 TABLET ORAL at 09:35

## 2022-02-09 RX ADMIN — ASPIRIN 81 MG 81 MG: 81 TABLET ORAL at 08:43

## 2022-02-09 RX ADMIN — APIXABAN 5 MG: 5 TABLET, FILM COATED ORAL at 08:43

## 2022-02-09 RX ADMIN — ACETAMINOPHEN 650 MG: 325 TABLET ORAL at 05:21

## 2022-02-09 RX ADMIN — CIPROFLOXACIN 1 DROP: 3 SOLUTION OPHTHALMIC at 01:10

## 2022-02-09 ASSESSMENT — PAIN SCALES - GENERAL
PAINLEVEL_OUTOF10: 5
PAINLEVEL_OUTOF10: 6
PAINLEVEL_OUTOF10: 6

## 2022-02-09 NOTE — PLAN OF CARE
Problem: Skin Integrity:  Goal: Will show no infection signs and symptoms  Description: Will show no infection signs and symptoms  2/8/2022 2228 by Danilo Avendano RN  Outcome: Ongoing  2/8/2022 2228 by Danilo Avendano RN  Outcome: Ongoing  Goal: Absence of new skin breakdown  Description: Absence of new skin breakdown  2/8/2022 2228 by Danilo Avendano RN  Outcome: Ongoing  2/8/2022 2228 by Danilo Avendano RN  Outcome: Ongoing     Problem: Pain:  Goal: Pain level will decrease  Description: Pain level will decrease  Outcome: Ongoing  Goal: Control of acute pain  Description: Control of acute pain  Outcome: Ongoing  Goal: Control of chronic pain  Description: Control of chronic pain  Outcome: Ongoing

## 2022-02-09 NOTE — PROGRESS NOTES
Comprehensive Nutrition Assessment    Type and Reason for Visit:  Positive Nutrition Screen    Nutrition Recommendations/Plan:   Continue regular diet  Monitor nutrition adequacy, pertinent labs, bowel habits, wt changes, and clinical progress    Nutrition Assessment:  Positive nutrition screen r/t weight loss. Pt admitted for planned partial mastectomy and lymph node biopsy. PMH CHF, breast CA. Pt reports significant improvement in appetite since completing chemotherapy. Pt reports consuming multiple meals and snacks +1-2 ensure daily. Weight has been stabilizing over the past 4mo. H/o significant weight loss, 16# (11.7%) over the past month per EMR. Ensure added w/ lunch per pt request. Plans for d/c home this afternoon. Malnutrition Assessment:  Malnutrition Status: At risk for malnutrition (Comment)    Context:  Chronic Illness     Findings of the 6 clinical characteristics of malnutrition:  Weight Loss:  7 - Greater than 10% over 6 months       Estimated Daily Nutrient Needs:  Energy (kcal):  3227-2612 kcals/day; Weight Used for Energy Requirements:  Current (54kg)     Protein (g):  65-81 g/day; Weight Used for Protein Requirements:  Current (1.2-1.5)        Fluid (ml/day):  1 ml/kcal      Nutrition Related Findings:  Last BM 2/7. Labs reviewed. Wounds:  Surgical Incision       Current Nutrition Therapies:    ADULT DIET;  Regular    Anthropometric Measures:  · Height: 5' 7.5\" (171.5 cm)  · Current Body Weight: 119 lb (54 kg)    · Ideal Body Weight: 138 lbs; % Ideal Body Weight 86.2 %   · BMI: 18.4  · BMI Categories: Underweight (BMI less than 18.5)       Nutrition Diagnosis:   · Inadequate protein-energy intake related to increase demand for energy/nutrients as evidenced by weight loss greater than or equal to 10% in 6 months    Nutrition Interventions:   Food and/or Nutrient Delivery:  Continue Current Diet  Nutrition Education/Counseling:  No recommendation at this time   Coordination of Nutrition Care:  Continue to monitor while inpatient    Goals:  Pt will consume greater than 50% of meals this admission       Nutrition Monitoring and Evaluation:   Behavioral-Environmental Outcomes:  None Identified   Food/Nutrient Intake Outcomes:  Food and Nutrient Intake  Physical Signs/Symptoms Outcomes:  Weight,Nutrition Focused Physical Findings,Biochemical Data     Discharge Planning:    Continue current diet     Electronically signed by Abebe Damon RD on 2/9/22 at 10:42 AM EST    Contact: [phone]

## 2022-02-09 NOTE — PROGRESS NOTES
Breast Surgery Daily Progress       02/09/22    Subjective   Vincent Scott is a 78 y.o. female who is POD # 1 s/p L RFID tag localized partial mastectomy, L SLN bx, R bracketed RFID tag localized partial mastectomy, R RFID tag localized excisional biopsy, R breast placement of biozorb and R axillary lymph node dissection. No acute events overnight. Pain controlled with PO pain medication. Some burning type pain in right axilla but intermittent and not overly bothersome. Notes that her drain did not really put much out overnight, but this AM started to drain a little bit. Tolerating regular diet and ambulating. Voiding without difficulty. Objective     Vitals:  /73   Pulse 73   Temp 98.1 °F (36.7 °C) (Oral)   Resp 18   Ht 5' 7.5\" (1.715 m)   Wt 119 lb (54 kg)   LMP  (LMP Unknown)   SpO2 99%   Breastfeeding No   BMI 18.36 kg/m²     I&O    Intake/Output Summary (Last 24 hours) at 2/9/2022 0915  Last data filed at 2/9/2022 0840  Gross per 24 hour   Intake 1060 ml   Output 325 ml   Net 735 ml        Physical Exam:   General:   AAOx3, NAD  HEENT:  NCAT, EOMI  Resp:  No respiratory distress on room air  CV:  RRR  Ext:  Warm, well-perfused  Chest:  Bilateral periareolar breast incisions c/d/i, some ecchymosis on right breast (which was present preop from RFID tag placement), no evidence of fluid collection or infection  Axilla: Bilateral axillary incisions c/d/i, no evidence of fluid collection or infection, VANITA drain on right with serosanguinous fluid (25 mL in bulb)    ASSESSMENT & PLAN:  Vincent Scott is a 78 y.o. female who is POD 1 s/p above procedure. She is doing very well. Pain is well controlled and there is no evidence of bleeding/hematoma. Will resume her Eliquis BID at this time. Reviewed DC instructions with her, including that she can stop the lovenox injections since we are restarting her Eliquis.     Will DC home this afternoon after first Eliquis dose as long as drain output does not significantly change. We reviewed drain care and RN will perform drain teaching. She declined home RN at this time. I will call her this Friday to see how her drain output is, and then will determine date for POV based on that.     Prashanth Schultz DO  [facility] Breast Surgery   02/09/22 9:15 AM

## 2022-02-09 NOTE — PROGRESS NOTES
Shift assessment completed. Pt A&O x4, VSS. Pt reporting mild pain this morning but tolerable until PRN medications are available. Pt independent with ambulation, non skid socks on. Bilateral breast dressings C/D/I with surgical bra in place. R VANITA drain C/D/I with bloody drainage. Denies any needs at this time. Bed locked and in lowest position. Call light and bedside table within reach. Will continue to monitor.

## 2022-02-09 NOTE — CARE COORDINATION
CASE MANAGEMENT INITIAL ASSESSMENT    Reviewed chart and completed assessment with patient  Explained Case Management role/services. Primary contact information: Redith dtr     Health Care Decision Maker :   Primary Decision Maker: Akilah Jackson - Child - [phone]    Supplemental (Other) Decision Maker: Yola French - Parent - [phone]    Supplemental (Other) Decision Maker: Keron Colbert Child - [phone]        Can this person be reached and be able to respond quickly, such as within a few minutes or hours? Yes    Admit date/status:02/08/2022 Inpatient   Diagnosis: Malignant neoplasm of overlapping sites of left breast in female, estrogen receptor positive ([facility] [address]  Is this a Readmission?:  No      Insurance:[facility] Medicare    Precert required for SNF: Yes       3 night stay required: No    Living arrangements, Adls, care needs, prior to admission: Home alone, 2 CONY and 2nd floor bed/bath IPTA     Transportation:Active , dtr Redith will  today at [address] at home:  Denies   Services in the home and/or outpatient, prior to admission: [facility] in past, declined service feels will manage drain on own     Barriers to discharge: Pending drain output     Plan/comments: Patient anticipates discharge this date. Patient reports dtr will  at discharge. Patient declined Kristofer Matute RN for drain management at home reports will manage on own. Patient denies needs. ZOE Tafoya       [facility] on chart for MD signature

## 2022-02-10 ENCOUNTER — CARE COORDINATION (OUTPATIENT)
Dept: CASE MANAGEMENT | Age: 77
End: 2022-02-10

## 2022-02-10 DIAGNOSIS — Z17.0 MALIGNANT NEOPLASM OF OVERLAPPING SITES OF LEFT BREAST IN FEMALE, ESTROGEN RECEPTOR POSITIVE (HCC): Primary | ICD-10-CM

## 2022-02-10 DIAGNOSIS — C50.812 MALIGNANT NEOPLASM OF OVERLAPPING SITES OF LEFT BREAST IN FEMALE, ESTROGEN RECEPTOR POSITIVE (HCC): Primary | ICD-10-CM

## 2022-02-10 PROCEDURE — 1111F DSCHRG MED/CURRENT MED MERGE: CPT | Performed by: REGISTERED NURSE

## 2022-02-10 NOTE — CARE COORDINATION
Ivan 45 Transitions Initial Follow Up Call    Call within 2 business days of discharge: Yes    Patient: Johana Juarez Patient : 1945   MRN: [id number]  Reason for Admission: left breast massectomy  Discharge Date: 22 RARS: Readmission Risk Score: 20 ( )      Last Discharge [facility]       Complaint Diagnosis Description Type Department Provider    22  Malignant neoplasm of overlapping sites of left breast in female, estrogen receptor positive ([facility] [address] . .. Admission (Discharged) [address] [facility] C3 Lamar Rosado DO           Spoke with: daughter, Gerry Erazo form verified in system    Facility: [facility], [place]    Non-face-to-face services provided:  Obtained and reviewed discharge summary and/or continuity of care documents     Transitions of Care Initial Call  Challenges to be reviewed by the provider   Additional needs identified to be addressed with provider: No  none             Method of communication with provider : none      Advance Care Planning:   Does patient have an Advance Directive: reviewed and current, reviewed and needs to be updated, not on file; education provided, not on file, patient declined education, decision maker updated and referral to internal ACP facilitator. Was this a readmission? No  Patient stated reason for admission: surgery  Patients top risk factors for readmission: medical condition-massectomy    Care Transition Nurse (CTN) contacted the patient by telephone to perform post hospital discharge assessment. Verified name and  with patient as identifiers. Provided introduction to self, and explanation of the CTN role. CTN reviewed discharge instructions, medical action plan and red flags with patient who verbalized understanding. Patient given an opportunity to ask questions and does not have any further questions or concerns at this time. Were discharge instructions available to patient? Yes.  Reviewed appropriate site of care based on symptoms and resources available to patient including: PCP and Specialist. The family agrees to contact the PCP office for questions related to their healthcare. Medication reconciliation was performed with family, who verbalizes understanding of administration of home medications. Advised obtaining a 90-day supply of all daily and as-needed medications. Spoke to patient's daughter, Vamshi Mccurdy and verified patient's . Daughter stated that patient is sore today, but doing well. VANITA drainage is draining and patient is able to manage independently. CTN reviewed full medication reconciliation and taking as directed, but concerned about taking pain medication. Encouraged patient to eat crackers, toast, or pudding before taking pain medication due to nausea side effect. Daughter stated understanding. Follow up visit scheduled for next week. Discussed home care options, but patient continues to decline services. Denied any acute needs at present time. Agreeable to f/u calls. Educated on the use of urgent care or physicians 24 hr access line if assistance is needed after hours. CTN provided contact information. Plan for follow-up call in 5-7 days based on severity of symptoms and risk factors.   Plan for next call: follow up [address] 24 Hour Call    Do you have any ongoing symptoms?: No  Do you have a copy of your discharge instructions?: Yes  Do you have all of your prescriptions and are they filled?: Yes  Have you been contacted by a [email or web address]?: No  Have you scheduled your follow up appointment?: Yes  How are you going to get to your appointment?: Car - family or friend to transport  Were you discharged with any Home Care or Post Acute Services: No  Do you feel like you have everything you need to keep you well at home?: Yes  Care Transitions Interventions         Follow Up  Future Appointments   Date Time Provider Moreno Olvera   3/15/2022  1:20 PM Yesica , APRN - CNP [facility] [place] - [place]   5/4/2022  3:30 PM Yomi Martinez, DO [facility] Marii Casey RN

## 2022-02-14 NOTE — DISCHARGE SUMMARY
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 Rigoberto Pelayo is a 78 y. o. woman with a diagnosis of bilateral breast cancer who presented to [facility] on 2/8/22 for her elective procedure (left RFID tag loc PM, left SLN bx, right bracketed RFID tag loc PM, right RFID tag loc excisional bx and right axillary lymph node dissection). Surgery was uneventful. Please see the operative note for more details from surgery. Postoperatively, Ms. Rigoberto Pelayo did very well and progressed as expected. On postoperative day #1 she was appropriate for discharge as her pain was controlled with oral medications, she was tolerating a diet, voiding, and ambulating independently. She had drain teaching and is comfortable managing her drains. She was discharged home in good condition with instructions on when to follow-up with me for her post-operative visit. We reviewed her discharge instructions and all of her questions were answered prior to her discharge.     She was given a prescription for: Percocet    Discharge Medications:   Discharge Medication List as of 2/9/2022 10:42 AM      START taking these medications    Details   oxyCODONE-acetaminophen (PERCOCET) 5-325 MG per tablet Take 1 tablet by mouth every 6 hours as needed for Pain for up to 4 days. Intended supply: 3 days. Take lowest dose possible to manage pain, Disp-16 tablet, R-0Print         CONTINUE these medications which have NOT CHANGED    Details   lisinopril (PRINIVIL;ZESTRIL) 2.5 MG tablet Take 1 tablet by mouth daily, Disp-30 tablet, R-5Normal      metoprolol succinate (TOPROL XL) 25 MG extended release tablet Take 1 tablet by mouth 2 times daily, Disp-60 tablet, R-3Normal      atorvastatin (LIPITOR) 40 MG tablet Take 1 tablet by mouth daily Take 1 tab by mouth in the evening., Disp-90 tablet, R-1Normal      gabapentin (NEURONTIN) 100 MG capsule Take 1 capsule by mouth 2 times daily as needed (pain/numbness/tingling) for up to 90 days. , Disp-60 capsule, R-2Normal      hydrOXYzine (ATARAX) 10 MG tablet Take 10 mg by mouth 3 times daily as neededHistorical Med      aspirin 81 MG chewable tablet Take 1 tablet by mouth daily, Disp-30 tablet, R-3Normal      nitroGLYCERIN (NITROSTAT) 0.4 MG SL tablet up to max of 3 total doses.  If no relief after 1 dose, call 911., Disp-25 tablet, R-3Normal      apixaban (ELIQUIS) 5 MG TABS tablet Take 1 tablet by mouth 2 times daily, Disp-60 tablet, R-0Normal      calcium carbonate-vitamin D (CALTRATE) 600-400 MG-UNIT TABS per tab Take by mouthHistorical Med      anastrozole (ARIMIDEX) 1 MG tablet Take 1 mg by mouthHistorical Med      ondansetron (ZOFRAN) 8 MG tablet Take 1 tablet by mouthHistorical Med      melatonin (RA MELATONIN) 3 MG TABS tablet Take 1 tablet by mouth nightly as needed (sleep), Disp-20 tablet, R-0Print             Electronically signed by Faye Montanez DO on 2/14/2022 at 3:14 PM

## 2022-02-17 ENCOUNTER — CARE COORDINATION (OUTPATIENT)
Dept: CASE MANAGEMENT | Age: 77
End: 2022-02-17

## 2022-02-17 NOTE — CARE COORDINATION
[facility] Transitions Follow Up Call    2022    Patient: Evelia Dinero  Patient : 1945   MRN: [id number]  Reason for Admission: left breast massectomy  Discharge Date: 22 RARS: Readmission Risk Score: 20 ( )         Spoke with: na    Attempted to reach patient via phone for transition call. VM left stating purpose of call along with my contact information requesting a return call. Care Transitions Subsequent and Final Call    Subsequent and Final Calls  Care Transitions Interventions  Other Interventions:            Follow Up  Future Appointments   Date Time Provider Moreno Olvera   3/15/2022  1:20 PM SOUTH Alvarez - CNP [facility] [place] - [place]   2022  3:30 PM Ladan Jackson DO [facility]       Issac Kang RN

## 2022-02-24 ENCOUNTER — CARE COORDINATION (OUTPATIENT)
Dept: CASE MANAGEMENT | Age: 77
End: 2022-02-24

## 2022-02-24 NOTE — CARE COORDINATION
Bar[facility] 45 Transitions Follow Up Call    2022    Patient: Pierce Fang  Patient : 1945   MRN: [id number]  Reason for Admission: left breast massectomy  Discharge Date: 22 RARS: Readmission Risk Score: 20 ( )         Spoke with: Griselda 3501 Transitions Follow Up Call    Doing extremely well, J tube is out, has f/u with providers. Radiation to start soon. Needs to be reviewed by the provider   Additional needs identified to be addressed with provider: No  none             Method of communication with provider : none      Care Transition Nurse (CTN) contacted the patient by telephone to follow up after admission on 22. Verified name and  with patient as identifiers. Addressed changes since last contact: s/s post op, incision, drainage and f/u appts  Discussed follow-up appointments. If no appointment was previously scheduled, appointment scheduling offered: No.   Is follow up appointment scheduled within 7 days of discharge? Yes. CTN reviewed discharge instructions, medical action plan and red flags with patient and discussed any barriers to care and/or understanding of plan of care after discharge. Discussed appropriate site of care based on symptoms and resources available to patient including: PCP, Specialist and When to call 911. The patient agrees to contact the PCP office for questions related to their healthcare. Patients top risk factors for readmission: post op recovery  Interventions to address risk factors: reviewed what to watch for from incision, constipation, increased soreness, any changes to alert MD      Non-[facility] follow up appointment(s): ONC in 3 months. Starts radiation soon. CTN provided contact information for future needs. Plan for follow-up call in 7-10 days based on severity of symptoms and risk factors.   Plan for next call: symptom management-post op  self management-ADLs alone  follow up [address] surgeon            Care Transitions Subsequent and Final Call    Schedule Follow Up Appointment with PCP: Completed  Subsequent and Final Calls  Do you have any ongoing symptoms?: No  Have your medications changed?: No  Do you have any questions related to your medications?: No  Do you currently have any active services?: No  Do you have any needs or concerns that I can assist you with?: No  Identified Barriers: None  Care Transitions Interventions  No Identified Needs  Other Interventions:            Follow Up  Future Appointments   Date Time Provider Moreno Olvera   3/15/2022  1:20 PM SUOTH Campbell - CNP [facility] [place] - [place]   5/4/2022  3:30 PM Keron Andersen DO [facility]       Maria G Diego RN

## 2022-03-03 ENCOUNTER — CARE COORDINATION (OUTPATIENT)
Dept: CASE MANAGEMENT | Age: 77
End: 2022-03-03

## 2022-03-03 NOTE — CARE COORDINATION
Barber 45 Transitions Follow Up Call    3/3/2022    Patient: Peace Yusuf  Patient : 1945   MRN: [id number]  Reason for Admission: Breast Cancer  Discharge Date: 22 RARS: Readmission Risk Score: 20 ( )         Spoke with: [facility] Transitions Follow Up Call    Needs to be reviewed by the provider   Additional needs identified to be addressed with provider: No  none             Method of communication with provider : none      Care Transition Nurse (CTN) contacted the patient by telephone to follow up after admission on 22. Verified name and  with patient as identifiers. Addressed changes since last contact: none  Discussed follow-up appointments. If no appointment was previously scheduled, appointment scheduling offered: Yes. Is follow up appointment scheduled within 7 days of discharge? Yes. Advance Care Planning:   Does patient have an Advance Directive: Not on file  CTN reviewed discharge instructions, medical action plan and red flags with patient and discussed any barriers to care and/or understanding of plan of care after discharge. Discussed appropriate site of care based on symptoms and resources available to patient including: PCP, Specialist, Urgent care clinics and When to call 911. The patient agrees to contact the PCP office for questions related to their healthcare. Patients top risk factors for readmission: ineffective coping  Interventions to address risk factors: Obtained and reviewed discharge summary and/or continuity of care documents      Non-[facility] follow up appointment(s):     CTN provided contact information for future needs. Plan for follow-up call in 5-7 days based on severity of symptoms and risk factors. Plan for next call: symptom management-Pain? This [facility] spoke with pt and pt stated that she is doing well. Pt stated that she has some mild, manageable pain that she manages with Tylenol and Gapentin.  Patient denied any worsening symptoms. Denied fever, chills, N/V and any difficulty breathing at this time. Denied chest pain and SOB. Denied difficulty with urination, BMs or appetite. Denied any needs and concerns at this time. No new changes or medications to report or address. Advised pt to immediately report any worsening symptoms to the PCP. Patient verbalized understanding and agreed. Alexandria Driscoll LPN, [facility]  PH: [phone]                Care Transitions Subsequent and Final Call    Schedule Follow Up Appointment with PCP: Completed  Subsequent and Final Calls  Do you have any ongoing symptoms?: Yes  Onset of Patient-reported symptoms: Other  Patient-reported symptoms: Pain  Interventions for patient-reported symptoms: Other  Have your medications changed?: No  Do you have any questions related to your medications?: No  Do you currently have any active services?: No  Do you have any needs or concerns that I can assist you with?: No  Identified Barriers: None  Care Transitions Interventions  No Identified Needs  Other Interventions:            Follow Up  Future Appointments   Date Time Provider Moreno Olvera   3/15/2022  1:20 PM SOUTH Rowe - CNP [facility] [place] - [place]   5/4/2022  3:30 PM DO Brittney Alvarez LPN

## 2022-03-10 ENCOUNTER — CARE COORDINATION (OUTPATIENT)
Dept: CASE MANAGEMENT | Age: 77
End: 2022-03-10

## 2022-03-10 NOTE — CARE COORDINATION
[facility] 45 Transitions Follow Up Call    3/10/2022    Patient: Rich Louise  Patient : 1945   MRN: [id number]  Reason for Admission: malignant neoplasm breast CA  Discharge Date: 22 RARS: Readmission Risk Score: 20 ( )         Spoke with: Rich Louise    Needs to be reviewed by the provider   Additional needs identified to be addressed with provider: No  none         Method of communication with provider : none    Care Transition Nurse (CTN) contacted the patient by telephone to follow up after recent hospital admission. Addressed changes since last contact: none  Discussed follow-up appointments. If no appointment was previously scheduled, appointment scheduling offered: Yes. Advance Care Planning:   Does patient have an Advance Directive: reviewed and current, reviewed and needs to be updated, not on file; education provided, not on file, patient declined education, decision maker updated, referral to internal ACP facilitator and Reviewed on prior CTN outreach. Discussed appropriate site of care based on symptoms and resources available to patient including: Specialist. The patient agrees to contact the PCP office for questions related to their healthcare. Patient answered call and verified . Patient pleasant and agreeable to transition call. Patient feeling well and minimal pain. Patient has good appetite and gained 2-3lbs since surgery. Patient happy with weight gain. Patient has not started radiation yet, but has follow up scheduled with specialist. Confirmed that she is taking all medication as directed. Denied any acute needs at present time. Educated on the use of urgent care or physicians 24 hr access line if assistance is needed after hours. CTN provided contact information for future needs. No further follow-up call indicated based on severity of symptoms and risk factors.       Care Transitions Subsequent and Final Call    Subsequent and Final Calls  Do you have any ongoing symptoms?: No  Have your medications changed?: No  Do you have any questions related to your medications?: No  Do you currently have any active services?: No  Do you have any needs or concerns that I can assist you with?: No  Identified Barriers: None  Care Transitions Interventions  Other Interventions:            Follow Up  Future Appointments   Date Time Provider Moreno Olvera   3/15/2022  1:20 PM Anna Margie Rice, APRN - CNP [facility] [place] - [place]   5/4/2022  3:30 PM Laurin Osler, DO [facility] Marisela Boer, RN

## 2022-03-15 ENCOUNTER — OFFICE VISIT (OUTPATIENT)
Dept: FAMILY MEDICINE CLINIC | Age: 77
End: 2022-03-15
Payer: MEDICARE

## 2022-03-15 VITALS
BODY MASS INDEX: 19.25 KG/M2 | SYSTOLIC BLOOD PRESSURE: 116 MMHG | OXYGEN SATURATION: 98 % | WEIGHT: 127 LBS | DIASTOLIC BLOOD PRESSURE: 68 MMHG | HEART RATE: 82 BPM | HEIGHT: 68 IN

## 2022-03-15 DIAGNOSIS — E43 SEVERE MALNUTRITION (HCC): ICD-10-CM

## 2022-03-15 DIAGNOSIS — F41.9 ANXIETY: ICD-10-CM

## 2022-03-15 DIAGNOSIS — Z17.0 MALIGNANT NEOPLASM OF OVERLAPPING SITES OF LEFT BREAST IN FEMALE, ESTROGEN RECEPTOR POSITIVE (HCC): ICD-10-CM

## 2022-03-15 DIAGNOSIS — C50.812 MALIGNANT NEOPLASM OF OVERLAPPING SITES OF LEFT BREAST IN FEMALE, ESTROGEN RECEPTOR POSITIVE (HCC): ICD-10-CM

## 2022-03-15 DIAGNOSIS — I25.119 CHEST PAIN DUE TO CAD (HCC): ICD-10-CM

## 2022-03-15 DIAGNOSIS — Z00.00 INITIAL MEDICARE ANNUAL WELLNESS VISIT: Primary | ICD-10-CM

## 2022-03-15 DIAGNOSIS — D70.9 NEUTROPENIA, UNSPECIFIED TYPE (HCC): ICD-10-CM

## 2022-03-15 PROCEDURE — G0438 PPPS, INITIAL VISIT: HCPCS | Performed by: REGISTERED NURSE

## 2022-03-15 RX ORDER — LANOLIN ALCOHOL/MO/W.PET/CERES
1000 CREAM (GRAM) TOPICAL DAILY
COMMUNITY

## 2022-03-15 ASSESSMENT — PATIENT HEALTH QUESTIONNAIRE - PHQ9
SUM OF ALL RESPONSES TO PHQ9 QUESTIONS 1 & 2: 0
1. LITTLE INTEREST OR PLEASURE IN DOING THINGS: 0
SUM OF ALL RESPONSES TO PHQ QUESTIONS 1-9: 0
2. FEELING DOWN, DEPRESSED OR HOPELESS: 0

## 2022-03-15 ASSESSMENT — LIFESTYLE VARIABLES: HOW OFTEN DO YOU HAVE A DRINK CONTAINING ALCOHOL: NEVER

## 2022-03-15 NOTE — PROGRESS NOTES
Medicare Annual Wellness Visit    Tiffany Rivas is here for Medicare AWV (AWV)    Assessment & Plan   Initial Medicare annual wellness visit  Anxiety-stable, no issues. She feels that she has good support. She is managing her cancer care well. Neutropenia, unspecified type (HCC)-stable. Severe malnutrition (HCC)-she is eating well and she says that her weight has been stable since she completed chemotherapy. Chest pain due to CAD (HCC)-she denies any chest pain. Malignant neoplasm of overlapping sites of left breast in female, estrogen receptor positive (HCC)-stable. She follows up with her breast surgeon and allergist      Recommendations for Preventive Services Due: see orders and patient instructions/AVS.  Recommended screening schedule for the next 5-10 years is provided to the patient in written form: see Patient Instructions/AVS.     Return for Medicare Annual Wellness Visit in 1 year. Subjective   The following acute and/or chronic problems were also addressed today:    She sees  every 6 months. She is going to start radiation. She will see  her cardiologist in 3 months and she will also have an ECHO completed. Patient's complete Health Risk Assessment and screening values have been reviewed and are found in Flowsheets. The following problems were reviewed today and where indicated follow up appointments were made and/or referrals ordered. Positive Risk Factor Screenings with Interventions:    Fall Risk:  Do you feel unsteady or are you worried about falling? : no  2 or more falls in past year?: (!) yes  Fall with injury in past year?: (!) yes     Fall Risk Interventions:    · Home safety tips provided and she had PT for balance and coordination. She is doing better.               General Health and ACP:  General  In general, how would you say your health is?: Good  In the past 7 days, have you experienced any of the following: New or Increased Pain, New or Increased Fatigue, Loneliness, Social Isolation, Stress or Anger?: (!) Yes  Select all that apply: (!) New or Increased Fatigue  Do you get the social and emotional support that you need?: Yes  Do you have a Living Will?: (!) No    Advance Directives     Power of  Living Will ACP-Advance Directive ACP-Power of     Not on File Not on File Not on File Not on File      General Health Risk Interventions:  · Fatigue: she has just completed chemo and had surgery. Health Habits/Nutrition:     Physical Activity: Sufficiently Active    Days of Exercise per Week: 7 days    Minutes of Exercise per Session: 30 min     Have you lost any weight without trying in the past 3 months?: No  Body mass index: 19.6  Have you seen the dentist within the past year?: (!) No    Health Habits/Nutrition Interventions:  · Dental exam overdue:  patient encouraged to make appointment with his/her dentist    Hearing/Vision:  Do you or your family notice any trouble with your hearing that hasn't been managed with hearing aids?: No  Do you have difficulty driving, watching TV, or doing any of your daily activities because of your eyesight?: No  Have you had an eye exam within the past year?: (!) No  No exam data present    Hearing/Vision Interventions:  · Vision concerns:  patient encouraged to make appointment with his/her eye specialist            Objective   Vitals:    03/15/22 1313   BP: 116/68   Pulse: 82   SpO2: 98%   Weight: 127 lb (57.6 kg)   Height: 5' 7.5\" (1.715 m)      Body mass index is 19.6 kg/m².         General Appearance: alert and oriented to person, place and time, well developed and well- nourished, in no acute distress  Skin: warm and dry, no rash or erythema  Head: normocephalic and atraumatic  Eyes: pupils equal, round, and reactive to light, extraocular eye movements intact, conjunctivae normal  ENT: tympanic membrane, external ear and ear canal normal bilaterally, nose without deformity, nasal mucosa and turbinates normal without polyps  Neck: supple and non-tender without mass, no thyromegaly or thyroid nodules, no cervical lymphadenopathy  Pulmonary/Chest: clear to auscultation bilaterally- no wheezes, rales or rhonchi, normal air movement, no respiratory distress  Cardiovascular: normal rate, regular rhythm, normal S1 and S2, no murmurs, rubs, clicks, or gallops, distal pulses intact  Abdomen: soft, non-tender, non-distended, normal bowel sounds, no masses or organomegaly  Extremities: no cyanosis, clubbing or edema  Musculoskeletal: normal range of motion, no joint swelling, deformity or tenderness  Neurologic: reflexes normal and symmetric, no cranial nerve deficit, gait, coordination and speech normal       Allergies   Allergen Reactions    Bactrim [Sulfamethoxazole-Trimethoprim] Nausea Only     Prior to Visit Medications    Medication Sig Taking? Authorizing Provider   vitamin B-12 (CYANOCOBALAMIN) 1000 MCG tablet Take 1,000 mcg by mouth daily Yes Historical Provider, MD   lisinopril (PRINIVIL;ZESTRIL) 2.5 MG tablet Take 1 tablet by mouth daily Yes Yomi Martinez DO   metoprolol succinate (TOPROL XL) 25 MG extended release tablet Take 1 tablet by mouth 2 times daily  Patient taking differently: Take 25 mg by mouth daily  Yes Yomi Martinez DO   atorvastatin (LIPITOR) 40 MG tablet Take 1 tablet by mouth daily Take 1 tab by mouth in the evening. Yes Yomi Martinez DO   gabapentin (NEURONTIN) 100 MG capsule Take 1 capsule by mouth 2 times daily as needed (pain/numbness/tingling) for up to 90 days. Yes SOUTH Villegas CNP   hydrOXYzine (ATARAX) 10 MG tablet Take 10 mg by mouth 3 times daily as needed Yes Historical Provider, MD   aspirin 81 MG chewable tablet Take 1 tablet by mouth daily Yes Jacque Willson MD   nitroGLYCERIN (NITROSTAT) 0.4 MG SL tablet up to max of 3 total doses. If no relief after 1 dose, call 911.  Yes Jacque Willson MD   apixaban (ELIQUIS) 5 MG TABS tablet Take 1 tablet by mouth 2 times daily Yes Mannie Cruz MD   calcium carbonate-vitamin D (CALTRATE) 600-400 MG-UNIT TABS per tab Take by mouth Yes Historical Provider, MD   anastrozole (ARIMIDEX) 1 MG tablet Take 1 mg by mouth Yes Historical Provider, MD   ondansetron (ZOFRAN) 8 MG tablet Take 1 tablet by mouth Yes Historical Provider, MD   melatonin (RA MELATONIN) 3 MG TABS tablet Take 1 tablet by mouth nightly as needed (sleep) Yes SOUTH Magallon CNP       CareTeam (Including outside providers/suppliers regularly involved in providing care):   Patient Care Team:  SOUTH Dillon CNP as PCP - General (Family Medicine)  SOUTH Dillon CNP as PCP - [facility] Empaneled Provider    Reviewed and updated this visit:  Tobacco  Allergies  Meds  Problems  Med Hx  Surg Hx  Soc Hx  Fam Hx

## 2022-03-29 ENCOUNTER — HOSPITAL ENCOUNTER (OUTPATIENT)
Dept: CARDIOLOGY | Age: 77
Discharge: HOME | End: 2022-03-29
Payer: MEDICARE

## 2022-03-29 DIAGNOSIS — I42.8 NON-ISCHEMIC CARDIOMYOPATHY (HCC): ICD-10-CM

## 2022-03-29 LAB
LV EF: 43 %
LVEF MODALITY: NORMAL

## 2022-03-29 PROCEDURE — 93306 TTE W/DOPPLER COMPLETE: CPT

## 2022-04-06 ENCOUNTER — TELEPHONE (OUTPATIENT)
Dept: CARDIOLOGY CLINIC | Age: 77
End: 2022-04-06

## 2022-05-04 ENCOUNTER — OFFICE VISIT (OUTPATIENT)
Dept: CARDIOLOGY CLINIC | Age: 77
End: 2022-05-04
Payer: MEDICARE

## 2022-05-04 VITALS
SYSTOLIC BLOOD PRESSURE: 126 MMHG | HEIGHT: 67 IN | DIASTOLIC BLOOD PRESSURE: 68 MMHG | WEIGHT: 133 LBS | HEART RATE: 89 BPM | OXYGEN SATURATION: 98 % | BODY MASS INDEX: 20.88 KG/M2

## 2022-05-04 DIAGNOSIS — I42.8 NON-ISCHEMIC CARDIOMYOPATHY (HCC): ICD-10-CM

## 2022-05-04 DIAGNOSIS — Z85.3 HISTORY OF BREAST CANCER: ICD-10-CM

## 2022-05-04 DIAGNOSIS — D63.8 ANEMIA OF CHRONIC DISEASE: ICD-10-CM

## 2022-05-04 DIAGNOSIS — Z86.711 HISTORY OF PULMONARY EMBOLISM: ICD-10-CM

## 2022-05-04 DIAGNOSIS — I82.499 DEEP VEIN THROMBOSIS (DVT) OF OTHER VEIN OF LOWER EXTREMITY, UNSPECIFIED CHRONICITY, UNSPECIFIED LATERALITY (HCC): ICD-10-CM

## 2022-05-04 DIAGNOSIS — I50.22 CHRONIC SYSTOLIC HEART FAILURE (HCC): Primary | ICD-10-CM

## 2022-05-04 DIAGNOSIS — E78.2 MIXED HYPERLIPIDEMIA: ICD-10-CM

## 2022-05-04 PROCEDURE — 99214 OFFICE O/P EST MOD 30 MIN: CPT | Performed by: INTERNAL MEDICINE

## 2022-05-04 ASSESSMENT — ENCOUNTER SYMPTOMS
BLOOD IN STOOL: 0
RHINORRHEA: 0
ABDOMINAL PAIN: 0
SHORTNESS OF BREATH: 1
VOMITING: 0
COUGH: 0
PHOTOPHOBIA: 0
SORE THROAT: 0
EYE PAIN: 0
NAUSEA: 0
CONSTIPATION: 0
DIARRHEA: 0

## 2022-05-04 NOTE — PROGRESS NOTES
[facility]   Follow-up Visit  [phone]      Attending Physician: TORIN Gustafson  Reason for Consultation/Chief Complaint:   Follow-up for LV systolic heart failure, non-ischemic cardiomyopathy    Taras Hancock is a 78 y.o. female with a history of chronic LV systolic heart failure secondary to non-ischemic cardiomyopathy, non-obstructive coronary artery disease, LVH, breast cancer, anemia of chronic disease, hyperlipidemia, and bilateral pulmonary emboli who presents for follow-up. The patient has had multiple admissions over the course of the last year for bilateral pulmonary emboli, chest pain, and acute heart failure. A TTE from 10/22/21 showed an LVEF of 25-30% with global hypokinesis and regional variation, normal RV size and systolic function, mild-moderate mitral regurgitation, and mild pulmonic regurgitation. Joanne Scrivener was also a right atrial echodensity noted which was ultimately determined to represent a prominent eustachian valve. It was initially recommended that additional ischemic evaluation be deferred until she had had more time to recover from her acute pulmonary emboli. She was then re-admitted from 11/17-11/21/21 with recurrent chest pain and ultimately underwent invasive coronary angiography at that time which demonstrated a calcified, eccentric 60-70% mid-LAD stenosis that was not physiologically significant by DFR (0.92) or FFR (0.90). During the procedure, the patient developed ventricular fibrillation while advancing the pressure wire in the LAD and was cardioverted x2. A cardiac MRI obtained on 12/3/21 showed findings consistent with a non-ischemic cardiomyopathy with calculated LVEF of 30%. There was no evidence of myocardial scarring, infiltration, or inflammation. A prominent eustachian valve was noted which likely represented the right atrial echodensity seen on her previous TTE.   There was otherwise no additional evidence of a right atrial mass. She underwent bilateral partial mastectomies with left sentinel lymph node biopsy and right axillary lymph node dissection on 2/8/22 and is scheduled to begin radiation therapy soon. Her repeat TTE from 3/29/22 showed an improvement in LVEF to 40-45%. Today, the patient reports that she has generally been feeling well. Her energy has improved and she has been able to gain some weight. She has shortness of breath only when over-exerting herself. She denies any recent chest pain, lightheadedness, dizziness, lower extremity edema, orthopnea, or paroxysmal nocturnal dyspnea. Eliquis is expensive through her insurance and she is requesting additional samples. Past Medical History:   has a past medical history of Breast cancer ([address] CAD (coronary artery disease), Cancer ([address] Cardiomyopathy ([address] CHF (congestive heart failure) ([address] Hx of blood clots, Hyperlipidemia, Hypertension, and Kidney stone. Surgical History:   has a past surgical history that includes [address] LOC DEVICE 1ST LESION RIGHT (Right, 5/7/2021); US BREAST BIOPSY W LOC DEVICE EACH ADDL LESION RIGHT (Right, 5/7/2021); US BREAST BIOPSY W LOC DEVICE 1ST LESION LEFT (Left, 5/7/2021); Colonoscopy; Port Surgery (N/A, 5/25/2021); MRI BIOPSY BREAST W LOC DEVICE RIGHT 1ST LESION (Right, 5/28/2021); MRI BIOPSY BREAST W LOC DEVICE RIGHT EACH ADDL (Right, 5/28/2021); MRI BIOPSY BREAST W LOC DEVICE RIGHT 1ST LESION (Right, 2/2/2022); US PLACE BREAST LOC DEVICE 1ST LESION LEFT (Left, 2/2/2022); US PLACE BREAST LOC DEVICE 1ST LESION RIGHT (Right, 2/2/2022); and Breast surgery (N/A, 2/8/2022). Social History:   reports that she is a non-smoker but has been exposed to tobacco smoke. She has never used smokeless tobacco. She reports that she does not drink alcohol and does not use drugs.      Family History:  family history includes Cancer (age of onset: 76) in her paternal grandmother; Diabetes in her father; High Cholesterol in her mother. Home Medications:  Were reviewed and are listed in nursing record and/or below  Prior to Admission medications    Medication Sig Start Date End Date Taking? Authorizing Provider   vitamin B-12 (CYANOCOBALAMIN) 1000 MCG tablet Take 1,000 mcg by mouth daily    Historical Provider, MD   lisinopril (PRINIVIL;ZESTRIL) 2.5 MG tablet Take 1 tablet by mouth daily 2/2/22   Yomi Kendalldox, DO   metoprolol succinate (TOPROL XL) 25 MG extended release tablet Take 1 tablet by mouth 2 times daily  Patient taking differently: Take 25 mg by mouth daily  12/6/21   Yomi Kendalldox, DO   atorvastatin (LIPITOR) 40 MG tablet Take 1 tablet by mouth daily Take 1 tab by mouth in the evening. 12/6/21   Yomi Kendalldox, DO   gabapentin (NEURONTIN) 100 MG capsule Take 1 capsule by mouth 2 times daily as needed (pain/numbness/tingling) for up to 90 days. 11/30/21 3/15/22  SOUTH rPakash CNP   hydrOXYzine (ATARAX) 10 MG tablet Take 10 mg by mouth 3 times daily as needed    Historical Provider, MD   aspirin 81 MG chewable tablet Take 1 tablet by mouth daily 11/22/21   Alesha Gramajo MD   nitroGLYCERIN (NITROSTAT) 0.4 MG SL tablet up to max of 3 total doses. If no relief after 1 dose, call 911. 11/21/21   Alesha Gramajo MD   apixaban Carlos Whitley) 5 MG TABS tablet Take 1 tablet by mouth 2 times daily 11/21/21   Alesha Gramajo MD   calcium carbonate-vitamin D (CALTRATE) 600-400 MG-UNIT TABS per tab Take by mouth 11/8/21   Historical Provider, MD   anastrozole (ARIMIDEX) 1 MG tablet Take 1 mg by mouth 11/8/21   Historical Provider, MD   ondansetron (ZOFRAN) 8 MG tablet Take 1 tablet by mouth 6/21/21   Historical Provider, MD   melatonin (RA MELATONIN) 3 MG TABS tablet Take 1 tablet by mouth nightly as needed (sleep) 5/30/21   SOUTH Magallon - CNP        CURRENT Medications:  No current facility-administered medications for this visit.       Allergies:  Bactrim [sulfamethoxazole-trimethoprim] Review of Systems:   Review of Systems   Constitutional: Negative for chills and fever. HENT: Negative for congestion, rhinorrhea and sore throat. Eyes: Negative for photophobia, pain and visual disturbance. Respiratory: Positive for shortness of breath. Negative for cough. Cardiovascular: Negative for chest pain, palpitations and leg swelling. Gastrointestinal: Negative for abdominal pain, blood in stool, constipation, diarrhea, nausea and vomiting. Endocrine: Negative for cold intolerance and heat intolerance. Genitourinary: Negative for difficulty urinating, dysuria and hematuria. Musculoskeletal: Positive for arthralgias and myalgias. Negative for joint swelling. Skin: Negative for rash and wound. Allergic/Immunologic: Negative for environmental allergies and food allergies. Neurological: Negative for dizziness, syncope and light-headedness. Hematological: Negative for adenopathy. Does not bruise/bleed easily. Psychiatric/Behavioral: Negative for dysphoric mood. The patient is not nervous/anxious. Objective   PHYSICAL EXAM:    Vitals:    05/04/22 1541   BP: 126/68   Pulse: 89   SpO2: 98%     General: Adult female in no acute distress. HEENT: Normocephalic, atraumatic, non-icteric, hearing intact, nares normal, mucous membranes moist.  Neck: No JVD. Heart: Regular rate and rhythm. Normal S1 and S2. No murmurs, gallops or rubs. Lungs: Normal respiratory effort. Clear to auscultation bilaterally. No wheezes, rales, or rhonchi. Abdomen: Soft, non-tender. Normoactive bowel sounds. No masses or organomegaly. Skin: No rashes, wounds, or lesions. Pulses: 2+ and symmetric. Extremities: No clubbing, cyanosis, or edema. Psych: Normal mood and affect. Neuro: Alert and oriented to person, place, and time. No focal deficits noted.       Labs   CBC:   Lab Results   Component Value Date    WBC 5.1 02/08/2022    RBC 4.05 02/08/2022    HGB 11.6 02/08/2022    HCT 36.1 02/08/2022 MCV 89.1 02/08/2022    RDW 16.2 02/08/2022     02/08/2022     CMP:  Lab Results   Component Value Date     02/08/2022    K 4.2 02/08/2022     02/08/2022    CO2 22 02/08/2022    BUN 16 02/08/2022    CREATININE <0.5 02/08/2022    GFRAA >60 02/08/2022    GFRAA >60 07/10/2012    AGRATIO 1.0 11/18/2021    LABGLOM >60 02/08/2022    GLUCOSE 131 02/08/2022    PROT 6.7 11/18/2021    PROT 7.8 07/10/2012    CALCIUM 9.7 02/08/2022    BILITOT 0.3 11/18/2021    ALKPHOS 103 11/18/2021    AST 17 11/18/2021    ALT 16 11/18/2021     PT/INR:  No results found for: PTINR  HgBA1c:  Lab Results   Component Value Date    LABA1C 5.6 09/13/2018     Lab Results   Component Value Date    TROPONINI <0.01 11/18/2021         Cardiac Data     ECHO:  TTE 5/26/21:  Summary   Limited echo for LVEF baseline prior to chemotherapy. Ectopy present. -- Left ventricular systolic function is low normal with a visually   estimated ejection fraction of 50%. EF estimated by Fay's method at 46   %. The left ventricle is normal in size with mild concentric hypertrophy. Mild apical hypokinesis. Indeterminate diastolic function. Systolic pulmonary artery pressure (SPAP) is normal and estimated at 19 mmHg   (right atrial pressure 3 mmHg). Frequent ectopy throughout exam.    TTE 8/16/21:  Conclusions   Summary   LV systolic function is normal with EF estimated at 55%. No regional wall motion abnormalities. Grade I diastolic dysfunction with normal LV filling pressure. Mild mitral regurgitation. Inadequate tricuspid regurgitation jet to estimate systolic artery pressure   (SPAP). TTE 10/23/21:  Conclusions   Summary   Normal left ventricle size and wall thickness. The left ventricular systolic function is severely reduced with an ejection   fraction of 25-30 %. EF by Fay's method estimated at 27%. Severe global hypokinesis with regional variation. Left ventricular diastolic filling pressure are indeterminate. The right ventricle is normal in size and function. There is protruding echodensity from anterior RA wall seen in multiple   views, possibly artifact, but given patient's clinical history,   mass/thrombus cannot be excluded. Mild to moderate mitral regurgitation. Mild pulmonic and tricuspid regurgitation. Systolic pulmonary artery pressure (SPAP) is normal and estimated at 35 mmHg   (right atrial pressure 3 mmHg). There is a large left pleural effusion    TTE 3/29/22:  Summary   The left ventricular systolic function is mildly reduced with an ejection   fraction of 40-45 %. By Fay's method 43%. Mild global hypokinesis with regional variation. Grade I diastolic dysfunction with normal filing pressure. MIld mitral and tricuspid regurgitation. Systolic pulmonary artery pressure (SPAP) is normal and estimated at 26 mmHg   (right atrial pressure 3 mmHg). Last echo on 10/23/2021 showed EF of 25-30%. Stress Test:  Treadmill GXT 3/30/10:  Negative for evidence of ischemia. Cath:   [facility] 11/19/21:  Findings:  1. Hemodynamics:              A. Opening arterial pressure: 122/55 (85) mmHg              B. LVEDP: 3 mmHg     2. Coronary anatomy:  A. Left main artery: The left main artery bifurcates into the left anterior descending artery and left circumflex artery. The left main artery has no angiographically significant disease. B. Left anterior descending artery: Transapical vessel which gives rise to 3 diagonal arteries. The LAD has a calcified, eccentric 60-70% stenosis at the junction of the proxima/mid-vessel that extends through the trifurcation of the first diagonal artery and first septal . There is another 20% stenosis in the distal LAD. The first diagonal artery is a small-medium caliber vessel with a 60-70% ostial stenosis. The second and third diagonal arteries are very small and have minor luminal irregularities.   C. Left circumflex artery: Non-dominant vessel that gives rise to one large branching obtuse marginal artery. The LCx has minor luminal irregularities. The obtuse marginal artery has minor luminal irregularities. D. Right coronary artery: Dominant vessel that gives rise to the posterior descending artery and 2 posterolateral branches. The RCA has minor luminal irregularities. The posterior descending artery has minor luminal irregularities. The right posterolateral branches have minor luminal irregularities.     DFR of proximal/mid-LAD:  DFR - 0.92     FFR of proximal/mid-LAD:  Baseline Pd/Pa - 0.96    Cardiac MRI w/ and w/o contrast 12/3/21:  CONCLUSIONS       Findings are consistent with a non ischemic/non infiltrative cardiomyopathy with severely reduced LV systolic function. No evidence of prior myocarditis. Mildly elevated native T1 values are suggestive of fibrosis. No mass seen in the RA, there is a prominent eustachian valve.       -Normal left ventricular size and systolic function with a calculated ejection fraction of 30% by Fay's method.   -Abnormal LV global longitudinal strain (GLS)  -11%   -Abnormal and mildly elevated native T1 time.   -Normal right ventricular size and systolic function with a calculated ejection fraction of 54% by Fay's method.   -No myocardial edema by T2w imaging/mapping. (Normal myocardium/skeletal ratio - normal < 1.9). -No intracardiac shunt. Calculated Qp:Qs:0.97 (Phase contrast velocity encoding Ao/Pa)   -Right atrium: prominent eustachian leigh, no mass seen.    -Normal myocardial resting perfusion imaging.   -On early enhancement imaging, there is no evidence of LV thrombus.   -On delayed enhancement imaging, there is no abnormal hyperenhancement to suggest myocardial scar/inflammation/infiltration.       The MRI sequences and imaging planes in this study were tailored for cardiac imaging and are suboptimal for evaluation of non-cardiac structures.        Other Studies:   CTPA 10/22/21:  1. multiple acute bilateral pulmonary emboli. 2. Nonspecific multifocal bilateral ground-glass opacification. In the   setting of pulmonary emboli, the differential includes pulmonary infarct with   or without superimposed atypical infectious versus inflammatory pneumonitis. 3. Mild CHF. Bilateral Venous duplex ultrasound 10/25/21:  Conclusions        Summary        Acute totally occluding deep vein thrombosis involving the left peroneal    veins.    No other evidence of deep vein or superficial vein thrombosis involving the    left lower extremity and the right common femoral vein. Chest X-ray 10/27/21: Worsening diffuse bilateral airspace disease predominantly within the   bilateral upper lobes. CTPA 10/27/21: Within the limitations of the exam, no new or worsening pulmonary emboli   identified.  Redemonstration of pulmonary emboli involving bilateral upper   lobes, segmental/subsegmental pulmonary arteries.  Previously noted pulmonary   emboli involving right middle and right lower lobe subsegmental pulmonary   arteries are not definitely evident within the limitations.       Significantly worsened areas of consolidation and ground-glass opacity   involving lungs bilaterally, right worse than left with bilateral upper lobe   predominance, concerning for worsening pneumonia.       Bilateral pleural effusions, right worse than left, worsened. Assessment:     1. Chronic LV systolic heart failure/non-ischemic cardiomyopathy - Concern for cardiotoxicity from adriamycin or Herceptin bio similar versus possible stress cardiomyopathy. Invasive coronary angiography on 11/19/21 showed non-obstructive coronary artery disease. Her cardiac MRI showed no evidence of myocardial scarring, infiltration, or inflammation. LVEF was as low as 25-30%, but had improved to 40-45% on most recent TTE. She remains clinically euvolemic. 2. DVT/Bilateral pulmonary emboli - On Eliquis.   RV size and systolic function are normal.  3. Right atrial echodensity - Most likely represents prominent eustachian valve better characterized on cardiac MRI. No other evidence of right atrial mass was seen on MRI. 4. Non-obstructive coronary artery disease - Underwent invasive coronary angiography on 11/19/21 and was found to have a calcified, eccentric 60-70% mid-LAD stenosis that was not physiologically significant by DFR (0.92) or FFR (0.90). Denies recent angina and will continue with medical management. 5. H/o breast cancer - S/p 4 cycles of Adriamycin/Cytoxan and 7 of 12 planned cycles of Taxol with Herceptin bio similar and pertuzumab. Previous regimen has been discontinued due to concern for cardiotoxicity. Follows with Dr. Tricia Dos Santos and is currently taking anastrozole. She underwent bilateral partial mastectomies with left sentinel lymph node biopsy and right axillary lymph node dissection on 2/8/22 and is scheduled to begin radiation therapy soon. 6. Hyperlipidemia  7. Anemia of chronic disease      Plan:     1. Patient has indicated that Eliquis is very expensive through her insurance. She was provided with additional medication samples and a reduced co-pay card. If she is ultimately unable to receive the drug at a reduced cost, can transition to Xarelto if better covered by her insurance or switch to warfarin. 2. Continue aspirin 81 mg daily, atorvastatin 40 mg daily, metoprolol succinate 25 mg daily, and lisinopril 2.5 mg daily. 3. Continue to encourage heart healthy, low sodium diet and regular physical exercise for at least 30 minutes a minimum of 3-5 times per week. 4. Follow-up with me in 6 months. Scribe's attestation: This note was scribed in the presence of Yomi Martinez DO by Evangelina Armas RN      It is a pleasure to assist in the care of Dillon Verdin. Please call with any questions. The scribneeraj documentation has been prepared under my direction and personally reviewed by me in its entirety.   I confirm that the note above accurately reflects all work, treatment, procedures, and medical decision making performed by me. I, Dr. Lance Mcknight, personally performed the services described in this documentation as scribed by Margie Laura RN in my presence, and it is both accurate and complete to the best of our ability. Thank you for allowing us to participate in the care of Sia Wick. Please call me with any questions [phone]. [address] DO Michelle  [phone] [facility]      I will address the patient's cardiac risk factors and adjusted pharmacologic treatment as needed. In addition, I have reinforced the need for patient directed risk factor modification. All questions and concerns were addressed to the patient/family. Alternatives to my treatment were discussed. The note was completed using EMR. Every effort was made to ensure accuracy; however, inadvertent computerized transcription errors may be present.

## 2022-05-04 NOTE — PATIENT INSTRUCTIONS
Plan:     1. Continue taking all medications as prescribed    -samples and co-pay cards for Eliquis given today. Let us know if this makes medication more affordable   -may consider switching to Xarelto or Coumadin if needed  2.  Follow up with me in 6 months

## 2022-09-21 ENCOUNTER — HOSPITAL ENCOUNTER (OUTPATIENT)
Dept: WOMENS IMAGING | Age: 77
Discharge: HOME | End: 2022-09-21
Payer: MEDICARE

## 2022-09-21 DIAGNOSIS — Z78.0 POSTMENOPAUSAL STATUS: ICD-10-CM

## 2022-09-21 DIAGNOSIS — C50.511 MALIGNANT NEOPLASM OF LOWER-OUTER QUADRANT OF RIGHT FEMALE BREAST, UNSPECIFIED ESTROGEN RECEPTOR STATUS (HCC): ICD-10-CM

## 2022-09-21 PROCEDURE — 77080 DXA BONE DENSITY AXIAL: CPT

## 2022-09-30 RX ORDER — ALENDRONATE SODIUM 70 MG/1
70 TABLET ORAL
COMMUNITY

## 2022-09-30 NOTE — PROGRESS NOTES
Iza Chowdhury    Age 78 y.o.    female    1945    MRN [id number]    10/4/2022  Arrival Time_____________  OR Time____________[address]     Procedure(s):  PORT REMOVAL                      Monitor Anesthesia Care   Surgeon(s):  Raymundo Carcamo, DO      DAY ADMIT ___  SDS/OP ___  OUTPT IN BED ___        Phone [phone] (home)     PCP _____________________ Phone_________________ Epic ( ) Epic CE ( ) Appt ________    ADDITIONAL INFO __________________________________ Cardio/Consult _____________    NOTES _____________________________________________________________________    ____________________________________________________________________________    PAT APPT DATE:________ TIME: ________  FAXED QAD: _______  (__) H&P w/ Hospitalist  __________________________________________________________________________  Preop Nurse phone screen complete: _____________  (__) CBC     (__) W/ DIFF ___________     (__) Hgb A1C    ___________  (__) CHEST X RAY   __________  (__) LIPID PROFILE  ___________  (__) EKG   __________  (__) PT/PTT   ___________  (__) PFT's   __________  (__) BMP   ___________  (__) CAROTIDS  __________  (__) CMP   ___________  (__) VEIN MAPPING  __________  (__) U/A   ___________  (__) HISTORY & PHYSICAL __________  (__) URINE C & S  ___________  (__) CARDIAC CLEARANCE __________  (__) U/A W/ FLEX  ___________  (__) PULM.  CLEARANCE __________  (__) SERUM PREGNANCY ___________  (__) Check Epic DOS orders __________  (__) TYPE & SCREEN __________repeat ( ) (__)  __________________ __________  (__) ALBUMIN Vanita Euler ___________  (__)  __________________ __________  (__) TRANSFERRIN  ___________  (__)  __________________ __________  (__) LIVER PROFILE  ___________  (__)  __________________ __________  (__) MRSA NASAL SWAB ___________  (__) URINE PREG DOS __________  (__) SED RATE  ___________  (__) BLOOD SUGAR DOS __________  (__) C-REACTIVE PROTEIN ___________    (__) VITAMIN D HYDROXY ___________  (__) BLOOD THINNERS __________    (__) ACE/ ARBS: _____________________     (__) BETABLOCKERS __________________

## 2022-09-30 NOTE — PROGRESS NOTES
1. Do not eat or drink anything after 12 midnight prior to surgery. This includes no water, chewing gum mints, or ice chips. You may brush your teeth and gargle the day of surgery but DO NOT SWALLOW THE WATER. 2. Please see your family doctor/pediatrician for a history and physical and/or concerning medications. Bring any test results/reports from your physician's office. If you are under the care of a heart doctor or specialist please be aware that you may be asked to see him or her for clearance. 3. You may be asked to stop blood thinners such as Coumadin, Plavix, Fragmin, and Lovenox or Anti-inflammatories such as Aspirin, Ibuprofen, Advil, and Naproxen prior to your surgery. Please check with your doctor before stopping these or any other medications. 4. Do not smoke, and do not drink any alcoholic beverages 24 hours prior to surgery. 5. You MUST make arrangements for a responsible adult to take you home after your surgery. For your safety, you will not be allowed to leave alone or drive yourself home. Your surgery will be cancelled if you do not have a ride home. Also for your safety, it is strongly suggested someone stay with you the first 24 hrs after your surgery. 6. A parent/legal guardian must accompany a child scheduled for surgery and plan to stay at the hospital until the child is discharged. Please do not bring other children with you. 7. For your comfort,please wear simple, loose fitting clothing to the hospital.  Please do not bring valuables (money, credit cards, checkbooks, etc.) Do not wear any makeup (including no eye makeup) or nail polish on your fingers or toes. 8. For your safety, please DO NOT wear any jewelry or piercings on day of surgery. All body piercing jewelry must be removed. 9. If you have dentures, they will be removed before going to the OR; for your convenience we will provide you with a container.   If you wear contact lenses or glasses, they will be removed, they will be removed, please bring a case for them. 10. If appicable,Please see your family doctor/pediatrician for a history & physical and/or concerning medications. Bring any test results/reports from your physician's office. 11. Remember to bring Blood Bank bracelet to the hospital on the day of surgery. 12. If you have a Living Will and Durable Power of  for Healthcare, please bring in a copy. 15. Notify your Surgeon if you develop any illness between now and surgery  time, cough, cold, fever, sore throat, nausea, vomiting, etc.  Please notify your surgeon if you experience dizziness, shortness of breath or blurred vision between now & the time of your surgery   14. DO NOT shave your operative site 96 hours prior to surgery. For face & neck surgery, men may use an electric razor 48 hours prior to surgery. 15. Shower the night before surgery with _X__Antibacterial soap ___Hibiclens   16. To provide excellent care visitors will be limited to one in the room at any given time. 17.  Please bring picture ID and insurance card. 18.  Visit our web site for additional information:  [email or web address].         TAKE METOPROLOL DOS W/SIPS

## 2022-10-03 ENCOUNTER — ANESTHESIA EVENT (OUTPATIENT)
Dept: OPERATING ROOM | Age: 77
End: 2022-10-03
Payer: MEDICARE

## 2022-10-04 ENCOUNTER — HOSPITAL ENCOUNTER (OUTPATIENT)
Age: 77
Setting detail: OUTPATIENT SURGERY
Discharge: HOME | End: 2022-10-04
Attending: SURGERY | Admitting: SURGERY
Payer: MEDICARE

## 2022-10-04 ENCOUNTER — ANESTHESIA (OUTPATIENT)
Dept: OPERATING ROOM | Age: 77
End: 2022-10-04
Payer: MEDICARE

## 2022-10-04 VITALS
SYSTOLIC BLOOD PRESSURE: 115 MMHG | HEIGHT: 68 IN | DIASTOLIC BLOOD PRESSURE: 63 MMHG | RESPIRATION RATE: 12 BRPM | OXYGEN SATURATION: 97 % | HEART RATE: 68 BPM | BODY MASS INDEX: 21.22 KG/M2 | TEMPERATURE: 96.8 F | WEIGHT: 140 LBS

## 2022-10-04 PROCEDURE — 7100000010 HC PHASE II RECOVERY - FIRST 15 MIN: Performed by: SURGERY

## 2022-10-04 PROCEDURE — 3700000000 HC ANESTHESIA ATTENDED CARE: Performed by: SURGERY

## 2022-10-04 PROCEDURE — 2500000003 HC RX 250 WO HCPCS: Performed by: NURSE ANESTHETIST, CERTIFIED REGISTERED

## 2022-10-04 PROCEDURE — 6360000002 HC RX W HCPCS: Performed by: SURGERY

## 2022-10-04 PROCEDURE — 7100000011 HC PHASE II RECOVERY - ADDTL 15 MIN: Performed by: SURGERY

## 2022-10-04 PROCEDURE — 3600000002 HC SURGERY LEVEL 2 BASE: Performed by: SURGERY

## 2022-10-04 PROCEDURE — 3600000012 HC SURGERY LEVEL 2 ADDTL 15MIN: Performed by: SURGERY

## 2022-10-04 PROCEDURE — 2709999900 HC NON-CHARGEABLE SUPPLY: Performed by: SURGERY

## 2022-10-04 PROCEDURE — 6360000002 HC RX W HCPCS: Performed by: NURSE ANESTHETIST, CERTIFIED REGISTERED

## 2022-10-04 PROCEDURE — 2580000003 HC RX 258: Performed by: NURSE ANESTHETIST, CERTIFIED REGISTERED

## 2022-10-04 PROCEDURE — 3700000001 HC ADD 15 MINUTES (ANESTHESIA): Performed by: SURGERY

## 2022-10-04 PROCEDURE — 2500000003 HC RX 250 WO HCPCS: Performed by: SURGERY

## 2022-10-04 RX ORDER — SODIUM CHLORIDE, SODIUM LACTATE, POTASSIUM CHLORIDE, CALCIUM CHLORIDE 600; 310; 30; 20 MG/100ML; MG/100ML; MG/100ML; MG/100ML
INJECTION, SOLUTION INTRAVENOUS CONTINUOUS PRN
Status: DISCONTINUED | OUTPATIENT
Start: 2022-10-04 | End: 2022-10-04 | Stop reason: SDUPTHER

## 2022-10-04 RX ORDER — LIDOCAINE HYDROCHLORIDE 10 MG/ML
0.3 INJECTION, SOLUTION EPIDURAL; INFILTRATION; INTRACAUDAL; PERINEURAL
Status: DISCONTINUED | OUTPATIENT
Start: 2022-10-04 | End: 2022-10-04 | Stop reason: HOSPADM

## 2022-10-04 RX ORDER — SODIUM CHLORIDE 0.9 % (FLUSH) 0.9 %
5-40 SYRINGE (ML) INJECTION EVERY 12 HOURS SCHEDULED
Status: DISCONTINUED | OUTPATIENT
Start: 2022-10-04 | End: 2022-10-04 | Stop reason: HOSPADM

## 2022-10-04 RX ORDER — SODIUM CHLORIDE, SODIUM LACTATE, POTASSIUM CHLORIDE, CALCIUM CHLORIDE 600; 310; 30; 20 MG/100ML; MG/100ML; MG/100ML; MG/100ML
INJECTION, SOLUTION INTRAVENOUS CONTINUOUS
Status: DISCONTINUED | OUTPATIENT
Start: 2022-10-04 | End: 2022-10-04 | Stop reason: HOSPADM

## 2022-10-04 RX ORDER — PROPOFOL 10 MG/ML
INJECTION, EMULSION INTRAVENOUS CONTINUOUS PRN
Status: DISCONTINUED | OUTPATIENT
Start: 2022-10-04 | End: 2022-10-04 | Stop reason: SDUPTHER

## 2022-10-04 RX ORDER — LABETALOL HYDROCHLORIDE 5 MG/ML
5 INJECTION, SOLUTION INTRAVENOUS EVERY 10 MIN PRN
Status: DISCONTINUED | OUTPATIENT
Start: 2022-10-04 | End: 2022-10-04 | Stop reason: HOSPADM

## 2022-10-04 RX ORDER — OXYCODONE HYDROCHLORIDE 5 MG/1
10 TABLET ORAL PRN
Status: DISCONTINUED | OUTPATIENT
Start: 2022-10-04 | End: 2022-10-04 | Stop reason: HOSPADM

## 2022-10-04 RX ORDER — ONDANSETRON 2 MG/ML
4 INJECTION INTRAMUSCULAR; INTRAVENOUS
Status: DISCONTINUED | OUTPATIENT
Start: 2022-10-04 | End: 2022-10-04 | Stop reason: HOSPADM

## 2022-10-04 RX ORDER — BUPIVACAINE HYDROCHLORIDE 5 MG/ML
INJECTION, SOLUTION EPIDURAL; INTRACAUDAL PRN
Status: DISCONTINUED | OUTPATIENT
Start: 2022-10-04 | End: 2022-10-04 | Stop reason: ALTCHOICE

## 2022-10-04 RX ORDER — OXYCODONE HYDROCHLORIDE 5 MG/1
5 TABLET ORAL PRN
Status: DISCONTINUED | OUTPATIENT
Start: 2022-10-04 | End: 2022-10-04 | Stop reason: HOSPADM

## 2022-10-04 RX ORDER — MEPERIDINE HYDROCHLORIDE 50 MG/ML
12.5 INJECTION INTRAMUSCULAR; INTRAVENOUS; SUBCUTANEOUS EVERY 5 MIN PRN
Status: DISCONTINUED | OUTPATIENT
Start: 2022-10-04 | End: 2022-10-04 | Stop reason: HOSPADM

## 2022-10-04 RX ORDER — LIDOCAINE HYDROCHLORIDE 20 MG/ML
INJECTION, SOLUTION INFILTRATION; PERINEURAL PRN
Status: DISCONTINUED | OUTPATIENT
Start: 2022-10-04 | End: 2022-10-04 | Stop reason: SDUPTHER

## 2022-10-04 RX ORDER — SODIUM CHLORIDE 9 MG/ML
INJECTION, SOLUTION INTRAVENOUS PRN
Status: DISCONTINUED | OUTPATIENT
Start: 2022-10-04 | End: 2022-10-04 | Stop reason: HOSPADM

## 2022-10-04 RX ORDER — PROPOFOL 10 MG/ML
INJECTION, EMULSION INTRAVENOUS PRN
Status: DISCONTINUED | OUTPATIENT
Start: 2022-10-04 | End: 2022-10-04 | Stop reason: SDUPTHER

## 2022-10-04 RX ORDER — DIPHENHYDRAMINE HYDROCHLORIDE 50 MG/ML
12.5 INJECTION INTRAMUSCULAR; INTRAVENOUS
Status: DISCONTINUED | OUTPATIENT
Start: 2022-10-04 | End: 2022-10-04 | Stop reason: HOSPADM

## 2022-10-04 RX ORDER — SODIUM CHLORIDE 0.9 % (FLUSH) 0.9 %
5-40 SYRINGE (ML) INJECTION PRN
Status: DISCONTINUED | OUTPATIENT
Start: 2022-10-04 | End: 2022-10-04 | Stop reason: HOSPADM

## 2022-10-04 RX ADMIN — PROPOFOL 80 MCG/KG/MIN: 10 INJECTION, EMULSION INTRAVENOUS at 10:06

## 2022-10-04 RX ADMIN — CEFAZOLIN 2000 MG: 2 INJECTION, POWDER, FOR SOLUTION INTRAMUSCULAR; INTRAVENOUS at 10:09

## 2022-10-04 RX ADMIN — PROPOFOL 80 MG: 10 INJECTION, EMULSION INTRAVENOUS at 10:06

## 2022-10-04 RX ADMIN — SODIUM CHLORIDE, SODIUM LACTATE, POTASSIUM CHLORIDE, AND CALCIUM CHLORIDE: .6; .31; .03; .02 INJECTION, SOLUTION INTRAVENOUS at 10:00

## 2022-10-04 RX ADMIN — LIDOCAINE HYDROCHLORIDE 60 MG: 20 INJECTION, SOLUTION INFILTRATION; PERINEURAL at 10:06

## 2022-10-04 ASSESSMENT — PAIN SCALES - GENERAL
PAINLEVEL_OUTOF10: 0
PAINLEVEL_OUTOF10: 0

## 2022-10-04 NOTE — H&P
Pt is a 79 y/o female with history of bilateral breast cancer. She was initially treated with neoadjuvant chemotherapy, followed by surgery. She is now on anastrozole. She presents today for port removal. We reviewed the procedure and risks including anesthetic risks, bleeding, infection. She wishes to proceed. Since I last saw her, she has been started on Eliquis for bilateral pulmonary emboli. She held this for surgery. H&P from medical oncology on file; reviewed. Safia Thompson.  DO Julius, GOLDIE  Breast Surgical Oncology    [facility] Breast Surgery  Phone: [phone])

## 2022-10-04 NOTE — OP NOTE
Breast Surgery Operative Note    Derek Nicholas  YOB: 1945  MRN: [id number]    DATE OF PROCEDURE  10/04/22     PREOPERATIVE DIAGNOSIS  History of bilateral breast cancer     POSTOPERATIVE DIAGNOSIS  Same    PROCEDURE  Removal of right internal jugular vein Port-A-Catheter     ANESTHESIA  Monitored anesthesia care, local anesthetic    SURGEON  Gerardo Garcia DO     ASSISTANT  Isabella Litten    ESTIMATED BLOOD LOSS  less than 50  ml    COMPLICATIONS  None apparent by completion of procedure    SPECIMENS REMOVED  Port-A-Catheter     FINDINGS  The patient had a right internal jugular vein Port-A-Catheter which was removed intact. POST-OP CONDITION  Stable    DISPOSITION  To PACU    INDICATION FOR PROCEDURE  Derek Nicholas is a 78 y.o. woman who has previously undergone treatment for breast cancer. She is no longer in need of her port-a-catheter and presents today to have it removed. The indications for the planned procedure, along with the potential benefits and risks which include but are not limited to the risk of anesthesia, bleeding, infection, and wound complications were reviewed. All questions were answered and she agrees to proceed. DESCRIPTION OF PROCEDURE  Derek Nicholas was brought to the operating room and placed supine on the operating room table with her arms tucked. She was appropriately positioned and padded. Bilateral sequential compression devices were applied to both lower extremities and appropriate perioperative antibiotics (ancef) were administered. After induction of anesthesia, a timeout procedure was performed. The patient was prepped and draped in the standard surgical fashion. The right infraclavicular incision was identified and was infiltrated with local anesthetic to create a field block. The previous incision was opened with a scalpel and carried down through the dermis with electrocautery.   Blunt and sharp dissection were utilized to free the port from the surrounding tissue. The two 2-0 Prolene stay sutures were identified and removed. The port-a-catheter hub was removed from its pocket. A 3-0 Vicryl figure-of-eight stitch was placed around the catheter tunnel site. The catheter was removed and the suture was tied in place. The catheter was noted to be intact and was discarded. Pressure was held x 5 minutes. Attention was then turned to the wound. Hemostasis was obtained with electrocautery. The wound was irrigated with copious amounts of sterile saline. The deep dermal layer was then reapproximated with interrupted 3-0 Vicryl stitches. The skin was reapproximated with a running subcuticular using 4-0 Monocryl. Surgical glue was applied. All sponge and needle counts were reported to be correct. The patient tolerated this procedure well, was awakened and transferred to the recovery room in stable condition.     ELECTRONICALLY SIGNED BY Brittni Palma DO  on 10/04/22 at 10:26 AM EDT

## 2022-10-04 NOTE — PROGRESS NOTES
Reviewed discharge instruction with patient and patients daughter.  No questions or concerns at this time  Oscar Cagle RN

## 2022-10-04 NOTE — PROGRESS NOTES
Pt brought to \[facility]\"". Report obtained from OR RN and Anesthesia. Pt A/O x 4. Left surgical incision C/D/I.  Patient denies any pain or nausea at this time  Rod Palumbo RN

## 2022-10-04 NOTE — DISCHARGE INSTRUCTIONS
Andrews Harding DO  [facility] Breast Surgery  [address] [place] [address], [address] [place] [address] [address]  Phone [phone]    Postoperative Instructions for Port Removal  These are general guidelines to help assist in recovery after breast surgery. Please keep in mind all patients recover differently, so please call the office with any questions. OK to restart Eliquis in 24 hours (10/5, PM)    Pain management   You may feel mild to moderate discomfort when anesthesia wears off   You may take Tylenol or extra strength Tylenol for pain  AVOID aspirin, Excedrin, ibuprofen, and other NSAIDS for 48 hours after surgery   Pain should improve, not worsen as days pass. If pain worsens, please call the office immediately. Wound care   Your incision has dissolvable stitches under the skin and skin glue. This will come off on its own over time. You may shower on Thursday morning. Let warm soapy water run over your incision, then pat dry. Do not place ointment or lotions on your incision   Do not take a tub bath where your incision will be submerged into water until it is fully healed in 4-8 weeks   Do not swim with a fresh incision   Some swelling and bruising in the surgical site is considered normal. It is not normal to have excessive bleeding or drainage from the wound. If you notice this, please call the office immediately. Please call the office if you notice a fever as this may be a sign of infection. Activity   You may resume most daily activities the day after surgery   Avoid strenuous activity, heavy lifting (10 pounds), and vigorous exercise for the next 48 hours  Walking is in normal activity that can be restarted right away   Avoid any direct trauma to the surgical area   You may resume driving when you are no longer taking narcotics, pain free, and you feel safe turning the wheel and stopping quickly     Diet   You may encounter nausea following surgery. Drink clear liquids or popsicles until you are feeling better. You have no diet restrictions and may resume a regular healthy diet immediately. You may add fiber to your diet to help with constipation incurred by narcotic pain medications. You can resume all of your regular medications immediately. You should discuss with your physician when to resume blood thinners. Follow-up   Call Dr. Isabel Royal office if you need to change your postoperative appointment    When to contact us   Please call the surgical office immediately if you notice any of the following: Excessive pain, persistent fever, excessive bleeding or swelling, redness surrounding the wound, drainage from the wound, reactions to medications, or any general concerns or worsening of overall condition.    The breast surgery office can be reached at [phone])

## 2022-10-04 NOTE — ANESTHESIA POSTPROCEDURE EVALUATION
Department of Anesthesiology  Postprocedure Note    Patient: Yu Mohan  MRN: [id number]  YOB: 1945  Date of evaluation: 10/4/2022      Procedure Summary     Date: 10/04/22 Room / Location: [address] [place] [address]    Anesthesia Start: 1001 Anesthesia Stop: 9635    Procedure: PORT REMOVAL (Chest) Diagnosis:       Malignant neoplasm of lower-outer quadrant of right female breast, unspecified estrogen receptor status ([address]      (RIGHT BREAST CANCER)    Surgeons: Renea Taylor DO Responsible Provider: Earle Rowell MD    Anesthesia Type: MAC ASA Status: 3          Anesthesia Type: No value filed.     Yeison Phase I: Yeison Score: 10    Yeison Phase II: Yeison Score: 10      Anesthesia Post Evaluation    Comments: Postoperative Anesthesia Note    Name:    Yu Mohan  MRN:      [id number]    Patient Vitals in the past 12 hrs:  10/04/22 1053, BP:115/63, Pulse:68, SpO2:97 %  10/04/22 1036, BP:(!) 95/56, Temp:96.8 °F (36 °C), Temp src:Temporal, Pulse:76, Resp:12, SpO2:98 %  10/04/22 0930, BP:(!) 143/67, Temp:97.1 °F (36.2 °C), Temp src:Temporal, Pulse:87, Resp:16, SpO2:98 %     LABS:    CBC  Lab Results       Component                Value               Date/Time                  WBC                      5.1                 02/08/2022 07:50 AM        HGB                      11.6 (L)            02/08/2022 07:50 AM        HCT                      36.1                02/08/2022 07:50 AM        PLT                      264                 02/08/2022 07:50 AM   RENAL  Lab Results       Component                Value               Date/Time                  NA                       141                 02/08/2022 07:50 AM        K                        4.2                 02/08/2022 07:50 AM        CL                       106                 02/08/2022 07:50 AM        CO2                      22                  02/08/2022 07:50 AM        BUN                      16 02/08/2022 07:50 AM        CREATININE               <0.5 (L)            02/08/2022 07:50 AM        GLUCOSE                  131 (H)             02/08/2022 07:50 AM   COAGS  Lab Results       Component                Value               Date/Time                  PROTIME                  15.0 (H)            11/18/2021 08:30 PM        INR                      1.31 (H)            11/18/2021 08:30 PM        APTT                     106.8 (HH)          11/19/2021 04:21 AM     Intake & Output:  @61SRCI@    Nausea & Vomiting:  No    Level of Consciousness:  Awake    Pain Assessment:  Adequate analgesia    Anesthesia Complications:  No apparent anesthetic complications    SUMMARY      Vital signs stable  OK to discharge from Stage I post anesthesia care.   Care transferred from Anesthesiology department on discharge from perioperative area

## 2022-10-04 NOTE — ANESTHESIA PRE PROCEDURE
Department of Anesthesiology  Preprocedure Note       Name:  Charlie Barry   Age:  78 y.o.  :  1945                                          MRN:  [id number]         Date:  10/4/2022      Surgeon: Keyshawn Espana):  Andreina Chance DO    Procedure: Procedure(s):  PORT REMOVAL    Medications prior to admission:   Prior to Admission medications    Medication Sig Start Date End Date Taking? Authorizing Provider   alendronate (FOSAMAX) 70 MG tablet Take 70 mg by mouth every 7 days    Yes Historical Provider, MD   vitamin B-12 (CYANOCOBALAMIN) 1000 MCG tablet Take 1,000 mcg by mouth daily    Historical Provider, MD   lisinopril (PRINIVIL;ZESTRIL) 2.5 MG tablet Take 1 tablet by mouth daily 22   Yomi Martinez, DO   metoprolol succinate (TOPROL XL) 25 MG extended release tablet Take 1 tablet by mouth 2 times daily  Patient taking differently: Take 25 mg by mouth daily  21   Yomi Martinez, DO   gabapentin (NEURONTIN) 100 MG capsule Take 1 capsule by mouth 2 times daily as needed (pain/numbness/tingling) for up to 90 days. Patient taking differently: Take 100 mg by mouth 2 times daily as needed (pain/numbness/tingling). TAKES ONLY AT NIGHT 21  [place] - CNP   hydrOXYzine (ATARAX) 10 MG tablet Take 10 mg by mouth 3 times daily as needed  Patient not taking: Reported on 2022    Historical Provider, MD   aspirin 81 MG chewable tablet Take 1 tablet by mouth daily 21   Dianne Kolb MD   nitroGLYCERIN (NITROSTAT) 0.4 MG SL tablet up to max of 3 total doses.  If no relief after 1 dose, call 911. 21   Dianne Kolb MD   apixaban Hermenia No) 5 MG TABS tablet Take 1 tablet by mouth 2 times daily 21   Dianne Kolb MD   calcium carbonate-vitamin D (CALTRATE) 600-400 MG-UNIT TABS per tab Take by mouth 21   Historical Provider, MD   anastrozole (ARIMIDEX) 1 MG tablet Take 1 mg by mouth  Patient not taking: Reported on 2022   Historical Provider, MD   ondansetron (ZOFRAN) 8 MG tablet Take 1 tablet by mouth 6/21/21   Historical Provider, MD   melatonin (RA MELATONIN) 3 MG TABS tablet Take 1 tablet by mouth nightly as needed (sleep) 5/30/21   Sherly Drake, APRN - CNP       Current medications:    Current Facility-Administered Medications   Medication Dose Route Frequency Provider Last Rate Last Admin    ceFAZolin (ANCEF) 2000 mg in dextrose 5 % 100 mL IVPB  2,000 mg IntraVENous On Call to [address], DO        lidocaine PF 1 % injection 0.3 mL  0.3 mL IntraDERmal Once PRN Escobar Escobar MD        lactated ringers infusion   IntraVENous Continuous Escobar Escobar MD        sodium chloride flush 0.9 % injection 5-40 mL  5-40 mL IntraVENous 2 times per day Escobar Escobar MD        sodium chloride flush 0.9 % injection 5-40 mL  5-40 mL IntraVENous PRN Escobar Escobar MD        0.9 % sodium chloride infusion   IntraVENous PRN Escobar Escobar MD           Allergies:     Allergies   Allergen Reactions    Bactrim [Sulfamethoxazole-Trimethoprim] Nausea Only       Problem List:    Patient Active Problem List   Diagnosis Code    Perforation of sigmoid colon due to diverticulitis K57.20    Anemia D64.9    Pure hypercholesterolemia E78.00    Malignant neoplasm of right breast in female, estrogen receptor positive ([place]) C50.911, Z17.0    Malignant neoplasm of upper-inner quadrant of left breast in female, estrogen receptor positive ([place]) C50.212, Z17.0    Chemotherapy-induced neutropenia (HCC) D70.1, T45.1X5A    Fatigue R53.83    Tachycardia R00.0    Neutropenia, drug-induced (HCC) D70.2    Severe malnutrition (HCC) E43    Neutropenia (HCC) D70.9    Failure to thrive in adult R62.7    Recurrent falls R29.6    Bacteriuria R82.71    Acute pulmonary embolism without acute cor pulmonale (HCC) I26.99    CHF (congestive heart failure) (HCC) I50.9    CHF (congestive heart failure), NYHA class I, acute on chronic, combined ([place]) I50.43    Chest pain due to CAD ([facility] I25.119    Chest pain R07.9    Cardiomyopathy (HCC) I42.9    Chronic systolic heart failure (HCC) I50.22    Malignant neoplasm of overlapping sites of left breast in female, estrogen receptor positive ([facility] C50.812, Z17.0       Past Medical History:        Diagnosis Date    Breast cancer ([facility]     CHEMO/RADIATION    CAD (coronary artery disease)     Cancer ([facility]     Cardiomyopathy ([facility]     SECONDARY TO CHEMO    CHF (congestive heart failure) ([facility]     Hx of blood clots     PE    Hyperlipidemia     Hypertension     Kidney stone        Past Surgical History:        Procedure Laterality Date    BREAST SURGERY N/A 02/08/2022    LEFT RADIOFREQUENCY IDENTIFICATION TAG LOCALIZED PARTIAL MASTECTOMY, LEFT SENTINEL LYMPH NODE BIOPSY, RIGHT BRACKETED RADIOFREQUENCY IDENTIFICATION TAG LOCALIZED PARTIAL MASTECTOMY, RIGHT RADIOFREQUENCY IDENTIFICATION TAG, LOCALIZED EXCISIONAL BIOPSY, RIGHT AXILLARY LYMPH NODE DISSECTION, RIGHT BREAST BIOZORB PLACEMENT performed by Sara Ann DO at [facility] MRI BREAST BX USING DEVICE RIGHT Right 05/28/2021    MRI BREAST BX USING DEVICE RIGHT 5/28/2021 [facility] MRI    MRI BREAST BX USING DEVICE RIGHT Right 02/02/2022    MRI BREAST BX USING DEVICE RIGHT 2/2/2022 [address] EG MRI    MRI NEEDLE BIOPSY EACH ADDL RIGHT Right 05/28/2021    MRI NEEDLE BIOPSY [facility]ACH ADDL RIGHT [facility]    PORT SURGERY N/A 05/25/2021    RIGHT PORT A CATH PLACEMENT performed by Sara Ann DO at [address] Right 05/07/2021    US BREAST BIOPSY NEEDLE ADDITIONAL RIGHT 5/7/2021 Gabriella Price MD [facility] LEFT Left 05/07/2021    US BREAST NEEDLE BIOPSY LEFT 5/7/2021 Gabriella Price MD [address] [place] [address] H BREAST NEEDLE BIOPSY RIGHT Right 05/07/2021    US BREAST NEEDLE BIOPSY RIGHT 5/7/2021 Gabriella Price MD [address] [place] [address] GUIDED NEEDLE LOC OF LEFT BREAST Left 02/02/2022    US GUIDED NEEDLE LOC OF LEFT BREAST 2/2/2022 [facility] RIGHT BREAST Right 02/02/2022    US GUIDED NEEDLE LOC OF RIGHT BREAST 2/2/2022 Billy Pruett MD [facility]       Social History:    Social History     Tobacco Use    Smoking status: Never     Passive exposure: Yes    Smokeless tobacco: Never   Substance Use Topics    Alcohol use: No                                Counseling given: Not Answered      Vital Signs (Current):   Vitals:    09/30/22 1522   Weight: 140 lb (63.5 kg)   Height: 5' 7.5\" (1.715 m)                                              BP Readings from Last 3 Encounters:   05/04/22 126/68   03/15/22 116/68   02/09/22 137/73       NPO Status:                                                                                 BMI:   Wt Readings from Last 3 Encounters:   09/30/22 140 lb (63.5 kg)   05/04/22 133 lb (60.3 kg)   03/15/22 127 lb (57.6 kg)     Body mass index is 21.6 kg/m².     CBC:   Lab Results   Component Value Date/Time    WBC 5.1 02/08/2022 07:50 AM    RBC 4.05 02/08/2022 07:50 AM    HGB 11.6 02/08/2022 07:50 AM    HCT 36.1 02/08/2022 07:50 AM    MCV 89.1 02/08/2022 07:50 AM    RDW 16.2 02/08/2022 07:50 AM     02/08/2022 07:50 AM       CMP:   Lab Results   Component Value Date/Time     02/08/2022 07:50 AM    K 4.2 02/08/2022 07:50 AM     02/08/2022 07:50 AM    CO2 22 02/08/2022 07:50 AM    BUN 16 02/08/2022 07:50 AM    CREATININE <0.5 02/08/2022 07:50 AM    GFRAA >60 02/08/2022 07:50 AM    GFRAA >60 07/10/2012 04:27 PM    AGRATIO 1.0 11/18/2021 08:39 AM    LABGLOM >60 02/08/2022 07:50 AM    GLUCOSE 131 02/08/2022 07:50 AM    PROT 6.7 11/18/2021 08:39 AM    PROT 7.8 07/10/2012 04:27 PM    CALCIUM 9.7 02/08/2022 07:50 AM    BILITOT 0.3 11/18/2021 08:39 AM    ALKPHOS 103 11/18/2021 08:39 AM    AST 17 11/18/2021 08:39 AM    ALT 16 11/18/2021 08:39 AM       POC Tests: No results for input(s): POCGLU, POCNA, POCK, POCCL, POCBUN, POCHEMO, POCHCT in the last 72 hours. Coags:   Lab Results   Component Value Date/Time    PROTIME 15.0 11/18/2021 08:30 PM    INR 1.31 11/18/2021 08:30 PM    APTT 106.8 11/19/2021 04:21 AM       HCG (If Applicable): No results found for: PREGTESTUR, PREGSERUM, HCG, HCGQUANT     ABGs: No results found for: PHART, PO2ART, YNB4ZMH, FTC3CFF, BEART, [id number]     Type & Screen (If Applicable):  No results found for: LABABO, LABRH    Drug/Infectious Status (If Applicable):  No results found for: HIV, HEPCAB    COVID-19 Screening (If Applicable):   Lab Results   Component Value Date/Time    COVID19 Not Detected 02/02/2022 02:24 PM           Anesthesia Evaluation  Patient summary reviewed no history of anesthetic complications:   Airway: Mallampati: I  TM distance: <3 FB   Neck ROM: full     Dental:    (+) partials      Pulmonary:normal exam  breath sounds clear to auscultation      (-) COPD, asthma and sleep apnea                           Cardiovascular:  Exercise tolerance: good (>4 METS),   (+) hypertension:, CHF: systolic, VEE:,     (-)  angina      Rhythm: regular  Rate: normal                    Neuro/Psych:   (+) neuromuscular disease:,    (-) seizures and TIA           GI/Hepatic/Renal:   (+) GERD: well controlled,      (-) liver disease and no renal disease       Endo/Other:    (+) blood dyscrasia: anemia:., malignancy/cancer. (-) diabetes mellitus               Abdominal:             Vascular:   + PE. Other Findings:           Anesthesia Plan      MAC     ASA 3     (I discussed with the patient the risks and benefits of PIV, anesthesia, IV Narcotics, PACU. All questions were answered the patient agrees with the plan and wishes to proceed)  Induction: intravenous.                             Chika James MD   10/4/2022

## 2022-10-31 DIAGNOSIS — I50.22 CHRONIC SYSTOLIC HEART FAILURE (HCC): ICD-10-CM

## 2022-10-31 DIAGNOSIS — Z79.899 MEDICATION MANAGEMENT: ICD-10-CM

## 2022-10-31 RX ORDER — METOPROLOL SUCCINATE 25 MG/1
25 TABLET, EXTENDED RELEASE ORAL DAILY
Qty: 90 TABLET | Refills: 3 | Status: SHIPPED | OUTPATIENT
Start: 2022-10-31

## 2022-10-31 RX ORDER — LISINOPRIL 2.5 MG/1
2.5 TABLET ORAL DAILY
Qty: 90 TABLET | Refills: 3 | Status: SHIPPED | OUTPATIENT
Start: 2022-10-31

## 2022-10-31 NOTE — TELEPHONE ENCOUNTER
lisinopril (PRINIVIL;ZESTRIL) 2.5 MG tablet   metoprolol succinate (TOPROL XL) 25 MG extended release tablet     [facility] [id number] Davey Arias, [address] [place] [address] [phone] - f [phone]

## 2022-11-16 ENCOUNTER — OFFICE VISIT (OUTPATIENT)
Dept: CARDIOLOGY CLINIC | Age: 77
End: 2022-11-16
Payer: MEDICARE

## 2022-11-16 VITALS
HEIGHT: 68 IN | DIASTOLIC BLOOD PRESSURE: 60 MMHG | SYSTOLIC BLOOD PRESSURE: 102 MMHG | OXYGEN SATURATION: 97 % | HEART RATE: 73 BPM | WEIGHT: 144 LBS | BODY MASS INDEX: 21.82 KG/M2

## 2022-11-16 DIAGNOSIS — E78.5 HYPERLIPIDEMIA, UNSPECIFIED HYPERLIPIDEMIA TYPE: ICD-10-CM

## 2022-11-16 DIAGNOSIS — I50.22 CHRONIC SYSTOLIC HEART FAILURE (HCC): Primary | ICD-10-CM

## 2022-11-16 DIAGNOSIS — I42.8 NON-ISCHEMIC CARDIOMYOPATHY (HCC): ICD-10-CM

## 2022-11-16 DIAGNOSIS — I25.10 CORONARY ARTERY DISEASE INVOLVING NATIVE CORONARY ARTERY OF NATIVE HEART WITHOUT ANGINA PECTORIS: ICD-10-CM

## 2022-11-16 DIAGNOSIS — D63.8 ANEMIA OF CHRONIC DISEASE: ICD-10-CM

## 2022-11-16 DIAGNOSIS — Z86.718 HISTORY OF DVT (DEEP VEIN THROMBOSIS): ICD-10-CM

## 2022-11-16 DIAGNOSIS — Z86.711 HISTORY OF PULMONARY EMBOLISM: ICD-10-CM

## 2022-11-16 DIAGNOSIS — Z85.3 HISTORY OF BREAST CANCER: ICD-10-CM

## 2022-11-16 PROCEDURE — 1123F ACP DISCUSS/DSCN MKR DOCD: CPT | Performed by: INTERNAL MEDICINE

## 2022-11-16 PROCEDURE — 99214 OFFICE O/P EST MOD 30 MIN: CPT | Performed by: INTERNAL MEDICINE

## 2022-11-16 RX ORDER — ATORVASTATIN CALCIUM 40 MG/1
40 TABLET, FILM COATED ORAL DAILY
Qty: 90 TABLET | Refills: 3 | Status: SHIPPED | OUTPATIENT
Start: 2022-11-16

## 2022-11-16 ASSESSMENT — ENCOUNTER SYMPTOMS
PHOTOPHOBIA: 0
CONSTIPATION: 0
RHINORRHEA: 0
EYE PAIN: 0
COUGH: 0
BLOOD IN STOOL: 0
SHORTNESS OF BREATH: 1
SORE THROAT: 0
VOMITING: 0
NAUSEA: 0
DIARRHEA: 0
ABDOMINAL PAIN: 0

## 2022-11-16 NOTE — PROGRESS NOTES
[facility], [place]   Follow-up Visit  [phone]      Attending Physician: TORIN Tomas  Reason for Consultation/Chief Complaint:   Follow-up for LV systolic heart failure, non-ischemic cardiomyopathy    Taras Nicholas is a 78 y.o. female with a history of chronic LV systolic heart failure secondary to non-ischemic cardiomyopathy, non-obstructive coronary artery disease, LVH, breast cancer, anemia of chronic disease, hyperlipidemia, and bilateral pulmonary emboli who presents for follow-up. The patient had multiple admissions during 2021 for bilateral pulmonary emboli, chest pain, and acute heart failure. A TTE from 10/22/21 showed an LVEF of 25-30% with global hypokinesis and regional variation, normal RV size and systolic function, mild-moderate mitral regurgitation, and mild pulmonic regurgitation. There was also a right atrial echodensity noted which was ultimately determined to represent a prominent eustachian valve. It was initially recommended that additional ischemic evaluation be deferred until she had had more time to recover from her acute pulmonary emboli. She was then re-admitted from 11/17-11/21/21 with recurrent chest pain and ultimately underwent invasive coronary angiography at that time which demonstrated a calcified, eccentric 60-70% mid-LAD stenosis that was not physiologically significant by DFR (0.92) or FFR (0.90). During the procedure, the patient developed ventricular fibrillation while advancing the pressure wire in the LAD and was cardioverted x2. A cardiac MRI obtained on 12/3/21 showed findings consistent with a non-ischemic cardiomyopathy with calculated LVEF of 30%. There was no evidence of myocardial scarring, infiltration, or inflammation. A prominent eustachian valve was noted which likely represented the right atrial echodensity seen on her previous TTE. There was otherwise no additional evidence of a right atrial mass.     She underwent bilateral partial mastectomies with left sentinel lymph node biopsy and right axillary lymph node dissection on 2/8/22 and and has now completed her radiation therapy. Her most recent TTE from 3/29/22 showed an improvement in LVEF to 40-45% with normal RV size and systolic function and mild mitral and tricuspid regurgitation. Recently, the patient reports that she has generally been doing well. She tripped going up the stairs a few days ago and bruised her knee and forehead. She denies any associated lightheadedness or dizziness and did not lose consciousness. She has not been having any headaches or visual changes. She does note some shortness of breath with exertion. She denies any chest pain, palpitations, or lower extremity edema. Her mother passed away on Mother's Day and she says she has had a difficult time coping with this. Past Medical History:   has a past medical history of Breast cancer ([facility] [address] CAD (coronary artery disease), Cancer ([facility] [address] Cardiomyopathy ([facility] [address] CHF (congestive heart failure) ([facility] [address] Hx of blood clots, Hyperlipidemia, Hypertension, and Kidney stone. Surgical History:   has a past surgical history that includes [address] LOC DEVICE 1ST LESION RIGHT (Right, 05/07/2021); US BREAST BIOPSY W LOC DEVICE EACH ADDL LESION RIGHT (Right, 05/07/2021); US BREAST BIOPSY W LOC DEVICE 1ST LESION LEFT (Left, 05/07/2021); Colonoscopy; Port Surgery (N/A, 05/25/2021); MRI BIOPSY BREAST W LOC DEVICE RIGHT 1ST LESION (Right, 05/28/2021); MRI BIOPSY BREAST W LOC DEVICE RIGHT EACH ADDL (Right, 05/28/2021); MRI BIOPSY BREAST W LOC DEVICE RIGHT 1ST LESION (Right, 02/02/2022); US PLACE BREAST LOC DEVICE 1ST LESION LEFT (Left, 02/02/2022); US PLACE BREAST LOC DEVICE 1ST LESION RIGHT (Right, 02/02/2022); Breast surgery (N/A, 02/08/2022); Cystoscopy; and Port Surgery (N/A, 10/4/2022). Social History:   reports that she has never smoked. She has been exposed to tobacco smoke.  She has never used smokeless tobacco. She reports that she does not drink alcohol and does not use drugs. Family History:  family history includes Cancer (age of onset: 76) in her paternal grandmother; Diabetes in her father; High Cholesterol in her mother. Home Medications:  Were reviewed and are listed in nursing record and/or below  Prior to Admission medications    Medication Sig Start Date End Date Taking? Authorizing Provider   lisinopril (PRINIVIL;ZESTRIL) 2.5 MG tablet Take 1 tablet by mouth daily 10/31/22  Yes Yomi Lynx, DO   metoprolol succinate (TOPROL XL) 25 MG extended release tablet Take 1 tablet by mouth daily 10/31/22  Yes Yomi Kendalldox, DO   alendronate (FOSAMAX) 70 MG tablet Take 70 mg by mouth every 7 days SUNDAY   Yes Historical Provider, MD   vitamin B-12 (CYANOCOBALAMIN) 1000 MCG tablet Take 1,000 mcg by mouth daily   Yes Historical Provider, MD   gabapentin (NEURONTIN) 100 MG capsule Take 1 capsule by mouth 2 times daily as needed (pain/numbness/tingling) for up to 90 days. Patient taking differently: Take 100 mg by mouth 2 times daily as needed (pain/numbness/tingling). TAKES ONLY AT NIGHT 11/30/21 11/16/22 Yes Joseph Maurer, SOUTH - CNP   aspirin 81 MG chewable tablet Take 1 tablet by mouth daily 11/22/21  Yes Heladio Rees MD   nitroGLYCERIN (NITROSTAT) 0.4 MG SL tablet up to max of 3 total doses.  If no relief after 1 dose, call 911. 11/21/21  Yes Heladio Rees MD   apixaban Marvetta Reddish) 5 MG TABS tablet Take 1 tablet by mouth 2 times daily 11/21/21  Yes Heladio Rees MD   calcium carbonate-vitamin D (CALTRATE) 600-400 MG-UNIT TABS per tab Take by mouth 11/8/21  Yes Historical Provider, MD   anastrozole (ARIMIDEX) 1 MG tablet Take 1 mg by mouth 11/8/21  Yes Historical Provider, MD   ondansetron (ZOFRAN) 8 MG tablet Take 1 tablet by mouth 6/21/21  Yes Historical Provider, MD   melatonin (RA MELATONIN) 3 MG TABS tablet Take 1 tablet by mouth nightly as needed (sleep) 5/30/21  Yes Sherly Drake APRN - CNP   hydrOXYzine (ATARAX) 10 MG tablet Take 10 mg by mouth 3 times daily as needed  Patient not taking: No sig reported    Historical Provider, MD        CURRENT Medications:  No current facility-administered medications for this visit. Allergies:  Bactrim [sulfamethoxazole-trimethoprim]     Review of Systems:   Review of Systems   Constitutional:  Negative for chills and fever. HENT:  Negative for congestion, rhinorrhea and sore throat. Eyes:  Negative for photophobia, pain and visual disturbance. Respiratory:  Positive for shortness of breath. Negative for cough. Cardiovascular:  Negative for chest pain, palpitations and leg swelling. Gastrointestinal:  Negative for abdominal pain, blood in stool, constipation, diarrhea, nausea and vomiting. Endocrine: Negative for cold intolerance and heat intolerance. Genitourinary:  Negative for difficulty urinating, dysuria and hematuria. Musculoskeletal:  Negative for arthralgias, joint swelling and myalgias. Skin:  Negative for rash and wound. Allergic/Immunologic: Negative for environmental allergies and food allergies. Neurological:  Negative for dizziness, syncope and light-headedness. Hematological:  Negative for adenopathy. Does not bruise/bleed easily. Psychiatric/Behavioral:  Negative for dysphoric mood. The patient is not nervous/anxious. Objective   PHYSICAL EXAM:    Vitals:    11/16/22 1546   BP: 102/60   Pulse: 73   SpO2: 97%     General: Adult female in no acute distress. HEENT: Normocephalic, atraumatic, non-icteric, hearing intact, nares normal, mucous membranes moist.  Neck: No JVD. Heart: Regular rate and rhythm. Normal S1 and S2. No murmurs, gallops or rubs. Lungs: Normal respiratory effort. Clear to auscultation bilaterally. No wheezes, rales, or rhonchi. Abdomen: Soft, non-tender. Normoactive bowel sounds. No masses or organomegaly.   Skin: No rashes, wounds, or lesions. Pulses: 2+ and symmetric. Extremities: No clubbing, cyanosis, or edema. Psych: Normal mood and affect. Neuro: Alert and oriented to person, place, and time. No focal deficits noted. Labs   CBC:   Lab Results   Component Value Date/Time    WBC 5.1 02/08/2022 07:50 AM    RBC 4.05 02/08/2022 07:50 AM    HGB 11.6 02/08/2022 07:50 AM    HCT 36.1 02/08/2022 07:50 AM    MCV 89.1 02/08/2022 07:50 AM    RDW 16.2 02/08/2022 07:50 AM     02/08/2022 07:50 AM     CMP:  Lab Results   Component Value Date/Time     02/08/2022 07:50 AM    K 4.2 02/08/2022 07:50 AM     02/08/2022 07:50 AM    CO2 22 02/08/2022 07:50 AM    BUN 16 02/08/2022 07:50 AM    CREATININE <0.5 02/08/2022 07:50 AM    GFRAA >60 02/08/2022 07:50 AM    GFRAA >60 07/10/2012 04:27 PM    AGRATIO 1.0 11/18/2021 08:39 AM    LABGLOM >60 02/08/2022 07:50 AM    GLUCOSE 131 02/08/2022 07:50 AM    PROT 6.7 11/18/2021 08:39 AM    PROT 7.8 07/10/2012 04:27 PM    CALCIUM 9.7 02/08/2022 07:50 AM    BILITOT 0.3 11/18/2021 08:39 AM    ALKPHOS 103 11/18/2021 08:39 AM    AST 17 11/18/2021 08:39 AM    ALT 16 11/18/2021 08:39 AM     PT/INR:  No results found for: PTINR  HgBA1c:  Lab Results   Component Value Date    LABA1C 5.6 09/13/2018     Lab Results   Component Value Date    TROPONINI <0.01 11/18/2021         Cardiac Data     ECHO:  TTE 5/26/21:  Summary   Limited echo for LVEF baseline prior to chemotherapy. Ectopy present. -- Left ventricular systolic function is low normal with a visually   estimated ejection fraction of 50%. EF estimated by Fay's method at 46   %. The left ventricle is normal in size with mild concentric hypertrophy. Mild apical hypokinesis. Indeterminate diastolic function. Systolic pulmonary artery pressure (SPAP) is normal and estimated at 19 mmHg   (right atrial pressure 3 mmHg).    Frequent ectopy throughout exam.    TTE 8/16/21:  Conclusions   Summary   LV systolic function is normal with EF estimated at 55%.   No regional wall motion abnormalities. Grade I diastolic dysfunction with normal LV filling pressure. Mild mitral regurgitation. Inadequate tricuspid regurgitation jet to estimate systolic artery pressure   (SPAP). TTE 10/23/21:  Conclusions   Summary   Normal left ventricle size and wall thickness. The left ventricular systolic function is severely reduced with an ejection   fraction of 25-30 %. EF by Fay's method estimated at 27%. Severe global hypokinesis with regional variation. Left ventricular diastolic filling pressure are indeterminate. The right ventricle is normal in size and function. There is protruding echodensity from anterior RA wall seen in multiple   views, possibly artifact, but given patient's clinical history,   mass/thrombus cannot be excluded. Mild to moderate mitral regurgitation. Mild pulmonic and tricuspid regurgitation. Systolic pulmonary artery pressure (SPAP) is normal and estimated at 35 mmHg   (right atrial pressure 3 mmHg). There is a large left pleural effusion    TTE 3/29/22:  Summary   The left ventricular systolic function is mildly reduced with an ejection   fraction of 40-45 %. By Fay's method 43%. Mild global hypokinesis with regional variation. Grade I diastolic dysfunction with normal filing pressure. MIld mitral and tricuspid regurgitation. Systolic pulmonary artery pressure (SPAP) is normal and estimated at 26 mmHg   (right atrial pressure 3 mmHg). Last echo on 10/23/2021 showed EF of 25-30%. Stress Test:  Treadmill GXT 3/30/10:  Negative for evidence of ischemia. Cath:   [facility] 11/19/21:  Findings:  Hemodynamics:              A. Opening arterial pressure: 122/55 (85) mmHg              B. LVEDP: 3 mmHg     2. Coronary anatomy:  A. Left main artery: The left main artery bifurcates into the left anterior descending artery and left circumflex artery.   The left main artery has no angiographically significant disease. B. Left anterior descending artery: Transapical vessel which gives rise to 3 diagonal arteries. The LAD has a calcified, eccentric 60-70% stenosis at the junction of the proxima/mid-vessel that extends through the trifurcation of the first diagonal artery and first septal . There is another 20% stenosis in the distal LAD. The first diagonal artery is a small-medium caliber vessel with a 60-70% ostial stenosis. The second and third diagonal arteries are very small and have minor luminal irregularities. C. Left circumflex artery: Non-dominant vessel that gives rise to one large branching obtuse marginal artery. The LCx has minor luminal irregularities. The obtuse marginal artery has minor luminal irregularities. D. Right coronary artery: Dominant vessel that gives rise to the posterior descending artery and 2 posterolateral branches. The RCA has minor luminal irregularities. The posterior descending artery has minor luminal irregularities. The right posterolateral branches have minor luminal irregularities. DFR of proximal/mid-LAD:  DFR - 0.92     FFR of proximal/mid-LAD:  Baseline Pd/Pa - 0.96  Hyperemic FFR - 0.90    Cardiac MRI w/ and w/o contrast 12/3/21:  CONCLUSIONS       Findings are consistent with a non ischemic/non infiltrative cardiomyopathy with severely reduced LV systolic function. No evidence of prior myocarditis. Mildly elevated native T1 values are suggestive of fibrosis. No mass seen in the RA, there is a prominent eustachian valve.       -Normal left ventricular size and systolic function with a calculated ejection fraction of 30% by Fay's method.   -Abnormal LV global longitudinal strain (GLS)  -11%   -Abnormal and mildly elevated native T1 time.   -Normal right ventricular size and systolic function with a calculated ejection fraction of 54% by Fay's method.   -No myocardial edema by T2w imaging/mapping.  (Normal myocardium/skeletal ratio - normal < 1. 9). -No intracardiac shunt. Calculated Qp:Qs:0.97 (Phase contrast velocity encoding Ao/Pa)   -Right atrium: prominent eustachian leigh, no mass seen.    -Normal myocardial resting perfusion imaging.   -On early enhancement imaging, there is no evidence of LV thrombus.   -On delayed enhancement imaging, there is no abnormal hyperenhancement to suggest myocardial scar/inflammation/infiltration. The MRI sequences and imaging planes in this study were tailored for cardiac imaging and are suboptimal for evaluation of non-cardiac structures. Other Studies:   CTPA 10/22/21:  1. multiple acute bilateral pulmonary emboli. 2. Nonspecific multifocal bilateral ground-glass opacification. In the   setting of pulmonary emboli, the differential includes pulmonary infarct with   or without superimposed atypical infectious versus inflammatory pneumonitis. 3. Mild CHF. Bilateral Venous duplex ultrasound 10/25/21:  Conclusions        Summary        Acute totally occluding deep vein thrombosis involving the left peroneal    veins. No other evidence of deep vein or superficial vein thrombosis involving the    left lower extremity and the right common femoral vein. Chest X-ray 10/27/21: Worsening diffuse bilateral airspace disease predominantly within the   bilateral upper lobes. CTPA 10/27/21: Within the limitations of the exam, no new or worsening pulmonary emboli   identified. Redemonstration of pulmonary emboli involving bilateral upper   lobes, segmental/subsegmental pulmonary arteries. Previously noted pulmonary   emboli involving right middle and right lower lobe subsegmental pulmonary   arteries are not definitely evident within the limitations. Significantly worsened areas of consolidation and ground-glass opacity   involving lungs bilaterally, right worse than left with bilateral upper lobe   predominance, concerning for worsening pneumonia.        Bilateral pleural effusions, right worse than left, worsened. Assessment:     1. Chronic LV systolic heart failure/non-ischemic cardiomyopathy - Concern for cardiotoxicity from adriamycin or Herceptin bio similar versus possible stress cardiomyopathy. Invasive coronary angiography on 11/19/21 demonstrated non-obstructive coronary artery disease. Previous cardiac MRI showed no evidence of myocardial scarring, infiltration, or inflammation. LVEF was as low as 25-30%, but had improved to 40-45% on most recent TTE. Overall, she remains clinically euvolemic and it is suspected that her recent exertional dyspnea is likely secondary to mild deconditioning. 2. DVT/Bilateral pulmonary emboli - On Eliquis. RV size and systolic function are normal.  3. Right atrial echodensity - Most likely represents prominent eustachian valve better characterized on cardiac MRI. No other evidence of right atrial mass was seen on MRI. 4. Non-obstructive coronary artery disease - Underwent invasive coronary angiography on 11/19/21 and was found to have a calcified, eccentric 60-70% mid-LAD stenosis that was not physiologically significant by DFR (0.92) or FFR (0.90). Denies recent angina and will continue with medical management. 5. H/o breast cancer - S/p 4 cycles of Adriamycin/Cytoxan and 7 of 12 planned cycles of Taxol with Herceptin bio similar and pertuzumab. Previous regimen was discontinued due to concern for cardiotoxicity. Follows with Dr. Abner Colbert and is currently taking anastrozole. She underwent bilateral partial mastectomies with left sentinel lymph node biopsy and right axillary lymph node dissection on 2/8/22 and recently completed radiation therapy. 6. Hyperlipidemia  7. Anemia of chronic disease      Plan:     1. Overall, patient remains stable from a cardiovascular standpoint. 2. Recommend resuming atorvastatin 40 mg daily (she reports that she ran out of her prescription).   3. Continue aspirin 81 mg daily, Eliquis 5 mg BID, metoprolol succinate 25 mg daily, and lisinopril 2.5 mg daily. 4. Continue to encourage heart healthy, low sodium diet and regular physical exercise for at least 30 minutes a minimum of 3-5 times per week. 5. Follow-up with me in 6 months and will plan to repeat TTE at that time to re-evaluate her cardiac function. Scribe's attestation: This note was scribed in the presence of Yomi Martinez DO by Alpa Cerna RN      It is a pleasure to assist in the care of Mayito Burnett. Please call with any questions. The scribes documentation has been prepared under my direction and personally reviewed by me in its entirety. I confirm that the note above accurately reflects all work, treatment, procedures, and medical decision making performed by me. I, Dr. Jr Eller, personally performed the services described in this documentation as scribed by Alpa Cerna RN in my presence, and it is both accurate and complete to the best of our ability. Thank you for allowing us to participate in the care of Mayito Burnett. Please call me with any questions [phone]. Jr Eller DO  [facility]  [phone] [facility]      I will address the patient's cardiac risk factors and adjusted pharmacologic treatment as needed. In addition, I have reinforced the need for patient directed risk factor modification. All questions and concerns were addressed to the patient/family. Alternatives to my treatment were discussed. The note was completed using EMR. Every effort was made to ensure accuracy; however, inadvertent computerized transcription errors may be present.

## 2022-11-16 NOTE — PATIENT INSTRUCTIONS
Plan:     1. Restart Atorvastatin 40mg once a day  2. Continue taking other cardiac medications as prescribed  3. Will discuss repeating Echo in 6 months  4.  Follow-up with me in 6 months or sooner if needed

## 2022-11-21 ENCOUNTER — COMMUNITY OUTREACH (OUTPATIENT)
Dept: FAMILY MEDICINE CLINIC | Age: 77
End: 2022-11-21

## 2022-11-21 NOTE — PROGRESS NOTES
Patient's HM shows they are overdue for Colonoscopy   CareEverywhere and  files searched without success.

## 2022-12-13 ENCOUNTER — TELEMEDICINE (OUTPATIENT)
Dept: FAMILY MEDICINE CLINIC | Age: 77
End: 2022-12-13
Payer: MEDICARE

## 2022-12-13 ENCOUNTER — TELEPHONE (OUTPATIENT)
Dept: FAMILY MEDICINE CLINIC | Age: 77
End: 2022-12-13

## 2022-12-13 DIAGNOSIS — B02.9 HERPES ZOSTER WITHOUT COMPLICATION: Primary | ICD-10-CM

## 2022-12-13 PROCEDURE — 1123F ACP DISCUSS/DSCN MKR DOCD: CPT | Performed by: REGISTERED NURSE

## 2022-12-13 PROCEDURE — 99213 OFFICE O/P EST LOW 20 MIN: CPT | Performed by: REGISTERED NURSE

## 2022-12-13 RX ORDER — VALACYCLOVIR HYDROCHLORIDE 1 G/1
1000 TABLET, FILM COATED ORAL 3 TIMES DAILY
Qty: 21 TABLET | Refills: 0 | Status: SHIPPED | OUTPATIENT
Start: 2022-12-13 | End: 2022-12-20

## 2022-12-13 RX ORDER — GABAPENTIN 100 MG/1
100 CAPSULE ORAL DAILY PRN
Qty: 30 CAPSULE | Refills: 1 | Status: SHIPPED | OUTPATIENT
Start: 2022-12-13 | End: 2023-03-13

## 2022-12-13 ASSESSMENT — ENCOUNTER SYMPTOMS
FACIAL SWELLING: 0
SINUS PAIN: 0
SORE THROAT: 1
RESPIRATORY NEGATIVE: 1
EYE PAIN: 1
SINUS PRESSURE: 0
RHINORRHEA: 0
SHORTNESS OF BREATH: 0
EYE REDNESS: 1

## 2022-12-13 NOTE — TELEPHONE ENCOUNTER
This came by nurse triage:    Pt is having blisters on her mouth, gums, and face. Pt thinks it may be shingles. She had shingles a few years ago and this feels the same. Pt can not eat due to pain. Drinking broth from a straw. Pt did not want to go to urgent care. Is there anywhere that pt can be worked in today?

## 2022-12-13 NOTE — PROGRESS NOTES
2022    TELEHEALTH EVALUATION -- Audio/Visual (During MPNTN-97 public health emergency)    HPI: This is telephone only per patient request.     Clarence Jurado (:  1945) has requested an audio/video evaluation for the following concern(s):    She is here for blisters that started in her mouth a couple of days ago. Now the blisters are on the right side of her face. She has blisters on her right eye and says her eyelid is slightly swollen. She denies pain with eye movement. She reports a history of shingles in her eye- does not recall which side. Review of Systems   Constitutional:  Negative for fatigue. HENT:  Positive for mouth sores and sore throat (on right side). Negative for congestion, facial swelling, postnasal drip, rhinorrhea, sinus pressure and sinus pain. Eyes:  Positive for pain and redness. Negative for visual disturbance. Respiratory: Negative. Negative for shortness of breath. Cardiovascular: Negative. Musculoskeletal: Negative. Neurological:  Positive for headaches. Prior to Visit Medications    Medication Sig Taking? Authorizing Provider   valACYclovir (VALTREX) 1 g tablet Take 1 tablet by mouth 3 times daily for 7 days Yes Anna Saavedra, APRN - CNP   gabapentin (NEURONTIN) 100 MG capsule Take 1 capsule by mouth daily as needed (pain/numbness/tingling) for up to 90 days.  Yes Marden Petties, APRN - CNP   atorvastatin (LIPITOR) 40 MG tablet Take 1 tablet by mouth daily Yes Yomi Martinez, DO   lisinopril (PRINIVIL;ZESTRIL) 2.5 MG tablet Take 1 tablet by mouth daily Yes Yomi Martinez, DO   metoprolol succinate (TOPROL XL) 25 MG extended release tablet Take 1 tablet by mouth daily Yes Yomi Martinez, DO   alendronate (FOSAMAX) 70 MG tablet Take 70 mg by mouth every 7 days  Yes Historical Provider, MD   vitamin B-12 (CYANOCOBALAMIN) 1000 MCG tablet Take 1,000 mcg by mouth daily Yes Historical Provider, MD   hydrOXYzine (ATARAX) 10 MG tablet Take 10 mg by mouth 3 times daily as needed Yes Historical Provider, MD   aspirin 81 MG chewable tablet Take 1 tablet by mouth daily Yes Raoul Adams MD   nitroGLYCERIN (NITROSTAT) 0.4 MG SL tablet up to max of 3 total doses. If no relief after 1 dose, call 911.  Yes Raoul Adams MD   apixaban (ELIQUIS) 5 MG TABS tablet Take 1 tablet by mouth 2 times daily Yes Roaul Adams MD   calcium carbonate-vitamin D (CALTRATE) 600-400 MG-UNIT TABS per tab Take by mouth Yes Historical Provider, MD   anastrozole (ARIMIDEX) 1 MG tablet Take 1 mg by mouth Yes Historical Provider, MD   ondansetron (ZOFRAN) 8 MG tablet Take 1 tablet by mouth Yes Historical Provider, MD   melatonin (RA MELATONIN) 3 MG TABS tablet Take 1 tablet by mouth nightly as needed (sleep) Yes Sherly Drake APRN - CNP       Social History     Tobacco Use    Smoking status: Never     Passive exposure: Yes    Smokeless tobacco: Never   Vaping Use    Vaping Use: Never used    Passive vaping exposure: Yes   Substance Use Topics    Alcohol use: No    Drug use: No        Allergies   Allergen Reactions    Bactrim [Sulfamethoxazole-Trimethoprim] Nausea Only   ,   Past Medical History:   Diagnosis Date    Breast cancer ([facility]     CHEMO/RADIATION    CAD (coronary artery disease)     Cancer ([facility]     Cardiomyopathy ([facility]     SECONDARY TO CHEMO    CHF (congestive heart failure) (HCC)     Hx of blood clots     PE    Hyperlipidemia     Hypertension     Kidney stone        PHYSICAL EXAMINATION:  [ INSTRUCTIONS:  \"[x]\" Indicates a positive item  \"[]\" Indicates a negative item  -- DELETE ALL ITEMS NOT EXAMINED]    Constitutional: [x] Appears well-developed and well-nourished [x] No apparent distress      [] Abnormal-   Mental status  [x] Alert and awake  [x] Oriented to person/place/time [x]Able to follow commands        Pulmonary/Chest: [x] Respiratory effort normal.  [x] No visualized signs of difficulty breathing or respiratory distress        [] Abnormal- Psychiatric:       [x] Normal Affect [x] No Hallucinations        [] Abnormal-     Other pertinent observable physical exam findings- none    ASSESSMENT/PLAN:    Clinical exam is limited due to patient only being able to do a telephone visit. Due to history of shingles, report of \"blisters\" that do not cross the midline and patient stating that \"this feels like when I had shingles\". Will treat for herpes zoster. Provided number for [facility] and instructed her to contact [facility] for appointment for evaluation for right eye ASAP. Valacyclovir and gabapentin as prescribed. Call the office for in person appointment if no improvement. 1. Herpes zoster without complication    - valACYclovir (VALTREX) 1 g tablet; Take 1 tablet by mouth 3 times daily for 7 days  Dispense: 21 tablet; Refill: 0    2. Neuropathy    - gabapentin (NEURONTIN) 100 MG capsule; Take 1 capsule by mouth daily as needed (pain/numbness/tingling) for up to 90 days. Dispense: 30 capsule; Refill: 1    Return if symptoms worsen or fail to improve. Christiano Rice is a 78 y.o. female being evaluated by a Virtual Visit (video visit) encounter to address concerns as mentioned above. A caregiver was present when appropriate. Due to this being a TeleHealth encounter (During [facility] public health emergency), evaluation of the following organ systems was limited: Vitals/Constitutional/EENT/Resp/CV/GI//MS/Neuro/Skin/Heme-Lymph-Imm. Pursuant to the emergency declaration under the [address] [place] [address] authority and the [employer] Act, this Virtual Visit was conducted with patient's (and/or legal guardian's) consent, to reduce the patient's risk of exposure to COVID-19 and provide necessary medical care.   The patient (and/or legal guardian) has also been advised to contact this office for worsening conditions or problems, and seek emergency medical treatment and/or call 911 if deemed necessary. Services were provided through a video synchronous discussion virtually to substitute for in-person clinic visit. Patient and provider were located at their individual homes. --SOUTH Waite CNP on 12/13/2022 at 11:22 AM    An electronic signature was used to authenticate this note. This chart was generated using the [address] [place] [address] [facility] Smart Panelation system. I created this record but it may contain dictation errors due to the limitation of the software. This is a telehealth visit. Verbal consent to participate in video visit was obtained. Pursuant to the emergency declaration under the [facility], [facility] waiver authority and the [employer] and [employer] Act, this Virtual Visit was conducted, with patient's consent, to reduce the patient's risk of exposure to COVID-19 and provide continuity of care for an established/new patient. Services were provided through a video synchronous discussion virtually to substitute for in-person clinic visit. I discussed with the patient the nature of our telehealth visits via interactive/real-time audio/video that:   - I would evaluate the patient and recommend diagnostics and treatments based on my assessment   - Our sessions are not being recorded and that personal health information is protected   - Our team would provide follow up care in person if/when the patient needs it.

## 2022-12-30 ENCOUNTER — TELEPHONE (OUTPATIENT)
Dept: FAMILY MEDICINE CLINIC | Age: 77
End: 2022-12-30

## 2022-12-30 NOTE — TELEPHONE ENCOUNTER
Reason for Call: advice/appointment needed    Current Symptoms: \"swelling around throat\", no trouble breathing, no chest tightness, no numbness, headache, pain on back of head where shingles are, still has rash from shingles on face area    Onset: x today     Associated Symptoms: Shingles dx 12/13/22    Pain Severity: n/a    Temperature: n/a    What has been tried: tylenol for pain- hasnt seemed to help and feels like shingles have not gotten any better    LMP:  Pregnant:     Advice Given to Pt: Pt advised to go to urgent care, [facility] urgent care, or ER to be evaluated because provider has no available appt's today.

## 2023-01-04 ENCOUNTER — HOSPITAL ENCOUNTER (OUTPATIENT)
Dept: WOMENS IMAGING | Age: 78
Discharge: HOME | End: 2023-01-04
Payer: MEDICARE

## 2023-01-04 DIAGNOSIS — C50.511 MALIGNANT NEOPLASM OF LOWER-OUTER QUADRANT OF RIGHT FEMALE BREAST, UNSPECIFIED ESTROGEN RECEPTOR STATUS (HCC): ICD-10-CM

## 2023-01-04 PROCEDURE — G0279 TOMOSYNTHESIS, MAMMO: HCPCS

## 2023-02-07 DIAGNOSIS — B02.9 HERPES ZOSTER WITHOUT COMPLICATION: ICD-10-CM

## 2023-02-07 RX ORDER — GABAPENTIN 100 MG/1
CAPSULE ORAL
Qty: 30 CAPSULE | Refills: 1 | Status: SHIPPED | OUTPATIENT
Start: 2023-02-07 | End: 2023-03-09

## 2023-02-07 NOTE — TELEPHONE ENCOUNTER
LOV 12/13/2022    Future Appointments   Date Time Provider Moreno Olvera   5/17/2023  1:00 PM [facility]

## 2023-02-22 NOTE — PROGRESS NOTES
Pt's verbal and written discharge instructions given, all questions answered. IV removed. Follow up appointments made and discussed. Instructions given on how to empty VANITA drain and all supplies given for dressing changes. New medications reviewed and pt to pickup from outpatient pharmacy. Pt left in stable condition via wheelchair to private car with family. Tetracycline Counseling: Patient counseled regarding possible photosensitivity and increased risk for sunburn.  Patient instructed to avoid sunlight, if possible.  When exposed to sunlight, patients should wear protective clothing, sunglasses, and sunscreen.  The patient was instructed to call the office immediately if the following severe adverse effects occur:  hearing changes, easy bruising/bleeding, severe headache, or vision changes.  The patient verbalized understanding of the proper use and possible adverse effects of tetracycline.  All of the patient's questions and concerns were addressed. Patient understands to avoid pregnancy while on therapy due to potential birth defects.

## 2023-04-25 NOTE — H&P
Assessment complete per sgna guidelines     Hospital Medicine History & Physical      PCP: SOUTH Collins - CNP    Date of Admission: 10/27/2021    Date of Service: Pt seen/examined on 28 October and Admitted to Inpatient with expected LOS greater than two midnights due to medical therapy. Chief Complaint:  SOB      History Of Present Illness:        78 y.o. female with PMH of breast CA on chemotherapy , recently discharged from [facility] 25 October after dx of Bilateral PEs started on Eliquis, who represented to [facility] with progressive severe SOB/VEE since discharge w/out associated LE edema that has improved since admission. The patient denies any fever/chills or other signs/sxs of systemic illness or identifiable aggravating/alleviating factors. Past Medical History:          Diagnosis Date    Breast cancer ([address]     [facility]     Kidney stone        Past Surgical History:          Procedure Laterality Date    COLONOSCOPY      MRI BREAST BX USING DEVICE RIGHT Right 5/28/2021    MRI BREAST BX USING DEVICE RIGHT 5/28/2021 [facility]    MRI NEEDLE BIOPSY EACH ADDL RIGHT Right 5/28/2021    MRI NEEDLE BIOPSY EACH ADDL RIGHT 5/28/2021 [facility]    PORT SURGERY N/A 5/25/2021    RIGHT PORT A CATH PLACEMENT performed by Jose Maria Vazquez DO at [address] Right 5/7/2021    US BREAST BIOPSY NEEDLE ADDITIONAL RIGHT 5/7/2021 Eduard Pereira MD [address] [place] [address] H BREAST NEEDLE BIOPSY LEFT Left 5/7/2021    US BREAST NEEDLE BIOPSY LEFT 5/7/2021 Eduard Pereira MD [address] [place] [address] H BREAST NEEDLE BIOPSY RIGHT Right 5/7/2021    US BREAST NEEDLE BIOPSY RIGHT 5/7/2021 Eduard Pereira MD [facility]       Medications Prior to Admission:      Prior to Admission medications    Medication Sig Start Date End Date Taking?  Authorizing Provider   apixaban starter pack (ELIQUIS DVT/PE STARTER PACK) 5 MG TBPK tablet Take 1 tablet by mouth See Admin Instructions 10/25/21  Yes Opal Gaines MD   lisinopril (PRINIVIL;ZESTRIL) 2.5 MG tablet Take 1 tablet by mouth daily 10/26/21  Yes Opal Gaines MD   carvedilol (COREG) 3.125 MG tablet Take 1 tablet by mouth 2 times daily (with meals) 10/25/21  Yes Opal Gaines MD   docusate sodium (COLACE) 100 MG capsule Take 100 mg by mouth every 8 hours as needed for Constipation   Yes Historical Provider, MD   loperamide (IMODIUM) 2 MG capsule Take 2 mg by mouth 4 times daily as needed for Diarrhea   Yes Historical Provider, MD   loperamide (IMODIUM) 2 MG capsule Take 1 capsule by mouth 9/7/21  Yes Historical Provider, MD   ondansetron (ZOFRAN) 8 MG tablet Take 1 tablet by mouth 6/21/21  Yes Historical Provider, MD   melatonin (RA MELATONIN) 3 MG TABS tablet Take 1 tablet by mouth nightly as needed (sleep) 5/30/21  Yes SOUTH Magallon - CNP   aspirin 81 MG EC tablet Take 81 mg by mouth daily   Yes Historical Provider, MD   Multiple Vitamin (MULTI-VITAMIN PO) Take by mouth   Yes Historical Provider, MD   Cholecalciferol (VITAMIN D3) 5000 units TABS Take by mouth   Yes Historical Provider, MD   Cyanocobalamin (VITAMIN B-12) 1000 MCG extended release tablet Take 1,000 mcg by mouth daily   Yes Historical Provider, MD       Allergies:  Bactrim [sulfamethoxazole-trimethoprim]    Social History:      The patient currently lives at home and was to receive [address] [place] [address]:   reports that she is a non-smoker but has been exposed to tobacco smoke. She has never used smokeless tobacco.  ETOH:   reports no history of alcohol use. Family History:      Reviewed in detail and negative for CAD, CVA. Positive as follows:        Problem Relation Age of Onset    High Cholesterol Mother     Diabetes Father     Cancer Paternal Grandmother 76        Bladder       REVIEW OF SYSTEMS:   Pertinent positives as noted in the HPI. All other systems reviewed and negative.     PHYSICAL EXAM:    /68   Pulse 93   Temp 97.6 °F (36.4 °C) (Oral)   Resp 18   Ht 5' 7\" (1.702 m)   Wt 119 lb 14.4 oz (54.4 kg)   LMP  (LMP Unknown)   SpO2 94%   BMI 18.78 kg/m²     General appearance:  No apparent distress, appears stated age and cooperative. HEENT:  Normal cephalic, atraumatic without obvious deformity. Pupils equal, round, and reactive to light. Extra ocular muscles intact. Conjunctivae/corneas clear. Neck: Supple, with full range of motion. No jugular venous distention. Trachea midline. Respiratory:  Normal respiratory effort. Clear to auscultation, bilaterally without Rales/Wheezes/Rhonchi. Cardiovascular:  Regular rate and rhythm with normal S1/S2 without murmurs, rubs or gallops. Abdomen: Soft, non-tender, non-distended with normal bowel sounds. Musculoskeletal:  No clubbing, cyanosis or edema bilaterally. Full range of motion without deformity. Skin: Skin color, texture, turgor normal.  No rashes or lesions. Neurologic:  Neurovascularly intact without any focal sensory/motor deficits. Cranial nerves: II-XII intact, grossly non-focal.  Psychiatric:  Alert and oriented, thought content appropriate, normal insight  Capillary Refill: Brisk,< 3 seconds   Peripheral Pulses: +2 palpable, equal bilaterally       CXR:  I have reviewed the CXR with the following interpretation: Bilateral UL ASD  EKG:  I have reviewed the EKG with the following interpretation: Tachy w/out acute ischemic changes. Labs:     Recent Labs     10/27/21  0840 10/28/21  0612   WBC 6.1 5.2   HGB 9.3* 8.9*   HCT 27.5* 26.7*   * 471*     Recent Labs     10/27/21  0840 10/28/21  0612   * 142   K 4.0 3.9   CL 98* 100   CO2 25 27   BUN 19 22*   CREATININE <0.5* <0.5*   CALCIUM 9.2 8.8   PHOS  --  4.8     Recent Labs     10/27/21  0840   AST 20   ALT 27   BILITOT 0.3   ALKPHOS 98     No results for input(s): INR in the last 72 hours.   Recent Labs     10/27/21  0840   TROPONINI 0.01       Urinalysis:      Lab Results   Component Value Date    NITRU Negative 10/27/2021    WBCUA  10/27/2021    BACTERIA 2+ 10/27/2021    RBCUA 0-2 10/27/2021    BLOODU Negative 10/27/2021    SPECGRAV >=1.030 10/27/2021    GLUCOSEU Negative 10/27/2021         ASSESSMENT:    Active Hospital Problems    Diagnosis Date Noted    UTI (urinary tract infection) [N39.0] 10/27/2021    CHF (congestive heart failure) ([facility] [address] [facility] [I50.9] 10/27/2021    CHF (congestive heart failure), NYHA class I, acute on chronic, combined ([facility] [address] [facility] [I50.43] 10/27/2021    Anemia [D64.9] 09/13/2018       PLAN:      CHF - acute on chronic  sytolic failure w/ reduced EF 25-30% by Echo 23 October. Likely due to hypertensive heart disease. Continue current medical management and follow I/O as well as clinical response. UTI - admission U/A c/w acute cystitis. Started on empiric Rocephin in ED on 27 October pending Cx results. Changed to DAILY dosing. Anemia - etiology clinically unable to determine, w/out evidence of active bleeding/hemolysis. Asymptomatic w/out indication for transfusion. Follow serial labs. Reviewed and documented as above. HypoNatremia - etiology clinically unable to determine but likely hypovolemic. Follow serial labs off IVF. Reviewed and documented as above. Pulmonary embolism - acute, recently dx and anticoagulated on Eliquis - continued.       Breast cancer - on Chemotherapy followed outpt by Dr Collin Hussein consulted.     Anxiety - poorly controlled on home meds. Ativan PRN ordered. DVT Prophylaxis: Eliquis  Diet: ADULT DIET; Regular  Code Status: Full Code      PT/OT Eval Status: not yet ordered    Dispo - Likely to home Sat/Sunday 30/31 October at the earliest pending clinical course and subspecialty recs. Carmin Lesch, MD    Thank you SOUTH Luna - JANIYA for the opportunity to be involved in this patient's care. If you have any questions or concerns please feel free to contact me at [phone].

## 2023-05-16 ASSESSMENT — ENCOUNTER SYMPTOMS
RHINORRHEA: 0
SORE THROAT: 0
PHOTOPHOBIA: 0
NAUSEA: 0
CONSTIPATION: 0
EYE PAIN: 0
VOMITING: 0
ABDOMINAL PAIN: 0
COUGH: 0
DIARRHEA: 0
BLOOD IN STOOL: 0

## 2023-05-16 NOTE — PROGRESS NOTES
underwent bilateral partial mastectomies with left sentinel lymph node biopsy and right axillary lymph node dissection on 2/8/22 and and has now completed her radiation therapy. Her most recent TTE from 3/29/22 showed an improvement in LVEF to 40-45% with normal RV size and systolic function and mild mitral and tricuspid regurgitation. Today, the patient reports that she has generally been feeling well from a cardiac standpoint. She stopped taking atorvastatin because she felt like it was causing myalgias. She says that her symptoms improved after stopping the medications. She has had some low BP readings on her home machine, but does not appear to be significant symptomatic from this. BP is 128/76 today in clinic. She denies any chest pain, shortness of breath, lightheadedness, dizziness, palpitations, or lower extremity edema. Past Medical History:   has a past medical history of Breast cancer ([address] CAD (coronary artery disease), Cancer ([address] Cardiomyopathy ([address] CHF (congestive heart failure) ([address] Hx of blood clots, Hyperlipidemia, Hypertension, and Kidney stone. Surgical History:   has a past surgical history that includes [address] LOC DEVICE 1ST LESION RIGHT (Right, 05/07/2021); US BREAST BIOPSY W LOC DEVICE EACH ADDL LESION RIGHT (Right, 05/07/2021); US BREAST BIOPSY W LOC DEVICE 1ST LESION LEFT (Left, 05/07/2021); Colonoscopy; Port Surgery (N/A, 05/25/2021); MRI BIOPSY BREAST W LOC DEVICE RIGHT 1ST LESION (Right, 05/28/2021); MRI BIOPSY BREAST W LOC DEVICE RIGHT EACH ADDL (Right, 05/28/2021); MRI BIOPSY BREAST W LOC DEVICE RIGHT 1ST LESION (Right, 02/02/2022); US PLACE BREAST LOC DEVICE 1ST LESION LEFT (Left, 02/02/2022); US PLACE BREAST LOC DEVICE 1ST LESION RIGHT (Right, 02/02/2022); Breast surgery (N/A, 02/08/2022); Cystoscopy; and Port Surgery (N/A, 10/4/2022). Social History:   reports that she has never smoked. She has been exposed to tobacco smoke.  She has never used

## 2023-05-17 ENCOUNTER — OFFICE VISIT (OUTPATIENT)
Dept: CARDIOLOGY CLINIC | Age: 78
End: 2023-05-17

## 2023-05-17 VITALS
DIASTOLIC BLOOD PRESSURE: 76 MMHG | BODY MASS INDEX: 23.07 KG/M2 | WEIGHT: 147 LBS | HEART RATE: 91 BPM | OXYGEN SATURATION: 98 % | HEIGHT: 67 IN | SYSTOLIC BLOOD PRESSURE: 128 MMHG

## 2023-05-17 DIAGNOSIS — I42.8 NON-ISCHEMIC CARDIOMYOPATHY (HCC): ICD-10-CM

## 2023-05-17 DIAGNOSIS — Z85.3 HISTORY OF BREAST CANCER: ICD-10-CM

## 2023-05-17 DIAGNOSIS — I25.10 CORONARY ARTERY DISEASE INVOLVING NATIVE CORONARY ARTERY OF NATIVE HEART WITHOUT ANGINA PECTORIS: ICD-10-CM

## 2023-05-17 DIAGNOSIS — E78.5 HYPERLIPIDEMIA, UNSPECIFIED HYPERLIPIDEMIA TYPE: ICD-10-CM

## 2023-05-17 DIAGNOSIS — Z86.718 HISTORY OF DVT (DEEP VEIN THROMBOSIS): ICD-10-CM

## 2023-05-17 DIAGNOSIS — Z86.711 HISTORY OF PULMONARY EMBOLUS (PE): ICD-10-CM

## 2023-05-17 DIAGNOSIS — I50.22 CHRONIC SYSTOLIC HEART FAILURE (HCC): Primary | ICD-10-CM

## 2023-05-17 RX ORDER — ROSUVASTATIN CALCIUM 10 MG/1
10 TABLET, COATED ORAL DAILY
Qty: 90 TABLET | Refills: 1 | Status: SHIPPED | OUTPATIENT
Start: 2023-05-17

## 2023-05-17 ASSESSMENT — ENCOUNTER SYMPTOMS: SHORTNESS OF BREATH: 0

## 2023-05-17 NOTE — PATIENT INSTRUCTIONS
Continue aspirin 81 mg daily, Eliquis 5 mg BID, metoprolol succinate 25 mg daily, and lisinopril 2.5 mg daily. Start Rosuvastatin 10 mg daily   Continue to encourage heart healthy, low sodium diet and regular physical exercise for at least 30 minutes a minimum of 3-5 times per week. Repeat TTE at that time to re-evaluate her cardiac function.  Call [phone] to schedule this test  Follow-up with me in 6 months

## 2023-06-09 ENCOUNTER — HOSPITAL ENCOUNTER (OUTPATIENT)
Dept: CARDIOLOGY | Age: 78
Discharge: HOME | End: 2023-06-09
Payer: MEDICARE

## 2023-06-09 DIAGNOSIS — I50.22 CHRONIC SYSTOLIC HEART FAILURE (HCC): ICD-10-CM

## 2023-06-09 DIAGNOSIS — I42.8 NON-ISCHEMIC CARDIOMYOPATHY (HCC): ICD-10-CM

## 2023-06-09 LAB
LV EF: 50 %
LVEF MODALITY: NORMAL

## 2023-06-09 PROCEDURE — 93306 TTE W/DOPPLER COMPLETE: CPT

## 2023-10-27 DIAGNOSIS — Z79.899 MEDICATION MANAGEMENT: ICD-10-CM

## 2023-10-27 DIAGNOSIS — I50.22 CHRONIC SYSTOLIC HEART FAILURE (HCC): ICD-10-CM

## 2023-10-27 RX ORDER — LISINOPRIL 2.5 MG/1
2.5 TABLET ORAL DAILY
Qty: 90 TABLET | Refills: 3 | Status: SHIPPED | OUTPATIENT
Start: 2023-10-27

## 2023-10-27 RX ORDER — METOPROLOL SUCCINATE 25 MG/1
25 TABLET, EXTENDED RELEASE ORAL DAILY
Qty: 90 TABLET | Refills: 3 | Status: SHIPPED | OUTPATIENT
Start: 2023-10-27

## 2023-11-14 RX ORDER — ROSUVASTATIN CALCIUM 10 MG/1
10 TABLET, COATED ORAL DAILY
Qty: 90 TABLET | Refills: 1 | Status: SHIPPED | OUTPATIENT
Start: 2023-11-14

## 2024-01-05 ENCOUNTER — HOSPITAL ENCOUNTER (OUTPATIENT)
Dept: WOMENS IMAGING | Age: 79
Discharge: HOME | End: 2024-01-05
Payer: MEDICARE

## 2024-01-05 VITALS — BODY MASS INDEX: 22.43 KG/M2 | HEIGHT: 68 IN | WEIGHT: 148 LBS

## 2024-01-05 DIAGNOSIS — C50.511 MALIGNANT NEOPLASM OF LOWER-OUTER QUADRANT OF RIGHT FEMALE BREAST, UNSPECIFIED ESTROGEN RECEPTOR STATUS (HCC): ICD-10-CM

## 2024-01-05 DIAGNOSIS — C50.212 MALIGNANT NEOPLASM OF UPPER-INNER QUADRANT OF LEFT FEMALE BREAST, UNSPECIFIED ESTROGEN RECEPTOR STATUS (HCC): ICD-10-CM

## 2024-01-05 PROCEDURE — G0279 TOMOSYNTHESIS, MAMMO: HCPCS

## 2024-01-09 ASSESSMENT — ENCOUNTER SYMPTOMS
ABDOMINAL PAIN: 0
COUGH: 0
SHORTNESS OF BREATH: 0
VOMITING: 0
SORE THROAT: 0
NAUSEA: 0
PHOTOPHOBIA: 0
CONSTIPATION: 0
BLOOD IN STOOL: 0
DIARRHEA: 0
RHINORRHEA: 0
EYE PAIN: 0

## 2024-01-09 NOTE — PROGRESS NOTES
Yomi Martinez DO  [facility] [place]  [phone] [place] Office      I will address the patient's cardiac risk factors and adjusted pharmacologic treatment as needed. In addition, I have reinforced the need for patient directed risk factor modification.  All questions and concerns were addressed to the patient/family. Alternatives to my treatment were discussed. The note was completed using EMR. Every effort was made to ensure accuracy; however, inadvertent computerized transcription errors may be present.

## 2024-01-10 ENCOUNTER — OFFICE VISIT (OUTPATIENT)
Dept: CARDIOLOGY CLINIC | Age: 79
End: 2024-01-10
Payer: MEDICARE

## 2024-01-10 VITALS
OXYGEN SATURATION: 96 % | SYSTOLIC BLOOD PRESSURE: 114 MMHG | BODY MASS INDEX: 23.57 KG/M2 | HEART RATE: 99 BPM | HEIGHT: 68 IN | WEIGHT: 155.5 LBS | DIASTOLIC BLOOD PRESSURE: 72 MMHG

## 2024-01-10 DIAGNOSIS — E78.00 PURE HYPERCHOLESTEROLEMIA: ICD-10-CM

## 2024-01-10 DIAGNOSIS — Z86.711 HISTORY OF PULMONARY EMBOLISM: ICD-10-CM

## 2024-01-10 DIAGNOSIS — Z86.718 HISTORY OF DVT (DEEP VEIN THROMBOSIS): ICD-10-CM

## 2024-01-10 DIAGNOSIS — I50.22 CHRONIC SYSTOLIC HEART FAILURE (HCC): ICD-10-CM

## 2024-01-10 DIAGNOSIS — R42 LIGHTHEADEDNESS: ICD-10-CM

## 2024-01-10 DIAGNOSIS — D63.8 ANEMIA OF CHRONIC DISEASE: ICD-10-CM

## 2024-01-10 DIAGNOSIS — I42.8 NON-ISCHEMIC CARDIOMYOPATHY (HCC): Primary | ICD-10-CM

## 2024-01-10 DIAGNOSIS — Z85.3 HISTORY OF BREAST CANCER: ICD-10-CM

## 2024-01-10 PROCEDURE — 99214 OFFICE O/P EST MOD 30 MIN: CPT | Performed by: INTERNAL MEDICINE

## 2024-01-10 PROCEDURE — 93000 ELECTROCARDIOGRAM COMPLETE: CPT | Performed by: INTERNAL MEDICINE

## 2024-01-10 PROCEDURE — 1123F ACP DISCUSS/DSCN MKR DOCD: CPT | Performed by: INTERNAL MEDICINE

## 2024-01-10 RX ORDER — ASCORBIC ACID 1000 MG
10 TABLET ORAL DAILY
COMMUNITY

## 2024-01-10 NOTE — PATIENT INSTRUCTIONS
Continue aspirin 81 mg daily, Eliquis 5 mg BID, metoprolol succinate 25 mg daily, and  rosuvastatin 10 mg daily   Stop lisinopril 2.5 mg daily  Continue to encourage heart healthy, low sodium diet and regular physical exercise for at least 30 minutes a minimum of 3-5 times per week.  Repeat Fasting cholesterol at your soonest convenience   Orthostatic Blood pressure today   Follow-up with me in 6 months.

## 2024-06-28 NOTE — PROGRESS NOTES
[facility]   Follow-up Visit  [phone]      Attending Physician: Anna Campbell, SOUTH-CNP  Reason for Consultation/Chief Complaint:   Follow-up for non-ischemic cardiomyopathy    Subjective     Keyona He is a 79 y.o. female with a history of non-ischemic cardiomyopathy, non-obstructive coronary artery disease, LVH, breast cancer, anemia of chronic disease, hyperlipidemia, and bilateral pulmonary emboli who presents for follow-up.      The patient had multiple admissions during 2021 for bilateral pulmonary emboli, chest pain, and acute heart failure.  A TTE from 10/22/21 showed an LVEF of 25-30% with global hypokinesis and regional variation, normal RV size and systolic function, mild-moderate mitral regurgitation, and mild pulmonic regurgitation.  There was also a right atrial echodensity noted which was ultimately determined to represent a prominent eustachian valve. It was initially recommended that additional ischemic evaluation be deferred until she had had more time to recover from her acute pulmonary emboli. She was then re-admitted from 11/17-11/21/21 with recurrent chest pain and ultimately underwent invasive coronary angiography which demonstrated a calcified, eccentric 60-70% mid-LAD stenosis that was not physiologically significant by DFR (0.92) or FFR (0.90).  During the procedure, the patient developed ventricular fibrillation while advancing the pressure wire in the LAD and was cardioverted x2.  A cardiac MRI obtained on 12/3/21 showed findings consistent with a non-ischemic cardiomyopathy with calculated LVEF of 30%.  There was no evidence of myocardial scarring, infiltration, or inflammation.  A prominent eustachian valve was noted which likely represented the right atrial echodensity seen on her previous TTE.  There was otherwise no additional evidence of a right atrial mass.    She underwent bilateral partial mastectomies with left sentinel lymph node biopsy and right

## 2024-07-10 ENCOUNTER — OFFICE VISIT (OUTPATIENT)
Dept: CARDIOLOGY CLINIC | Age: 79
End: 2024-07-10
Payer: MEDICARE

## 2024-07-10 VITALS
OXYGEN SATURATION: 97 % | HEIGHT: 68 IN | HEART RATE: 84 BPM | WEIGHT: 162 LBS | SYSTOLIC BLOOD PRESSURE: 132 MMHG | BODY MASS INDEX: 24.55 KG/M2 | DIASTOLIC BLOOD PRESSURE: 76 MMHG

## 2024-07-10 DIAGNOSIS — I42.8 NON-ISCHEMIC CARDIOMYOPATHY (HCC): Primary | ICD-10-CM

## 2024-07-10 DIAGNOSIS — D63.8 ANEMIA OF CHRONIC DISEASE: ICD-10-CM

## 2024-07-10 DIAGNOSIS — Z86.711 HISTORY OF PULMONARY EMBOLISM: ICD-10-CM

## 2024-07-10 DIAGNOSIS — E78.00 PURE HYPERCHOLESTEROLEMIA: ICD-10-CM

## 2024-07-10 DIAGNOSIS — Z85.3 HISTORY OF BREAST CANCER: ICD-10-CM

## 2024-07-10 PROCEDURE — 99214 OFFICE O/P EST MOD 30 MIN: CPT | Performed by: INTERNAL MEDICINE

## 2024-07-10 PROCEDURE — 1123F ACP DISCUSS/DSCN MKR DOCD: CPT | Performed by: INTERNAL MEDICINE

## 2024-07-10 RX ORDER — ROSUVASTATIN CALCIUM 20 MG/1
20 TABLET, COATED ORAL DAILY
Qty: 90 TABLET | Refills: 3 | Status: SHIPPED | OUTPATIENT
Start: 2024-07-10

## 2024-07-10 NOTE — PATIENT INSTRUCTIONS
Continue aspirin 81 mg daily, Eliquis 5 mg BID, metoprolol succinate 25 mg daily, and   Increase  rosuvastatin to 20 mg daily.  Recommend patient make sure that she is maintaining adequate PO fluid intake and always rise slowly from lying and sitting positions and sit or lie down immediately whenever she begins to feel lightheaded.  Continue to encourage heart healthy diet and regular physical exercise for at least 30 minutes a minimum of 3-5 times per week.  Follow-up with me in 1 year.

## 2024-07-11 ENCOUNTER — TELEPHONE (OUTPATIENT)
Dept: CARDIOLOGY CLINIC | Age: 79
End: 2024-07-11

## 2024-07-11 NOTE — TELEPHONE ENCOUNTER
----- Message from Yomi Martinez, DO sent at 7/10/2024  9:17 PM EDT -----  Regarding: Lipid panel  Can we please remind this patient to get the lipid panel that was ordered at her previous visit?  Thanks.

## 2024-07-12 NOTE — TELEPHONE ENCOUNTER
Pt returned call.  Relayed [facility] message to pt.  Pt Ruben and stated that she would have blood work done.

## 2024-10-10 ENCOUNTER — TELEPHONE (OUTPATIENT)
Dept: FAMILY MEDICINE CLINIC | Age: 79
End: 2024-10-10

## 2024-10-16 ENCOUNTER — TELEPHONE (OUTPATIENT)
Dept: FAMILY MEDICINE CLINIC | Age: 79
End: 2024-10-16

## 2024-10-16 NOTE — TELEPHONE ENCOUNTER
3erd attempt LVM for AWV @10/16/24  2:45   
Called Pt to get her up to date on her AWV, LVM advising her to call back and schedule AWV  
Hep B, adolescent or pediatric; 2018 02:53; Charis Robin (RN); [employer]; [id number]; IntraMuscular; Vastus Lateralis Right.; 0.5 milliLiter(s); VIS (VIS Published: 20-Jul-2016, VIS Presented: 2018);

## 2024-10-25 DIAGNOSIS — Z79.899 MEDICATION MANAGEMENT: ICD-10-CM

## 2024-10-25 RX ORDER — METOPROLOL SUCCINATE 25 MG/1
25 TABLET, EXTENDED RELEASE ORAL DAILY
Qty: 90 TABLET | Refills: 3 | Status: SHIPPED | OUTPATIENT
Start: 2024-10-25

## 2024-10-25 NOTE — TELEPHONE ENCOUNTER
Medication Refill    Medication needing refilled: metoprolol succinate (TOPROL XL) 25 MG extended release tablet [[id number]]        Dosage of the medication: 25MG    How are you taking this medication (QD, BID, TID, QID, PRN):   Sig: TAKE ONE TABLET BY MOUTH DAILY              30 or 90 day supply called in: 90    When will you run out of your medication: OUT     Which Pharmacy are we sending the medication to?:   [facility] [id number] - [place], [place] - [address] -  [phone] - F [phone]  [address] [address]  Phone: [phone]  Fax: [phone]

## 2025-01-09 NOTE — PROGRESS NOTES
Hospitalist Progress Note      PCP: SOUTH Lowe CNP    Date of Admission: 11/17/2021    Chief Complaint: Chest pain    Hospital Course:    78 y.o. female who presented to [facility] with chest pain. Patient with history of breast cancer. Patient states she had pressure on like an elephant sitting on chest. No fever or chills. No emesis. Pain radiated to shoulder. Pain was 10 out of 10. Currently 5 out of 10 and tolerable. Improved with rest. No dizziness. Patient recently admitted to hospital and discharged on 11/2. She recently had flu shot yesterday. Felt worse after shot. History of CHF. Previously treated with adriamycin. She is scheduled for cardiac MRI within the month. Subjective:   No chest pain. Feeling tired. No shortness of breath. She states plan is for cardiac cath today.     Medications:  Reviewed    Infusion Medications    heparin (PORCINE) Infusion 800 Units/hr (11/19/21 0546)    sodium chloride       Scheduled Medications    metoprolol succinate  12.5 mg Oral Daily    aspirin  81 mg Oral Daily    atorvastatin  40 mg Oral Nightly    sodium chloride flush  5-40 mL IntraVENous 2 times per day    anastrozole  1 mg Oral Daily    Calcium Carb-Cholecalciferol  500 mg Oral Daily    polyethylene glycol  17 g Oral Daily    polyethylene glycol  17 g Oral Daily     PRN Meds: traMADol, heparin (porcine), heparin (porcine), sodium chloride flush, sodium chloride, acetaminophen **OR** acetaminophen, polyethylene glycol, hydrOXYzine, melatonin, promethazine, nitroGLYCERIN      Intake/Output Summary (Last 24 hours) at 11/19/2021 1010  Last data filed at 11/19/2021 0449  Gross per 24 hour   Intake 720 ml   Output 700 ml   Net 20 ml       Physical Exam Performed:    BP (!) 145/84   Pulse 105   Temp 97.2 °F (36.2 °C) (Oral)   Resp 16   Ht 5' 7.5\" (1.715 m)   Wt 120 lb 3.2 oz (54.5 kg)   LMP  (LMP Unknown)   SpO2 99%   BMI 18.55 kg/m²     General appearance:  No apparent Neck , no lymphadenopathy distress, appears stated age and cooperative. HEENT:  Normal cephalic, atraumatic without obvious deformity. Pupils equal, round, and reactive to light. Extra ocular muscles intact. Conjunctivae/corneas clear. Neck: Supple, with full range of motion. No jugular venous distention. Trachea midline. Respiratory:  Normal respiratory effort. Clear to auscultation, bilaterally without Rales/Wheezes/Rhonchi. Cardiovascular:  Regular rate and rhythm with normal S1/S2 without murmurs, rubs or gallops. Abdomen: Soft, non-tender, non-distended with normal bowel sounds. Musculoskeletal:  No clubbing, cyanosis or edema bilaterally. Full range of motion without deformity. Skin: Skin color, texture, turgor normal.  No rashes or lesions. Neurologic:  Neurovascularly intact without any focal sensory/motor deficits.  Cranial nerves: II-XII intact, grossly non-focal.  Psychiatric:  Alert and oriented, thought content appropriate, normal insight  Capillary Refill: Brisk,3 seconds, normal  Peripheral Pulses: +2 palpable, equal bilaterally        Labs:   Recent Labs     11/17/21  0855 11/18/21  0622 11/18/21 2030   WBC 4.5 4.3 4.6   HGB 9.9* 10.1* 9.3*   HCT 29.6* 30.1* 28.0*    318 293     Recent Labs     11/18/21  0622 11/18/21  0839 11/19/21  0421    138 138   K 4.2 3.9 3.6    102 104   CO2 25 25 23   BUN 21* 20 18   CREATININE 0.7 0.7 <0.5*   CALCIUM 9.3 9.6 9.2     Recent Labs     11/17/21  0855 11/18/21  0839   AST 15 17   ALT 16 16   BILITOT 0.4 0.3   ALKPHOS 102 103     Recent Labs     11/18/21 2030   INR 1.31*     Recent Labs     11/17/21  1530 11/18/21  0839 11/18/21 2030   TROPONINI <0.01 <0.01 <0.01       Urinalysis:      Lab Results   Component Value Date    NITRU Negative 11/18/2021    WBCUA 21-50 11/18/2021    BACTERIA 3+ 11/18/2021    RBCUA 0-2 11/18/2021    BLOODU Negative 11/18/2021    SPECGRAV 1.020 11/18/2021    GLUCOSEU Negative 11/18/2021       Radiology:  XR CHEST PORTABLE   Final Result   No acute pulmonary process. Assessment/Plan:    Active Hospital Problems    Diagnosis     Chest pain due to CAD ([facility] [address] [I25.119]      1. Chest pain - Cardiology consulted. Plan for cardiac cath. Eliquis held. On heparin gtt. 2. Hypotension - BP improved. Restart antihypertensives if remains elevated. 3. HFrEF - Cardiomyopathy from chemo? Lasix on hold. Consider restart if BP improved. 4. Pulmonary emboli - Heparin drip started. Eliquis on hold. Hemoglobin stable. 5. Breast cancer - Continue Arimidex. Follow with oncology. DVT Prophylaxis: Eliquis  Diet: Diet NPO  Code Status: Full Code Discussed with patient    PT/OT Eval Status: Consulted    Dispo - Await cath results. Anticipate discharge 1-2 days?     Yobani Duffy MD

## 2025-01-14 ENCOUNTER — HOSPITAL ENCOUNTER (OUTPATIENT)
Dept: WOMENS IMAGING | Age: 80
Discharge: HOME | End: 2025-01-14
Payer: MEDICARE

## 2025-01-14 VITALS — WEIGHT: 162 LBS | HEIGHT: 67 IN | BODY MASS INDEX: 25.43 KG/M2

## 2025-01-14 DIAGNOSIS — Z12.31 VISIT FOR SCREENING MAMMOGRAM: ICD-10-CM

## 2025-01-14 PROCEDURE — 77063 BREAST TOMOSYNTHESIS BI: CPT

## 2025-02-14 NOTE — PROGRESS NOTES
Assessment complete. VSS tachy when up. Denies pain at this time. Call light in reach. [facility]  ICU ADMISSION NOTE       Patient: Praveen Alva Date of Service: 2/14/2025   female, 82 year old  Admit Date: 2/14/2025   Attending: Hermilo Brown MD        aTTENDING Physician    Hermilo Brown MD      CHIEF COMPLAINT    Blood per rectume    HPI    Praveen Alva is a 82 year old female on Eliquis for atrial fibrillation. Developed blood per rectum last night with dizziness. Took her 7 AM dose of Eliquis 5 mg. Patient brought to ED via EMS.      Hgb 6.4 and Platelets 64 K - K-Centra given. Platelets ordered. 2 units PRBC ordered and being transfused.ordered and transfused. Hypotensive and Hypothermic. 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 Last EF 45% 11/9/24.    CKD 3b Creatinine 1.31 last on 2/5/25 1.48    Plan for urgent endoscopy    PAST MEDICAL HISTORY    Past Medical History:   Diagnosis Date    AF (atrial fibrillation)  (CMD)     Anemia     had Hgb 5.5 on 7-29-20- had to get 5 units PRBC's- was on coumadin but this has been discontinued    Aortic stenosis     Chronic renal failure, stage 3a  (CMD) 09/21/2021    Congestive cardiac failure  (CMD)     DJD (degenerative joint disease)     knees and spine    Gallstone 03/14/2024    History of skin cancer     Hypertension     Obesity     Pneumonia     Severe obesity (BMI >= 40)  (CMD) 09/21/2021    Skin cancer 08/2020    left lower eye lid    Sleep apnea     CPAP       SURGICAL HISTORY    Past Surgical History:   Procedure Laterality Date    Aortic valve replacement      21mm Mary-Hugo tissue    Appendectomy      Basal cell carcinoma excision Left 08/11/2020    eft lower lid correction of Mohs defect with rotational skin flap    Bladder suspension      Cardiac surgery      Carpal tunnel release      Colonoscopy  12/03/2009    Colonoscopy w/ polypectomy  01/28/2013    Colonoscopy w/ polypectomy  03/21/2018     Repeat in 5 years - Dr. Beckwith    Echo rosa      Echo rosa      Echocardiogram      Excision of lingual tonsil      Eye surgery      Hysterectomy  2008    w/BSO    Joint replacement      Mitral valve replacement      27mm Mosaic tissue    Ptca  2014    ARM APPROACH    Tonsillectomy and adenoidectomy      Total knee arthroplasty  2010    left       FAMILY HISTORY    Family History   Problem Relation Age of Onset    Birth defects Son     Heart disease Mother     Cancer, Lung Mother         lung cancer from smoking    Cataracts Mother     Hypertension Daughter     Diabetes Paternal Aunt     Diabetes Father     Other Father          at 22 from allergic reaction to Sulfa    Diabetes Other         fathers siblings and cousins       SOCIAL HISTORY    Social History     Tobacco Use    Smoking status: Never    Smokeless tobacco: Never   Vaping Use    Vaping status: never used   Substance Use Topics    Alcohol use: Yes     Alcohol/week: 0.0 - 1.0 standard drinks of alcohol     Comment: rare    Drug use: No       PRIOR TO ARRIVAL MEDICATIONS  Prior to Admission medications    Medication Sig Start Date End Date Taking? Authorizing Provider   acetaminophen (TYLENOL) 500 MG tablet Take 500 mg by mouth in the morning and 500 mg in the evening.   Yes Provider, Outside   vitamin B-12 (CYANOCOBALAMIN) 1000 MCG tablet Take 1,000 mcg by mouth nightly.   Yes Provider, Outside   Specialty Vitamins Products (LYMPH PO) Lymph MD Support - 1 tablet twice daily   Yes Provider, Outside   bumetanide (Bumex) 1 MG tablet Take 2 tablets by mouth daily AND hold on day of IV 25  Yes Maryam Ward APNP   potassium CHLORIDE (KLOR-CON M) 20 MEQ marce ER tablet Take 2 tablets by mouth daily. 25  Yes Maryam Ward APNP   midodrine (PROAMATINE) 5 MG tablet Take 1 tablet by mouth 3 times daily (before meals). 1/10/25  Yes Maryam Ward APNP   Coenzyme Q10 (Co Q10) 100 MG Cap Take 1 capsule by mouth at bedtime.   Yes  Provider, Outside   aspirin (ECOTRIN) 81 MG EC tablet Take 81 mg by mouth daily.   Yes Provider, Outside   apixaBAN (ELIQUIS) 2.5 MG Tab Take 1 tablet by mouth every 12 hours. 11/11/24  Yes Mingo Harmon, DO   metoPROLOL tartrate (LOPRESSOR) 25 MG tablet Take a HALF tablet by mouth every 12 hours. 6/5/24  Yes Vero Graf APNP   clindamycin (CLEOCIN) 300 MG capsule Take 1 capsule by mouth 4 times daily until gone.  Patient not taking: Reported on 2/12/2025 1/15/25      ipratropium (ATROVENT) 0.06 % nasal spray Spray 1 spray in each nostril nightly as needed for Rhinitis. Not more than 3-4 days/week 12/17/24   Alhaji Leo MD   fluticasone (Flonase Allergy Relief) 50 MCG/ACT nasal spray Spray 1 spray in each nostril daily. 12/17/24   Alhaji Leo MD   diclofenac (Voltaren) 1 % gel Apply 2-4 g topically 4 times daily as needed (knee and back pain).    Provider, Outside   triamcinolone (ARISTOCORT) 0.1 % cream Apply to lower legs 2-3 times weekly as needed 1/19/22   Kushal Simpson PA-C   polyethylene glycol (MIRALAX) 17 GM/SCOOP powder Take 17 g by mouth daily as needed (Constipation). Stir and dissolve powder in any 4 to 8 ounces of beverage, then drink. 1/28/21   Patricio Grande, DO          CURRENT MEDICATIONS      Current Facility-Administered Medications   Medication Dose Route Frequency Provider Last Rate Last Admin    Potassium Standard Replacement Protocol (Levels 3.5 and lower)   Does not apply See Admin Instructions Chito Low MD        Potassium Replacement (Levels 3.6 - 4)   Does not apply See Admin Instructions Chito Low MD        Magnesium Standard Replacement Protocol   Does not apply See Admin Instructions Chito Low MD        Phosphorus Standard Replacement Protocol   Does not apply See Admin Instructions Chito Low MD        [START ON 2/15/2025] pantoprazole (PROTONIX INJECT) injection 40 mg  40 mg Intravenous 2 times per day Chito Low MD        albumin human (SPA)  25 % injection 25 g  25 g Intravenous Once Chito Low MD              CONTINUOUS INFUSIONS  Current Facility-Administered Medications   Medication Dose Route Frequency Provider Last Rate Last Admin    sodium chloride 0.9% infusion   Intravenous Continuous PRN Fredis Gunter MD        sodium chloride 0.9% infusion  390 mL/hr Intravenous Continuous Frisque, Becky M, APNP        sodium chloride 0.9% infusion  130 mL/hr Intravenous Continuous Frisque, Becky M, APNP        sodium chloride 0.9% infusion   Intravenous Continuous PRN Hermilo Brown MD              PRN MEDICATIONS  Current Facility-Administered Medications   Medication Dose Route Frequency Provider Last Rate Last Admin    sodium chloride 0.9% infusion   Intravenous Continuous PRN Fredis Gunter MD        ondansetron (ZOFRAN) injection 4 mg  4 mg Intravenous BID PRN Chito Low MD        prochlorperazine (COMPAZINE) tablet 5 mg  5 mg Oral Q4H PRN Chito Low MD        prochlorperazine (COMPAZINE) injection 5 mg  5 mg Intravenous Q4H PRN Chito Low MD        acetaminophen (TYLENOL) tablet 650 mg  650 mg Oral Q4H PRN Chito Low MD        polyethylene glycol (MIRALAX) packet 17 g  17 g Oral Daily PRN Chito Low MD        docusate sodium-sennosides (SENOKOT S) 50-8.6 MG 2 tablet  2 tablet Oral Daily PRN Chito Low MD        bisacodyl (DULCOLAX) suppository 10 mg  10 mg Rectal Daily PRN Chito Low MD        magnesium hydroxide (MILK OF MAGNESIA) 400 MG/5ML suspension 30 mL  30 mL Oral Daily PRN Chito Low MD        aluminum-magnesium hydroxide-simethicone (MAALOX) 200-200-20 MG/5ML suspension 30 mL  30 mL Oral Q4H PRN Chito Low MD        sodium chloride 0.9 % injection 10 mL  10 mL Intravenous PRN Chito Low MD        sodium chloride (NORMAL SALINE) 0.9 % bolus 500 mL  500 mL Intravenous PRN Chito Low MD        traMADol (ULTRAM) tablet 50 mg  50 mg Oral Q6H PRN Chito Low MD        Or    oxyCODONE  (IMM REL) (ROXICODONE) tablet 5 mg  5 mg Oral Q4H PRN Chito Low MD        sodium chloride 0.9% infusion   Intravenous Continuous PRN Hermilo Brown MD           ALLERGIES    ALLERGIES:   Allergen Reactions    Sulfa Antibiotics Other (See Comments)     FATHER  FROM SULFA SO MOTHER NEVER WANTED HER TO TAKE    Furosemide RASH    Losartan Other (See Comments)     Coughing      Penicillins HIVES       Review of Systems   Constitutional: Negative.   HENT: Negative.     Eyes: Negative.    Cardiovascular: Negative.    Respiratory: Negative.     Endocrine: Negative.    Hematologic/Lymphatic: Positive for bleeding problem.   Skin: Negative.    Musculoskeletal: Negative.    Gastrointestinal:  Positive for melena. Negative for nausea and vomiting.   Genitourinary: Negative.    Neurological:  Positive for dizziness.   Psychiatric/Behavioral: Negative.     Allergic/Immunologic: Negative.         PHYSICAL EXAM    Vital 24 Hour Range Most Recent Value   Temperature Temp  Min: 94.5 °F (34.7 °C)  Max: 97.9 °F (36.6 °C) (!) 94.5 °F (34.7 °C)   Pulse Pulse  Min: 83  Max: 97 94   Respiratory Resp  Min: 13  Max: 24 16   Blood Pressure BP  Min: 80/33  Max: 119/58 (!) 89/39   Pulse Oximetry SpO2  Min: 98 %  Max: 99 % 98 %   Art. BP No data recorded     O2 No data recorded       Vital Most Recent Value First Value   Weight 113.5 kg (250 lb 3.6 oz) Weight: 113.5 kg (250 lb 3.6 oz)   Height 5' 4\" (162.6 cm) Height: 5' 4\" (162.6 cm)   BMI 42.95 N/A       Physical Exam  Vitals and nursing note reviewed.   Constitutional:       General: She is in acute distress.      Appearance: She is obese. She is ill-appearing. She is not toxic-appearing or diaphoretic.   HENT:      Head: Normocephalic and atraumatic.      Mouth/Throat:      Mouth: Mucous membranes are dry.   Eyes:      General: No scleral icterus.     Extraocular Movements: Extraocular movements intact.      Pupils: Pupils are equal, round, and reactive to light.      Comments:  Conjunctiva pale   Neck:      Vascular: No carotid bruit.   Cardiovascular:      Rate and Rhythm: Normal rate. Rhythm irregular.      Pulses: Normal pulses.      Heart sounds: Normal heart sounds. No murmur heard.     Comments: Pulses 2+ radial 1+ Dorsalis pedis  Pulmonary:      Effort: Pulmonary effort is normal. No respiratory distress.      Breath sounds: Normal breath sounds.   Abdominal:      General: There is no distension.      Palpations: Abdomen is soft. There is no mass.      Tenderness: There is no abdominal tenderness.      Hernia: No hernia is present.   Musculoskeletal:         General: Tenderness present.      Cervical back: Normal range of motion and neck supple. No rigidity or tenderness.      Right lower leg: No edema.      Left lower leg: No edema.      Comments: Tender calves bilaterally   Lymphadenopathy:      Cervical: No cervical adenopathy.   Skin:     General: Skin is warm and dry.      Capillary Refill: Capillary refill takes 2 to 3 seconds.      Coloration: Skin is pale. Skin is not jaundiced.      Findings: Lesion present. No rash.   Neurological:      General: No focal deficit present.      Mental Status: She is alert and oriented to person, place, and time. Mental status is at baseline.      Motor: Weakness present.   Psychiatric:         Behavior: Behavior normal.         Thought Content: Thought content normal.        Central line present Right internal jugular vein    Labs    ABGs:    No results found    Laboratory Results:    Recent Labs   Lab 02/14/25  1119   SODIUM 141   POTASSIUM 4.3   CHLORIDE 110   CO2 22   BUN 92*   CREATININE 1.31*   GLUCOSE 116*   AST 35   GPT 28   BILIRUBIN 2.9*   WBC 7.4   HGB 6.4*   HCT 19.3*   PLT 64*       IMAGING & OTHER STUDIES        Study Result Echo 11/9/24    Result Text   Final Impressions    * Limited echo.    * Left ventricular ejection fraction; 45 %.    * Moderate tricuspid valve regurgitation.    * Severely elevated Pulmonary artery systolic  pressure; 58 mmHg.    * Mildly increased right ventricular chamber size.    * Moderately decreased right ventricular systolic function.    * s/p 27 mm Mosaic bioprosthetic mitral valve with subsequent Amplatzer plug  for perivalvular regurgitation.    * Mitral valve prosthesis mean gradient 7 mmHg.    * Mild paravalvular mitral valve regurgitation.    * s/p 21 mm Mary-Hugo bioprosthetic aortic valve.    * Prosthetic aortic valve mean gradient 12 mmHg.    * Mild paravalvular aortic valve regurgitation.    * No pericardial effusion.    * No significant change since the prior study.         Assessment   Active Hospital Problems        Gastrointestinal hemorrhage with melena      *Nontraumatic hemorrhagic shock  (CMD)      Acute blood loss anemia      Acute GI bleeding      CKD (chronic kidney disease)      Biventricular heart failure  (CMD)      Pulmonary hypertension due to left heart valvular disease  (CMD)      Acute on chronic combined systolic (congestive) and diastolic (congestive) heart failure (CMD)      Thrombocytopenia (CMD)      PAF (paroxysmal atrial fibrillation)  (CMD)      Long term (current) use of anticoagulants      hypothermia         Plan      Admitted to ICU  Telemetry  Continuous pulse oximetry  Hemodynamic monitoring  GI Consult - Dr. Zeng  Endoscopy today  Transfuse 2 units PRBC  Transfuse 2 packs platelets  Had received K-Centra  Check DANIKA  Q 6 hours Hgb and Hct  Protonix 40 mg BID  Warming [place]  Electrolyte management  Echocardiogram to assess LV function  Monitor urine output and Creatinine  Hold antihypertensive medications      MAINTENANCE THERAPIES  DVT prophylaxis:  SCDs  GI prophylaxis: Protonix  Nutrition:  NPO  Sedation Holiday: Patient is not sedated, no sedation holiday required    I have personally examined the patient and reviewed all pertinent data. Critical care time was 60 minutes.  This does not include time spent in procedures and teaching.  The patient's  cares and treatment plan were discussed with Patient/Family, Nursing, Respiratory therapist, Pharmacist, Intensivist, Hospitalist, and GI - Dr. Zeng.    Hermilo Brown MD  2/14/2025  3:50 PM

## (undated) DEVICE — DRAPE,T,LAPARO,TRANS,STERILE: Brand: MEDLINE

## (undated) DEVICE — COVER US PRB W12XL244CM SURGICAL INTRAOPERATIVE PLAS TAPR L

## (undated) DEVICE — STERILE POLYISOPRENE POWDER-FREE SURGICAL GLOVES: Brand: PROTEXIS

## (undated) DEVICE — SUTURE PROL SZ 3-0 L30IN NONABSORBABLE BLU SH-1 L22MM 1/2 8762H

## (undated) DEVICE — STANDARD HYPODERMIC NEEDLE,POLYPROPYLENE HUB: Brand: MONOJECT

## (undated) DEVICE — BLADE ES ELASTOMERIC COAT INSUL DURABLE BEND UPTO 90DEG

## (undated) DEVICE — MINOR SET UP: Brand: MEDLINE INDUSTRIES, INC.

## (undated) DEVICE — INTENDED FOR TISSUE SEPARATION, AND OTHER PROCEDURES THAT REQUIRE A SHARP SURGICAL BLADE TO PUNCTURE OR CUT.: Brand: BARD-PARKER ® CARBON RIB-BACK BLADES

## (undated) DEVICE — ADHESIVE SKIN CLSR 0.7ML TOP DERMBND ADV

## (undated) DEVICE — RETRACTOR SURG WIDE FLAT LO PROF LTWT PHOTONGUIDE

## (undated) DEVICE — GLOVE SURG SZ 65 THK91MIL LTX FREE SYN POLYISOPRENE

## (undated) DEVICE — RESERVOIR,SUCTION,100CC,SILICONE: Brand: MEDLINE

## (undated) DEVICE — SHEARS ENDOSCP L9CM CRV HARM FOCS +

## (undated) DEVICE — SOLUTION IV IRRIG POUR BRL 0.9% SODIUM CHL 2F7124

## (undated) DEVICE — SUTURE MCRYL + SZ 4-0 L18IN ABSRB UD L19MM PS-2 3/8 CIR MCP496G

## (undated) DEVICE — POST-SURG COMPRESSION BRA,MD: Brand: MEDLINE

## (undated) DEVICE — SUTURE VCRL + SZ 3-0 L27IN ABSRB UD L26MM SH 1/2 CIR VCP416H

## (undated) DEVICE — YANKAUER,OPEN TIP,W/O VENT,STERILE: Brand: MEDLINE INDUSTRIES, INC.

## (undated) DEVICE — TOWEL,OR,DSP,ST,BLUE,STD,6/PK,12PK/CS: Brand: MEDLINE

## (undated) DEVICE — GLOVE SURG SZ 65 L12IN FNGR THK79MIL GRN LTX FREE

## (undated) DEVICE — PROBE SET W/ DRP

## (undated) DEVICE — COVER LT HNDL PLAS RIG 2 PER PK

## (undated) DEVICE — PENCIL SMK EVAC MPLR ULT VAC E Z CLN TLS MEGADYNE

## (undated) DEVICE — SOLUTION IV IRRIG 500ML 0.9% SODIUM CHL 2F7123

## (undated) DEVICE — DRAIN SURG 15FR SIL RND CHN W/ TRCR FULL FLUT DBL WRP TRAD

## (undated) DEVICE — SYRINGE MED 10ML LUERLOCK TIP W/O SFTY DISP

## (undated) DEVICE — GOWN SIRUS NONREIN LG W/TWL: Brand: MEDLINE INDUSTRIES, INC.

## (undated) DEVICE — GAUZE,SPONGE,4"X4",16PLY,XRAY,STRL,LF: Brand: MEDLINE

## (undated) DEVICE — TOTAL TRAY, DB, 100% SILI FOLEY, 16FR 10: Brand: MEDLINE

## (undated) DEVICE — SUTURE ETHLN SZ 2-0 L18IN NONABSORBABLE BLK L26MM FS 3/8 664G

## (undated) DEVICE — TUBING SUCT 12FR MAL ALUM SHFT FN CAP VENT UNIV CONN W/ OBT

## (undated) DEVICE — GLOVE,SURG,SENSICARE SLT,LF,PF,6.5: Brand: MEDLINE

## (undated) DEVICE — Device

## (undated) DEVICE — Z DISCONTINUED USE 2272117 DRAPE SURG 3 QTR N INVASIVE 2 LAYR DISP

## (undated) DEVICE — SKIN MARKER,REGULAR TIP WITH RULER AND LABELS: Brand: DEVON

## (undated) DEVICE — BLADE CARBON-STEEL #15 STRL TWIN-BACK

## (undated) DEVICE — GOWN SIRUS NONREIN XL W/TWL: Brand: MEDLINE INDUSTRIES, INC.

## (undated) DEVICE — GLOVE SURG SZ 9 L11.85IN FNGR THK9.8MIL STRW LTX POLYMER

## (undated) DEVICE — SMARTGOWN BREATHABLE SURGICAL GOWN: Brand: CONVERTORS

## (undated) DEVICE — CLIP LIG M BLU TI HRT SHP WIRE HORZ 600 PER BX

## (undated) DEVICE — 3M™ TEGADERM™ TRANSPARENT FILM DRESSING FRAME STYLE, 1624W, 2-3/8 IN X 2-3/4 IN (6 CM X 7 CM), 100/CT 4CT/CASE: Brand: 3M™ TEGADERM™

## (undated) DEVICE — C-ARM: Brand: UNBRANDED

## (undated) DEVICE — GAUZE,SPONGE,2"X2",8PLY,STERILE,LF,2'S: Brand: MEDLINE

## (undated) DEVICE — DRAPE,SPLIT ,77X120: Brand: MEDLINE

## (undated) DEVICE — COVER,MAYO STAND,XL,STERILE: Brand: MEDLINE

## (undated) DEVICE — SUTURE PERMA-HAND SZ 2-0 L30IN NONABSORBABLE BLK L26MM SH K833H

## (undated) DEVICE — APPLICATOR MEDICATED 26 CC SOLUTION HI LT ORNG CHLORAPREP